# Patient Record
Sex: FEMALE | Race: BLACK OR AFRICAN AMERICAN | NOT HISPANIC OR LATINO | Employment: OTHER | URBAN - METROPOLITAN AREA
[De-identification: names, ages, dates, MRNs, and addresses within clinical notes are randomized per-mention and may not be internally consistent; named-entity substitution may affect disease eponyms.]

---

## 2017-03-24 ENCOUNTER — OFFICE VISIT (OUTPATIENT)
Dept: FAMILY MEDICINE | Facility: CLINIC | Age: 82
End: 2017-03-24
Payer: MEDICARE

## 2017-03-24 VITALS
HEIGHT: 64 IN | RESPIRATION RATE: 18 BRPM | BODY MASS INDEX: 23.82 KG/M2 | WEIGHT: 139.56 LBS | SYSTOLIC BLOOD PRESSURE: 130 MMHG | HEART RATE: 81 BPM | DIASTOLIC BLOOD PRESSURE: 72 MMHG | TEMPERATURE: 98 F | OXYGEN SATURATION: 99 %

## 2017-03-24 DIAGNOSIS — L23.9 ALLERGIC DERMATITIS: Primary | ICD-10-CM

## 2017-03-24 PROCEDURE — 99999 PR PBB SHADOW E&M-EST. PATIENT-LVL III: CPT | Mod: PBBFAC,,, | Performed by: FAMILY MEDICINE

## 2017-03-24 PROCEDURE — 99213 OFFICE O/P EST LOW 20 MIN: CPT | Mod: PBBFAC,PN | Performed by: FAMILY MEDICINE

## 2017-03-24 PROCEDURE — 99214 OFFICE O/P EST MOD 30 MIN: CPT | Mod: S$PBB,25,, | Performed by: FAMILY MEDICINE

## 2017-03-24 PROCEDURE — 96372 THER/PROPH/DIAG INJ SC/IM: CPT | Mod: PBBFAC,PN

## 2017-03-24 RX ORDER — TRIAMCINOLONE ACETONIDE 40 MG/ML
40 INJECTION, SUSPENSION INTRA-ARTICULAR; INTRAMUSCULAR
Status: COMPLETED | OUTPATIENT
Start: 2017-03-24 | End: 2017-03-24

## 2017-03-24 RX ORDER — KETOCONAZOLE 20 MG/G
CREAM TOPICAL
COMMUNITY
Start: 2016-12-24 | End: 2018-02-01 | Stop reason: SDUPTHER

## 2017-03-24 RX ORDER — HYDROXYZINE PAMOATE 25 MG/1
25 CAPSULE ORAL EVERY 8 HOURS PRN
Qty: 30 CAPSULE | Refills: 0 | Status: SHIPPED | OUTPATIENT
Start: 2017-03-24 | End: 2018-01-30

## 2017-03-24 RX ADMIN — TRIAMCINOLONE ACETONIDE 40 MG: 40 INJECTION, SUSPENSION INTRA-ARTICULAR; INTRAMUSCULAR at 11:03

## 2017-03-24 NOTE — MR AVS SNAPSHOT
[facility]  [address]  [place] [address]  Phone: [phone]                  Daiana Mcduffie   3/24/2017 10:15 AM   Office Visit    Description:  Female : 1929   Provider:  Nino Treviño MD   Department:  [facility]           Reason for Visit     Rash     Headache           Diagnoses this Visit        Comments    Allergic dermatitis    -  Primary            To Do List           Goals (5 Years of Data)     None      Follow-Up and Disposition     Return if symptoms worsen or fail to improve.       These Medications        Disp Refills Start End    hydrOXYzine pamoate (VISTARIL) 25 MG Cap 30 capsule 0 3/24/2017     Take 1 capsule (25 mg total) by mouth every 8 (eight) hours as needed. - Oral    Pharmacy: [id number] - [place], [place] - [address] Ph #: [phone]         [facility]     [facility] Care [facility] -  Assistance  Registered nurses in the [facility] provide clinical advisement, health education, appointment booking, and other advisory services.  Call for this free service at [phone].             Medications           Message regarding Medications     Verify the changes and/or additions to your medication regime listed below are the same as discussed with your clinician today.  If any of these changes or additions are incorrect, please notify your healthcare provider.        START taking these NEW medications        Refills    hydrOXYzine pamoate (VISTARIL) 25 MG Cap 0    Sig: Take 1 capsule (25 mg total) by mouth every 8 (eight) hours as needed.    Class: Normal    Route: Oral      These medications were administered today        Dose Freq    triamcinolone acetonide injection 40 mg 40 mg Clinic/HOD 1 time    Sig: Inject 1 mL (40 mg total) into the muscle one time.    Class: Normal    Route: Intramuscular           Verify that the below list of medications is an accurate representation of  "the medications you are currently taking.  If none reported, the list may be blank. If incorrect, please contact your healthcare provider. Carry this list with you in case of emergency.           Current Medications     amlodipine (NORVASC) 2.5 MG tablet Take 1 tablet (2.5 mg total) by mouth once daily.    atorvastatin (LIPITOR) 20 MG tablet TAKE ONE TABLET BY MOUTH ONCE DAILY    cetirizine (ZYRTEC) 10 MG tablet Take 1 tablet (10 mg total) by mouth every evening.    clopidogrel (PLAVIX) 75 mg tablet Take 1 tablet (75 mg total) by mouth once daily.    fluticasone (FLONASE) 50 mcg/actuation nasal spray 1 spray by Each Nare route once daily.    ketoconazole (NIZORAL) 2 % cream     lisinopril (PRINIVIL,ZESTRIL) 40 MG tablet Take 1 tablet (40 mg total) by mouth once daily.    meclizine (ANTIVERT) 25 mg tablet TAKE ONE TABLET BY MOUTH THREE TIMES DAILY AS NEEDED FOR  DIZZINESS    triamcinolone acetonide 0.1% (KENALOG) 0.1 % cream Apply topically 2 (two) times daily.    hydrOXYzine pamoate (VISTARIL) 25 MG Cap Take 1 capsule (25 mg total) by mouth every 8 (eight) hours as needed.           Clinical Reference Information           Your Vitals Were     BP Pulse Temp Resp Height Weight    130/72 (BP Location: Right arm, Patient Position: Sitting, BP Method: Manual) 81 97.6 °F (36.4 °C) (Oral) 18 5' 4" (1.626 m) 63.3 kg (139 lb 8.8 oz)    SpO2 BMI             99% 23.95 kg/m2         Blood Pressure          Most Recent Value    BP  130/72      Allergies as of 3/24/2017     Ibuprofen    Penicillins      Immunizations Administered on Date of Encounter - 3/24/2017     None      [facility] Sign-Up     Activating your [facility] account is as easy as 1-2-3!     1) Visit [email or web address], select Sign Up Now, enter this activation code and your date of birth, then select Next.  [email or web address]  Expires: 5/8/2017 10:50 AM      2) Create a username and password to use when you visit [facility] in the future and select a security question " in case you lose your password and select Next.    3) Enter your e-mail address and click Sign Up!    Additional Information  If you have questions, please e-mail [email or web address] or call [phone] to talk to our [facility] staff. Remember, [facility] is NOT to be used for urgent needs. For medical emergencies, dial 911.         Language Assistance Services     ATTENTION: Language assistance services are available, free of charge. Please call [phone].      ATENCIÓN: Si habla español, tiene a gordon disposición servicios gratuitos de asistencia lingüística. Llame al [phone].     CHÚ Ý: N?u b?n nói Ti?ng Vi?t, có các d?ch v? h? tr? ngôn ng? mi?n phí dành cho b?n. G?i s? [phone].         [facility] complies with applicable Federal civil rights laws and does not discriminate on the basis of race, color, national origin, age, disability, or sex.

## 2017-03-24 NOTE — PROGRESS NOTES
Routine Office Visit    Patient Name: Daiana Mcduffie    : 1929  MRN: [id number]    Subjective:  Daiana is a 87 y.o. female who presents today for:    1. Itching  Patient states that she has been getting bitten by something that causes her to form red bumps and patches of redness all over.  She states that she is constantly itching.  She thought it may be bed bugs, but had her home inspected and told she didn't have bed bugs.  She doesn't know of anything that she is allergic to other than a couple of medications.  She has no n/v/d, wheezing, or shortness of breath.  She has not taken any OTC antihistamine.     Past Medical History  Past Medical History:   Diagnosis Date    GERD (gastroesophageal reflux disease)     Hyperlipidemia     Hypertension        Past Surgical History  History reviewed. No pertinent surgical history.    Family History  History reviewed. No pertinent family history.    Social History  Social History     Social History    Marital status:      Spouse name: N/A    Number of children: N/A    Years of education: N/A     Occupational History    Not on file.     Social History Main Topics    Smoking status: Never Smoker    Smokeless tobacco: Not on file    Alcohol use No    Drug use: No    Sexual activity: No     Other Topics Concern    Not on file     Social History Narrative       Current Medications  Current Outpatient Prescriptions on File Prior to Visit   Medication Sig Dispense Refill    amlodipine (NORVASC) 2.5 MG tablet Take 1 tablet (2.5 mg total) by mouth once daily. 30 tablet 0    atorvastatin (LIPITOR) 20 MG tablet TAKE ONE TABLET BY MOUTH ONCE DAILY 30 tablet 2    cetirizine (ZYRTEC) 10 MG tablet Take 1 tablet (10 mg total) by mouth every evening.  0    clopidogrel (PLAVIX) 75 mg tablet Take 1 tablet (75 mg total) by mouth once daily. 30 tablet 11    fluticasone (FLONASE) 50 mcg/actuation nasal spray 1 spray by Each Nare route once daily.      lisinopril  "(PRINIVIL,ZESTRIL) 40 MG tablet Take 1 tablet (40 mg total) by mouth once daily. 30 tablet 0    meclizine (ANTIVERT) 25 mg tablet TAKE ONE TABLET BY MOUTH THREE TIMES DAILY AS NEEDED FOR  DIZZINESS 30 tablet 0    triamcinolone acetonide 0.1% (KENALOG) 0.1 % cream Apply topically 2 (two) times daily. 28.4 g 0     No current facility-administered medications on file prior to visit.        Allergies   Review of patient's allergies indicates:   Allergen Reactions    Ibuprofen     Penicillins        Review of Systems (Pertinent positives)  Review of Systems   Constitutional: Negative.    HENT: Negative.    Eyes: Negative.    Respiratory: Negative.    Cardiovascular: Negative.    Gastrointestinal: Negative.    Skin: Positive for itching and rash.         /72 (BP Location: Right arm, Patient Position: Sitting, BP Method: Manual)  Pulse 81  Temp 97.6 °F (36.4 °C) (Oral)   Resp 18  Ht 5' 4" (1.626 m)  Wt 63.3 kg (139 lb 8.8 oz)  SpO2 99%  BMI 23.95 kg/m2    GENERAL APPEARANCE: in no apparent distress and well developed and well nourished  HEENT: PERRL, EOMI, Sclera clear, anicteric, Oropharynx clear, no lesions, Neck supple with midline trachea  NECK: normal, supple, no adenopathy, thyroid normal in size  RESPIRATORY: appears well, vitals normal, no respiratory distress, acyanotic, normal RR, chest clear, no wheezing, crepitations, rhonchi, normal symmetric air entry  HEART: regular rate and rhythm, S1, S2 normal, no murmur, click, rub or gallop.    ABDOMEN: abdomen is soft without tenderness, no masses, no hernias, no organomegaly, no rebound, no guarding. Suprapubic tenderness absent. No CVA tenderness.  SKIN: small, erythematous bump noted to right bicep, no other rash seen  PSYCH: Alert, oriented x 3, thought content appropriate, speech normal, pleasant and cooperative, good eye contact, well groomed    Assessment/Plan:  Daiana Mcduffie is a 87 y.o. female who presents today for :    [place] was seen " today for rash and headache.    Diagnoses and all orders for this visit:    Allergic dermatitis  -     hydrOXYzine pamoate (VISTARIL) 25 MG Cap; Take 1 capsule (25 mg total) by mouth every 8 (eight) hours as needed.  -     triamcinolone acetonide injection 40 mg; Inject 1 mL (40 mg total) into the muscle one time.      1.  Steroid for relief of itching  2.  Vistaril for recurring itching  3.  Follow up as needed  4.  She is to call if symptoms persist or worsen    Nino Treviño MD

## 2017-05-09 ENCOUNTER — OFFICE VISIT (OUTPATIENT)
Dept: FAMILY MEDICINE | Facility: CLINIC | Age: 82
End: 2017-05-09
Payer: MEDICARE

## 2017-05-09 VITALS
TEMPERATURE: 98 F | RESPIRATION RATE: 17 BRPM | HEART RATE: 85 BPM | OXYGEN SATURATION: 97 % | DIASTOLIC BLOOD PRESSURE: 86 MMHG | BODY MASS INDEX: 23.37 KG/M2 | WEIGHT: 136.88 LBS | SYSTOLIC BLOOD PRESSURE: 136 MMHG | HEIGHT: 64 IN

## 2017-05-09 DIAGNOSIS — R07.9 CHEST PAIN, UNSPECIFIED TYPE: Primary | ICD-10-CM

## 2017-05-09 DIAGNOSIS — I10 ESSENTIAL HYPERTENSION: ICD-10-CM

## 2017-05-09 PROCEDURE — 99213 OFFICE O/P EST LOW 20 MIN: CPT | Mod: PBBFAC,PN,25 | Performed by: INTERNAL MEDICINE

## 2017-05-09 PROCEDURE — 99214 OFFICE O/P EST MOD 30 MIN: CPT | Mod: S$PBB,,, | Performed by: INTERNAL MEDICINE

## 2017-05-09 PROCEDURE — 99999 PR PBB SHADOW E&M-EST. PATIENT-LVL III: CPT | Mod: PBBFAC,,, | Performed by: INTERNAL MEDICINE

## 2017-05-09 PROCEDURE — 93005 ELECTROCARDIOGRAM TRACING: CPT | Mod: PBBFAC,PN | Performed by: INTERNAL MEDICINE

## 2017-05-09 PROCEDURE — 93010 ELECTROCARDIOGRAM REPORT: CPT | Mod: ,,, | Performed by: INTERNAL MEDICINE

## 2017-05-09 NOTE — PROGRESS NOTES
Subjective:       Patient ID: Daiana Mcduffie is a 88 y.o. female.    Chief Complaint: Hypertension; Follow-up (3 month ); Chest Pain (x's one day ); and Sinusitis    HPI Comments: Today she complains of a 2 day history of intermittent chest pain associated with shortness of breath.  She describes her pain is 4 out of 10.  She does have pain currently though it is mild.  She denies any other associated symptoms.  She reports that the pain moves and she sometimes feels like she has something stuck in her throat.  She denies heartburn does she does belch from time to time.  This seems to help also.    Review of Systems   Constitutional: Negative for diaphoresis.   Respiratory: Positive for shortness of breath.    Cardiovascular: Positive for chest pain. Negative for leg swelling.   Gastrointestinal: Negative for abdominal pain and nausea.   Neurological: Negative for light-headedness.       Objective:      Physical Exam   Constitutional: She is oriented to person, place, and time. She appears well-developed and well-nourished. No distress.   HENT:   Head: Normocephalic and atraumatic.   Right Ear: External ear normal.   Left Ear: External ear normal.   Eyes: Conjunctivae are normal. No scleral icterus.   Cardiovascular: Normal rate, regular rhythm and normal heart sounds.  Exam reveals no gallop and no friction rub.    No murmur heard.  Pulmonary/Chest: Effort normal and breath sounds normal. No respiratory distress. She has no wheezes. She has no rales.   Abdominal: Soft. Bowel sounds are normal. She exhibits no distension and no mass. There is no tenderness. There is no rebound and no guarding.   Musculoskeletal: She exhibits no edema or tenderness.   Neurological: She is alert and oriented to person, place, and time. No cranial nerve deficit.   Skin: Skin is warm and dry.   Psychiatric: She has a normal mood and affect.   Vitals reviewed.      Assessment:       1. Chest pain, unspecified type    2. Essential  hypertension        Plan:       Daiana was seen today for hypertension, follow-up, chest pain and sinusitis.    Diagnoses and all orders for this visit:    Chest pain, unspecified type - I suspect her symptoms are GI in nature as she has improvement with belching and reports a feeling of fullness in her throat.  Trial of Zantac.  No significant changes on EKG.  She will follow-up if persistent symptoms  -     EKG 12-lead    Essential hypertension - BP controlled.       follow-up in 3 months

## 2017-06-26 DIAGNOSIS — E78.5 HYPERLIPIDEMIA, UNSPECIFIED HYPERLIPIDEMIA TYPE: ICD-10-CM

## 2017-06-26 RX ORDER — LISINOPRIL 40 MG/1
40 TABLET ORAL DAILY
Qty: 30 TABLET | Refills: 2 | Status: SHIPPED | OUTPATIENT
Start: 2017-06-26 | End: 2017-07-21 | Stop reason: SDUPTHER

## 2017-06-26 RX ORDER — ATORVASTATIN CALCIUM 20 MG/1
20 TABLET, FILM COATED ORAL DAILY
Qty: 30 TABLET | Refills: 2 | Status: SHIPPED | OUTPATIENT
Start: 2017-06-26 | End: 2017-10-27 | Stop reason: SDUPTHER

## 2017-06-26 NOTE — TELEPHONE ENCOUNTER
----- Message from Carly Rachel sent at 6/26/2017  1:38 PM CDT -----  Contact: SELF  REFILL: lisinopril (PRINIVIL,ZESTRIL) 40 MG tablet                 atorvastatin (LIPITOR) 20 MG tablet    PLEASE SEND TO [facility]

## 2017-07-20 ENCOUNTER — TELEPHONE (OUTPATIENT)
Dept: FAMILY MEDICINE | Facility: CLINIC | Age: 82
End: 2017-07-20

## 2017-07-20 ENCOUNTER — HOSPITAL ENCOUNTER (OUTPATIENT)
Dept: RADIOLOGY | Facility: HOSPITAL | Age: 82
Discharge: HOME | End: 2017-07-20
Attending: NURSE PRACTITIONER
Payer: MEDICARE

## 2017-07-20 ENCOUNTER — OFFICE VISIT (OUTPATIENT)
Dept: FAMILY MEDICINE | Facility: CLINIC | Age: 82
End: 2017-07-20
Payer: MEDICARE

## 2017-07-20 VITALS
TEMPERATURE: 98 F | HEIGHT: 64 IN | SYSTOLIC BLOOD PRESSURE: 130 MMHG | HEART RATE: 82 BPM | WEIGHT: 138 LBS | BODY MASS INDEX: 23.56 KG/M2 | DIASTOLIC BLOOD PRESSURE: 78 MMHG | OXYGEN SATURATION: 97 % | RESPIRATION RATE: 17 BRPM

## 2017-07-20 DIAGNOSIS — R68.83 CHILLS: ICD-10-CM

## 2017-07-20 DIAGNOSIS — R68.83 CHILLS: Primary | ICD-10-CM

## 2017-07-20 DIAGNOSIS — J30.9 ALLERGIC RHINITIS, UNSPECIFIED CHRONICITY, UNSPECIFIED SEASONALITY, UNSPECIFIED TRIGGER: ICD-10-CM

## 2017-07-20 PROCEDURE — 99999 PR PBB SHADOW E&M-EST. PATIENT-LVL IV: CPT | Mod: PBBFAC,,, | Performed by: NURSE PRACTITIONER

## 2017-07-20 PROCEDURE — 1126F AMNT PAIN NOTED NONE PRSNT: CPT | Mod: ,,, | Performed by: NURSE PRACTITIONER

## 2017-07-20 PROCEDURE — 1159F MED LIST DOCD IN RCRD: CPT | Mod: ,,, | Performed by: NURSE PRACTITIONER

## 2017-07-20 PROCEDURE — 99214 OFFICE O/P EST MOD 30 MIN: CPT | Mod: S$PBB,,, | Performed by: NURSE PRACTITIONER

## 2017-07-20 PROCEDURE — 99214 OFFICE O/P EST MOD 30 MIN: CPT | Mod: PBBFAC,25,PN | Performed by: NURSE PRACTITIONER

## 2017-07-20 RX ORDER — FLUTICASONE PROPIONATE 50 MCG
1 SPRAY, SUSPENSION (ML) NASAL DAILY
Qty: 1 BOTTLE | Refills: 1 | Status: SHIPPED | OUTPATIENT
Start: 2017-07-20 | End: 2018-10-01 | Stop reason: SDUPTHER

## 2017-07-20 NOTE — PROGRESS NOTES
Subjective:       Patient ID: Daiana Mcduffie is a 88 y.o. female.    Chief Complaint: Fatigue and Chills    HPI Ms Mcduffie is here for some vague complaints.  She reports some chills and fatigue.  She is requesting vitamins.  She denies any CP, SOB, rhinorrhea, cough, bloody or black stool.  Her appetite is good per her daughter.  Review of Systems   Constitutional: Positive for chills and fatigue. Negative for fever.   Respiratory: Negative.    Cardiovascular: Negative.        Objective:      Physical Exam   Constitutional: She is oriented to person, place, and time. She appears well-developed and well-nourished. She does not appear ill. No distress.   HENT:   Head: Normocephalic and atraumatic.   Mildly full TMs  Mildly enlarged turbinates.   Cardiovascular: Normal rate, regular rhythm and normal heart sounds.  Exam reveals no friction rub.    No murmur heard.  Pulses:       Dorsalis pedis pulses are 2+ on the right side, and 2+ on the left side.        Posterior tibial pulses are 2+ on the right side, and 2+ on the left side.   Pulmonary/Chest: Effort normal and breath sounds normal. No respiratory distress. She has no decreased breath sounds. She has no wheezes. She has no rhonchi. She has no rales.   Musculoskeletal: Normal range of motion. She exhibits no edema.   Neurological: She is alert and oriented to person, place, and time.   Skin: Skin is warm and dry.   Psychiatric: She has a normal mood and affect.   Vitals reviewed.      Assessment:       1. Chills    2. Allergic rhinitis, unspecified chronicity, unspecified seasonality, unspecified trigger        Plan:       Chills  -     POCT urinalysis, dipstick or tablet reag  -     Cancel: X-Ray Chest PA And Lateral; Future; Expected date: 07/20/2017    Allergic rhinitis, unspecified chronicity, unspecified seasonality, unspecified trigger  -     fluticasone (FLONASE) 50 mcg/actuation nasal spray; 1 spray by Each Nare route once daily.  Dispense: 1 Bottle;  Refill: 1    Her exam is WNL,   Check for UTI, pneumonia?  encouraged her to use children's gummy vitamins since she states she is unable to swallow pills whole.    She is unable to make urine, will take cup home and return.    F/u if not improved  Go to ER for new worse or concerning symptoms      Verbalized understanding

## 2017-07-20 NOTE — PATIENT INSTRUCTIONS
Follow up if not improved  Go to ER for new worse or concerning symptoms    Allergic Rhinitis  Allergic rhinitis is an allergic reaction that affects the nose, and often the eyes. Its often known as nasal allergies. Nasal allergies are often due to things in the environment that are breathed in. Depending what you are sensitive to, nasal allergies may occur only during certain seasons. Or they may occur year round. Common indoor allergens include house dust mites, mold, cockroaches, and pet dander. Outdoor allergens include pollen from trees, grasses, and weeds.   Symptoms include a drippy, stuffy, and itchy nose. They also include sneezing and red and itchy eyes. You may feel tired more often. Severe allergies may also affect your breathing and trigger a condition called asthma.   Tests can be done to see what allergens are affecting you. You may be referred to an allergy specialist for testing and further evaluation.  Home care  The healthcare provider may prescribe medicines to help relieve allergy symptoms.   Ask the provider for advice on how to avoid substances that you are allergic to. Below are a few tips for each type of allergen.  Pet dander:  · Do not have pets with fur and feathers.  · If you cannot avoid having a pet, keep it out of your bedroom and off upholstered furniture.  Pollen:  · When pollen counts are high, keep windows of your car and home closed. If possible, use an air conditioner instead.  · Wear a filter mask when mowing or doing yard work.  House dust mites:  · Wash bedding every week in warm water and detergent and dry on a hot setting.  · Cover the mattress, box spring, and pillows with allergy covers.   · If possible, sleep in a room with no carpet, curtains, or upholstered furniture.  Cockroaches:  · Store food in sealed containers.  · Remove garbage from the home promptly.  · Fix water leaks  Mold:  · Keep humidity low by using a dehumidifier or air conditioner. Keep the  dehumidifier and air conditioner clean and free of mold.  · Clean moldy areas with bleach and water.  In general:  · Vacuum once or twice a week. If possible, use a vacuum with a high-efficiency particulate air (HEPA) filter.  · Do not smoke. Avoid cigarette smoke. Cigarette smoke is an irritant that can make symptoms worse.  Follow-up care  Follow up as advised by the health care provider or our staff. If you were referred to an allergy specialist, make this appointment promptly.  When to seek medical advice  Call your healthcare provider right away if the following occur:  · Coughing or wheezing  · Fever greater than 100.4°F (38°C)  · Continuing symptoms, new symptoms, or worsening symptoms  Call 911 right away if you have:  · Trouble breathing  · Hives (raised red bumps)  · Severe swelling of the face or severe itching of the eyes or mouth  Date Last Reviewed: 4/26/2015  © 3589-1568 [employer]. [address] [place] [address], [place] [address]. All rights reserved. This information is not intended as a substitute for professional medical care. Always follow your healthcare professional's instructions.

## 2017-07-21 RX ORDER — LISINOPRIL 40 MG/1
40 TABLET ORAL DAILY
Qty: 30 TABLET | Refills: 2 | Status: SHIPPED | OUTPATIENT
Start: 2017-07-21 | End: 2017-07-21 | Stop reason: SDUPTHER

## 2017-07-21 RX ORDER — LISINOPRIL 40 MG/1
40 TABLET ORAL DAILY
Qty: 30 TABLET | Refills: 2 | Status: SHIPPED | OUTPATIENT
Start: 2017-07-21 | End: 2017-10-27 | Stop reason: SDUPTHER

## 2017-07-21 RX ORDER — AMLODIPINE BESYLATE 2.5 MG/1
2.5 TABLET ORAL DAILY
Qty: 30 TABLET | Refills: 0 | Status: CANCELLED | OUTPATIENT
Start: 2017-07-21 | End: 2018-07-21

## 2017-07-21 NOTE — TELEPHONE ENCOUNTER
----- Message from Stacy Lobo sent at 7/20/2017  4:04 PM CDT -----  Contact: Self   Patient returned your call. Please call again at [phone]

## 2017-08-04 ENCOUNTER — HOSPITAL ENCOUNTER (EMERGENCY)
Facility: HOSPITAL | Age: 82
Discharge: HOME | End: 2017-08-05
Attending: EMERGENCY MEDICINE
Payer: MEDICARE

## 2017-08-04 DIAGNOSIS — I10 ESSENTIAL HYPERTENSION: ICD-10-CM

## 2017-08-04 DIAGNOSIS — Z77.120 MOLD EXPOSURE: Primary | ICD-10-CM

## 2017-08-04 DIAGNOSIS — R05.9 COUGH: ICD-10-CM

## 2017-08-04 PROCEDURE — 99284 EMERGENCY DEPT VISIT MOD MDM: CPT

## 2017-08-05 VITALS
DIASTOLIC BLOOD PRESSURE: 91 MMHG | HEART RATE: 79 BPM | WEIGHT: 138 LBS | RESPIRATION RATE: 16 BRPM | BODY MASS INDEX: 23.56 KG/M2 | OXYGEN SATURATION: 96 % | HEIGHT: 64 IN | SYSTOLIC BLOOD PRESSURE: 177 MMHG | TEMPERATURE: 97 F

## 2017-08-05 LAB
ALBUMIN SERPL BCP-MCNC: 3.8 G/DL
ALP SERPL-CCNC: 73 U/L
ALT SERPL W/O P-5'-P-CCNC: 10 U/L
ANION GAP SERPL CALC-SCNC: 12 MMOL/L
AST SERPL-CCNC: 21 U/L
BASOPHILS # BLD AUTO: 0.03 K/UL
BASOPHILS NFR BLD: 0.5 %
BILIRUB SERPL-MCNC: 0.3 MG/DL
BNP SERPL-MCNC: 40 PG/ML
BUN SERPL-MCNC: 22 MG/DL
CALCIUM SERPL-MCNC: 9.9 MG/DL
CHLORIDE SERPL-SCNC: 106 MMOL/L
CO2 SERPL-SCNC: 24 MMOL/L
CREAT SERPL-MCNC: 1 MG/DL
DIFFERENTIAL METHOD: ABNORMAL
EOSINOPHIL # BLD AUTO: 0 K/UL
EOSINOPHIL NFR BLD: 0.5 %
ERYTHROCYTE [DISTWIDTH] IN BLOOD BY AUTOMATED COUNT: 12.9 %
EST. GFR  (AFRICAN AMERICAN): 58 ML/MIN/1.73 M^2
EST. GFR  (NON AFRICAN AMERICAN): 50 ML/MIN/1.73 M^2
GLUCOSE SERPL-MCNC: 114 MG/DL
HCT VFR BLD AUTO: 42.6 %
HGB BLD-MCNC: 13.4 G/DL
LYMPHOCYTES # BLD AUTO: 1.8 K/UL
LYMPHOCYTES NFR BLD: 31.1 %
MCH RBC QN AUTO: 29.3 PG
MCHC RBC AUTO-ENTMCNC: 31.5 G/DL
MCV RBC AUTO: 93 FL
MONOCYTES # BLD AUTO: 0.5 K/UL
MONOCYTES NFR BLD: 8.5 %
NEUTROPHILS # BLD AUTO: 3.5 K/UL
NEUTROPHILS NFR BLD: 59.4 %
PLATELET # BLD AUTO: 146 K/UL
PMV BLD AUTO: 10.4 FL
POTASSIUM SERPL-SCNC: 4 MMOL/L
PROT SERPL-MCNC: 7.9 G/DL
RBC # BLD AUTO: 4.58 M/UL
SODIUM SERPL-SCNC: 142 MMOL/L
TROPONIN I SERPL DL<=0.01 NG/ML-MCNC: <0.006 NG/ML
WBC # BLD AUTO: 5.88 K/UL

## 2017-08-05 PROCEDURE — 93005 ELECTROCARDIOGRAM TRACING: CPT

## 2017-08-05 PROCEDURE — 84484 ASSAY OF TROPONIN QUANT: CPT

## 2017-08-05 PROCEDURE — 85025 COMPLETE CBC W/AUTO DIFF WBC: CPT

## 2017-08-05 PROCEDURE — 83880 ASSAY OF NATRIURETIC PEPTIDE: CPT

## 2017-08-05 PROCEDURE — 80053 COMPREHEN METABOLIC PANEL: CPT

## 2017-08-05 RX ORDER — HYDRALAZINE HYDROCHLORIDE 20 MG/ML
5 INJECTION INTRAMUSCULAR; INTRAVENOUS
Status: DISCONTINUED | OUTPATIENT
Start: 2017-08-05 | End: 2017-08-05

## 2017-08-05 NOTE — ED PROVIDER NOTES
Encounter Date: 8/4/2017    SCRIBE #1 NOTE: I, Shabnam Jarod, am scribing for, and in the presence of, Eric Cook III, MD. Other sections scribed: HPI, ROS.       History     Chief Complaint   Patient presents with    mold intoxication     Stated has mold in her apartment and it's making her sick,complaining of slight headache,watery eyes.    Hypertension     Denies,blurred vision,nausea or vomiting.209/102     CC: Mold Intoxication and Hypertension    HPI: This 88 y.o. female with a past medical history of GERD, hyperlipidemia, and HTN presents to the ED c/o generalized weakness with occasional coughing, and a hoarse voice x2-3 days from mold in her house. Pt denies fever, blurred vision, and associated sx. Pt reports the mold is in her bed and sofa, and her bed is damp. Pt reports that she is always very cold, and mold is all over her house. No prior tx.             The history is provided by the patient. No  was used.     Review of patient's allergies indicates:   Allergen Reactions    Ibuprofen     Penicillins      Past Medical History:   Diagnosis Date    GERD (gastroesophageal reflux disease)     Hyperlipidemia     Hypertension      No past surgical history on file.  No family history on file.  Social History   Substance Use Topics    Smoking status: Never Smoker    Smokeless tobacco: Not on file    Alcohol use No     Review of Systems   Constitutional: Negative for fever.   HENT: Positive for voice change (hoarse). Negative for sore throat.    Respiratory: Positive for cough (occasionally). Negative for shortness of breath.    Cardiovascular: Negative for chest pain.   Gastrointestinal: Negative for nausea.   Genitourinary: Negative for dysuria.   Musculoskeletal: Negative for back pain.   Skin: Negative for rash.   Neurological: Positive for weakness (generalized).   Hematological: Does not bruise/bleed easily.       Physical Exam     Initial Vitals [08/04/17 2131]   BP  Pulse Resp Temp SpO2   (!) 209/102 101 12 98 °F (36.7 °C) 98 %      MAP       137.67         Physical Exam    Nursing note and vitals reviewed.  Constitutional: She appears well-developed and well-nourished. She is not diaphoretic. No distress.   HENT:   Head: Normocephalic and atraumatic.   Nose: Nose normal.   Mouth/Throat: Oropharynx is clear and moist. No oropharyngeal exudate.   Eyes: Conjunctivae and EOM are normal. Pupils are equal, round, and reactive to light. No scleral icterus.   Neck: Normal range of motion. Neck supple. No thyromegaly present. No tracheal deviation present.   Cardiovascular: Normal rate, regular rhythm and normal heart sounds. Exam reveals no gallop and no friction rub.    No murmur heard.  Pulmonary/Chest: Breath sounds normal. No respiratory distress. She has no wheezes. She has no rhonchi. She has no rales.   Abdominal: Soft. Bowel sounds are normal. She exhibits no distension and no mass. There is no tenderness. There is no rebound and no guarding.   Musculoskeletal: Normal range of motion. She exhibits no edema or tenderness.   Lymphadenopathy:     She has no cervical adenopathy.   Neurological: She is alert and oriented to person, place, and time. She has normal strength. No cranial nerve deficit or sensory deficit.   Skin: Skin is warm and dry. No rash noted. No erythema. No pallor.   Psychiatric: She has a normal mood and affect. Her behavior is normal. Thought content normal.         ED Course   Procedures  Labs Reviewed   COMPREHENSIVE METABOLIC PANEL   CBC W/ AUTO DIFFERENTIAL   B-TYPE NATRIURETIC PEPTIDE   TROPONIN I             Medical Decision Making:   Initial Assessment:   88-year-old female presents with multiple complaints as detailed above, stating that she thinks mold in her apartment is responsible.  Physical examination reveals severely elevated blood pressure but no other concerning abnormalities.  Differential Diagnosis:   Given patient's age and comorbidities  will obtain screening evaluation  Independently Interpreted Test(s):   I have ordered and independently interpreted X-rays - see summary below.       <> Summary of X-Ray Reading(s): Chest x-ray: No acute abnormality  I have ordered and independently interpreted EKG Reading(s) - see summary below       <> Summary of EKG Reading(s): Sinus rhythm at 102 bpm, normal axis, normal intervals, no acute ischemic change  ED Management:  Laboratory evaluation is unremarkable.  Small dose of antihypertensive medication was ordered but patient's blood pressure began to decrease spontaneously so this was not given.  Patient continues to be well-appearing, with normal cardiopulmonary exam, stable vital signs, normal mental status. Patient counseled regarding test results, recommendations for supportive care, and need for follow-up.  Return precautions given.              Scribe Attestation:   Scribe #1: I performed the above scribed service and the documentation accurately describes the services I performed. I attest to the accuracy of the note.    Attending Attestation:           Physician Attestation for Scribe:  Physician Attestation Statement for Scribe #1: I, Eric Cook III, MD, reviewed documentation, as scribed by Shabnam Olivera in my presence, and it is both accurate and complete.                 ED Course     Clinical Impression:   The primary encounter diagnosis was Mold exposure. Diagnoses of Cough and Essential hypertension were also pertinent to this visit.                           Eric Cook III, MD  08/05/17 9977

## 2017-08-05 NOTE — DISCHARGE INSTRUCTIONS
Please return to the emergency department if you develop difficulty breathing, worsening cough, fever higher than 100.4°, chest pain, severe headache, persistent vomiting, or for any new or worsening medical concerns.

## 2017-08-05 NOTE — ED TRIAGE NOTES
"Pt presents to ED c/o mold in her apartment, "it's making me feel sick." Pt reports intermittent chills, abdominal pain and nausea, and weakness x 1 week.   "

## 2017-08-31 ENCOUNTER — OFFICE VISIT (OUTPATIENT)
Dept: FAMILY MEDICINE | Facility: CLINIC | Age: 82
End: 2017-08-31
Payer: MEDICARE

## 2017-08-31 VITALS
HEIGHT: 64 IN | SYSTOLIC BLOOD PRESSURE: 126 MMHG | WEIGHT: 136.44 LBS | RESPIRATION RATE: 16 BRPM | BODY MASS INDEX: 23.29 KG/M2 | HEART RATE: 88 BPM | TEMPERATURE: 98 F | DIASTOLIC BLOOD PRESSURE: 82 MMHG | OXYGEN SATURATION: 95 %

## 2017-08-31 DIAGNOSIS — Z77.120 MOLD EXPOSURE: Primary | ICD-10-CM

## 2017-08-31 DIAGNOSIS — R06.02 SOB (SHORTNESS OF BREATH): ICD-10-CM

## 2017-08-31 PROCEDURE — 99214 OFFICE O/P EST MOD 30 MIN: CPT | Mod: PBBFAC,PN | Performed by: INTERNAL MEDICINE

## 2017-08-31 PROCEDURE — 1126F AMNT PAIN NOTED NONE PRSNT: CPT | Mod: ,,, | Performed by: INTERNAL MEDICINE

## 2017-08-31 PROCEDURE — 99214 OFFICE O/P EST MOD 30 MIN: CPT | Mod: S$PBB,,, | Performed by: INTERNAL MEDICINE

## 2017-08-31 PROCEDURE — 1159F MED LIST DOCD IN RCRD: CPT | Mod: ,,, | Performed by: INTERNAL MEDICINE

## 2017-08-31 PROCEDURE — 99999 PR PBB SHADOW E&M-EST. PATIENT-LVL IV: CPT | Mod: PBBFAC,,, | Performed by: INTERNAL MEDICINE

## 2017-08-31 RX ORDER — ALBUTEROL SULFATE 90 UG/1
2 AEROSOL, METERED RESPIRATORY (INHALATION) EVERY 4 HOURS PRN
Qty: 1 INHALER | Refills: 2 | Status: SHIPPED | OUTPATIENT
Start: 2017-08-31 | End: 2018-01-30

## 2017-08-31 NOTE — PROGRESS NOTES
Subjective:       Patient ID: Daiana Mcduffie is a 88 y.o. female.    Chief Complaint: Hoarse (due to mold) and Fatigue (due to mold)    She presents today with complaints of continued symptoms relation to mold in her apartment.  She reports that the mole was supposed to be cleaned but she continues to have symptoms.  She is currently on a waiting list for new housing and has been staying with friends intermittently.  She complains of intermittent headache as well as hoarseness and continued shortness of breath.  Flonase does help with her rhinitis.      Review of Systems   Constitutional: Positive for fatigue.   HENT: Positive for congestion, rhinorrhea and voice change.    Respiratory: Positive for shortness of breath.    Neurological: Positive for headaches.       Objective:      Physical Exam   Constitutional: She is oriented to person, place, and time. She appears well-developed and well-nourished. No distress.   HENT:   Head: Normocephalic and atraumatic.   Right Ear: Tympanic membrane, external ear and ear canal normal.   Left Ear: Tympanic membrane, external ear and ear canal normal.   Nose: Nose normal. No mucosal edema.   Mouth/Throat: Oropharynx is clear and moist. No oropharyngeal exudate.   Eyes: Conjunctivae and EOM are normal. Pupils are equal, round, and reactive to light. No scleral icterus.   Cardiovascular: Normal rate, regular rhythm and normal heart sounds.  Exam reveals no gallop and no friction rub.    No murmur heard.  Pulmonary/Chest: Effort normal and breath sounds normal. No respiratory distress. She has no wheezes. She has no rales.   Lymphadenopathy:     She has no cervical adenopathy.   Neurological: She is alert and oriented to person, place, and time. No cranial nerve deficit.   Skin: Skin is warm and dry. No rash noted.   Psychiatric: She has a normal mood and affect.   Vitals reviewed.      Assessment:       1. Mold exposure    2. SOB (shortness of breath)        Plan:       Daiana  was seen today for hoarse and fatigue.    Diagnoses and all orders for this visit:    Mold exposure - c/o continued symptoms including headache, fatigue, sob and rhinitis.  She has moved out of her apartment and is on the waiting list for a new housing.  Will refer to outpatient case management for assistance with any available community resources.  -     Ambulatory referral to Outpatient Case Management    SOB (shortness of breath) - complains of continued symptoms.  She has had 2 chest x-rays over the past 6 weeks which have been normal.  She is clear on exam.  PFTs as below.  Albuterol inhaler when necessary in the meantime.  -     albuterol 90 mcg/actuation inhaler; Inhale 2 puffs into the lungs every 4 (four) hours as needed for Wheezing or Shortness of Breath. Rescue  -     Complete PFT w/o bronchodilator; Future       F/u after pfts

## 2017-08-31 NOTE — PATIENT INSTRUCTIONS
Inhaler Use  The inhaler that you were prescribed contains a potent medicine. It should only be used as directed. The medicine in your inhaler must be breathed deeply into your lungs for it to work. It will not work at all if it only reaches your mouth and throat. Follow the instructions below for best results. And remember to follow your asthma action plan as given to you by your doctor.  1. Keep your inhaler at room temperature.  2. Hold the inhaler so that the part that goes into your mouth is at the bottom.  3. Shake the inhaler well and remove the cap.  4. Breathe out through your mouth to fully empty your lungs.  5. Place the inhaler in your mouth and close your lips tightly around it. (Or hold the inhaler 1 to 2 inches from your open mouth if told to do so by your healthcare provider.)  6. Squeeze the inhaler as you breathe in slowly through your mouth until your lungs are full of air, drawing the medicine deep into your lungs.  7. Hold your breath for 10 seconds, or as long as you can comfortably hold your breath. Then breathe out slowly.  8. If you have been advised to take 2 puffs, wait 5 minutes, then repeat steps 3-7 above. Waiting 5 minutes between puffs will alow the medicine to open up your lungs so the second puff can get deeper into the lungs. Replace the cap when done.  9. If you were prescribed both a steroid inhaler and a bronchodilator inhaler, use the bronchodilator first to open the air passages. Wait 5 minutes, then use the steroid inhaler.  10. Rinse your mouth with water and spit it out (especially after using a steroid inhaler). This is very important if you are using a steroid inhaler to prevent thrush, a mild yeast infection of the mouth and back of the throat.  11. A special chamber (spacer) may be prescribed that attaches to your inhaler. This increases the amount of medicine that goes to your lungs. It also improves how well each treatment works. Ask your doctor about this if you  did not receive one.    Keep it clean  Remove the metal canister and do not immerse it in water. Then clean the plastic mouthpiece, cap, and spacer if you have one, by rinsing them well in warm running water for 30 to 60 seconds. Shake off excess water and allow the mouthpiece to dry completely (overnight is recommended). This should be done once a week. If you need the inhaler before the mouthpiece is dry, shake off excess water, replace canister, and test spray 2 times (away from the face).  Warning  A steroid inhaler is used to prevent an asthma attack. Do not use this to treat an acute wheezing episode. Use only bronchodilator inhalers (quick relief) to treat an acute asthma attack.  If you find that your medicine is not working and you need to use it more often than prescribed, this could be a sign that your asthma is getting worse. Go to the emergency room or urgent care right away. An asthma attack is easiest to treat in the early stages before it becomes severe.  When to seek medical advice  Get prompt medical attention if any of the following occur:  · Increased wheezing or shortness of breath  · Need to use your inhalers more often than usual without relief  · Fever of 100.4°F (38°C) or higher, or as directed by your healthcare provider  · Coughing up lots of dark-colored or bloody sputum (mucus)  · Chest pain with each breath  · Blue lips or fingernails  · Peak flow reading less than 50% of your normal best  Date Last Reviewed: 12/2/2015  © 9108-1126 [employer]. [address] [place] [address], [place] [address]. All rights reserved. This information is not intended as a substitute for professional medical care. Always follow your healthcare professional's instructions.

## 2017-09-01 ENCOUNTER — TELEPHONE (OUTPATIENT)
Dept: FAMILY MEDICINE | Facility: CLINIC | Age: 82
End: 2017-09-01

## 2017-09-01 ENCOUNTER — OUTPATIENT CASE MANAGEMENT (OUTPATIENT)
Dept: ADMINISTRATIVE | Facility: OTHER | Age: 82
End: 2017-09-01

## 2017-09-01 NOTE — TELEPHONE ENCOUNTER
----- Message from Jolie Avelar sent at 9/1/2017  2:15 PM CDT -----  Contact: [phone]  Pt is requesting a call back to follow up on the  helping her find a place to stay because she cant stay at her house because of mold Please call pt at your earliest convenience.  Thanks !

## 2017-09-01 NOTE — PROGRESS NOTES
Thank you for the referral.  Patient has been assigned to Marilee FRAGOSO  for low risk screening for Outpatient Case Management.     Reason for referral: Low risk    Mold exposure    Thank you,    Viviana Villar, SSC

## 2017-09-01 NOTE — TELEPHONE ENCOUNTER
The pt. States that she can not stay at her home due to mold. She is requesting to move to [facility].    The pt. Needs the provider to call Gin at [phone], to report from a doctors stand point the urgency of her moving homes.

## 2017-09-05 ENCOUNTER — TELEPHONE (OUTPATIENT)
Dept: FAMILY MEDICINE | Facility: CLINIC | Age: 82
End: 2017-09-05

## 2017-09-05 ENCOUNTER — OUTPATIENT CASE MANAGEMENT (OUTPATIENT)
Dept: ADMINISTRATIVE | Facility: OTHER | Age: 82
End: 2017-09-05

## 2017-09-05 NOTE — TELEPHONE ENCOUNTER
----- Message from Davida Lobo sent at 9/5/2017  9:43 AM CDT -----  Patient states she was told to contact us. She needs us to call her back at [phone]. I'm not sure exactly what she's calling about. I was very confused. Thank you!

## 2017-09-05 NOTE — PROGRESS NOTES
Attempt #:  1  This CSW attempted to reach patient/caregiver to provide resource and left a message requesting a return call.      CSW contacted patient to provided housing resources. Patient reports she is on waiting list for [facility]. CSW ask patient if [facility] has been notified of mold issues. Patient is having a hard understanding the question. Patient placed her friend on the phone Mrs Chambers. CSW ask Mrs Chambers if issues has been reported to [facility]. Mrs Chambers didn't know. CSW encouraged patient and friend to seek legal advice. Patient reports she is on the wait list for another appt not sure of the name. Patient reports she will contact me back to give me the name.  Patient denied emergency shelter information . CSW will follow up with patient.

## 2017-09-07 ENCOUNTER — OUTPATIENT CASE MANAGEMENT (OUTPATIENT)
Dept: ADMINISTRATIVE | Facility: OTHER | Age: 82
End: 2017-09-07

## 2017-09-07 NOTE — LETTER
September 7, 2017    Daiana Mcduffie  [address]  [place] [address]             [facility]  [address]  [facility] [address] Dear:Ms. Mcduffie    I am writing from the Outpatient Complex Care Management Department at [facility].  I received a referral from Dr. Mcclain to contact you or your caregiver regarding any needs you may have. I have attempted to contact you or your cargeiver by phone two times unsuccessfully.  Please contact the Outpatient Complex Care Management Department at [phone] if you would like to discuss your needs.      Sincerely,         Marilee Haque MARYSOL

## 2017-09-07 NOTE — PROGRESS NOTES
Follow Up  Reason for referral: Housing Advice    CSW attempted to follow up with patient no answer. Phone just rung. No voicemail.   Letter with contact information was sent via [employer]  to patient/caregiver.  Referral source notified.

## 2017-09-08 ENCOUNTER — TELEPHONE (OUTPATIENT)
Dept: FAMILY MEDICINE | Facility: CLINIC | Age: 82
End: 2017-09-08

## 2017-09-08 NOTE — TELEPHONE ENCOUNTER
----- Message from Jolie Avelar sent at 9/8/2017 10:33 AM CDT -----  Contact: [phone]  Pt is requesting a call back in regards to her living issues Please call pt at your earliest convenience.  Thanks !

## 2017-09-09 ENCOUNTER — HOSPITAL ENCOUNTER (INPATIENT)
Facility: HOSPITAL | Age: 82
LOS: 3 days | Discharge: HOME HEALTH | DRG: 175 | End: 2017-09-12
Attending: EMERGENCY MEDICINE | Admitting: EMERGENCY MEDICINE
Payer: MEDICARE

## 2017-09-09 DIAGNOSIS — I50.30 (HFPEF) HEART FAILURE WITH PRESERVED EJECTION FRACTION: ICD-10-CM

## 2017-09-09 DIAGNOSIS — R79.89 ELEVATED TROPONIN: ICD-10-CM

## 2017-09-09 DIAGNOSIS — I26.99 OTHER ACUTE PULMONARY EMBOLISM: Primary | ICD-10-CM

## 2017-09-09 PROBLEM — E78.5 HYPERLIPIDEMIA: Status: ACTIVE | Noted: 2017-09-09

## 2017-09-09 LAB
ALBUMIN SERPL BCP-MCNC: 3.4 G/DL
ALP SERPL-CCNC: 80 U/L
ALT SERPL W/O P-5'-P-CCNC: 10 U/L
ANION GAP SERPL CALC-SCNC: 13 MMOL/L
AST SERPL-CCNC: 18 U/L
BASOPHILS # BLD AUTO: 0.02 K/UL
BASOPHILS NFR BLD: 0.3 %
BILIRUB SERPL-MCNC: 0.4 MG/DL
BNP SERPL-MCNC: 61 PG/ML
BUN SERPL-MCNC: 14 MG/DL
CALCIUM SERPL-MCNC: 9.6 MG/DL
CHLORIDE SERPL-SCNC: 109 MMOL/L
CO2 SERPL-SCNC: 23 MMOL/L
CREAT SERPL-MCNC: 0.9 MG/DL
DIFFERENTIAL METHOD: ABNORMAL
EOSINOPHIL # BLD AUTO: 0.1 K/UL
EOSINOPHIL NFR BLD: 1.2 %
ERYTHROCYTE [DISTWIDTH] IN BLOOD BY AUTOMATED COUNT: 13.1 %
EST. GFR  (AFRICAN AMERICAN): >60 ML/MIN/1.73 M^2
EST. GFR  (NON AFRICAN AMERICAN): 57 ML/MIN/1.73 M^2
GLUCOSE SERPL-MCNC: 126 MG/DL
HCT VFR BLD AUTO: 42.6 %
HGB BLD-MCNC: 13.5 G/DL
LYMPHOCYTES # BLD AUTO: 2.3 K/UL
LYMPHOCYTES NFR BLD: 37.9 %
MCH RBC QN AUTO: 29.1 PG
MCHC RBC AUTO-ENTMCNC: 31.7 G/DL
MCV RBC AUTO: 92 FL
MONOCYTES # BLD AUTO: 0.3 K/UL
MONOCYTES NFR BLD: 5.4 %
NEUTROPHILS # BLD AUTO: 3.3 K/UL
NEUTROPHILS NFR BLD: 55.2 %
PLATELET # BLD AUTO: 122 K/UL
PMV BLD AUTO: 9.6 FL
POTASSIUM SERPL-SCNC: 3.7 MMOL/L
PROT SERPL-MCNC: 7 G/DL
RBC # BLD AUTO: 4.64 M/UL
SODIUM SERPL-SCNC: 145 MMOL/L
TROPONIN I SERPL DL<=0.01 NG/ML-MCNC: 0.01 NG/ML
TROPONIN I SERPL DL<=0.01 NG/ML-MCNC: 0.29 NG/ML
WBC # BLD AUTO: 5.93 K/UL

## 2017-09-09 PROCEDURE — 96360 HYDRATION IV INFUSION INIT: CPT

## 2017-09-09 PROCEDURE — 83880 ASSAY OF NATRIURETIC PEPTIDE: CPT

## 2017-09-09 PROCEDURE — 96372 THER/PROPH/DIAG INJ SC/IM: CPT

## 2017-09-09 PROCEDURE — 25000003 PHARM REV CODE 250: Performed by: EMERGENCY MEDICINE

## 2017-09-09 PROCEDURE — 21400001 HC TELEMETRY ROOM

## 2017-09-09 PROCEDURE — 93005 ELECTROCARDIOGRAM TRACING: CPT

## 2017-09-09 PROCEDURE — 99285 EMERGENCY DEPT VISIT HI MDM: CPT | Mod: 25

## 2017-09-09 PROCEDURE — 25500020 PHARM REV CODE 255: Performed by: EMERGENCY MEDICINE

## 2017-09-09 PROCEDURE — 27000221 HC OXYGEN, UP TO 24 HOURS

## 2017-09-09 PROCEDURE — 80053 COMPREHEN METABOLIC PANEL: CPT

## 2017-09-09 PROCEDURE — 94761 N-INVAS EAR/PLS OXIMETRY MLT: CPT

## 2017-09-09 PROCEDURE — 25000003 PHARM REV CODE 250: Performed by: HOSPITALIST

## 2017-09-09 PROCEDURE — 85025 COMPLETE CBC W/AUTO DIFF WBC: CPT

## 2017-09-09 PROCEDURE — 63600175 PHARM REV CODE 636 W HCPCS: Performed by: EMERGENCY MEDICINE

## 2017-09-09 PROCEDURE — 96361 HYDRATE IV INFUSION ADD-ON: CPT

## 2017-09-09 PROCEDURE — 84484 ASSAY OF TROPONIN QUANT: CPT | Mod: 91

## 2017-09-09 PROCEDURE — 99900035 HC TECH TIME PER 15 MIN (STAT)

## 2017-09-09 RX ORDER — FAMOTIDINE 20 MG/1
20 TABLET, FILM COATED ORAL 2 TIMES DAILY
Status: DISCONTINUED | OUTPATIENT
Start: 2017-09-09 | End: 2017-09-09

## 2017-09-09 RX ORDER — FAMOTIDINE 20 MG/1
20 TABLET, FILM COATED ORAL DAILY
Status: DISCONTINUED | OUTPATIENT
Start: 2017-09-09 | End: 2017-09-12 | Stop reason: HOSPADM

## 2017-09-09 RX ORDER — ALBUTEROL SULFATE 90 UG/1
2 AEROSOL, METERED RESPIRATORY (INHALATION) EVERY 4 HOURS PRN
Status: DISCONTINUED | OUTPATIENT
Start: 2017-09-09 | End: 2017-09-12 | Stop reason: HOSPADM

## 2017-09-09 RX ORDER — SODIUM CHLORIDE 9 MG/ML
1000 INJECTION, SOLUTION INTRAVENOUS
Status: COMPLETED | OUTPATIENT
Start: 2017-09-09 | End: 2017-09-09

## 2017-09-09 RX ORDER — ATORVASTATIN CALCIUM 10 MG/1
20 TABLET, FILM COATED ORAL DAILY
Status: DISCONTINUED | OUTPATIENT
Start: 2017-09-09 | End: 2017-09-12 | Stop reason: HOSPADM

## 2017-09-09 RX ORDER — ENOXAPARIN SODIUM 100 MG/ML
1 INJECTION SUBCUTANEOUS
Status: DISCONTINUED | OUTPATIENT
Start: 2017-09-10 | End: 2017-09-09

## 2017-09-09 RX ORDER — NITROGLYCERIN 0.4 MG/1
0.4 TABLET SUBLINGUAL EVERY 5 MIN PRN
Status: DISCONTINUED | OUTPATIENT
Start: 2017-09-09 | End: 2017-09-12 | Stop reason: HOSPADM

## 2017-09-09 RX ORDER — LISINOPRIL 20 MG/1
40 TABLET ORAL DAILY
Status: DISCONTINUED | OUTPATIENT
Start: 2017-09-09 | End: 2017-09-11

## 2017-09-09 RX ORDER — ENOXAPARIN SODIUM 100 MG/ML
1 INJECTION SUBCUTANEOUS
Status: DISCONTINUED | OUTPATIENT
Start: 2017-09-10 | End: 2017-09-11

## 2017-09-09 RX ORDER — MECLIZINE HCL 12.5 MG 12.5 MG/1
12.5 TABLET ORAL 3 TIMES DAILY PRN
Status: DISCONTINUED | OUTPATIENT
Start: 2017-09-09 | End: 2017-09-12 | Stop reason: HOSPADM

## 2017-09-09 RX ORDER — ENOXAPARIN SODIUM 100 MG/ML
92 INJECTION SUBCUTANEOUS
Status: DISCONTINUED | OUTPATIENT
Start: 2017-09-09 | End: 2017-09-09

## 2017-09-09 RX ORDER — FLUTICASONE PROPIONATE 50 MCG
1 SPRAY, SUSPENSION (ML) NASAL DAILY
Status: DISCONTINUED | OUTPATIENT
Start: 2017-09-09 | End: 2017-09-12 | Stop reason: HOSPADM

## 2017-09-09 RX ADMIN — SODIUM CHLORIDE 1000 ML: 0.9 INJECTION, SOLUTION INTRAVENOUS at 12:09

## 2017-09-09 RX ADMIN — NITROGLYCERIN 0.4 MG: 0.4 TABLET SUBLINGUAL at 10:09

## 2017-09-09 RX ADMIN — IOHEXOL 70 ML: 350 INJECTION, SOLUTION INTRAVENOUS at 12:09

## 2017-09-09 RX ADMIN — FAMOTIDINE 20 MG: 20 TABLET, FILM COATED ORAL at 04:09

## 2017-09-09 RX ADMIN — ATORVASTATIN CALCIUM 20 MG: 10 TABLET, FILM COATED ORAL at 04:09

## 2017-09-09 RX ADMIN — ENOXAPARIN SODIUM 90 MG: 100 INJECTION SUBCUTANEOUS at 02:09

## 2017-09-09 RX ADMIN — LISINOPRIL 40 MG: 20 TABLET ORAL at 04:09

## 2017-09-09 NOTE — H&P
[facility] Hospital Medicine  History & Physical    Patient Name: Daiana Mcduffie  MRN: [id number]  Admission Date: 2017  Attending Physician: Sreedhar Baird MD   Primary Care Provider: Pawan Mcclain MD         Patient information was obtained from patient and ER records.     Subjective:     Principal Problem:Acute pulmonary embolism    Chief Complaint:   Chief Complaint   Patient presents with    Chest Pain     constant midsternal chest pain since this morning and sob        HPI: This 88 y.o. female with HTN, HLD, GERD presents to the ED c/o SOB and chest pain that developed just a few minutes prior to arrival to the ED while she and her daughter were getting ready to attend a . Her pain is severe (8/10) and acute. Pt states that she is feeling SOB this morning.she was slightly hypoxic 88% Spo2 on RA improved with supplemental oxygen,, Pt denies any sore throat, N/V, fever;cough.CTA chest show bilateral PE,patient as been stared on lovenox,no reported recent surgery,long travel,fracture,history of blood clot or malignancy.    Past Medical History:   Diagnosis Date    GERD (gastroesophageal reflux disease)     Hyperlipidemia     Hypertension        History reviewed. No pertinent surgical history.    Review of patient's allergies indicates:   Allergen Reactions    Ibuprofen     Penicillins        No current facility-administered medications on file prior to encounter.      Current Outpatient Prescriptions on File Prior to Encounter   Medication Sig    atorvastatin (LIPITOR) 20 MG tablet Take 1 tablet (20 mg total) by mouth once daily.    lisinopril (PRINIVIL,ZESTRIL) 40 MG tablet Take 1 tablet (40 mg total) by mouth once daily.    albuterol 90 mcg/actuation inhaler Inhale 2 puffs into the lungs every 4 (four) hours as needed for Wheezing or Shortness of Breath. Rescue    amlodipine (NORVASC) 2.5 MG tablet Take 1 tablet (2.5 mg total) by mouth once daily.    cetirizine  (ZYRTEC) 10 MG tablet Take 1 tablet (10 mg total) by mouth every evening.    clopidogrel (PLAVIX) 75 mg tablet Take 1 tablet (75 mg total) by mouth once daily.    fluticasone (FLONASE) 50 mcg/actuation nasal spray 1 spray by Each Nare route once daily.    hydrOXYzine pamoate (VISTARIL) 25 MG Cap Take 1 capsule (25 mg total) by mouth every 8 (eight) hours as needed.    ketoconazole (NIZORAL) 2 % cream     meclizine (ANTIVERT) 25 mg tablet TAKE ONE TABLET BY MOUTH THREE TIMES DAILY AS NEEDED FOR  DIZZINESS    ranitidine (ZANTAC) 150 MG tablet Take 1 tablet (150 mg total) by mouth 2 (two) times daily.    triamcinolone acetonide 0.1% (KENALOG) 0.1 % cream Apply topically 2 (two) times daily.     Family History     None        Social History Main Topics    Smoking status: Never Smoker    Smokeless tobacco: Never Used    Alcohol use No    Drug use: No    Sexual activity: No     Review of Systems   Constitutional: Negative for activity change and appetite change.   HENT: Negative for congestion and dental problem.    Eyes: Negative for discharge and itching.   Respiratory: Positive for shortness of breath. Negative for cough.    Cardiovascular: Positive for chest pain. Negative for leg swelling.   Gastrointestinal: Negative for abdominal distention and abdominal pain.   Endocrine: Negative for cold intolerance and heat intolerance.   Genitourinary: Negative for difficulty urinating and dyspareunia.   Musculoskeletal: Negative for arthralgias and back pain.   Skin: Negative for color change.   Allergic/Immunologic: Negative for environmental allergies and food allergies.   Neurological: Negative for dizziness and facial asymmetry.   Hematological: Negative for adenopathy. Does not bruise/bleed easily.   Psychiatric/Behavioral: Negative for agitation and behavioral problems.     Objective:     Vital Signs (Most Recent):  Temp: 96.2 °F (35.7 °C) (pt just drank water) (09/09/17 1025)  Pulse: 96 (09/09/17  1325)  Resp: 18 (09/09/17 1043)  BP: 138/77 (09/09/17 1211)  SpO2: 99 % (09/09/17 1325) Vital Signs (24h Range):  Temp:  [96.2 °F (35.7 °C)] 96.2 °F (35.7 °C)  Pulse:  [] 96  Resp:  [18] 18  SpO2:  [81 %-99 %] 99 %  BP: (114-156)/(66-83) 138/77     Weight: 61.7 kg (136 lb)  Body mass index is 23.34 kg/m².    Physical Exam   Constitutional: She is oriented to person, place, and time. No distress.   HENT:   Head: Normocephalic.   Eyes: EOM are normal. Pupils are equal, round, and reactive to light.   Neck: Normal range of motion. Neck supple.   Cardiovascular: Normal rate and regular rhythm.    Pulmonary/Chest: Effort normal and breath sounds normal.   Abdominal: Soft.   Musculoskeletal: Normal range of motion. She exhibits no edema or deformity.   Neurological: She is oriented to person, place, and time. No cranial nerve deficit. Coordination normal.   Skin: Skin is warm and dry.   Psychiatric: She has a normal mood and affect.        Significant Labs:   BMP:   Recent Labs  Lab 09/09/17  1100   *      K 3.7      CO2 23   BUN 14   CREATININE 0.9   CALCIUM 9.6     CBC:   Recent Labs  Lab 09/09/17  1100   WBC 5.93   HGB 13.5   HCT 42.6   *       Significant Imaging: reviewed    Assessment/Plan:     * Acute pulmonary embolism    Possible duo to sedentary life style,will check lower leg DVT and will continue with Lovenox.          Essential hypertension    Continue with home medication.          Hyperlipidemia    Continue with stain.            VTE Risk Mitigation     None             Leonidas Bartlett MD  Department of Hospital Medicine   [facility]

## 2017-09-09 NOTE — ED PROVIDER NOTES
Encounter Date: 2017    SCRIBE #1 NOTE: I, Fidel Tone, am scribing for, and in the presence of, Wilmer Baird MD. Other sections scribed: HPI, ROS.       History     Chief Complaint   Patient presents with    Chest Pain     constant midsternal chest pain since this morning and sob     CC: Chest Pain  HPI: This 88 y.o. female with HTN, HLD, GERD presents to the ED c/o chest pain that developed just a few minutes prior to arrival to the ED while she and her daughter were getting ready to attend a . Her pain is severe (8/10) and acute. Pt states that she is feeling SOB this morning. Pt denies any sore throat, N/V, fever; daughter denies any coughing. Pt denies any Hx of A-fib or cardiac problems.        The history is provided by the patient and a relative.     Review of patient's allergies indicates:   Allergen Reactions    Ibuprofen     Penicillins      Past Medical History:   Diagnosis Date    GERD (gastroesophageal reflux disease)     Hyperlipidemia     Hypertension      History reviewed. No pertinent surgical history.  History reviewed. No pertinent family history.  Social History   Substance Use Topics    Smoking status: Never Smoker    Smokeless tobacco: Never Used    Alcohol use No     Review of Systems   Constitutional: Negative for chills and fever.   HENT: Negative for ear pain and sore throat.    Eyes: Negative for pain.   Respiratory: Positive for shortness of breath.    Cardiovascular: Positive for chest pain.   Gastrointestinal: Negative for abdominal pain, nausea and vomiting.   Genitourinary: Negative for dysuria.   Musculoskeletal: Negative for myalgias.   Skin: Negative for rash and wound.   Neurological: Negative for headaches.   All other systems reviewed and are negative.      Physical Exam     Initial Vitals [17 1025]   BP Pulse Resp Temp SpO2   114/66 92 18 96.2 °F (35.7 °C) (!) 90 %      MAP       82         Physical Exam    Nursing note and vitals  reviewed.    PHYSICAL EXAM:  saO2 on room air during interview  GEN:   NAD, A & Ox3, atraumatic, well appearing non toxic  HEENT:  PERRLA, EOMI, moist membranes, nl conjunctiva, no scleral icterus, no nystagmus, no nodes/nodules, soft, supple, FROM, no trachial deviation, nexus negative  CV:   RRR no m/r/g, 2+ radial pulses, <2sec cap refill  RESP:  no w/r/r, equal and bilateral chest rise, mild respiratory distress with tachypnea. Decreased breath sounds in bilateral bases.  ABD:   soft, Nontender, Nondistended, +BS  BACK:  FROM, no midline tenderness, no paraspinal tenderness  :   Deferred  EXT:   FROM, POWERS x 4, no edema, no calf tenderness, no swelling  LYMPH:  no gross adenopathy  NEURO:  CN II-XII grossly intact, no obvious motor/sensory deficit  PSYCH:   no SI/HI, no anxiety, nl mood, nl judgement/thought process. Flat affect.  SKIN:  Warm, dry, intact, no rashes/lesions or masses, nl color, no pallor      ED Course   Procedures  Labs Reviewed   CBC W/ AUTO DIFFERENTIAL - Abnormal; Notable for the following:        Result Value    MCHC 31.7 (*)     Platelets 122 (*)     All other components within normal limits   COMPREHENSIVE METABOLIC PANEL - Abnormal; Notable for the following:     Glucose 126 (*)     Albumin 3.4 (*)     eGFR if non  57 (*)     All other components within normal limits   TROPONIN I   B-TYPE NATRIURETIC PEPTIDE     EKG Readings: (Independently Interpreted)   Initial Reading: No STEMI. Previous EKG: Compared with most recent EKG Ectopy: No Ectopy. Conduction: Normal. ST Segments: Non-Specific ST Segment Depression. Axis: Normal.   10:40 NSR HR 90       X-Rays:   Other Radiology Reports:   Discussed with Radiology : CT chest bilateral PE     Medical Decision Making:   Differential Diagnosis:   Chest pain differential includes but not exclusive to myocardial infarction, pulmonary embolism, aortic dissection, costochondritis, GERD, pneumonia, malingering      Independently  Interpreted Test(s):   I have ordered and independently interpreted X-rays - see prior notes.  I have ordered and independently interpreted EKG Reading(s) - see prior notes  Clinical Tests:   Lab Tests: Ordered and Reviewed  The following lab test(s) were unremarkable: BMP       <> Summary of Lab: Second troponin elevated  Radiological Study: Ordered and Reviewed  Medical Tests: Ordered and Reviewed  ED Management:  Treatment plan includes physical exam, cardiac monitoring, labs, imaging studies, oxygne IVF and supportive care.    Patient noted to need more oxygen even on Nasal cannula.  Switched to face mask    12:48 Patient improved after treatment and tolerating Po.  saO2 100% titrate down face mask to nasal cannula.  Awaiting results CT chest      Patient updated on care   Second troponin elevation possible secondary to Pe.  ASA held as allergy to NSAID. lovenox order.  Currently CP free  Further plan per inpatient team              Scribe Attestation:   Scribe #1: I performed the above scribed service and the documentation accurately describes the services I performed. I attest to the accuracy of the note.    Attending Attestation:           Physician Attestation for Scribe:  Physician Attestation Statement for Scribe #1: I, Sreedhar Baird MD, reviewed documentation, as scribed by Fidel Wayne in my presence, and it is both accurate and complete.                 ED Course      Clinical Impression:   The primary encounter diagnosis was Other acute pulmonary embolism. A diagnosis of Elevated troponin was also pertinent to this visit. hypoxia    Disposition:   Disposition: Admitted  Condition: Ailyn Baird MD  09/09/17 8660

## 2017-09-09 NOTE — HPI
This 88 y.o. female with HTN, HLD, GERD presents to the ED c/o SOB and chest pain that developed just a few minutes prior to arrival to the ED while she and her daughter were getting ready to attend a . Her pain is severe (8/10) and acute. Pt states that she is feeling SOB this morning.she was slightly hypoxic 88% Spo2 on RA improved with supplemental oxygen,, Pt denies any sore throat, N/V, fever;cough.CTA chest show bilateral PE,patient as been stared on lovenox,no reported recent surgery,long travel,fracture,history of blood clot or malignancy.

## 2017-09-09 NOTE — NURSING
Received patient from ER to room via stretcher with 3L O2 nc. Patient accompanied by transport and family friend Mable Larsen. Transferred patient to bed. Evaluated general patient appearance and condition. Admit assessment initiated. Tele monitoring initiated. Saline lock at RFa 20G and 18 G Intact. ED IVF discontinued.Family friend Mable states she is temporarily living with patient. Patient states she feels tightness in her chest with increase on inspiration. No apparent distress noted at this time.

## 2017-09-09 NOTE — SUBJECTIVE & OBJECTIVE
Past Medical History:   Diagnosis Date    GERD (gastroesophageal reflux disease)     Hyperlipidemia     Hypertension        History reviewed. No pertinent surgical history.    Review of patient's allergies indicates:   Allergen Reactions    Ibuprofen     Penicillins        No current facility-administered medications on file prior to encounter.      Current Outpatient Prescriptions on File Prior to Encounter   Medication Sig    atorvastatin (LIPITOR) 20 MG tablet Take 1 tablet (20 mg total) by mouth once daily.    lisinopril (PRINIVIL,ZESTRIL) 40 MG tablet Take 1 tablet (40 mg total) by mouth once daily.    albuterol 90 mcg/actuation inhaler Inhale 2 puffs into the lungs every 4 (four) hours as needed for Wheezing or Shortness of Breath. Rescue    amlodipine (NORVASC) 2.5 MG tablet Take 1 tablet (2.5 mg total) by mouth once daily.    cetirizine (ZYRTEC) 10 MG tablet Take 1 tablet (10 mg total) by mouth every evening.    clopidogrel (PLAVIX) 75 mg tablet Take 1 tablet (75 mg total) by mouth once daily.    fluticasone (FLONASE) 50 mcg/actuation nasal spray 1 spray by Each Nare route once daily.    hydrOXYzine pamoate (VISTARIL) 25 MG Cap Take 1 capsule (25 mg total) by mouth every 8 (eight) hours as needed.    ketoconazole (NIZORAL) 2 % cream     meclizine (ANTIVERT) 25 mg tablet TAKE ONE TABLET BY MOUTH THREE TIMES DAILY AS NEEDED FOR  DIZZINESS    ranitidine (ZANTAC) 150 MG tablet Take 1 tablet (150 mg total) by mouth 2 (two) times daily.    triamcinolone acetonide 0.1% (KENALOG) 0.1 % cream Apply topically 2 (two) times daily.     Family History     None        Social History Main Topics    Smoking status: Never Smoker    Smokeless tobacco: Never Used    Alcohol use No    Drug use: No    Sexual activity: No     Review of Systems   Constitutional: Negative for activity change and appetite change.   HENT: Negative for congestion and dental problem.    Eyes: Negative for discharge and itching.    Respiratory: Positive for shortness of breath. Negative for cough.    Cardiovascular: Positive for chest pain. Negative for leg swelling.   Gastrointestinal: Negative for abdominal distention and abdominal pain.   Endocrine: Negative for cold intolerance and heat intolerance.   Genitourinary: Negative for difficulty urinating and dyspareunia.   Musculoskeletal: Negative for arthralgias and back pain.   Skin: Negative for color change.   Allergic/Immunologic: Negative for environmental allergies and food allergies.   Neurological: Negative for dizziness and facial asymmetry.   Hematological: Negative for adenopathy. Does not bruise/bleed easily.   Psychiatric/Behavioral: Negative for agitation and behavioral problems.     Objective:     Vital Signs (Most Recent):  Temp: 96.2 °F (35.7 °C) (pt just drank water) (09/09/17 1025)  Pulse: 96 (09/09/17 1325)  Resp: 18 (09/09/17 1043)  BP: 138/77 (09/09/17 1211)  SpO2: 99 % (09/09/17 1325) Vital Signs (24h Range):  Temp:  [96.2 °F (35.7 °C)] 96.2 °F (35.7 °C)  Pulse:  [] 96  Resp:  [18] 18  SpO2:  [81 %-99 %] 99 %  BP: (114-156)/(66-83) 138/77     Weight: 61.7 kg (136 lb)  Body mass index is 23.34 kg/m².    Physical Exam   Constitutional: She is oriented to person, place, and time. No distress.   HENT:   Head: Normocephalic.   Eyes: EOM are normal. Pupils are equal, round, and reactive to light.   Neck: Normal range of motion. Neck supple.   Cardiovascular: Normal rate and regular rhythm.    Pulmonary/Chest: Effort normal and breath sounds normal.   Abdominal: Soft.   Musculoskeletal: Normal range of motion. She exhibits no edema or deformity.   Neurological: She is oriented to person, place, and time. No cranial nerve deficit. Coordination normal.   Skin: Skin is warm and dry.   Psychiatric: She has a normal mood and affect.        Significant Labs:   BMP:   Recent Labs  Lab 09/09/17  1100   *      K 3.7      CO2 23   BUN 14   CREATININE 0.9    CALCIUM 9.6     CBC:   Recent Labs  Lab 09/09/17  1100   WBC 5.93   HGB 13.5   HCT 42.6   *       Significant Imaging: reviewed

## 2017-09-09 NOTE — ED PROVIDER NOTES
Encounter Date: 9/9/2017    SCRIBE #1 NOTE: I, Fidel Wayne, am scribing for, and in the presence of, Sreedhar Baird MD. Other sections scribed: HPI, ROS.       History     Chief Complaint   Patient presents with    Chest Pain     constant midsternal chest pain since this morning and sob     The history is provided by the patient and a relative.     Review of patient's allergies indicates:   Allergen Reactions    Ibuprofen     Penicillins      Past Medical History:   Diagnosis Date    GERD (gastroesophageal reflux disease)     Hyperlipidemia     Hypertension      No past surgical history on file.  No family history on file.  Social History   Substance Use Topics    Smoking status: Never Smoker    Smokeless tobacco: Never Used    Alcohol use No     Review of Systems   Constitutional: Negative for chills and fever.   HENT: Negative for sore throat.    Eyes: Negative for pain.   Respiratory: Positive for shortness of breath. Negative for cough.    Cardiovascular: Positive for chest pain.   Gastrointestinal: Negative for abdominal pain, nausea and vomiting.   Genitourinary: Negative for dysuria.   Musculoskeletal: Negative for myalgias.   Skin: Negative for rash and wound.   Neurological: Negative for syncope.       Physical Exam     Initial Vitals [09/09/17 1025]   BP Pulse Resp Temp SpO2   114/66 92 18 96.2 °F (35.7 °C) (!) 90 %      MAP       82         Physical Exam    Pulmonary/Chest:   Decreased breath sounds in bilateral bases   Psychiatric:   Flat affect.         ED Course   Procedures  Labs Reviewed   CBC W/ AUTO DIFFERENTIAL   COMPREHENSIVE METABOLIC PANEL   TROPONIN I   TROPONIN I   B-TYPE NATRIURETIC PEPTIDE                        Scribe Attestation:   Scribe #1: I performed the above scribed service and the documentation accurately describes the services I performed. I attest to the accuracy of the note.    Attending Attestation:           Physician Attestation for Scribe:  Physician  Attestation Statement for Scribe #1: I, Sreedhar Baird MD, reviewed documentation, as scribed by Fidel Wayne in my presence, and it is both accurate and complete.                 ED Course      Clinical Impression:   There were no encounter diagnoses.

## 2017-09-10 PROBLEM — R79.89 ELEVATED TROPONIN: Status: ACTIVE | Noted: 2017-09-10

## 2017-09-10 PROBLEM — I82.409 ACUTE DVT (DEEP VENOUS THROMBOSIS): Status: ACTIVE | Noted: 2017-09-10

## 2017-09-10 LAB
DIASTOLIC DYSFUNCTION: YES
ESTIMATED PA SYSTOLIC PRESSURE: 43.94
GLOBAL PERICARDIAL EFFUSION: ABNORMAL
MITRAL VALVE MOBILITY: NORMAL
RETIRED EF AND QEF - SEE NOTES: 45 (ref 55–65)
TRICUSPID VALVE REGURGITATION: ABNORMAL
TROPONIN I SERPL DL<=0.01 NG/ML-MCNC: 0.44 NG/ML

## 2017-09-10 PROCEDURE — 25000003 PHARM REV CODE 250: Performed by: HOSPITALIST

## 2017-09-10 PROCEDURE — 63600175 PHARM REV CODE 636 W HCPCS: Performed by: HOSPITALIST

## 2017-09-10 PROCEDURE — 27000221 HC OXYGEN, UP TO 24 HOURS

## 2017-09-10 PROCEDURE — 25000003 PHARM REV CODE 250: Performed by: EMERGENCY MEDICINE

## 2017-09-10 PROCEDURE — 94761 N-INVAS EAR/PLS OXIMETRY MLT: CPT

## 2017-09-10 PROCEDURE — G8978 MOBILITY CURRENT STATUS: HCPCS | Mod: CJ

## 2017-09-10 PROCEDURE — 99900035 HC TECH TIME PER 15 MIN (STAT)

## 2017-09-10 PROCEDURE — 84484 ASSAY OF TROPONIN QUANT: CPT

## 2017-09-10 PROCEDURE — 97161 PT EVAL LOW COMPLEX 20 MIN: CPT

## 2017-09-10 PROCEDURE — 36415 COLL VENOUS BLD VENIPUNCTURE: CPT

## 2017-09-10 PROCEDURE — 21400001 HC TELEMETRY ROOM

## 2017-09-10 PROCEDURE — G8979 MOBILITY GOAL STATUS: HCPCS | Mod: CI

## 2017-09-10 PROCEDURE — 93306 TTE W/DOPPLER COMPLETE: CPT

## 2017-09-10 PROCEDURE — 93306 TTE W/DOPPLER COMPLETE: CPT | Mod: 26,,, | Performed by: INTERNAL MEDICINE

## 2017-09-10 RX ADMIN — LISINOPRIL 40 MG: 20 TABLET ORAL at 10:09

## 2017-09-10 RX ADMIN — ATORVASTATIN CALCIUM 20 MG: 10 TABLET, FILM COATED ORAL at 10:09

## 2017-09-10 RX ADMIN — ENOXAPARIN SODIUM 60 MG: 100 INJECTION SUBCUTANEOUS at 02:09

## 2017-09-10 RX ADMIN — FAMOTIDINE 20 MG: 20 TABLET, FILM COATED ORAL at 10:09

## 2017-09-10 NOTE — PROGRESS NOTES
Received report from Angela SORIANO to assume care. AAOX 4 sitting up in bed talking to daughter and grand-daughter at bedside. No c/o pain. Oxygen level reads 98% on 3 lpm. Denies being SOB. Breath sounds very diminished. +2 radial and pedal pulses. BS normoactive X 4 quads. Able to reposition self in bed. No edema noted. Call light at side and bed alarm active. Cont pulse ox in place. Instructed to call for assistance if needed.

## 2017-09-10 NOTE — NURSING
Dr. Macias notified of patient's troponin of 0.287. No new orders given. Will continue to monitor.

## 2017-09-10 NOTE — PLAN OF CARE
Problem: Fall Risk (Adult)  Intervention: Reduce Risk/Promote Restraint Free Environment   17   Safety Interventions   Safety Precautions emergency equipment at bedside   Safety Interventions   Environmental Safety Modification assistive device/personal items within reach;lighting adjusted;room near unit station   Prevent  Drop/Fall   Safety/Security Measures bed alarm set     Intervention: Review Medications/Identify Contributors to Fall Risk   17   Safety Interventions   Medication Review/Management medications reviewed;high risk medications identified     Intervention: Patient Rounds   17   Safety Interventions   Patient Rounds bed in low position;bed wheels locked;call light in reach;clutter free environment maintained;ID band on;placement of personal items at bedside;toileting offered;visualized patient     Intervention: Safety Promotion/Fall Prevention   17 1730   Safety Interventions   Safety Promotion/Fall Prevention bed alarm set;Fall Risk reviewed with patient/family;Fall Risk signage in place;lighting adjusted;medications reviewed;muscle strengthening facilitated;nonskid shoes/socks when out of bed;room near unit station;side rails raised x 2     Intervention: Safety Precautions   17   Safety Interventions   Safety Precautions emergency equipment at bedside       Goal: Identify Related Risk Factors and Signs and Symptoms  Related risk factors and signs and symptoms are identified upon initiation of Human Response Clinical Practice Guideline (CPG)   Outcome: Ongoing (interventions implemented as appropriate)   17   Fall Risk   Related Risk Factors (Fall Risk) age-related changes;environment unfamiliar   Signs and Symptoms (Fall Risk) presence of risk factors     Goal: Absence of Falls  Patient will demonstrate the desired outcomes by discharge/transition of care.   Outcome: Ongoing (interventions implemented as appropriate)   17    Fall Risk (Adult)   Absence of Falls making progress toward outcome       Problem: VTE, DVT and PE (Adult)  Intervention: Prevent/Manage Thrombi/Emboli   09/09/17 2014   OTHER   VTE Required Core Measure Pharmacological prophylaxis initiated/maintained   Minimize Embolism Risk   VTE Prevention/Management bleeding precautions maintained;bleeding risk assessed;ROM (active) performed   Safety Interventions   Bleeding Precautions blood pressure closely monitored     Intervention: Monitor/Manage Pain   09/09/17 2014   Safety Interventions   Medication Review/Management medications reviewed;high risk medications identified   Pain/Comfort/Sleep Interventions   Pain Management Interventions care clustered;relaxation promoted       Goal: Signs and Symptoms of Listed Potential Problems Will be Absent, Minimized or Managed (VTE, DVT and PE)  Signs and symptoms of listed potential problems will be absent, minimized or managed by discharge/transition of care (reference VTE, DVT and PE (Adult) CPG).  Outcome: Ongoing (interventions implemented as appropriate)   09/09/17 2014   VTE, DVT and PE   Problems Assessed (VTE, DVT, PE) all   Problems Present (VTE, DVT, PE) pulmonary embolism

## 2017-09-10 NOTE — PLAN OF CARE
Problem: Fall Risk (Adult)  Intervention: Safety Promotion/Fall Prevention   09/10/17 0733   Safety Interventions   Safety Promotion/Fall Prevention side rails raised x 2;bed alarm set       Goal: Absence of Falls  Patient will demonstrate the desired outcomes by discharge/transition of care.    09/10/17 0739   Fall Risk (Adult)   Absence of Falls making progress toward outcome       Problem: VTE, DVT and PE (Adult)  Intervention: Monitor/Manage Pain   09/09/17 2014   Safety Interventions   Medication Review/Management medications reviewed;high risk medications identified   Pain/Comfort/Sleep Interventions   Pain Management Interventions care clustered;relaxation promoted         Comments: Educated on Lovenox. Daughter and grand-daughter along with pt receptive to education. Report still feeling a little tightness when breathing but has subsided from what the pain was. Stable getting to bed side commode. NC remains in place at 3 lpm along with cont pulse ox. Non-skid socks in place and bed alarm active.

## 2017-09-10 NOTE — PLAN OF CARE
09/10/17 1505   Discharge Assessment   Assessment Type Discharge Planning Assessment   Confirmed/corrected address and phone number on facesheet? Yes  (Does plan on moving into an appartment)   Assessment information obtained from? Patient   Communicated expected length of stay with patient/caregiver yes   Prior to hospitilization cognitive status: Alert/Oriented   Prior to hospitalization functional status: Independent   Current cognitive status: Alert/Oriented   Current Functional Status: Independent;Needs Assistance  (Showing a need for assistance; considering moving into an apartment and reveiving HH)   Facility Arrived From: Home-friend's home   Lives With friend(s)   Able to Return to Prior Arrangements no  (Apartment; indpendent living; assist.living)   Is patient able to care for self after discharge? Unable to determine at this time (comments)   Who are your caregiver(s) and their phone number(s)? Relative-[place]: [phone]   Patient's perception of discharge disposition home health  (Apartment vs. Independent living vs. assisted living in consideration)   Readmission Within The Last 30 Days no previous admission in last 30 days   Patient currently being followed by outpatient case management? No   Patient currently receives any other outside agency services? No   Equipment Currently Used at Home none   Do you have any problems affording any of your prescribed medications? No   Is the patient taking medications as prescribed? yes   Does the patient have transportation home? Yes   Transportation Available family or friend will provide   Does the patient receive services at [facility]? No   Discharge Plan A Other  (TBD: considering assist living; independ living; apartment; believes she needs HH)   Discharge Plan B Other  (TBD)   Patient/Family In Agreement With Plan yes   Readmission Questionnaire   Living Arrangements house  (Was living with friend but will not be returning)     Kathy Cisneros  independent at home with friend; family provides transport; will not be returning to friend's home: apart v. indep or assist living    Warning signs/symptoms information reviewed with and provided to patient     Senior guide provided for patient to review living possibilities

## 2017-09-10 NOTE — PLAN OF CARE
Problem: Physical Therapy Goal  Goal: Physical Therapy Goal  Goals to be met by: 17     Patient will increase functional independence with mobility by performin. Sit to stand transfer with Supervision  2. Gait  x500 feet with Supervision   3. Lower extremity exercise program x30 reps per handout, with supervision       She is okay to ambulate with nursing staff supervision and oxygen.

## 2017-09-10 NOTE — PT/OT/SLP EVAL
Physical Therapy  Evaluation    Daiana Mcduffie   MRN: [id number]   Admitting Diagnosis: Acute pulmonary embolism and L LE DVT    PT Received On: 09/10/17  PT Start Time: 1340     PT Stop Time: 1356    PT Total Time (min): 16 min       Billable Minutes:  Evaluation  16     Diagnosis: Acute pulmonary embolism and L LE DVT    Past Medical History:   Diagnosis Date    GERD (gastroesophageal reflux disease)     Hyperlipidemia     Hypertension       History reviewed. No pertinent surgical history.    Referring physician: Tri  Date referred to PT: 9/10/17    General Precautions: Standard, fall, respiratory, hard of hearing  Orthopedic Precautions: N/A   Braces: N/A       Patient History:  Lives With: alone  Living Arrangements: apartment  Living Environment Comment: Her apartment has mold in it so she is living with her friend currently.   Equipment Currently Used at Home: none    Previous Level of Function:  Ambulation Skills: independent  Transfer Skills: independent    Subjective:  Communicated with nurse Serrano prior to session.  Patient agreeable to participate in PT evaluation.   Chief Complaint: SOB with ambulation.   Patient goals: To get back to PLOF.     Pain/Comfort  Pain Rating : 0/10    Objective:   Patient found with: telemetry, peripheral IV, oxygen, pulse ox (continuous)     Cognitive Exam:  Oriented to: Person and Place    Follows Commands/attention: Follows one-step commands  Communication: clear/fluent  Safety awareness/insight to disability: intact    Physical Exam:  Postural examination/scapula alignment: No postural abnormalities identified    Skin integrity: Visible skin intact  Edema: None noted     Sensation: Intact    Lower Extremity Range of Motion:  Right Lower Extremity: WFL  Left Lower Extremity: WFL    Lower Extremity Strength:  Right Lower Extremity: WFL  Left Lower Extremity: WFL     Gross motor coordination: WFL    Functional Mobility:  Bed Mobility:  Supine to Sit:  Supervision    Transfers:  Sit <> Stand Assistance: Supervision  Sit <> Stand Assistive Device: No Assistive Device    Gait:   Gait Distance: ~250ft with 1 standing rest break due to SOB despite being on 2L NC O2. Patient educated in pursed lip breathing.   Assistance 1: Contact Guard Assistance  Gait Assistive Device: No device  Gait Pattern: reciprocal  Gait Deviation(s): decreased dirk, decreased velocity of limb motion, decreased step length    Balance:   Static Sit: NORMAL: No deviations seen in posture held statically  Dynamic Sit: GOOD+: Maintains balance through MAXIMAL excursions of active trunk motion  Static Stand: GOOD-: Takes MODERATE challenges from all directions inconsistently  Dynamic stand: FAIR: Needs CONTACT GUARD during gait    AM-PAC 6 CLICK MOBILITY  How much help from another person does this patient currently need?   1 = Unable, Total/Dependent Assistance  2 = A lot, Maximum/Moderate Assistance  3 = A little, Minimum/Contact Guard/Supervision  4 = None, Modified [place]/Independent    Turning over in bed (including adjusting bedclothes, sheets and blankets)?: 4  Sitting down on and standing up from a chair with arms (e.g., wheelchair, bedside commode, etc.): 4  Moving from lying on back to sitting on the side of the bed?: 4  Moving to and from a bed to a chair (including a wheelchair)?: 3  Need to walk in hospital room?: 3  Climbing 3-5 steps with a railing?: 3  Total Score: 21     AM-PAC Raw Score CMS G-Code Modifier Level of Impairment Assistance   6 % Total / Unable   7 - 9 CM 80 - 100% Maximal Assist   10 - 14 CL 60 - 80% Moderate Assist   15 - 19 CK 40 - 60% Moderate Assist   20 - 22 CJ 20 - 40% Minimal Assist   23 CI 1-20% SBA / CGA   24 CH 0% Independent/ Mod I     Patient left seated at edge of bed with all lines intact, call button in reach, nurse Maggie notified, and family present.    Assessment:   Dainaa Mcduffie is a 88 y.o. female with a medical diagnosis of  Acute pulmonary embolism and presents with decreased endurance for functional mobility. She was limited in ambulation due to SOB. She is okay to ambulate with nursing staff supervision and oxygen. She would continue to benefit from PT while in the hospital in order to address deficits listed below and get patient back to PLOF.     Rehab identified problem list/impairments: Rehab identified problem list/impairments: impaired endurance, impaired balance, impaired cardiopulmonary response to activity, impaired functional mobilty    Rehab potential is good.    Activity tolerance: Fair    Discharge recommendations: Discharge Facility/Level Of Care Needs: home (with assistance from family as needed)     Barriers to discharge: Barriers to Discharge: None    Equipment recommendations: Equipment Needed After Discharge: none     GOALS:    Physical Therapy Goals        Problem: Physical Therapy Goal    Goal Priority Disciplines Outcome Goal Variances Interventions   Physical Therapy Goal     PT/OT, PT      Description:  Goals to be met by: 17     Patient will increase functional independence with mobility by performin. Sit to stand transfer with Supervision  2. Gait  x500 feet with Supervision   3. Lower extremity exercise program x30 reps per handout, with supervision                    PLAN:    Patient to be seen 5 x/week to address the above listed problems via gait training, therapeutic activities, therapeutic exercises  Plan of Care expires: 17  Plan of Care reviewed with: patient, family    Functional Assessment Tool Used: AM PAC   Score: 21  Functional Limitation: Mobility: Walking and moving around  Mobility: Walking and Moving Around Current Status (): RANULFO  Mobility: Walking and Moving Around Goal Status (): MANUEL Maddox, PT, MOT  09/10/2017

## 2017-09-10 NOTE — HOSPITAL COURSE
This 88 y.o. female with HTN, HLD, GERD presents to the ED c/o SOB and chest pain that developed just a few minutes prior to arrival to the ED while she and her daughter were getting ready to attend a . Her pain is severe (8/10) and acute. Pt states that she is feeling SOB .she was slightly hypoxic 88% Spo2 on RA improved with supplemental oxygen,, Pt denies any sore throat, N/V, fever;cough.CTA chest show bilateral PE,left lower leg DVT is positive,patient was  stared on lovenox,no reported recent surgery,long travel,fracture,history of blood clot or malignancy.switched to Eliquis,side with possible irreversible to patient and family explained,she has slight elevated Troponin with no sign of ischemic changes on EKG,she denies chest pain.    Pt was switched to eliquis. Her discharge home was held up due to oxygen requirements. Testing on day of discharge did not indicate she needed home oxygen. Follow up with PCP as scheduled.

## 2017-09-11 PROBLEM — J96.02 ACUTE RESPIRATORY FAILURE WITH HYPOXIA AND HYPERCARBIA: Status: ACTIVE | Noted: 2017-09-11

## 2017-09-11 PROBLEM — J96.01 ACUTE RESPIRATORY FAILURE WITH HYPOXIA AND HYPERCARBIA: Status: ACTIVE | Noted: 2017-09-11

## 2017-09-11 PROCEDURE — 97165 OT EVAL LOW COMPLEX 30 MIN: CPT

## 2017-09-11 PROCEDURE — 25000242 PHARM REV CODE 250 ALT 637 W/ HCPCS: Performed by: HOSPITALIST

## 2017-09-11 PROCEDURE — 21400001 HC TELEMETRY ROOM

## 2017-09-11 PROCEDURE — 94640 AIRWAY INHALATION TREATMENT: CPT

## 2017-09-11 PROCEDURE — 63600175 PHARM REV CODE 636 W HCPCS: Performed by: HOSPITALIST

## 2017-09-11 PROCEDURE — G8987 SELF CARE CURRENT STATUS: HCPCS | Mod: CJ

## 2017-09-11 PROCEDURE — 97110 THERAPEUTIC EXERCISES: CPT

## 2017-09-11 PROCEDURE — 94799 UNLISTED PULMONARY SVC/PX: CPT

## 2017-09-11 PROCEDURE — 25000003 PHARM REV CODE 250: Performed by: HOSPITALIST

## 2017-09-11 PROCEDURE — 97116 GAIT TRAINING THERAPY: CPT

## 2017-09-11 PROCEDURE — G8988 SELF CARE GOAL STATUS: HCPCS | Mod: CI

## 2017-09-11 PROCEDURE — 27000221 HC OXYGEN, UP TO 24 HOURS

## 2017-09-11 PROCEDURE — 94761 N-INVAS EAR/PLS OXIMETRY MLT: CPT

## 2017-09-11 RX ORDER — IPRATROPIUM BROMIDE AND ALBUTEROL SULFATE 2.5; .5 MG/3ML; MG/3ML
3 SOLUTION RESPIRATORY (INHALATION) EVERY 6 HOURS
Status: DISCONTINUED | OUTPATIENT
Start: 2017-09-11 | End: 2017-09-12 | Stop reason: HOSPADM

## 2017-09-11 RX ORDER — IPRATROPIUM BROMIDE AND ALBUTEROL SULFATE 2.5; .5 MG/3ML; MG/3ML
3 SOLUTION RESPIRATORY (INHALATION) EVERY 6 HOURS
Status: DISCONTINUED | OUTPATIENT
Start: 2017-09-11 | End: 2017-09-11

## 2017-09-11 RX ADMIN — ATORVASTATIN CALCIUM 20 MG: 10 TABLET, FILM COATED ORAL at 10:09

## 2017-09-11 RX ADMIN — IPRATROPIUM BROMIDE AND ALBUTEROL SULFATE 3 ML: .5; 3 SOLUTION RESPIRATORY (INHALATION) at 07:09

## 2017-09-11 RX ADMIN — ENOXAPARIN SODIUM 60 MG: 100 INJECTION SUBCUTANEOUS at 02:09

## 2017-09-11 RX ADMIN — FAMOTIDINE 20 MG: 20 TABLET, FILM COATED ORAL at 10:09

## 2017-09-11 RX ADMIN — APIXABAN 10 MG: 5 TABLET, FILM COATED ORAL at 03:09

## 2017-09-11 RX ADMIN — APIXABAN 10 MG: 5 TABLET, FILM COATED ORAL at 09:09

## 2017-09-11 NOTE — PLAN OF CARE
Problem: Fall Risk (Adult)  Goal: Identify Related Risk Factors and Signs and Symptoms  Related risk factors and signs and symptoms are identified upon initiation of Human Response Clinical Practice Guideline (CPG)   Outcome: Ongoing (interventions implemented as appropriate)   09/09/17 2014 09/10/17 2005   Fall Risk   Related Risk Factors (Fall Risk) age-related changes;environment unfamiliar --    Signs and Symptoms (Fall Risk) --  presence of risk factors     Goal: Absence of Falls  Patient will demonstrate the desired outcomes by discharge/transition of care.   Outcome: Ongoing (interventions implemented as appropriate)   09/10/17 2005   Fall Risk (Adult)   Absence of Falls making progress toward outcome       Problem: VTE, DVT and PE (Adult)  Goal: Signs and Symptoms of Listed Potential Problems Will be Absent, Minimized or Managed (VTE, DVT and PE)  Signs and symptoms of listed potential problems will be absent, minimized or managed by discharge/transition of care (reference VTE, DVT and PE (Adult) CPG).   Outcome: Ongoing (interventions implemented as appropriate)   09/09/17 2014 09/10/17 2005   VTE, DVT and PE   Problems Assessed (VTE, DVT, PE) --  all   Problems Present (VTE, DVT, PE) pulmonary embolism --        Comments: Pt remains free of falls this shift. 2D echo shows elevated PA - diastolic dysfunction, EF 45. Pt without complaints of pain today, O2 3L nc continuous with continuous O2sat monitoring. Lovenox for PE/DVT management. BM today.

## 2017-09-11 NOTE — NURSING
While at pt bedside, notified by Dr. Byrd pt will be staying one more night due to O2 and home arrangements. Bing Estes at bedside, Pt notified of one more night and that TN will speak to Mable about it. Pt received first dose abixiban.

## 2017-09-11 NOTE — PROGRESS NOTES
TN faxed to [facility] [phone] for Oxygent to be delivered to patient's room..Bing Estes RN, BSN, [facility], [place]  9/11/2017

## 2017-09-11 NOTE — PLAN OF CARE
Problem: Fall Risk (Adult)  Goal: Identify Related Risk Factors and Signs and Symptoms  Related risk factors and signs and symptoms are identified upon initiation of Human Response Clinical Practice Guideline (CPG)   Outcome: Ongoing (interventions implemented as appropriate)   09/09/17 2014   Fall Risk   Related Risk Factors (Fall Risk) age-related changes;environment unfamiliar   Signs and Symptoms (Fall Risk) presence of risk factors     Goal: Absence of Falls  Patient will demonstrate the desired outcomes by discharge/transition of care.   Outcome: Ongoing (interventions implemented as appropriate)   09/10/17 1918   Fall Risk (Adult)   Absence of Falls making progress toward outcome       Problem: VTE, DVT and PE (Adult)  Goal: Signs and Symptoms of Listed Potential Problems Will be Absent, Minimized or Managed (VTE, DVT and PE)  Signs and symptoms of listed potential problems will be absent, minimized or managed by discharge/transition of care (reference VTE, DVT and PE (Adult) CPG).   Outcome: Ongoing (interventions implemented as appropriate)   09/10/17 1918   VTE, DVT and PE   Problems Assessed (VTE, DVT, PE) all       Comments: Pt remains free of falls, calls for assistance. No new skin breakdown. lovenox forPE/DVT management.

## 2017-09-11 NOTE — PROGRESS NOTES
[facility] Hospital Medicine  Progress Note    Patient Name: Daiana Mcduffie  MRN: [id number]  Patient Class: IP- Inpatient   Admission Date: 2017  Length of Stay: 2 days  Attending Physician: Leonidas Bartlett MD  Primary Care Provider: Pawan Mcclain MD        Subjective:     Principal Problem:Acute pulmonary embolism    HPI:  This 88 y.o. female with HTN, HLD, GERD presents to the ED c/o SOB and chest pain that developed just a few minutes prior to arrival to the ED while she and her daughter were getting ready to attend a . Her pain is severe (8/10) and acute. Pt states that she is feeling SOB this morning.she was slightly hypoxic 88% Spo2 on RA improved with supplemental oxygen,, Pt denies any sore throat, N/V, fever;cough.CTA chest show bilateral PE,patient as been stared on lovenox,no reported recent surgery,long travel,fracture,history of blood clot or malignancy.    Hospital Course:  This 88 y.o. female with HTN, HLD, GERD presents to the ED c/o SOB and chest pain that developed just a few minutes prior to arrival to the ED while she and her daughter were getting ready to attend a . Her pain is severe (8/10) and acute. Pt states that she is feeling SOB .she was slightly hypoxic 88% Spo2 on RA improved with supplemental oxygen,, Pt denies any sore throat, N/V, fever;cough.CTA chest show bilateral PE,left lower leg DVT is positive,patient was  stared on lovenox,no reported recent surgery,long travel,fracture,history of blood clot or malignancy.switched to Eliquis,side with possible irreversible to patient and family explained,she has slight elevated Troponin with no sign of ischemic changes on EKG,she denies chest pain.    Past Medical History:   Diagnosis Date    GERD (gastroesophageal reflux disease)     Hyperlipidemia     Hypertension        History reviewed. No pertinent surgical history.    Review of patient's allergies indicates:   Allergen Reactions     Ibuprofen     Penicillins        No current facility-administered medications on file prior to encounter.      Current Outpatient Prescriptions on File Prior to Encounter   Medication Sig    albuterol 90 mcg/actuation inhaler Inhale 2 puffs into the lungs every 4 (four) hours as needed for Wheezing or Shortness of Breath. Rescue    atorvastatin (LIPITOR) 20 MG tablet Take 1 tablet (20 mg total) by mouth once daily.    fluticasone (FLONASE) 50 mcg/actuation nasal spray 1 spray by Each Nare route once daily.    cetirizine (ZYRTEC) 10 MG tablet Take 1 tablet (10 mg total) by mouth every evening.    clopidogrel (PLAVIX) 75 mg tablet Take 1 tablet (75 mg total) by mouth once daily.    hydrOXYzine pamoate (VISTARIL) 25 MG Cap Take 1 capsule (25 mg total) by mouth every 8 (eight) hours as needed.    ketoconazole (NIZORAL) 2 % cream     lisinopril (PRINIVIL,ZESTRIL) 40 MG tablet Take 1 tablet (40 mg total) by mouth once daily.    meclizine (ANTIVERT) 25 mg tablet TAKE ONE TABLET BY MOUTH THREE TIMES DAILY AS NEEDED FOR  DIZZINESS    ranitidine (ZANTAC) 150 MG tablet Take 1 tablet (150 mg total) by mouth 2 (two) times daily.    triamcinolone acetonide 0.1% (KENALOG) 0.1 % cream Apply topically 2 (two) times daily.    [DISCONTINUED] amlodipine (NORVASC) 2.5 MG tablet Take 1 tablet (2.5 mg total) by mouth once daily.     Family History     None        Social History Main Topics    Smoking status: Never Smoker    Smokeless tobacco: Never Used    Alcohol use No    Drug use: No    Sexual activity: No     Review of Systems   Constitutional: Negative for activity change and appetite change.   HENT: Negative for congestion and dental problem.    Eyes: Negative for discharge and itching.   Respiratory: Negative for cough and shortness of breath.    Cardiovascular: Negative for chest pain and leg swelling.   Gastrointestinal: Negative for abdominal distention and abdominal pain.   Endocrine: Negative for cold  intolerance and heat intolerance.   Genitourinary: Negative for difficulty urinating and dyspareunia.   Musculoskeletal: Negative for arthralgias and back pain.   Skin: Negative for color change.   Allergic/Immunologic: Negative for environmental allergies and food allergies.   Neurological: Negative for dizziness and facial asymmetry.   Hematological: Negative for adenopathy. Does not bruise/bleed easily.   Psychiatric/Behavioral: Negative for agitation and behavioral problems.     Objective:     Vital Signs (Most Recent):  Temp: 97.5 °F (36.4 °C) (09/11/17 1126)  Pulse: 69 (09/11/17 1126)  Resp: 18 (09/11/17 1126)  BP: (!) 91/54 (09/11/17 1126)  SpO2: 99 % (09/11/17 1126) Vital Signs (24h Range):  Temp:  [97 °F (36.1 °C)-99 °F (37.2 °C)] 97.5 °F (36.4 °C)  Pulse:  [64-79] 69  Resp:  [18] 18  SpO2:  [94 %-100 %] 99 %  BP: ()/(53-71) 91/54     Weight: 61 kg (134 lb 7.7 oz)  Body mass index is 23.08 kg/m².    Physical Exam   Constitutional: She is oriented to person, place, and time. No distress.   HENT:   Head: Normocephalic.   Eyes: EOM are normal. Pupils are equal, round, and reactive to light.   Neck: Normal range of motion. Neck supple.   Cardiovascular: Normal rate and regular rhythm.    Pulmonary/Chest: Effort normal and breath sounds normal.   Abdominal: Soft.   Musculoskeletal: Normal range of motion. She exhibits no edema or deformity.   Neurological: She is oriented to person, place, and time. No cranial nerve deficit. Coordination normal.   Skin: Skin is warm and dry.   Psychiatric: She has a normal mood and affect.        Significant Labs:   BMP:   No results for input(s): GLU, NA, K, CL, CO2, BUN, CREATININE, CALCIUM, MG in the last 48 hours.  CBC:   No results for input(s): WBC, HGB, HCT, PLT in the last 48 hours.    Significant Imaging: reviewed    Assessment/Plan:      * Acute pulmonary embolism     duo to sedentary life style, was on  Lovenox.switched to Eliquis,pending authorization.           Essential hypertension    Continue with home medication.          Hyperlipidemia    Continue with stain.          Acute respiratory failure with hypoxia and hypercarbia    Denies history of tobacco abuse,passive smoking,likley duo to PE,staretd on supportive care with nebulzer and  IS,consuted SW for home oxygen.          Acute DVT (deep venous thrombosis)    Left leg ,On Eliquis.          Elevated troponin    Denies chest pain,EKG is unremarkable.likely duo to PE,Echo show no sign of heart stain,no sig of fluid overload.            VTE Risk Mitigation         Ordered     apixaban tablet 10 mg  2 times daily     Route:  Oral        09/11/17 0732              Leonidas Bartlett MD  Department of Hospital Medicine   [facility]

## 2017-09-11 NOTE — PROGRESS NOTES
WRITTEN DISCHARGE INFORMATION:     Follow-up Information     Pawan Mcclain MD On 9/18/2017.    Specialty:  Internal Medicine  Why:  out patient services:  10:40 a.m. follow up from the hospital  Contact information:  [address] [facility]  [place] [id number]  [phone]             Mikael Queen MD On 9/20/2017.    Specialties:  INTERVENTIONAL CARDIOLOGY, Cardiology  Why:  out patient services:  10:00 a.m. follow up from the hospital  Contact information:  [address]  [address]  [place] [facility]  [phone]             [facility] On 9/13/2017.    Specialty:  Home Health Services  Why:  Home Health  Contact information:  [address]  [address]  [place] [address]  [phone]               Things that YOU are responsible for to Manage Your Care At Home:  1. Getting your prescriptions filled.  2. Taking you medications as directed. DO NOT MISS ANY DOSES!  3. Going to your follow-up doctor appointments. This is important because it allows the doctor to monitor your progress and to determine if any changes need to be made to your treatment plan.                                     Help at Home  After discharge for assistance [facility] Line 24/7 assistance  [phone]     Thank you for choosing [facility] for your care.  Please answer any calls you may receive from [facility] we want to continue to support you as you manage your healthcare needs.  Sincerely, Your [facility] Healthcare Manager is,  Bing Estes RN [facility] [phone]

## 2017-09-11 NOTE — PT/OT/SLP PROGRESS
Physical Therapy  Treatment    Daiana Mcduffie   MRN: [id number]   Admitting Diagnosis: Acute pulmonary embolism    PT Received On: 09/11/17  PT Start Time: 0947     PT Stop Time: 1010    PT Total Time (min): 23 min       Billable Minutes:  Gait Training 13 and Therapeutic Exercise 10     Treatment Type: Treatment  PT/PTA: PT     PTA Visit Number: 0       General Precautions: Standard, fall, respiratory  Orthopedic Precautions: N/A   Braces: N/A    Subjective:  Communicated with nurse Serrano prior to session.  Patient agreeable to participate in PT evaluation.     Pain/Comfort  Pain Rating : 0/10    Objective:   Patient found with: telemetry, peripheral IV, pulse ox (continuous), oxygen    Functional Mobility:  Bed Mobility:   Supine to Sit: Supervision    Transfers:  Sit <> Stand Assistance: Supervision  Sit <> Stand Assistive Device: No Assistive Device    Gait:   Gait Distance: ~250ft with 1 seated rest break due to SOB/fatigue. Patient educated in pursed lip breathing. Patient had 2 loss of balance while initially ambulating and needed min A to recover upright posture.   Assistance 1: Contact Guard Assistance, Minimum assistance  Gait Assistive Device: No device  Gait Pattern: reciprocal  Gait Deviation(s): decreased dirk, decreased velocity of limb motion, decreased step length    Balance:   Static Sit: GOOD+: Takes MAXIMAL challenges from all directions.    Dynamic Sit: GOOD: Maintains balance through MODERATE excursions of active trunk movement  Static Stand: FAIR+: Takes MINIMAL challenges from all directions  Dynamic stand: FAIR: Needs CONTACT GUARD during gait     Therapeutic Activities and Exercises:  Patient performed B LE seated therex 2x15 for A/P, LAQ, hip flex, and hip abd/add. Patient tolerated well.      AM-PAC 6 CLICK MOBILITY  How much help from another person does this patient currently need?   1 = Unable, Total/Dependent Assistance  2 = A lot, Maximum/Moderate Assistance  3 = A little,  Minimum/Contact Guard/Supervision  4 = None, Modified [place]/Independent    Turning over in bed (including adjusting bedclothes, sheets and blankets)?: 4  Sitting down on and standing up from a chair with arms (e.g., wheelchair, bedside commode, etc.): 4  Moving from lying on back to sitting on the side of the bed?: 4  Moving to and from a bed to a chair (including a wheelchair)?: 3  Need to walk in hospital room?: 3  Climbing 3-5 steps with a railing?: 3  Total Score: 21    AM-PAC Raw Score CMS G-Code Modifier Level of Impairment Assistance   6 % Total / Unable   7 - 9 CM 80 - 100% Maximal Assist   10 - 14 CL 60 - 80% Moderate Assist   15 - 19 CK 40 - 60% Moderate Assist   20 - 22 CJ 20 - 40% Minimal Assist   23 CI 1-20% SBA / CGA   24 CH 0% Independent/ Mod I     Patient left seated on edge of bed with OT present with all lines intact, call button in reach, and nurse Maggie notified.    Assessment:  Daiana Mcduffie is a 88 y.o. female with a medical diagnosis of Acute pulmonary embolism and presents with decreased endurance and impaired balance for functional mobility. She was limited in ambulation distance due to SOB/fatigue. She would continue to benefit from PT while in the hospital in order to get back to PLOF.     Rehab identified problem list/impairments: Rehab identified problem list/impairments: impaired endurance, impaired balance, impaired cardiopulmonary response to activity, impaired functional mobilty, decreased safety awareness    Rehab potential is good.    Activity tolerance: Fair due to decreased endurance    Discharge recommendations: Discharge Facility/Level Of Care Needs: home health PT (with assistance from family as needed)     Barriers to discharge: Barriers to Discharge: None    Equipment recommendations: Equipment Needed After Discharge: none     GOALS:    Physical Therapy Goals        Problem: Physical Therapy Goal    Goal Priority Disciplines Outcome Goal Variances  Interventions   Physical Therapy Goal     PT/OT, PT      Description:  Goals to be met by: 17     Patient will increase functional independence with mobility by performin. Sit to stand transfer with Supervision  2. Gait  x500 feet with Supervision   3. Lower extremity exercise program x30 reps per handout, with supervision                    PLAN:    Patient to be seen 5 x/week  to address the above listed problems via gait training, therapeutic activities, therapeutic exercises  Plan of Care expires: 17  Plan of Care reviewed with: patient    Yvonne Maddox, PT, MOT  2017

## 2017-09-11 NOTE — CONSULTS
12:54TN requested PA information from pharmacy for PA for Eliquis..Bing Estes RN, BSN, [facility]  2017         12:54p.m. TN received Prior authorization request number [id number] [facility] [phone].  TN called and provided [employer] with patient information ember ID: [id number], Name & , Dr. Byrd's NPI [id number], doctor and address and  fax #[phone].  TN also provided intake Alessia davies with TN's phone number.  Acute pulmonary embolism ICD10 I26.99. Rx Eliquis 5 mg tab take 2 tabs by mouth twice daily.  Quanity #28..  Alessia says she requested urgent  Processing which could take 4 - 24 hours for processing.  The PA reference number is #[id number]..Bing Estes RN, BSN, [facility]  2017

## 2017-09-11 NOTE — SUBJECTIVE & OBJECTIVE
Past Medical History:   Diagnosis Date    GERD (gastroesophageal reflux disease)     Hyperlipidemia     Hypertension        History reviewed. No pertinent surgical history.    Review of patient's allergies indicates:   Allergen Reactions    Ibuprofen     Penicillins        No current facility-administered medications on file prior to encounter.      Current Outpatient Prescriptions on File Prior to Encounter   Medication Sig    albuterol 90 mcg/actuation inhaler Inhale 2 puffs into the lungs every 4 (four) hours as needed for Wheezing or Shortness of Breath. Rescue    atorvastatin (LIPITOR) 20 MG tablet Take 1 tablet (20 mg total) by mouth once daily.    fluticasone (FLONASE) 50 mcg/actuation nasal spray 1 spray by Each Nare route once daily.    cetirizine (ZYRTEC) 10 MG tablet Take 1 tablet (10 mg total) by mouth every evening.    clopidogrel (PLAVIX) 75 mg tablet Take 1 tablet (75 mg total) by mouth once daily.    hydrOXYzine pamoate (VISTARIL) 25 MG Cap Take 1 capsule (25 mg total) by mouth every 8 (eight) hours as needed.    ketoconazole (NIZORAL) 2 % cream     lisinopril (PRINIVIL,ZESTRIL) 40 MG tablet Take 1 tablet (40 mg total) by mouth once daily.    meclizine (ANTIVERT) 25 mg tablet TAKE ONE TABLET BY MOUTH THREE TIMES DAILY AS NEEDED FOR  DIZZINESS    ranitidine (ZANTAC) 150 MG tablet Take 1 tablet (150 mg total) by mouth 2 (two) times daily.    triamcinolone acetonide 0.1% (KENALOG) 0.1 % cream Apply topically 2 (two) times daily.    [DISCONTINUED] amlodipine (NORVASC) 2.5 MG tablet Take 1 tablet (2.5 mg total) by mouth once daily.     Family History     None        Social History Main Topics    Smoking status: Never Smoker    Smokeless tobacco: Never Used    Alcohol use No    Drug use: No    Sexual activity: No     Review of Systems   Constitutional: Negative for activity change and appetite change.   HENT: Negative for congestion and dental problem.    Eyes: Negative for discharge  and itching.   Respiratory: Negative for cough and shortness of breath.    Cardiovascular: Negative for chest pain and leg swelling.   Gastrointestinal: Negative for abdominal distention and abdominal pain.   Endocrine: Negative for cold intolerance and heat intolerance.   Genitourinary: Negative for difficulty urinating and dyspareunia.   Musculoskeletal: Negative for arthralgias and back pain.   Skin: Negative for color change.   Allergic/Immunologic: Negative for environmental allergies and food allergies.   Neurological: Negative for dizziness and facial asymmetry.   Hematological: Negative for adenopathy. Does not bruise/bleed easily.   Psychiatric/Behavioral: Negative for agitation and behavioral problems.     Objective:     Vital Signs (Most Recent):  Temp: 97.5 °F (36.4 °C) (09/11/17 1126)  Pulse: 69 (09/11/17 1126)  Resp: 18 (09/11/17 1126)  BP: (!) 91/54 (09/11/17 1126)  SpO2: 99 % (09/11/17 1126) Vital Signs (24h Range):  Temp:  [97 °F (36.1 °C)-99 °F (37.2 °C)] 97.5 °F (36.4 °C)  Pulse:  [64-79] 69  Resp:  [18] 18  SpO2:  [94 %-100 %] 99 %  BP: ()/(53-71) 91/54     Weight: 61 kg (134 lb 7.7 oz)  Body mass index is 23.08 kg/m².    Physical Exam   Constitutional: She is oriented to person, place, and time. No distress.   HENT:   Head: Normocephalic.   Eyes: EOM are normal. Pupils are equal, round, and reactive to light.   Neck: Normal range of motion. Neck supple.   Cardiovascular: Normal rate and regular rhythm.    Pulmonary/Chest: Effort normal and breath sounds normal.   Abdominal: Soft.   Musculoskeletal: Normal range of motion. She exhibits no edema or deformity.   Neurological: She is oriented to person, place, and time. No cranial nerve deficit. Coordination normal.   Skin: Skin is warm and dry.   Psychiatric: She has a normal mood and affect.        Significant Labs:   BMP:   No results for input(s): GLU, NA, K, CL, CO2, BUN, CREATININE, CALCIUM, MG in the last 48 hours.  CBC:   No results  for input(s): WBC, HGB, HCT, PLT in the last 48 hours.    Significant Imaging: reviewed

## 2017-09-11 NOTE — PLAN OF CARE
Problem: Occupational Therapy Goal  Goal: Occupational Therapy Goal  Goals to be met by: 9/18/17    Patient will increase functional independence with ADLs by performing:    UE Dressing with Modified [place] and Supervision.  LE Dressing with Stand-by Assistance.  Grooming while standing at sink with Modified [place].  Toileting from toilet with Modified [place] for hygiene and clothing management.   Stand pivot transfers with Modified [place] and Supervision.  Toilet transfer to toilet with Supervision.  Upper extremity exercise program x10 reps per handout, with assistance as needed.    Outcome: Ongoing (interventions implemented as appropriate)  Patient will benefit from OT to address functional deficits.

## 2017-09-11 NOTE — PROGRESS NOTES
Phone: [phone] Call to [facility] pharmacy, says Eliquis need PA sent form to hospitalist fax on  Yesterday.  TN asked that form be faxed to [phone],  fax.  TN will check for coupon  Availability..Bing Estes RN, BSN, [facility]  9/11/2017

## 2017-09-11 NOTE — PHYSICIAN QUERY
99PT Name: Daiana Mcduffie  MR #: [id number]    Physician Query Form - Respiratory Condition Clarification      CDS/: Michelle Cee RN CDS               Contact information: ext. [phone]------ 9/12 Query answered in Hospital Med. Progress Note 9/11 @ 2:28pm by LAYNE Bartlett MD.    This form is a permanent document in the medical record.    Query Date: September 11, 2017  /  By submitting this query, we are merely seeking further clarification of documentation. Please utilize your independent clinical judgment when addressing the question(s) below.    The Medical record contains the following   Indicators   Supporting Clinical Findings Location in Medical Record   x   SOB, NAVARRETE, Wheezing, Productive Cough, Use of Accessory Muscles, etc. to the ED c/o SOB and chest pain that developed just a few minutes prior  to arrival to the ED.    constant midsternal chest pain since this morning and sob         H & P 9/9   x   Acute/Chronic Illness with HTN, HLD, GERD presents to the ED c/o SOB and chest pain; Acute Pulm. Embolism H & P 9/9   x   Radiology Findings Diffuse bilateral pulmonary thromboembolism    CTA Chest 9/9   x   Respiratory Distress or Failure Mild respiratory distress with tachypnea.   ED MD Prov. Note 9/9   x   Hypoxia or Hypercapnia A diagnosis of Elevated troponin was also pertinent to this visit. Hypoxia    Pt states that she is feeling SOB this morning.she was slightly hypoxic 88%     ED MD Prov. Note 9/9        H & P 9/9   x   RR         ABGs         O2 sat RR = 18    Sats = 81 - 99% Vital Signs 9/9 - 9/11      BiPAP/Intubation     x   Supplemental O2 Pt states that she is feeling SOB this morning.she was slightly hypoxic 88% Spo2 on RA improved with supplemental oxygen.      Patient noted to need more oxygen even on Nasal cannula.    saO2 100% titrate down face mask to nasal cannula        Pt without complaints of pain today, O2 3L nc continuous with continuous O2sat monitoring.   H & P  9/9              ED MD Prov. Note 9/9                  Nurse Care Plan Note 9/10      Home O2, Oxygen Dependence        Treatment        Other       Provider, please specify diagnosis or diagnoses associated with above clinical findings.    [  ] Acute Respiratory Failure with Hypoxia  [  ] Acute Respiratory Failure with Hypercapnia  [  ] Acute Respiratory Failure with Hypoxia and Hypercapnia  [  ] Other Respiratory Diagnosis (please specify): _______________________________  [  ] Clinically Undetermined    Please document in your progress notes daily for the duration of treatment until resolved and include in your discharge summary.

## 2017-09-11 NOTE — PROGRESS NOTES
TN received call from [facility], [facility] says acute pulmonary embolism is not covered by medicare.  Patient needs a chronic respiratory diagnosis..Bing Estes RN, BSN, [facility]  9/11/2017  TN informed Dr. Byrd of the above..Bing Estes RN, BSN, [facility]  9/11/2017  '

## 2017-09-11 NOTE — PLAN OF CARE
[facility]    HOME HEALTH ORDERS  FACE TO FACE ENCOUNTER    Patient Name: Daiana Mcduffie  YOB: 1929    PCP: Pawan Mcclain MD   PCP Address: [facility] / [place] [address] [address]  PCP Phone Number: [phone]  PCP Fax: [phone]    Encounter Date: 09/11/2017    Admit to Home Health    Diagnoses:  Active Hospital Problems    Diagnosis  POA    *Acute pulmonary embolism [I26.99]  Yes     Priority: 1 - High    Essential hypertension [I10]  Yes     Priority: 2     Hyperlipidemia [E78.5]  Yes     Priority: 3     Acute respiratory failure with hypoxia and hypercarbia [J96.01, J96.02]  Yes    Elevated troponin [R74.8]  Yes    Acute DVT (deep venous thrombosis) [I82.409]  Yes      Resolved Hospital Problems    Diagnosis Date Resolved POA   No resolved problems to display.       No future appointments.  Follow-up Information     Pawan Mcclain MD In 1 week.    Specialty:  Internal Medicine  Contact information:  [address] [place], [place] [id number] [phone]             Mikael Queen MD In 1 week.    Specialties:  INTERVENTIONAL CARDIOLOGY, Cardiology  Contact information:  [address] [facility]  [address]  [place] [id number] [phone]                     I have seen and examined this patient face to face today. My clinical findings that support the need for the home health skilled services and home bound status are the following:  Weakness/numbness causing balance and gait disturbance due to pulmonary emboly  making it taxing to leave home.  Requiring assistive device to leave home due to unsteady gait caused by  hypoxia.  Medical restrictions requiring assistance of another human to leave home due to  Dyspnea on exertion (SOB) and Home oxygen requirement.    Allergies:  Review of patient's allergies indicates:   Allergen Reactions    Ibuprofen     Penicillins        Diet: cardiac diet    Activities: activity as tolerated    Nursing:   SN to complete comprehensive assessment  including routine vital signs. Instruct on disease process and s/s of complications to report to MD. Review/verify medication list sent home with the patient at time of discharge  and instruct patient/caregiver as needed. Frequency may be adjusted depending on start of care date.    Notify MD if SBP > 160 or < 90; DBP > 90 or < 50; HR > 120 or < 50; Temp > 101; Other:         CONSULTS:    Physical Therapy to evaluate and treat. Evaluate for home safety and equipment needs; Establish/upgrade home exercise program. Perform / instruct on therapeutic exercises, gait training, transfer training, and Range of Motion.  Occupational Therapy to evaluate and treat. Evaluate home environment for safety and equipment needs. Perform/Instruct on transfers, ADL training, ROM, and therapeutic exercises.   to evaluate for community resources/long-range planning.    MISCELLANEOUS CARE:  Home Oxygen:  Oxygen at 3 L/min nasal canula to be used:  Continuously.    WOUND CARE ORDERS  n/a      Medications: Review discharge medications with patient and family and provide education.      Current Discharge Medication List      START taking these medications    Details   !! apixaban 5 mg Tab Take 2 tablets (10 mg total) by mouth 2 (two) times daily.  Qty: 28 tablet, Refills: 0      !! apixaban 5 mg Tab Take 1 tablet (5 mg total) by mouth 2 (two) times daily.  Qty: 60 tablet, Refills: 5       !! - Potential duplicate medications found. Please discuss with provider.      CONTINUE these medications which have NOT CHANGED    Details   albuterol 90 mcg/actuation inhaler Inhale 2 puffs into the lungs every 4 (four) hours as needed for Wheezing or Shortness of Breath. Rescue  Qty: 1 Inhaler, Refills: 2    Associated Diagnoses: SOB (shortness of breath)      atorvastatin (LIPITOR) 20 MG tablet Take 1 tablet (20 mg total) by mouth once daily.  Qty: 30 tablet, Refills: 2    Associated Diagnoses: Hyperlipidemia, unspecified hyperlipidemia  type      fluticasone (FLONASE) 50 mcg/actuation nasal spray 1 spray by Each Nare route once daily.  Qty: 1 Bottle, Refills: 1    Associated Diagnoses: Allergic rhinitis, unspecified chronicity, unspecified seasonality, unspecified trigger      cetirizine (ZYRTEC) 10 MG tablet Take 1 tablet (10 mg total) by mouth every evening.  Refills: 0      clopidogrel (PLAVIX) 75 mg tablet Take 1 tablet (75 mg total) by mouth once daily.  Qty: 30 tablet, Refills: 11      hydrOXYzine pamoate (VISTARIL) 25 MG Cap Take 1 capsule (25 mg total) by mouth every 8 (eight) hours as needed.  Qty: 30 capsule, Refills: 0    Associated Diagnoses: Allergic dermatitis      ketoconazole (NIZORAL) 2 % cream       lisinopril (PRINIVIL,ZESTRIL) 40 MG tablet Take 1 tablet (40 mg total) by mouth once daily.  Qty: 30 tablet, Refills: 2      meclizine (ANTIVERT) 25 mg tablet TAKE ONE TABLET BY MOUTH THREE TIMES DAILY AS NEEDED FOR  DIZZINESS  Qty: 30 tablet, Refills: 0    Associated Diagnoses: Dizziness      ranitidine (ZANTAC) 150 MG tablet Take 1 tablet (150 mg total) by mouth 2 (two) times daily.  Qty: 60 tablet, Refills: 1    Associated Diagnoses: Chest pain, unspecified type      triamcinolone acetonide 0.1% (KENALOG) 0.1 % cream Apply topically 2 (two) times daily.  Qty: 28.4 g, Refills: 0    Associated Diagnoses: Contact dermatitis, unspecified contact dermatitis type, unspecified trigger         STOP taking these medications       amlodipine (NORVASC) 2.5 MG tablet Comments:   Reason for Stopping:               I certify that this patient is confined to her home and needs intermittent skilled nursing care, physical therapy and occupational therapy.

## 2017-09-11 NOTE — PROGRESS NOTES
TN informed Dr. Byrd that Eliquis processing could take 4 - 24 hours for processing.  The PA reference number is #[id number].  Phone number [phone]..Bing Estes RN, BSN, [facility]  9/11/2017

## 2017-09-11 NOTE — PROGRESS NOTES
TN asked Marianna at [id number] what is the cost to the pt is decides to purchase oxygen since pt does not qualify through her medicare with her diagnosis of acute pulmonary embolism.,   1cart, 1 oxygen tank and 1 regulator = $125 plus tax. Patient has IS and nebulizer at home.  .Bing Estes RN, BSN, [facility]  9/11/2017  '

## 2017-09-11 NOTE — PROGRESS NOTES
Pt is living with Mable Larsen (friend) temporarily due to mold in pt house: [phone]. TN spoke to Ms. Akins who will come to see pt this p.m. She is aware that pt is not discharging today..  ..Bing Estes, RN, BSN, [facility]  9/11/2017

## 2017-09-11 NOTE — NURSING
O2 Sats on RA at rest = 95 %    O2 sats on RA with exercise = 85%  O2 sats on 3L nc with exercise = 94%

## 2017-09-11 NOTE — ASSESSMENT & PLAN NOTE
Denies history of tobacco abuse,passive smoking,likley duo to PE,staretd on supportive care with nebulzer and  IS,consuted SW for home oxygen.

## 2017-09-11 NOTE — PT/OT/SLP EVAL
Occupational Therapy  Evaluation    Daiana Mcduffie   MRN: [id number]   Admitting Diagnosis: Acute pulmonary embolism    OT Date of Treatment: 09/11/17   OT Start Time: 1010  OT Stop Time: 1026  OT Total Time (min): 16 min    Billable Minutes:  Evaluation 16    Diagnosis: Acute pulmonary embolism       Past Medical History:   Diagnosis Date    GERD (gastroesophageal reflux disease)     Hyperlipidemia     Hypertension       History reviewed. No pertinent surgical history.    Referring physician: Tri  Date referred to OT: 10/10/17    General Precautions: Standard, fall, respiratory  Orthopedic Precautions: N/A  Braces: N/A    Do you have any cultural, spiritual, Spiritism conflicts, given your current situation?: none     Patient History:  Living Environment  Lives With: friend(s)  Living Arrangements: house  Equipment Currently Used at Home: none    Prior level of function:   Bed Mobility/Transfers: independent  Grooming: independent  Bathing: independent  Upper Body Dressing: independent  Lower Body Dressing: independent  Toileting: independent  IADL Comments: Patient states she recently left her apt and is staying with a friend     Dominant hand: right    Subjective:  Communicated with patient prior to session.    Chief Complaint: gets tired after walking  Patient/Family stated goals: ready to go home    Pain/Comfort  Pain Rating 1: 0/10    Objective:  Patient found with: telemetry, pulse ox (continuous), oxygen    Cognitive Exam:  Oriented to: Person, Place and Situation  Follows Commands/attention: Follows two-step commands  Communication: clear/fluent  Memory:  No Deficits noted  Safety awareness/insight to disability: intact  Coping skills/emotional control: Appropriate to situation    Visual/perceptual:  Intact    Physical Exam:  Postural examination/scapula alignment: No postural abnormalities identified  Skin integrity: Visible skin intact  Edema: None noted     Sensation:   Intact    Upper  Extremity Range of Motion:  Right Upper Extremity: WFL  Left Upper Extremity: WFL    Upper Extremity Strength:  Right Upper Extremity: WFL  Left Upper Extremity: WFL   Strength: WFL    Fine motor coordination:   Intact    Gross motor coordination: WFL    Functional Mobility:  Bed Mobility:  Sit to Supine:  (The patient was found sitting on the EOB with PT)    Transfers:  Sit <> Stand Assistance: Stand By Assistance  Sit <> Stand Assistive Device: No Assistive Device  Toilet Transfer Assistance: Contact Guard Assistance, Stand By Assistance  Toilet Transfer Assistive Device: No Assistive Device    Functional Ambulation: The patient amb to the toilet and sink with SBS and assist to manage the O2 lines    Activities of Daily Living:  Feeding Level of Assistance: Activity did not occur  Grooming Position: Standing at sink  Grooming Level of Assistance: Stand by assistance (the patient washed her hands and brushed her teeth)  Toileting Where Assessed: Toilet  Toileting Level of Assistance: Modified independent       Balance:   Static Sit: FAIR+: Able to take MINIMAL challenges from all directions  Dynamic Sit: FAIR+: Maintains balance through MINIMAL excursions of active trunk motion  Static Stand: FAIR+: Takes MINIMAL challenges from all directions  Dynamic stand: FAIR+: Needs CLOSE SUPERVISION during gait and is able to right self with minor LOB    Therapeutic Activities and Exercises:  The patient was educated re: OT role and recommendations.    AM-PAC 6 CLICK ADL  How much help from another person does this patient currently need?  1 = Unable, Total/Dependent Assistance  2 = A lot, Maximum/Moderate Assistance  3 = A little, Minimum/Contact Guard/Supervision  4 = None, Modified [place]/Independent    Putting on and taking off regular lower body clothing? : 3  Bathing (including washing, rinsing, drying)?: 3  Toileting, which includes using toilet, bedpan, or urinal? : 4  Putting on and taking off regular  "upper body clothing?: 4  Taking care of personal grooming such as brushing teeth?: 4  Eating meals?: 4  Total Score: 22    AM-PAC Raw Score CMS "G-Code Modifier Level of Impairment Assistance   6 % Total / Unable   7 - 9 CM 80 - 100% Maximal Assist   10-14 CL 60 - 80% Moderate Assist   15 - 19 CK 40 - 60% Moderate Assist   20 - 22 CJ 20 - 40% Minimal Assist   23 CI 1-20% SBA / CGA   24 CH 0% Independent/ Mod I       Patient left up in chair, reclined with all lines intact, call button in reach and nurseMaggie notified    Assessment:  Daiana Mcduffie is a 88 y.o. female with a medical diagnosis of Acute pulmonary embolism. The patient is able to perform self care and functional mobility with SBA. The patient will benefit from  OT to assess home safety.    Rehab identified problem list/impairments: Rehab identified problem list/impairments: impaired endurance, impaired self care skills, impaired functional mobilty, impaired cardiopulmonary response to activity, decreased safety awareness    Rehab potential is good.    Activity tolerance: Good    Discharge recommendations: Discharge Facility/Level Of Care Needs: home health OT (with assist from family)     Barriers to discharge: Barriers to Discharge: None    Equipment recommendations: none     GOALS:    Occupational Therapy Goals        Problem: Occupational Therapy Goal    Goal Priority Disciplines Outcome Interventions   Occupational Therapy Goal     OT, PT/OT Ongoing (interventions implemented as appropriate)    Description:  Goals to be met by: 9/18/17    Patient will increase functional independence with ADLs by performing:    UE Dressing with Modified [place] and Supervision.  LE Dressing with Stand-by Assistance.  Grooming while standing at sink with Modified [place].  Toileting from toilet with Modified [place] for hygiene and clothing management.   Stand pivot transfers with Modified [place] and Supervision.  Toilet transfer " to toilet with Supervision.  Upper extremity exercise program x10 reps per handout, with assistance as needed.                      PLAN:  Patient to be seen 3 x/week to address the above listed problems via self-care/home management, therapeutic activities, therapeutic exercises  Plan of Care expires: 09/18/17  Plan of Care reviewed with: patient    OT G-codes  Functional Assessment Tool Used: AM PAC  Score: 22  Functional Limitation: Self care  Self Care Current Status (): CJ  Self Care Goal Status (): MANUEL Valadez OT  09/11/2017

## 2017-09-11 NOTE — PROGRESS NOTES
Chiara with [facility], says pt accepted but waiting to find out friend's address where pt is going at d/c since pt has mold at her home..Bing Estse RN, BSN, [facility]  9/11/2017    '

## 2017-09-11 NOTE — PLAN OF CARE
Problem: Fall Risk (Adult)  Goal: Identify Related Risk Factors and Signs and Symptoms  Related risk factors and signs and symptoms are identified upon initiation of Human Response Clinical Practice Guideline (CPG)   Outcome: Ongoing (interventions implemented as appropriate)   09/11/17 0046   Fall Risk   Related Risk Factors (Fall Risk) age-related changes;environment unfamiliar     Goal: Absence of Falls  Patient will demonstrate the desired outcomes by discharge/transition of care.   Outcome: Ongoing (interventions implemented as appropriate)   09/11/17 1806   Fall Risk (Adult)   Absence of Falls making progress toward outcome       Problem: VTE, DVT and PE (Adult)  Goal: Signs and Symptoms of Listed Potential Problems Will be Absent, Minimized or Managed (VTE, DVT and PE)  Signs and symptoms of listed potential problems will be absent, minimized or managed by discharge/transition of care (reference VTE, DVT and PE (Adult) CPG).   Outcome: Ongoing (interventions implemented as appropriate)   09/11/17 0046 09/11/17 1806   VTE, DVT and PE   Problems Assessed (VTE, DVT, PE) --  all   Problems Present (VTE, DVT, PE) deep vein thrombosis;pulmonary embolism --        Comments: Pt remains free of falls this shift, PE management - lovenox Dc'd and first dose Apixiban today. Pt remains painfree today. Able to make needs known. Plans for DC tomorrow.

## 2017-09-11 NOTE — ASSESSMENT & PLAN NOTE
Denies chest pain,EKG is unremarkable.likely duo to PE,Echo show no sign of heart stain,no sig of fluid overload.

## 2017-09-11 NOTE — PROGRESS NOTES
TN called [phone], talked to Bryanna says eliquis has been approved.  TN called [facility] [phone], spoke to Supriya,pharm tech said eliquis script went through..Bing Estes RN, BSN, [facility]  9/11/2017

## 2017-09-11 NOTE — PLAN OF CARE
Problem: Fall Risk (Adult)  Goal: Identify Related Risk Factors and Signs and Symptoms  Related risk factors and signs and symptoms are identified upon initiation of Human Response Clinical Practice Guideline (CPG)   Outcome: Ongoing (interventions implemented as appropriate)   09/11/17 0046   Fall Risk   Related Risk Factors (Fall Risk) age-related changes;environment unfamiliar   Signs and Symptoms (Fall Risk) presence of risk factors     Plan of care reviewed with patient. Continue Lovenox as ordered. Patient does ROM with arms and legs as requested. Up to BSC with steady gait. Experienced dyspnea with activity.     Problem: Patient Care Overview  Goal: Plan of Care Review  Outcome: Ongoing (interventions implemented as appropriate)   09/11/17 0046   Coping/Psychosocial   Plan Of Care Reviewed With patient       Problem: VTE, DVT and PE (Adult)  Intervention: Prevent/Manage Thrombi/Emboli   09/11/17 0046   OTHER   VTE Required Core Measure Pharmacological prophylaxis initiated/maintained   Minimize Embolism Risk   VTE Prevention/Management bleeding precautions maintained;bleeding risk assessed;dorsiflexion/plantar flexion performed;ROM (active) performed   Safety Interventions   Bleeding Precautions blood pressure closely monitored;coagulation study results reviewed       Goal: Signs and Symptoms of Listed Potential Problems Will be Absent, Minimized or Managed (VTE, DVT and PE)  Signs and symptoms of listed potential problems will be absent, minimized or managed by discharge/transition of care (reference VTE, DVT and PE (Adult) CPG).   Outcome: Ongoing (interventions implemented as appropriate)   09/11/17 0046   VTE, DVT and PE   Problems Assessed (VTE, DVT, PE) all   Problems Present (VTE, DVT, PE) deep vein thrombosis;pulmonary embolism

## 2017-09-12 VITALS
OXYGEN SATURATION: 95 % | BODY MASS INDEX: 23.14 KG/M2 | DIASTOLIC BLOOD PRESSURE: 68 MMHG | HEIGHT: 64 IN | SYSTOLIC BLOOD PRESSURE: 118 MMHG | WEIGHT: 135.56 LBS | HEART RATE: 72 BPM | RESPIRATION RATE: 18 BRPM | TEMPERATURE: 97 F

## 2017-09-12 PROCEDURE — 25000242 PHARM REV CODE 250 ALT 637 W/ HCPCS: Performed by: EMERGENCY MEDICINE

## 2017-09-12 PROCEDURE — 25000242 PHARM REV CODE 250 ALT 637 W/ HCPCS: Performed by: HOSPITALIST

## 2017-09-12 PROCEDURE — 97116 GAIT TRAINING THERAPY: CPT

## 2017-09-12 PROCEDURE — G8978 MOBILITY CURRENT STATUS: HCPCS | Mod: CJ

## 2017-09-12 PROCEDURE — 97110 THERAPEUTIC EXERCISES: CPT

## 2017-09-12 PROCEDURE — 94799 UNLISTED PULMONARY SVC/PX: CPT

## 2017-09-12 PROCEDURE — G8988 SELF CARE GOAL STATUS: HCPCS | Mod: CI

## 2017-09-12 PROCEDURE — G8980 MOBILITY D/C STATUS: HCPCS | Mod: CJ

## 2017-09-12 PROCEDURE — 94640 AIRWAY INHALATION TREATMENT: CPT

## 2017-09-12 PROCEDURE — 94761 N-INVAS EAR/PLS OXIMETRY MLT: CPT

## 2017-09-12 PROCEDURE — G8987 SELF CARE CURRENT STATUS: HCPCS | Mod: CJ

## 2017-09-12 PROCEDURE — G8989 SELF CARE D/C STATUS: HCPCS | Mod: CJ

## 2017-09-12 PROCEDURE — 27000221 HC OXYGEN, UP TO 24 HOURS

## 2017-09-12 PROCEDURE — 25000003 PHARM REV CODE 250: Performed by: HOSPITALIST

## 2017-09-12 PROCEDURE — G8979 MOBILITY GOAL STATUS: HCPCS | Mod: CI

## 2017-09-12 RX ADMIN — FAMOTIDINE 20 MG: 20 TABLET, FILM COATED ORAL at 08:09

## 2017-09-12 RX ADMIN — FLUTICASONE PROPIONATE 1 SPRAY: 50 SPRAY, METERED NASAL at 08:09

## 2017-09-12 RX ADMIN — APIXABAN 10 MG: 5 TABLET, FILM COATED ORAL at 08:09

## 2017-09-12 RX ADMIN — ATORVASTATIN CALCIUM 20 MG: 10 TABLET, FILM COATED ORAL at 08:09

## 2017-09-12 RX ADMIN — IPRATROPIUM BROMIDE AND ALBUTEROL SULFATE 3 ML: .5; 3 SOLUTION RESPIRATORY (INHALATION) at 07:09

## 2017-09-12 RX ADMIN — IPRATROPIUM BROMIDE AND ALBUTEROL SULFATE 3 ML: .5; 3 SOLUTION RESPIRATORY (INHALATION) at 12:09

## 2017-09-12 RX ADMIN — IPRATROPIUM BROMIDE AND ALBUTEROL SULFATE 3 ML: .5; 3 SOLUTION RESPIRATORY (INHALATION) at 01:09

## 2017-09-12 NOTE — PLAN OF CARE
Problem: Fall Risk (Adult)  Intervention: Reduce Risk/Promote Restraint Free Environment   09/12/17 0456   Safety Interventions   Environmental Safety Modification assistive device/personal items within reach;room near unit station     Intervention: Review Medications/Identify Contributors to Fall Risk   09/12/17 0456   Safety Interventions   Medication Review/Management medications reviewed     Intervention: Patient Rounds   09/12/17 0300   Safety Interventions   Patient Rounds bed in low position;bed wheels locked;call light in reach;clutter free environment maintained;ID band on;placement of personal items at bedside;toileting offered;visualized patient     Intervention: Safety Promotion/Fall Prevention   09/12/17 0300   Safety Interventions   Safety Promotion/Fall Prevention assistive device/personal item within reach;nonskid shoes/socks when out of bed;room near unit station;lighting adjusted;medications reviewed;side rails raised x 2       Goal: Identify Related Risk Factors and Signs and Symptoms  Related risk factors and signs and symptoms are identified upon initiation of Human Response Clinical Practice Guideline (CPG)   Outcome: Ongoing (interventions implemented as appropriate)   09/12/17 0456   Fall Risk   Related Risk Factors (Fall Risk) age-related changes;impaired vision;environment unfamiliar   Signs and Symptoms (Fall Risk) presence of risk factors     Goal: Absence of Falls  Patient will demonstrate the desired outcomes by discharge/transition of care.   Outcome: Ongoing (interventions implemented as appropriate)   09/12/17 0456   Fall Risk (Adult)   Absence of Falls making progress toward outcome       Problem: Patient Care Overview  Goal: Plan of Care Review  Outcome: Ongoing (interventions implemented as appropriate)   09/12/17 0456   Coping/Psychosocial   Plan Of Care Reviewed With patient       Rested quietly throughout this shift.  No complaints of pain noted.  Patient noted to remain free  from falls and trauma this shift.  Purposeful hourly rounding in progress.  Call bell within reach.  Will continue to monitor.

## 2017-09-12 NOTE — PLAN OF CARE
Problem: Patient Care Overview  Goal: Plan of Care Review  Outcome: Ongoing (interventions implemented as appropriate)  Patient received on nasal cannula 2 lpm. Aerosol treatment given as ordered. Patient up in chair at this time.

## 2017-09-12 NOTE — PROGRESS NOTES
Pt is living with Mable Larsen (friend) temporarily due to mold in pt house: [phone]  Daughter Alicia Diez [phone], TN spoke to patient and called Alicia scott regarding, help at home, getting prescriptions filled, taking all meds and going to appts..  S&S of Respiratory SOB...Bing Estes RN, BSN, [facility]  9/12/2017    TN left message for Mable Larsen, friend that pt lives with at this time..Bing Estes RN, BSN, [facility]  9/12/2017    Telemetry nurse informed that pt is ready for d/c from [facility] viewpoint..Bing Estes RN, BSN, [facility]  9/12/2017

## 2017-09-12 NOTE — PT/OT/SLP PROGRESS
Physical Therapy  Treatment    Daiana Mcduffie   MRN: [id number]   Admitting Diagnosis: Acute pulmonary embolism    PT Received On: 09/12/17  PT Start Time: 1358     PT Stop Time: 1410    PT Total Time (min): 12 min       Billable Minutes:  Gait [id number]    Treatment Type: Treatment  PT/PTA: PTA     PTA Visit Number: 1       General Precautions: Standard, fall  Orthopedic Precautions: N/A   Braces: N/A         Subjective:  Communicated with nurse Collazo (  , per Ms. Bing pt will be stay home by herself most of the time, therapy will benefit her )  prior to session.  Pt agreeable to gait training.     Pain/Comfort  Pain Rating 1: 0/10  Pain Rating Post-Intervention 1: 0/10    Objective:   Patient found with: telemetry    Functional Mobility:  Bed Mobility:    pt found seated in bedside chair.     Transfers:  Sit <> Stand Assistance: Stand By Assistance, Supervision  Sit <> Stand Assistive Device: No Assistive Device    Gait:   Gait Distance: 260 ft. Pt  with min unsteadiness during gait training however no LOB. Pt reaching for hallway handrail occationally. Pt required 1 standing rest break during gait training 2* fatigue  . VC's for safety awareness and porper breathing technique during gait training.   Assistance 1: Contact Guard Assistance, Stand by Assistance  Gait Assistive Device: No device  Gait Pattern: reciprocal  Gait Deviation(s): decreased dirk, increased time in double stance, decreased velocity of limb motion, decreased swing-to-stance ratio      Balance:   Static Sit: GOOD-: Takes MODERATE challenges from all directions but inconsistently  Dynamic Sit: GOOD-: Maintains balance through MODERATE excursions of active trunk movement,     Static Stand: FAIR+: Takes MINIMAL challenges from all directions  Dynamic stand: FAIR+: Needs CLOSE SUPERVISION during gait and is able to right self with minor LOB     Therapeutic Activities and Exercises:  Pt performed transfer and gait training  as above.   Educated and demonstrated proper technique with BLE ex's : AP, LAQ, HS, hip flexion and pillow squeezes.   Encouraged pt to perform BLE ex's while in chair during the day.      AM-PAC 6 CLICK MOBILITY  How much help from another person does this patient currently need?   1 = Unable, Total/Dependent Assistance  2 = A lot, Maximum/Moderate Assistance  3 = A little, Minimum/Contact Guard/Supervision  4 = None, Modified [place]/Independent    Turning over in bed (including adjusting bedclothes, sheets and blankets)?: 4  Sitting down on and standing up from a chair with arms (e.g., wheelchair, bedside commode, etc.): 4  Moving from lying on back to sitting on the side of the bed?: 4  Moving to and from a bed to a chair (including a wheelchair)?: 4  Need to walk in hospital room?: 3  Climbing 3-5 steps with a railing?: 3  Total Score: 22    AM-PAC Raw Score CMS G-Code Modifier Level of Impairment Assistance   6 % Total / Unable   7 - 9 CM 80 - 100% Maximal Assist   10 - 14 CL 60 - 80% Moderate Assist   15 - 19 CK 40 - 60% Moderate Assist   20 - 22 CJ 20 - 40% Minimal Assist   23 CI 1-20% SBA / CGA   24 CH 0% Independent/ Mod I     Patient left up in reclined chair with all lines intact, call button in reach and nurse notified.    Assessment:  Daiana Mcduffie is a 88 y.o. female with a medical diagnosis of Acute pulmonary embolism . Pt progressing well toward treatment goals. Pt will benefit from family assistance upon discharge from the hospital  .    Rehab identified problem list/impairments: Rehab identified problem list/impairments: weakness, decreased lower extremity function, decreased upper extremity function, decreased coordination, decreased safety awareness, gait instability    Rehab potential is good.    Activity tolerance: Good    Discharge recommendations:   Discharge Facility/Level Of Care Needs: home health PT (with assistance from family as needed)     Barriers to discharge:  None      Equipment recommendations:   none     GOALS:    Physical Therapy Goals        Problem: Physical Therapy Goal    Goal Priority Disciplines Outcome Goal Variances Interventions   Physical Therapy Goal     PT/OT, PT      Description:  Goals to be met by: 17     Patient will increase functional independence with mobility by performin. Sit to stand transfer with Supervision  2. Gait  x500 feet with Supervision   3. Lower extremity exercise program x30 reps per handout, with supervision                      PLAN:    Patient to be seen 5 x/week  to address the above listed problems via gait training, therapeutic activities, therapeutic exercises  Plan of Care expires: 17  Plan of Care reviewed with: patient         Estella Garibay, PTA  2017

## 2017-09-12 NOTE — PLAN OF CARE
Problem: Physical Therapy Goal  Goal: Physical Therapy Goal  Goals to be met by: 17     Patient will increase functional independence with mobility by performin. Sit to stand transfer with Supervision  2. Gait  x500 feet with Supervision   3. Lower extremity exercise program x30 reps per handout, with supervision     Outcome: Ongoing (interventions implemented as appropriate)  Pt progressing well toward treatment goals. Pt will benefit from family assistance upon discharge from the hospital .

## 2017-09-12 NOTE — PLAN OF CARE
Problem: Occupational Therapy Goal  Goal: Occupational Therapy Goal  Goals to be met by: 9/18/17    Patient will increase functional independence with ADLs by performing:    UE Dressing with Modified [place] and Supervision. Met 9/12/17  LE Dressing with Stand-by Assistance. Met 9/12/17  Grooming while standing at sink with Modified [place]. Met 9/12/17  Toileting from toilet with Modified [place] for hygiene and clothing management.   Stand pivot transfers with Modified [place] and Supervision.  Toilet transfer to toilet with Supervision.  Upper extremity exercise program x10 reps per handout, with assistance as needed.     Outcome: Ongoing (interventions implemented as appropriate)  Patient able to complete LE/UE dressing and grooming at sink with mod I and supervision. Patient progressing toward goals.

## 2017-09-12 NOTE — PT/OT/SLP PROGRESS
Occupational Therapy  Treatment    Daiana Mcduffie   MRN: [id number]   Admitting Diagnosis: Acute pulmonary embolism    OT Date of Treatment: 09/12/17   OT Start Time: 0920  OT Stop Time: 0935  OT Total Time (min): 15 min    Billable Minutes:  Therapeutic Exercise 15 and Total Time 15    General Precautions: Standard, fall, respiratory  Orthopedic Precautions: N/A  Braces: N/A    Do you have any cultural, spiritual, Orthodoxy conflicts, given your current situation?: none    Subjective:  Communicated with  Nurse Jenn prior to session. Patient without O2 since early this AM.    Pain/Comfort  Pain Rating 1: 0/10    Objective:  Patient found with:  Up in chair without O2     Functional Mobility:  Transfers:   Sit <> Stand Assistance: Stand By Assistance  Sit <> Stand Assistive Device: No Assistive Device    Functional Ambulation: Patient ambulated from bedside chair to sink for grooming activity with SBA.    Activities of Daily Living:     UE Dressing Level of Assistance: Supervision, Modified independent  LE Dressing Level of Assistance: Supervision, Modified independent  Grooming Position: Standing at sink  Grooming Level of Assistance: Modified independent, Supervision    Balance:   Static Sit: GOOD: Takes MODERATE challenges from all directions  Dynamic Sit: GOOD: Maintains balance through MODERATE excursions of active trunk movement  Static Stand: FAIR+: Takes MINIMAL challenges from all directions  Dynamic stand: FAIR+: Needs CLOSE SUPERVISION during gait and is able to right self with minor LOB    Therapeutic Activities and Exercises:  Patient performed BUE theraband exercises x 10 reps shoulder flex, elbow flex, shoulder horizontal abd, and forward press.     AM-PAC 6 CLICK ADL   How much help from another person does this patient currently need?   1 = Unable, Total/Dependent Assistance  2 = A lot, Maximum/Moderate Assistance  3 = A little, Minimum/Contact Guard/Supervision  4 = None, Modified  "[place]/Independent    Putting on and taking off regular lower body clothing? : 3  Bathing (including washing, rinsing, drying)?: 3  Toileting, which includes using toilet, bedpan, or urinal? : 4  Putting on and taking off regular upper body clothing?: 4  Taking care of personal grooming such as brushing teeth?: 4  Eating meals?: 4  Total Score: 22     AM-PAC Raw Score CMS "G-Code Modifier Level of Impairment Assistance   6 % Total / Unable   7 - 8 CM 80 - 100% Maximal Assist   9-13 CL 60 - 80% Moderate Assist   14 - 19 CK 40 - 60% Moderate Assist   20 - 22 CJ 20 - 40% Minimal Assist   23 CI 1-20% SBA / CGA   24 CH 0% Independent/ Mod I       Patient left up in chair with all lines intact, call button in reach and nurse Jenn notified    ASSESSMENT:  Daiana Mcduffie is a 88 y.o. female with a medical diagnosis of Acute pulmonary embolism.  Patient able to perform functional mobility with SBA for safety.  Patient able to perform self care with mod I and supervision. Patient without O2 this AM with no signs of SOB.  Patient will benefit from continued OT to assess home safety awareness.    Rehab identified problem list/impairments: Rehab identified problem list/impairments: impaired endurance, impaired self care skills, impaired functional mobilty, decreased safety awareness, impaired cardiopulmonary response to activity    Rehab potential is good.    Activity tolerance: Good    Discharge recommendations: Discharge Facility/Level Of Care Needs: home with home health ((with assist from family))     Barriers to discharge: Barriers to Discharge: None    Equipment recommendations: none     GOALS:    Occupational Therapy Goals        Problem: Occupational Therapy Goal    Goal Priority Disciplines Outcome Interventions   Occupational Therapy Goal     OT, PT/OT Ongoing (interventions implemented as appropriate)    Description:  Goals to be met by: 9/18/17    Patient will increase functional independence with ADLs " by performing:    UE Dressing with Modified [place] and Supervision. Met 9/12/17  LE Dressing with Stand-by Assistance. Met 9/12/17  Grooming while standing at sink with Modified [place]. Met 9/12/17  Toileting from toilet with Modified [place] for hygiene and clothing management.   Stand pivot transfers with Modified [place] and Supervision.  Toilet transfer to toilet with Supervision.  Upper extremity exercise program x10 reps per handout, with assistance as needed.                       Plan:  Patient to be seen 3 x/week to address the above listed problems via self-care/home management, therapeutic activities, therapeutic exercises  Plan of Care expires: 09/18/17  Plan of Care reviewed with: patient         SERA Heck  09/12/2017

## 2017-09-13 NOTE — PT/OT/SLP DISCHARGE
Physical Therapy Discharge Summary    Daiana Mcduffie  MRN: [id number]   Acute pulmonary embolism   Patient Discharged from acute Physical Therapy on 17.  Please refer to prior PT noted date on 17 for functional status.     Assessment:   Goals partially met. Patient appropriate for care in another setting.  GOALS:    Physical Therapy Goals        Problem: Physical Therapy Goal    Goal Priority Disciplines Outcome Goal Variances Interventions   Physical Therapy Goal     PT/OT, PT Ongoing (interventions implemented as appropriate)     Description:  Goals to be met by: 17     Patient will increase functional independence with mobility by performin. Sit to stand transfer with Supervision  2. Gait  x500 feet with Supervision   3. Lower extremity exercise program x30 reps per handout, with supervision                    Reasons for Discontinuation of Therapy Services  Transfer to alternate level of care.      Plan:  Patient Discharged to: Home with Home Health Service.

## 2017-09-13 NOTE — DISCHARGE SUMMARY
[facility] Hospital Medicine  Discharge Summary      Patient Name: Daiana Mcduffie  MRN: [id number]  Admission Date: 2017  Hospital Length of Stay: 3 days  Discharge Date and Time: 2017  Attending Physician: Meliza Lang   Discharging Provider: Meliza Lang MD  Primary Care Provider: Pawan Mcclain MD      HPI:   This 88 y.o. female with HTN, HLD, GERD presents to the ED c/o SOB and chest pain that developed just a few minutes prior to arrival to the ED while she and her daughter were getting ready to attend a . Her pain is severe (8/10) and acute. Pt states that she is feeling SOB this morning.she was slightly hypoxic 88% Spo2 on RA improved with supplemental oxygen,, Pt denies any sore throat, N/V, fever;cough.CTA chest show bilateral PE,patient as been stared on lovenox,no reported recent surgery,long travel,fracture,history of blood clot or malignancy.    * No surgery found *      Indwelling Lines/Drains at time of discharge:   Lines/Drains/Airways          No matching active lines, drains, or airways        Hospital Course:   This 88 y.o. female with HTN, HLD, GERD presents to the ED c/o SOB and chest pain that developed just a few minutes prior to arrival to the ED while she and her daughter were getting ready to attend a . Her pain is severe (8/10) and acute. Pt states that she is feeling SOB .she was slightly hypoxic 88% Spo2 on RA improved with supplemental oxygen,, Pt denies any sore throat, N/V, fever;cough.CTA chest show bilateral PE,left lower leg DVT is positive,patient was  stared on lovenox,no reported recent surgery,long travel,fracture,history of blood clot or malignancy.switched to Eliquis,side with possible irreversible to patient and family explained,she has slight elevated Troponin with no sign of ischemic changes on EKG,she denies chest pain.    Pt was switched to eliquis. Her discharge home was held up due to oxygen requirements. Testing on day  of discharge did not indicate she needed home oxygen. Follow up with PCP as scheduled.      Consults:   Consults         Status Ordering Provider     Inpatient consult to Social Work  Once     Provider:  (Not yet assigned)    Completed KATHERIN GAONA     Inpatient consult to Social Work  Once     Provider:  (Not yet assigned)    Completed KATHERIN GAONA          Significant Diagnostic Studies: Chest CTA:  .  Diffuse bilateral pulmonary thromboembolism as described above.    2.  Pulmonary nodule within the right lower lobe, one year followup advised.  Correlation with any previous exams would be helpful, this may reflect a chronic finding.    3.  Several additional findings above noting mild prominence of the thyroid gland, correlation with ultrasound if not previously performed.  Please see above.    LE ultrasound:   Partially occlusive thrombus involving the left femoral vein and popliteal vein.  No right deep venous thrombosis.    Pending Diagnostic Studies:     None        Final Active Diagnoses:    Diagnosis Date Noted POA    PRINCIPAL PROBLEM:  Acute pulmonary embolism [I26.99] 09/09/2017 Yes    Acute respiratory failure with hypoxia and hypercarbia [J96.01, J96.02] 09/11/2017 Yes    Elevated troponin [R74.8] 09/10/2017 Yes    Acute DVT (deep venous thrombosis) [I82.409] 09/10/2017 Yes    Hyperlipidemia [E78.5] 09/09/2017 Yes    Essential hypertension [I10] 12/17/2012 Yes      Problems Resolved During this Admission:    Diagnosis Date Noted Date Resolved POA      No new Assessment & Plan notes have been filed under this hospital service since the last note was generated.  Service: Hospital Medicine      Discharged Condition: good    Disposition: Home-Health Care [facility]    Follow Up:  Follow-up Information     Pawan Mcclain MD On 9/18/2017.    Specialty:  Internal Medicine  Why:  out patient services:  10:40 a.m. follow up from the hospital  Contact information:  [address] [place]  "[id number]  [phone]             Mikael Queen MD On 9/20/2017.    Specialties:  INTERVENTIONAL CARDIOLOGY, Cardiology  Why:  out patient services:  10:00 a.m. follow up from the hospital  Contact information:  [address]  [address]  [place] [address]  [phone]             [facility] [place] On 9/13/2017.    Specialty:  Home Health Services  Why:  Home Health  Contact information:  [address]  [address]  [place] [id number]  [phone]                 Patient Instructions:     OXYGEN FOR HOME USE   Order Specific Question Answer Comments   Liter Flow 3    Duration Continuous    Qualifying SpO2: 85 % on RA.    Testing done at: Exercise/Activity    Device home concentrator with portable unit    Length of need (in months): 99 mos    Patient condition with qualifying saturation  pulmonary emboly    Height: 5' 4" (1.626 m)    Weight: 61 kg (134 lb 7.7 oz)    Does patient have medical equipment at home? none    Alternative treatment measures have been tried or considered and deemed clinically ineffective. Yes      Diet general     Activity as tolerated       Medications:  Reconciled Home Medications:   Discharge Medication List as of 9/12/2017 12:48 PM      START taking these medications    Details   !! apixaban 5 mg Tab Take 2 tablets (10 mg total) by mouth 2 (two) times daily., Starting Sun 9/10/2017, Until Sun 9/17/2017, Normal      !! apixaban 5 mg Tab Take 1 tablet (5 mg total) by mouth 2 (two) times daily., Starting Sun 9/17/2017, Until Tue 10/17/2017, Normal       !! - Potential duplicate medications found. Please discuss with provider.      CONTINUE these medications which have NOT CHANGED    Details   albuterol 90 mcg/actuation inhaler Inhale 2 puffs into the lungs every 4 (four) hours as needed for Wheezing or Shortness of Breath. Rescue, Starting Thu 8/31/2017, Normal      atorvastatin (LIPITOR) 20 MG tablet Take 1 tablet (20 mg total) by mouth once daily., Starting Mon 6/26/2017, " Normal      cetirizine (ZYRTEC) 10 MG tablet Take 1 tablet (10 mg total) by mouth every evening., Starting 5/17/2016, Until Wed 5/17/17, OTC      clopidogrel (PLAVIX) 75 mg tablet Take 1 tablet (75 mg total) by mouth once daily., Starting 5/17/2016, Until Wed 5/17/17, Normal      fluticasone (FLONASE) 50 mcg/actuation nasal spray 1 spray by Each Nare route once daily., Starting Thu 7/20/2017, Normal      hydrOXYzine pamoate (VISTARIL) 25 MG Cap Take 1 capsule (25 mg total) by mouth every 8 (eight) hours as needed., Starting 3/24/2017, Until Discontinued, Normal      ketoconazole (NIZORAL) 2 % cream Starting 12/24/2016, Until Discontinued, Historical Med      lisinopril (PRINIVIL,ZESTRIL) 40 MG tablet Take 1 tablet (40 mg total) by mouth once daily., Starting Fri 7/21/2017, Until Sat 7/21/2018, Normal      meclizine (ANTIVERT) 25 mg tablet TAKE ONE TABLET BY MOUTH THREE TIMES DAILY AS NEEDED FOR  DIZZINESS, Normal      ranitidine (ZANTAC) 150 MG tablet Take 1 tablet (150 mg total) by mouth 2 (two) times daily., Starting Tue 5/9/2017, Until Wed 5/9/2018, Normal      triamcinolone acetonide 0.1% (KENALOG) 0.1 % cream Apply topically 2 (two) times daily., Starting 9/8/2016, Until Discontinued, Normal         STOP taking these medications       amlodipine (NORVASC) 2.5 MG tablet Comments:   Reason for Stopping:             Time spent on the discharge of patient: 35 minutes    HOS POC IP DISCHARGE SUMMARY    Meliza Lang MD  Department of Hospital Medicine  [facility]

## 2017-09-14 ENCOUNTER — TELEPHONE (OUTPATIENT)
Dept: FAMILY MEDICINE | Facility: CLINIC | Age: 82
End: 2017-09-14

## 2017-09-14 ENCOUNTER — PATIENT OUTREACH (OUTPATIENT)
Dept: ADMINISTRATIVE | Facility: CLINIC | Age: 82
End: 2017-09-14

## 2017-09-14 DIAGNOSIS — J96.90 RESPIRATORY FAILURE, UNSPECIFIED CHRONICITY, UNSPECIFIED WHETHER WITH HYPOXIA OR HYPERCAPNIA: ICD-10-CM

## 2017-09-14 DIAGNOSIS — I10 ESSENTIAL HYPERTENSION: Primary | ICD-10-CM

## 2017-09-14 DIAGNOSIS — I26.99 OTHER ACUTE PULMONARY EMBOLISM WITHOUT ACUTE COR PULMONALE: ICD-10-CM

## 2017-09-14 NOTE — PROGRESS NOTES
C3 nurse contacted several entities for housing; Called friend whom pt is staying with temporarily and gave her phone #'s for housing and food assistance. C3 nurse will call pt later.    Also, pt has been referred to [facility].

## 2017-09-14 NOTE — Clinical Note
Please forward this important TCC information to your provider in order to maximize the post discharge care delivery of this patient.  C3 nurse spoke with Daiana Mcduffie  for a TCC post hospital discharge follow up call. The patient has a scheduled HOSFU appointment with Pawan Mcclain MD on 9/18 @ 1040. Patient is staying with friend and is in need of housing; Reporting  Her apt has mold.  Respectfully, Krista Noel RN  [facility]   [email or web address]     Please do not reply to this message, as this inbox is not routinely monitored.

## 2017-09-14 NOTE — PATIENT INSTRUCTIONS
Discharge Instructions for Pulmonary Embolism  A pulmonary embolism occurs when an embolus (clot) in the bloodstream travels through the heart and into the lungs. If the embolus becomes lodged in a blood vessel in the lungs, blood flow can be blocked. Symptoms can quickly develop and cause life-threatening heart and lung problems.  Home Care  Take your medications exactly as directed. Dont skip doses.  Ask your doctor about daily aspirin therapy.  Drink 6-8 glasses of water a day, unless directed otherwise.  Learn to take your own pulse. Keep a record of your results. Ask your doctor which readings mean that you need medical attention.  Lifestyle Changes  Avoid sitting, standing, or lying down for long periods without moving your legs and feet.  When traveling by car, stop to get out and move around at least once every three hours. On long airplane, train, or bus rides, get up and move around when possible. If you cant get up, wiggle your toes and tighten your calves to keep your blood moving.  Maintain a healthy weight. Get help to lose any extra pounds.  If you are a smoker, break the smoking habit. Enroll in a stop-smoking program to improve your chances of success.  Be active. Begin an exercise program. Ask your doctor how to get started. You can benefit from simple activities such as walking or gardening.  Follow-Up  Make a follow-up appointment as directed by our staff.    Call the healthcare provider right away if you have any of the following:  Chest pain (call 911)  Trouble breathing (call 911)  Coughing up blood (call 911)  Fainting (call 911)  Skin turns blue (call 911)  Dizziness  Rapid, pounding, or unusual heartbeat  Sweating more than usual  Unusual swelling or pain in your leg   © 8123-5303 [facility], [address] [place] [address], [place] [address]. All rights reserved. This information is not intended as a substitute for professional medical care. Always follow your healthcare professional's  instructions.

## 2017-09-14 NOTE — TELEPHONE ENCOUNTER
----- Message from Stacy Lobo sent at 9/14/2017 11:46 AM CDT -----  Contact: John - [facility] John with [facility] says he need a Verbal order for patient to get a   . Please call at [phone]

## 2017-09-18 ENCOUNTER — OFFICE VISIT (OUTPATIENT)
Dept: FAMILY MEDICINE | Facility: CLINIC | Age: 82
End: 2017-09-18
Payer: MEDICARE

## 2017-09-18 ENCOUNTER — OUTPATIENT CASE MANAGEMENT (OUTPATIENT)
Dept: ADMINISTRATIVE | Facility: OTHER | Age: 82
End: 2017-09-18

## 2017-09-18 VITALS
RESPIRATION RATE: 17 BRPM | BODY MASS INDEX: 23.18 KG/M2 | WEIGHT: 135.81 LBS | SYSTOLIC BLOOD PRESSURE: 124 MMHG | TEMPERATURE: 98 F | OXYGEN SATURATION: 95 % | DIASTOLIC BLOOD PRESSURE: 74 MMHG | HEART RATE: 83 BPM | HEIGHT: 64 IN

## 2017-09-18 DIAGNOSIS — I26.99 OTHER ACUTE PULMONARY EMBOLISM WITHOUT ACUTE COR PULMONALE: Primary | ICD-10-CM

## 2017-09-18 DIAGNOSIS — R91.1 PULMONARY NODULE, RIGHT: ICD-10-CM

## 2017-09-18 PROCEDURE — 99213 OFFICE O/P EST LOW 20 MIN: CPT | Mod: PBBFAC,PN | Performed by: INTERNAL MEDICINE

## 2017-09-18 PROCEDURE — 99495 TRANSJ CARE MGMT MOD F2F 14D: CPT | Mod: PBBFAC,PN | Performed by: INTERNAL MEDICINE

## 2017-09-18 PROCEDURE — 99495 TRANSJ CARE MGMT MOD F2F 14D: CPT | Mod: S$PBB,,, | Performed by: INTERNAL MEDICINE

## 2017-09-18 PROCEDURE — 99999 PR PBB SHADOW E&M-EST. PATIENT-LVL III: CPT | Mod: PBBFAC,,, | Performed by: INTERNAL MEDICINE

## 2017-09-18 NOTE — PROGRESS NOTES
Transitional Care Note  Subjective:       Patient ID: Daiana Mcduffie is a 88 y.o. female.  Chief Complaint: Hospital Follow Up    Family and/or Caretaker present at visit?  No.  Diagnostic tests reviewed/disposition: No diagnosic tests pending after this hospitalization.  Disease/illness education:   Home health/community services discussion/referrals: Patient has home health established at .   Establishment or re-establishment of referral orders for community resources: No other necessary community resources.   Discussion with other health care providers: No discussion with other health care providers necessary.   HPI  Review of Systems   HENT: Positive for hearing loss.    Respiratory: Negative for shortness of breath.    Cardiovascular: Negative for chest pain and leg swelling.   Musculoskeletal: Negative for arthralgias.       Objective:      Physical Exam   Constitutional: She is oriented to person, place, and time. She appears well-developed and well-nourished. No distress.   HENT:   Head: Normocephalic and atraumatic.   Right Ear: External ear normal.   Left Ear: External ear normal.   Eyes: Conjunctivae are normal. No scleral icterus.   Cardiovascular: Normal rate, regular rhythm and normal heart sounds.  Exam reveals no gallop and no friction rub.    No murmur heard.  Pulmonary/Chest: Effort normal and breath sounds normal. No respiratory distress. She has no wheezes. She has no rales.   Musculoskeletal: She exhibits no edema or tenderness.   Neurological: She is alert and oriented to person, place, and time. No cranial nerve deficit.   Skin: Skin is warm and dry.   Psychiatric: She has a normal mood and affect.   Vitals reviewed.      Assessment:       1. Other acute pulmonary embolism without acute cor pulmonale    2. Pulmonary nodule, right        Plan:       Daiana was seen today for hospital follow up.    Diagnoses and all orders for this visit:    Other acute pulmonary embolism without acute cor  pulmonale - recent admission with DVT and multiple PEs.  She is taking apixaban as prescribed.  She denies sob and no longer needs O2.  She tells me she will have no where to live soon.  She has 5 children but none are able to take her in.  She has lived with random friends for weeks now.  She won't go back to her apartment due to mold.  I have suggested that she allow management to move her into another apartment until she is able to find additional housing.  She seems relucant.  I asked to call her daughter who is listed as her emergency contact.  She would rather I not.  Will try to get case management involved again to help with available resources.      Pulmonary nodule, right - No on CT during admission.  She is not a smoker.  Repeat CT in 1 year.        f/u 1 month

## 2017-09-18 NOTE — PROGRESS NOTES
Thank you for the referral.  Patient has been assigned to FOUZIA London  for low risk screening for Outpatient Case Management.     Reason for referral: Low risk    Essential hypertension  Respiratory failure, unspecified chronicity, unspecified whether with hypoxia or hypercapnia  Other acute pulmonary embolism without acute cor pulmonale  Pt has moved to friend's apt due to mold in pt's apt and is seeking aid for housing.    Thank you,    Viviana Villar, SSC

## 2017-09-20 ENCOUNTER — OUTPATIENT CASE MANAGEMENT (OUTPATIENT)
Dept: ADMINISTRATIVE | Facility: OTHER | Age: 82
End: 2017-09-20

## 2017-09-21 ENCOUNTER — OUTPATIENT CASE MANAGEMENT (OUTPATIENT)
Dept: ADMINISTRATIVE | Facility: OTHER | Age: 82
End: 2017-09-21

## 2017-09-21 NOTE — LETTER
September 21, 2017    Daiana Mcduffie  [address]  [place] [address]             [facility]  [address]  [facility] [id number] Dear:Ms.Lillie Mcduffie     I am writing from the Outpatient Complex Care Management Department at [facility].  I received a referral from Dr. Mcclain  to contact you or your caregiver regarding any needs you may have. I have attempted to contact you or your cargeiver by phone two times unsuccessfully.  Please contact the Outpatient Complex Care Management Department at [phone] you would like to discuss your needs.      Sincerely,         FOUZIA London

## 2017-09-21 NOTE — PROGRESS NOTES
This CSW attempted to reach patient/caregiver to provide resource and left msg requesting a return call.  Letter with contact information was sent via [employer] to patient/caregiver.    CSW sent an e-mail to PCP asking if there are any additional numbers to contact patient .

## 2017-09-25 ENCOUNTER — TELEPHONE (OUTPATIENT)
Dept: FAMILY MEDICINE | Facility: CLINIC | Age: 82
End: 2017-09-25

## 2017-09-25 NOTE — TELEPHONE ENCOUNTER
----- Message from FOUZIA Morales sent at 9/21/2017  2:05 PM CDT -----  This CSW received a referral on the above patient. I have attempted to contact the patient or caregiver by phone two times unsuccessfully and mailed a letter with our contact information.    Please advise if there is another number I may call to reach the patient     Thank you for the referral,    FOUZIA London

## 2017-10-15 ENCOUNTER — HOSPITAL ENCOUNTER (INPATIENT)
Facility: HOSPITAL | Age: 82
LOS: 2 days | Discharge: HOME HEALTH | DRG: 175 | End: 2017-10-18
Attending: EMERGENCY MEDICINE | Admitting: EMERGENCY MEDICINE
Payer: MEDICARE

## 2017-10-15 DIAGNOSIS — R09.02 HYPOXIA: ICD-10-CM

## 2017-10-15 DIAGNOSIS — R07.9 CHEST PAIN: ICD-10-CM

## 2017-10-15 DIAGNOSIS — J96.01 ACUTE HYPOXEMIC RESPIRATORY FAILURE: ICD-10-CM

## 2017-10-15 DIAGNOSIS — R10.13 EPIGASTRIC ABDOMINAL PAIN: ICD-10-CM

## 2017-10-15 DIAGNOSIS — R55 SYNCOPE AND COLLAPSE: ICD-10-CM

## 2017-10-15 DIAGNOSIS — I26.09 OTHER ACUTE PULMONARY EMBOLISM WITH ACUTE COR PULMONALE: Primary | ICD-10-CM

## 2017-10-15 LAB
ALBUMIN SERPL BCP-MCNC: 3.3 G/DL
ALP SERPL-CCNC: 87 U/L
ALT SERPL W/O P-5'-P-CCNC: 67 U/L
ANION GAP SERPL CALC-SCNC: 13 MMOL/L
AST SERPL-CCNC: 96 U/L
BACTERIA #/AREA URNS HPF: ABNORMAL /HPF
BASOPHILS # BLD AUTO: 0.01 K/UL
BASOPHILS NFR BLD: 0.1 %
BILIRUB SERPL-MCNC: 0.5 MG/DL
BILIRUB UR QL STRIP: NEGATIVE
BNP SERPL-MCNC: 26 PG/ML
BUN SERPL-MCNC: 30 MG/DL
CALCIUM SERPL-MCNC: 9.3 MG/DL
CHLORIDE SERPL-SCNC: 105 MMOL/L
CLARITY UR: CLEAR
CO2 SERPL-SCNC: 23 MMOL/L
COLOR UR: YELLOW
CREAT SERPL-MCNC: 1.3 MG/DL
DIFFERENTIAL METHOD: ABNORMAL
EOSINOPHIL # BLD AUTO: 0 K/UL
EOSINOPHIL NFR BLD: 0.4 %
ERYTHROCYTE [DISTWIDTH] IN BLOOD BY AUTOMATED COUNT: 13.3 %
EST. GFR  (AFRICAN AMERICAN): 42 ML/MIN/1.73 M^2
EST. GFR  (NON AFRICAN AMERICAN): 37 ML/MIN/1.73 M^2
GLUCOSE SERPL-MCNC: 172 MG/DL
GLUCOSE UR QL STRIP: ABNORMAL
HCT VFR BLD AUTO: 40.2 %
HGB BLD-MCNC: 12.9 G/DL
HGB UR QL STRIP: ABNORMAL
KETONES UR QL STRIP: NEGATIVE
LACTATE SERPL-SCNC: 2.5 MMOL/L
LEUKOCYTE ESTERASE UR QL STRIP: ABNORMAL
LYMPHOCYTES # BLD AUTO: 3.1 K/UL
LYMPHOCYTES NFR BLD: 38.4 %
MCH RBC QN AUTO: 28.9 PG
MCHC RBC AUTO-ENTMCNC: 32.1 G/DL
MCV RBC AUTO: 90 FL
MICROSCOPIC COMMENT: ABNORMAL
MONOCYTES # BLD AUTO: 0.6 K/UL
MONOCYTES NFR BLD: 7.2 %
NEUTROPHILS # BLD AUTO: 4.3 K/UL
NEUTROPHILS NFR BLD: 53.9 %
NITRITE UR QL STRIP: NEGATIVE
PH UR STRIP: 5 [PH] (ref 5–8)
PLATELET # BLD AUTO: 120 K/UL
PMV BLD AUTO: 10 FL
POCT GLUCOSE: 179 MG/DL (ref 70–110)
POTASSIUM SERPL-SCNC: 3.6 MMOL/L
PROT SERPL-MCNC: 6.8 G/DL
PROT UR QL STRIP: NEGATIVE
RBC # BLD AUTO: 4.47 M/UL
RBC #/AREA URNS HPF: 4 /HPF (ref 0–4)
SODIUM SERPL-SCNC: 141 MMOL/L
SP GR UR STRIP: 1.02 (ref 1–1.03)
SQUAMOUS #/AREA URNS HPF: 6 /HPF
TROPONIN I SERPL DL<=0.01 NG/ML-MCNC: 0.02 NG/ML
URN SPEC COLLECT METH UR: ABNORMAL
UROBILINOGEN UR STRIP-ACNC: NEGATIVE EU/DL
WBC # BLD AUTO: 8.02 K/UL
WBC #/AREA URNS HPF: 15 /HPF (ref 0–5)

## 2017-10-15 PROCEDURE — 93005 ELECTROCARDIOGRAM TRACING: CPT

## 2017-10-15 PROCEDURE — 81000 URINALYSIS NONAUTO W/SCOPE: CPT

## 2017-10-15 PROCEDURE — 83880 ASSAY OF NATRIURETIC PEPTIDE: CPT

## 2017-10-15 PROCEDURE — 87088 URINE BACTERIA CULTURE: CPT

## 2017-10-15 PROCEDURE — 96365 THER/PROPH/DIAG IV INF INIT: CPT

## 2017-10-15 PROCEDURE — 96372 THER/PROPH/DIAG INJ SC/IM: CPT

## 2017-10-15 PROCEDURE — 93010 ELECTROCARDIOGRAM REPORT: CPT | Mod: ,,, | Performed by: INTERNAL MEDICINE

## 2017-10-15 PROCEDURE — 87077 CULTURE AEROBIC IDENTIFY: CPT

## 2017-10-15 PROCEDURE — 83605 ASSAY OF LACTIC ACID: CPT

## 2017-10-15 PROCEDURE — 87086 URINE CULTURE/COLONY COUNT: CPT

## 2017-10-15 PROCEDURE — 84484 ASSAY OF TROPONIN QUANT: CPT

## 2017-10-15 PROCEDURE — 99285 EMERGENCY DEPT VISIT HI MDM: CPT | Mod: 25

## 2017-10-15 PROCEDURE — 85025 COMPLETE CBC W/AUTO DIFF WBC: CPT

## 2017-10-15 PROCEDURE — 80053 COMPREHEN METABOLIC PANEL: CPT

## 2017-10-15 PROCEDURE — 82962 GLUCOSE BLOOD TEST: CPT

## 2017-10-15 PROCEDURE — 87186 SC STD MICRODIL/AGAR DIL: CPT

## 2017-10-16 PROBLEM — R07.9 CHEST PAIN: Status: ACTIVE | Noted: 2017-10-16

## 2017-10-16 PROBLEM — I27.82 CHRONIC PULMONARY EMBOLISM: Status: ACTIVE | Noted: 2017-10-16

## 2017-10-16 PROBLEM — R93.5 ABNORMAL CT SCAN, PELVIS: Status: ACTIVE | Noted: 2017-10-16

## 2017-10-16 PROBLEM — N30.00 ACUTE CYSTITIS: Status: ACTIVE | Noted: 2017-10-16

## 2017-10-16 PROBLEM — I26.09 PULMONARY EMBOLISM WITH ACUTE COR PULMONALE: Status: ACTIVE | Noted: 2017-09-09

## 2017-10-16 PROBLEM — R06.03 ACUTE RESPIRATORY DISTRESS: Status: ACTIVE | Noted: 2017-10-16

## 2017-10-16 LAB
ALLENS TEST: ABNORMAL
DELSYS: ABNORMAL
FIO2: 21
HCO3 UR-SCNC: 23.3 MMOL/L (ref 24–28)
HCT VFR BLD CALC: 38 %PCV (ref 36–54)
MODE: ABNORMAL
PCO2 BLDA: 34.2 MMHG (ref 35–45)
PH SMN: 7.44 [PH] (ref 7.35–7.45)
PO2 BLDA: 51 MMHG (ref 80–100)
POC BE: 0 MMOL/L
POC IONIZED CALCIUM: 1.23 MMOL/L (ref 1.06–1.42)
POC SATURATED O2: 88 % (ref 95–100)
POC TCO2: 24 MMOL/L (ref 23–27)
POTASSIUM BLD-SCNC: 3.7 MMOL/L (ref 3.5–5.1)
SAMPLE: ABNORMAL
SITE: ABNORMAL
SODIUM BLD-SCNC: 137 MMOL/L (ref 136–145)
SP02: 92
TROPONIN I SERPL DL<=0.01 NG/ML-MCNC: 0.13 NG/ML
TROPONIN I SERPL DL<=0.01 NG/ML-MCNC: 0.13 NG/ML
TROPONIN I SERPL DL<=0.01 NG/ML-MCNC: 0.18 NG/ML
TROPONIN I SERPL DL<=0.01 NG/ML-MCNC: 0.18 NG/ML

## 2017-10-16 PROCEDURE — 82803 BLOOD GASES ANY COMBINATION: CPT

## 2017-10-16 PROCEDURE — 36600 WITHDRAWAL OF ARTERIAL BLOOD: CPT

## 2017-10-16 PROCEDURE — 63600175 PHARM REV CODE 636 W HCPCS: Performed by: EMERGENCY MEDICINE

## 2017-10-16 PROCEDURE — 21400001 HC TELEMETRY ROOM

## 2017-10-16 PROCEDURE — 84484 ASSAY OF TROPONIN QUANT: CPT

## 2017-10-16 PROCEDURE — 99223 1ST HOSP IP/OBS HIGH 75: CPT | Mod: ,,, | Performed by: INTERNAL MEDICINE

## 2017-10-16 PROCEDURE — 25000003 PHARM REV CODE 250: Performed by: HOSPITALIST

## 2017-10-16 PROCEDURE — 25000003 PHARM REV CODE 250: Performed by: EMERGENCY MEDICINE

## 2017-10-16 PROCEDURE — 99900035 HC TECH TIME PER 15 MIN (STAT)

## 2017-10-16 PROCEDURE — 36415 COLL VENOUS BLD VENIPUNCTURE: CPT

## 2017-10-16 RX ORDER — ENOXAPARIN SODIUM 100 MG/ML
1 INJECTION SUBCUTANEOUS
Status: DISCONTINUED | OUTPATIENT
Start: 2017-10-17 | End: 2017-10-18 | Stop reason: HOSPADM

## 2017-10-16 RX ORDER — LISINOPRIL 20 MG/1
40 TABLET ORAL DAILY
Status: DISCONTINUED | OUTPATIENT
Start: 2017-10-16 | End: 2017-10-18 | Stop reason: HOSPADM

## 2017-10-16 RX ORDER — CLOPIDOGREL BISULFATE 75 MG/1
75 TABLET ORAL DAILY
Status: DISCONTINUED | OUTPATIENT
Start: 2017-10-16 | End: 2017-10-16

## 2017-10-16 RX ORDER — ONDANSETRON 2 MG/ML
4 INJECTION INTRAMUSCULAR; INTRAVENOUS EVERY 8 HOURS PRN
Status: DISCONTINUED | OUTPATIENT
Start: 2017-10-16 | End: 2017-10-18 | Stop reason: HOSPADM

## 2017-10-16 RX ORDER — SODIUM CHLORIDE 0.9 % (FLUSH) 0.9 %
3 SYRINGE (ML) INJECTION EVERY 8 HOURS PRN
Status: DISCONTINUED | OUTPATIENT
Start: 2017-10-16 | End: 2017-10-18 | Stop reason: HOSPADM

## 2017-10-16 RX ORDER — ENOXAPARIN SODIUM 100 MG/ML
1 INJECTION SUBCUTANEOUS
Status: COMPLETED | OUTPATIENT
Start: 2017-10-16 | End: 2017-10-16

## 2017-10-16 RX ORDER — SODIUM CHLORIDE 9 MG/ML
INJECTION, SOLUTION INTRAVENOUS CONTINUOUS
Status: ACTIVE | OUTPATIENT
Start: 2017-10-16 | End: 2017-10-16

## 2017-10-16 RX ORDER — NAPROXEN SODIUM 220 MG/1
81 TABLET, FILM COATED ORAL DAILY
Status: DISCONTINUED | OUTPATIENT
Start: 2017-10-17 | End: 2017-10-18 | Stop reason: HOSPADM

## 2017-10-16 RX ORDER — ATORVASTATIN CALCIUM 10 MG/1
20 TABLET, FILM COATED ORAL DAILY
Status: DISCONTINUED | OUTPATIENT
Start: 2017-10-16 | End: 2017-10-18 | Stop reason: HOSPADM

## 2017-10-16 RX ADMIN — LISINOPRIL 40 MG: 20 TABLET ORAL at 09:10

## 2017-10-16 RX ADMIN — ATORVASTATIN CALCIUM 20 MG: 10 TABLET, FILM COATED ORAL at 09:10

## 2017-10-16 RX ADMIN — SODIUM CHLORIDE: 0.9 INJECTION, SOLUTION INTRAVENOUS at 06:10

## 2017-10-16 RX ADMIN — CEFTRIAXONE 1 G: 1 INJECTION, SOLUTION INTRAVENOUS at 02:10

## 2017-10-16 RX ADMIN — ENOXAPARIN SODIUM 50 MG: 100 INJECTION SUBCUTANEOUS at 05:10

## 2017-10-16 NOTE — H&P
[facility] Medicine  History & Physical    Patient Name: Daiana Mcduffie  MRN: [id number]  Admission Date: 10/16/2017  Attending Physician: Keith Méndez MD, MPH      PCP:     Pawan Mcclain MD    CC:     Chief Complaint   Patient presents with    Chest Pain     pt biba from [facility] living orignally found on the floor diaphoretic tachypneic (36 per minute) cool to touch. inverted t waves on ems monitor. c/o mild chest pressure upon arrival        HISTORY OF PRESENT ILLNESS:     Daiana Mcduffie is a 88 y.o. female that (in part)  has a past medical history of GERD (gastroesophageal reflux disease); Hyperlipidemia; and Hypertension. Presents to [facility] Emergency Department brought in by ems c/o chest pressure 2/10. Pt was found on the floor at [facility] diaphoretic, tachypneic at 36 breaths per minute with o2 sat 84 %.  Upon arrival in the ED she was still in distress on a non re breather. Pt given 325 aspirin and 2 sprays of nitro in route without relief of her symptoms.  She was having chest pain associated with dyspnea with exertion.   severe intensity.  Constant duration.  Acute onset.  Progressively worsening since onset.   Denies fever or chills.  No hemoptysis.   Located in the chest.  Diffuse radiation throughout chest.  No relieving factors.  She has been taking Avandia and Plavix at home.  She has a history of recently being diagnosed with DVT and pulmonary embolism.  States she is compliant with her home medication regimen.  No reported history of malignancy.      In the emergency department a VQ scan as well as a pelvic CT was obtained with renal stone protocol.  There is a high probability result for pulmonary embolism on VQ scan.  CT of pelvis showed evidence of adnexal lesion.      Hospital medicine has been asked to admit for further evaluation and treatment.       REVIEW OF SYSTEMS:     -- Constitutional: No fever or chills.  --  Eyes: No visual changes, diplopia, pain, tearing, blind spots, or discharge.   -- Ears, nose, mouth, throat, and face: No congestion, sore throat, epistaxis, d/c, bleeding gums, neck stiffness masses, or dental issues.  -- Respiratory: Acute respiratory distress.  Shortness of breath.  Denies hemoptysis.    -- Cardiovascular: Chest pain.  Associated with exertion.  -- Gastrointestinal: No vomiting, abdominal pain, hematemesis, melena, dyspepsia, or change in bowel habits.  -- Genitourinary: No hematuria, dysuria, frequency, urgency, nocturia, polyuria, stones, or incontinence.  -- Integument/breast: No rash, pruritis, pigmentation changes, dryness, or changes in hair  -- Hematologic/lymphatic: Recent DVT and pulmonary embolism.  Easy bruising.  Chronic anti-coagulation.    -- Musculoskeletal: No acute arthralgias, acute myalgias, joint swelling, acute limitations of ROM, or acute muscular weakness.  -- Neurological: No seizures, headaches, incoordination, paraesthesias, ataxia, vertigo, or tremors.  -- Behavioral/Psych: No auditory or visual hallucinations, depression, or suicidal/homicidal ideations.  -- Endocrine: No heat or cold intolerance, polydipsia, or unintentional weight gain / loss.  -- Allergy/Immunologic: No recurrent infections or adverse reaction to food, insects, or difficulty breathing.      PAST MEDICAL / SURGICAL HISTORY:     Past Medical History:   Diagnosis Date    GERD (gastroesophageal reflux disease)     Hyperlipidemia     Hypertension      History reviewed. No pertinent surgical history.      FAMILY HISTORY:     Family history of cardiovascular disease    SOCIAL HISTORY:     Social History   Substance Use Topics    Smoking status: Never Smoker    Smokeless tobacco: Never Used    Alcohol use No     Social History     Social History    Marital status:      Spouse name: N/A    Number of children: N/A    Years of education: N/A     Social History Main Topics    Smoking status:  Never Smoker    Smokeless tobacco: Never Used    Alcohol use No    Drug use: No    Sexual activity: No     Other Topics Concern    None     Social History Narrative    None         ALLERGIES:       Review of patient's allergies indicates:   Allergen Reactions    Ibuprofen     Penicillins          HEALTH SCREENING:     Influenza vaccine not up-to-date for this season.  Prevnar 13 pneumonia vaccine = no evidence of previous vaccination found in the medical record      HOME MEDICATIONS:     Prior to Admission medications    Medication Sig Start Date End Date Taking? Authorizing Provider   albuterol 90 mcg/actuation inhaler Inhale 2 puffs into the lungs every 4 (four) hours as needed for Wheezing or Shortness of Breath. Rescue 8/31/17   Pawan Mcclain MD   apixaban 5 mg Tab Take 1 tablet (5 mg total) by mouth 2 (two) times daily. 9/17/17 10/17/17  Leonidas Bartlett MD   atorvastatin (LIPITOR) 20 MG tablet Take 1 tablet (20 mg total) by mouth once daily. 6/26/17   OLVIN Fernández   cetirizine (ZYRTEC) 10 MG tablet Take 1 tablet (10 mg total) by mouth every evening. 5/17/16 9/14/17  SCARLETT Meneses   clopidogrel (PLAVIX) 75 mg tablet Take 1 tablet (75 mg total) by mouth once daily. 5/17/16 9/14/17  SCARLETT Meneses   fluticasone (FLONASE) 50 mcg/actuation nasal spray 1 spray by Each Nare route once daily. 7/20/17   OLVIN Fernández   hydrOXYzine pamoate (VISTARIL) 25 MG Cap Take 1 capsule (25 mg total) by mouth every 8 (eight) hours as needed. 3/24/17   Nino Treviño MD   ketoconazole (NIZORAL) 2 % cream  12/24/16   Historical Provider, MD   lisinopril (PRINIVIL,ZESTRIL) 40 MG tablet Take 1 tablet (40 mg total) by mouth once daily. 7/21/17 7/21/18  OLVIN Fernández   meclizine (ANTIVERT) 25 mg tablet TAKE ONE TABLET BY MOUTH THREE TIMES DAILY AS NEEDED FOR  DIZZINESS 12/6/16   Pawan Mcclain MD   ranitidine (ZANTAC) 150 MG tablet Take 1 tablet (150 mg total) by mouth 2 (two)  times daily. 5/9/17 5/9/18  Pawan Mcclain MD   triamcinolone acetonide 0.1% (KENALOG) 0.1 % cream Apply topically 2 (two) times daily. 9/8/16   Jhonny Guerrero MD          [facility] MEDICATIONS:     Scheduled Meds:    enoxaparin  1 mg/kg Subcutaneous ED 1 Time     Continuous Infusions:    PRN Meds:       PHYSICAL EXAM:     Wt Readings from Last 1 Encounters:   10/15/17 2137 54 kg (119 lb 0.8 oz)     Body mass index is 20.43 kg/m².  Vitals:    10/15/17 2144 10/16/17 0119 10/16/17 0200 10/16/17 0300   BP: (!) 140/73  (!) 141/89 116/72   BP Location:       Patient Position:       Pulse: 110 104 108 96   Resp:       Temp:       TempSrc:       SpO2: (!) 87% 95% 98% 98%   Weight:       Height:              -- General appearance: well developed. appears stated age   -- Head: normocephalic, atraumatic   -- Eyes: conjunctivae clear. Extraocular muscles intact  -- Nose: Nares normal. Septum midline.   -- Mouth/Throat: lips, mucosa, and tongue normal. no throat erythema.   -- Neck: supple, symmetrical, trachea midline, no JVD and thyroid not grossly enlarged, appears symmetric  -- Lungs: Increased respiratory rate.  clear to auscultation bilaterally. normal respiratory effort. No use of accessory muscles.   -- Chest wall: no tenderness. equal bilateral chest rise   -- Heart: Rapid rate and regular rhythm. S1, S2 normal.  no click, rub or gallop   -- Abdomen: soft, non-tender, non-distended, non-tympanic; bowel sounds normal; no masses  -- Extremities: no cyanosis, clubbing or edema.   -- Pulses: 2+ and symmetric   -- Skin: Scattered ecchymoses.  color normal, texture normal, turgor normal.   -- Neurologic: Normal strength and tone. No focal numbness or weakness. CNII-XII intact. Langlois coma scale: eyes open spontaneously-4, oriented & converses-5, obeys commands-6.      LABORATORY STUDIES:     Recent Results (from the past 36 hour(s))   CBC auto differential    Collection Time: 10/15/17 10:02 PM   Result Value Ref Range     WBC 8.02 3.90 - 12.70 K/uL    RBC 4.47 4.00 - 5.40 M/uL    Hemoglobin 12.9 12.0 - 16.0 g/dL    Hematocrit 40.2 37.0 - 48.5 %    MCV 90 82 - 98 fL    MCH 28.9 27.0 - 31.0 pg    MCHC 32.1 32.0 - 36.0 g/dL    RDW 13.3 11.5 - 14.5 %    Platelets 120 (L) 150 - 350 K/uL    MPV 10.0 9.2 - 12.9 fL    Gran # 4.3 1.8 - 7.7 K/uL    Lymph # 3.1 1.0 - 4.8 K/uL    Mono # 0.6 0.3 - 1.0 K/uL    Eos # 0.0 0.0 - 0.5 K/uL    Baso # 0.01 0.00 - 0.20 K/uL    Gran% 53.9 38.0 - 73.0 %    Lymph% 38.4 18.0 - 48.0 %    Mono% 7.2 4.0 - 15.0 %    Eosinophil% 0.4 0.0 - 8.0 %    Basophil% 0.1 0.0 - 1.9 %    Differential Method Automated    Comprehensive metabolic panel    Collection Time: 10/15/17 10:02 PM   Result Value Ref Range    Sodium 141 136 - 145 mmol/L    Potassium 3.6 3.5 - 5.1 mmol/L    Chloride 105 95 - 110 mmol/L    CO2 23 23 - 29 mmol/L    Glucose 172 (H) 70 - 110 mg/dL    BUN, Bld 30 (H) 8 - 23 mg/dL    Creatinine 1.3 0.5 - 1.4 mg/dL    Calcium 9.3 8.7 - 10.5 mg/dL    Total Protein 6.8 6.0 - 8.4 g/dL    Albumin 3.3 (L) 3.5 - 5.2 g/dL    Total Bilirubin 0.5 0.1 - 1.0 mg/dL    Alkaline Phosphatase 87 55 - 135 U/L    AST 96 (H) 10 - 40 U/L    ALT 67 (H) 10 - 44 U/L    Anion Gap 13 8 - 16 mmol/L    eGFR if African American 42 (A) >60 mL/min/1.73 m^2    eGFR if non African American 37 (A) >60 mL/min/1.73 m^2   Troponin I    Collection Time: 10/15/17 10:02 PM   Result Value Ref Range    Troponin I 0.016 0.000 - 0.026 ng/mL   B-Type natriuretic peptide (BNP)    Collection Time: 10/15/17 10:02 PM   Result Value Ref Range    BNP 26 0 - 99 pg/mL   Lactic acid, plasma    Collection Time: 10/15/17 10:02 PM   Result Value Ref Range    Lactate (Lactic Acid) 2.5 (H) 0.5 - 2.2 mmol/L   POCT glucose    Collection Time: 10/15/17 10:04 PM   Result Value Ref Range    POCT Glucose 179 (H) 70 - 110 mg/dL   Urinalysis Clean Catch    Collection Time: 10/15/17 10:30 PM   Result Value Ref Range    Specimen UA Urine, Clean Catch     Color, UA Yellow  Yellow, Straw, Angelina    Appearance, UA Clear Clear    pH, UA 5.0 5.0 - 8.0    Specific Gravity, UA 1.025 1.005 - 1.030    Protein, UA Negative Negative    Glucose, UA 1+ (A) Negative    Ketones, UA Negative Negative    Bilirubin (UA) Negative Negative    Occult Blood UA 1+ (A) Negative    Nitrite, UA Negative Negative    Urobilinogen, UA Negative <2.0 EU/dL    Leukocytes, UA 2+ (A) Negative   Urinalysis Microscopic    Collection Time: 10/15/17 10:30 PM   Result Value Ref Range    RBC, UA 4 0 - 4 /hpf    WBC, UA 15 (H) 0 - 5 /hpf    Bacteria, UA Moderate (A) None-Occ /hpf    Squam Epithel, UA 6 /hpf    Microscopic Comment SEE COMMENT    ISTAT PROCEDURE    Collection Time: 10/16/17  1:19 AM   Result Value Ref Range    POC PH 7.440 7.35 - 7.45    POC PCO2 34.2 (L) 35 - 45 mmHg    POC PO2 51 (LL) 80 - 100 mmHg    POC HCO3 23.3 (L) 24 - 28 mmol/L    POC BE 0 -2 to 2 mmol/L    POC SATURATED O2 88 (L) 95 - 100 %    POC Sodium 137 136 - 145 mmol/L    POC Potassium 3.7 3.5 - 5.1 mmol/L    POC TCO2 24 23 - 27 mmol/L    POC Ionized Calcium 1.23 1.06 - 1.42 mmol/L    POC Hematocrit 38 36 - 54 %PCV    Sample ARTERIAL     Site LR     Allens Test Pass     DelSys Room Air     Mode SPONT     FiO2 21     Sp02 92        Lab Results   Component Value Date    INR 1.1 12/02/2016    INR 1.1 05/16/2016    INR 1.0 02/25/2016     Lab Results   Component Value Date    HGBA1C 5.5 06/04/2014     Recent Labs      10/15/17   2204   POCTGLUCOSE  179*           MICROBIOLOGY DATA:     Microbiology x 7d:   Microbiology Results (last 7 days)     Procedure Component Value Units Date/Time    Urine culture [[id number]] Collected:  10/15/17 2230    Order Status:  Sent Specimen:  Urine from Urine, Clean Catch Updated:  10/15/17 2242            IMAGING:     Imaging Results          NM Lung Ventilation Perfusion Imaging (Final result)  Result time 10/16/17 03:57:43    Final result by Teodoro Saini MD (10/16/17 03:57:43)                 Impression:      High probability for pulmonary emboli.      Findings were discussed with PABLITO MANZO at 03:57:38 10/16/17       Electronically signed by: JOSE ALBERTO CRAIG MD  Date:     10/16/17  Time:    03:57              Narrative:    Clinical history:Syncope with associated hypoxia    Following inhalation of 19.6 mCi of xenon 133 gas, statis scans were performed from posterior aspect.  There is normal tracer localization in the lungs.  [place]ashout is symmetrical with minimal bibasal retention.     Following intravenous injection of 5.0 mCi of Tc 99m MAA, static scans were performed from multiple projections.  Abnormal tracer localization identified in the lungs.  There are multiple bilateral segmental and subsegmental mismatched perfusion defects.                             CT Renal Stone Study ABD Pelvis WO (Final result)  Result time 10/16/17 02:22:25    Final result by Marin Wellington MD (10/16/17 02:22:25)                 Impression:        No acute intra-abdominal/pelvic CT abnormalities to account for the reported history of epigastric pain. Note is made that the stomach is decompressed, limiting accurate evaluation.    Findings include:  - Extensive colonic diverticulosis without associated inflammatory changes.  - 3.2 cm low attenuation right adnexal lesion, incompletely characterized on this exam.  Recommend further characterization with nonemergent outpatient pelvic ultrasound.  - Extensive atherosclerotic calcification of the abdominal aorta.      Electronically signed by: MARIN WELLINGTON MD  Date:     10/16/17  Time:    02:22              Narrative:    Technique: 5-mm axial images were obtained through the abdomen and pelvis without the use of IV contrast.  Images were submitted for coronal and sagittal reformats.    Comparison: CT 09/09/2013.    Findings:    The visualized heart is at the upper limit of normal in size.  There is trace pericardial fluid.    Visualized lungs demonstrate minimal basilar dependent  atelectasis.  No pleural effusions.    Evaluation of the intra-abdominal/pelvis structures is limited due to the lack of IV and enteric contrast.    The liver is normal in size.  No focal hypoattenuating masses.  No intrahepatic bile duct dilatation.    Gallbladder, common bile duct, spleen, pancreas and adrenal glands are normal.    The stomach is decompressed, limiting evaluation.    Kidneys are normal in size and location.  No contour deforming renal masses.  Punctate calcification noted about the right renal cortex, possibly vascular in nature.  No hydronephrosis. Ureters are difficult to track but demonstrate no obvious abnormalities along their expected course.  The bladder is fluid-filled and unremarkable.    Uterus is normal.  There is a 3.2 cm low-attenuation right adnexal lesion, incompletely evaluated on this exam.  No pelvic free fluid.    The small bowel is normal in caliber.  The colon demonstrates numerous scattered diverticula and a moderate amount of retained fecal material.  The appendix is not definitely seen.  No obstruction or inflammatory changes.    Abdominal aorta demonstrates prominent atherosclerotic calcification.    No ascites or intra-abdominal free air.  No abdominal or pelvic lymph node enlargement.  No focal mesenteric masses.    Subcutaneous soft tissues are within normal limits.    There are degenerative changes of the axial and appendicular skeleton.  No acute fractures are also destruction.                             CT Head Without Contrast (Final result)  Result time 10/16/17 01:40:50    Final result by Teodoro Saini MD (10/16/17 01:40:50)                 Impression:        No acute intracranial abnormalities.    No significant change from prior.      Electronically signed by: TEODORO SAINI MD  Date:     10/16/17  Time:    01:40              Narrative:    History: altered mental status    Comparison: December 3, 2016    Technique:    Axial images of the brain were  obtained at 5-mm intervals from the skull base to the vertex without the administration of contrast.    Findings:    The brain parenchyma appears normal for age with good corticomedullary differentiation.  There is no evidence of acute infarct, hemorrhage, or mass.  There is ventricular and sulcal enlargement consistent with generalized atrophy.  No mass-effect or midline shift.  There are no abnormal extra-axial fluid collections.      Partially visualized mucous retention cyst in the maxillary antra.  The remaining paranasal sinuses and mastoid air cells are clear.      The calvarium appears intact.  .                             X-Ray Chest AP Portable (Final result)  Result time 10/15/17 23:03:32    Final result by Jd Flowers MD (10/15/17 23:03:32)                 Impression:        No acute radiographic findings in the chest on this single view.      Electronically signed by: JD FLOWERS MD  Date:     10/15/17  Time:    23:03              Narrative:    Technique: AP chest radiograph.    Comparison: CT 09/09/2017.    Findings:    Mediastinal structures are midline. Cardiac silhouette is normal in size. Lung volumes are normal and symmetric. No pulmonary consolidation.  No pneumothorax or pleural effusion. No free air beneath the diaphragm. Bones demonstrate no acute abnormalities.  Degenerative changes of the spine noted.                                CONSULTS:     IP CONSULT TO PULMONOLOGY       ASSESSMENT & PLAN:     Primary Diagnosis:  Acute respiratory distress    Active Hospital Problems    Diagnosis  POA    *Acute respiratory distress [R06.03]  Yes     Priority: 1 - High    Chest pain [R07.9]  Yes     Priority: 2     Chronic pulmonary embolism [I27.82]  Yes     Priority: 2     Abnormal CT scan, pelvis [R93.5]  Yes     Priority: 3     Acute cystitis [N30.00]  Yes     Priority: 4     Hyperlipidemia [E78.5]  Yes    Essential hypertension [I10]  Yes      Resolved Hospital Problems     Diagnosis Date Resolved POA   No resolved problems to display.         Chronic pulmonary embolism  VQ scan positive for perfusion abnormality is high probability of pulmonary embolism.  She currently on oral anticoagulation but I am concerned that this is not effective treatment.  We will give full dose Lovenox and reevaluate.  Also history of recent DVT.  We will reevaluate with bilateral lower extremity ultrasound.  Also of note she has a CT of the pelvis with a possible adnexal lesion  which will be investigated further.  If she has malignancy this may be a reason for a hypercoagulable state.      Chest pain  Chest pain secondary to acute on chronic pulmonary embolism as described below.      Acute respiratory distress  Acute respiratory distress secondary to acute on chronic pulmonary embolism as described below.  Full dose Lovenox given.      Essential hypertension  · Goal while inpatient is a systolic blood pressure less than 160mmHg  · BP in acceptable range at this time  · Continue current home regimen with hold parameters  · PRN antihypertensives available        Hyperlipidemia  · Lipid panel - as an outpatient  · Cardiac diet  · Continue statin        Abnormal CT scan, pelvis  3.2 cm low attenuation right adnexal lesion, incompletely characterized on this exam.  Recommend further characterization with nonemergent outpatient pelvic ultrasound.      Acute cystitis  · As evidenced by UA and history  · Urine culture and Gram stain obtained prior to antibiotics  · Given empiric antibiotics  · Will tailor antibiotic regimen according to culture & sensitivity results  · Maintain euvolemic status with IV fluids              VTE Risk Mitigation         Ordered     enoxaparin injection 50 mg  ED 1 Time     Route:  Subcutaneous        10/16/17 0238            Adult PRN medications available   DVT prophylaxis given       DISPOSITION:     Will admit to the Hospital Medicine service for further evaluation and  treatment.    Chart reviewed and updated where applicable.    High Risk Conditions:  Patient has a condition that poses threat to life and bodily function: Pulmonary Embolism and Severe Respiratory Distress      ===============================================================    Keith Méndez MD, MPH  Department of Hospital Medicine   [facility]  [phone] pg  (7pm - 6am)          This note is dictated using Dragon Medical 360 voice recognition software.  There are word recognition mistakes that are occasionally missed on review.

## 2017-10-16 NOTE — ASSESSMENT & PLAN NOTE
3.2 cm low attenuation right adnexal lesion, incompletely characterized on this exam.  Recommend further characterization with nonemergent outpatient pelvic ultrasound.

## 2017-10-16 NOTE — NURSING
1530- provided patient with snack off npo status . Tolerated food well. Denies pain when breathing . Tolerating oxygen at 4 liters by nasal canula . No changes on telemetry . Bed alarm is on . Call light in hand. Head of bed elevated.       1730- patient ate supper meal tolerated well. No acute distress . Denies pain when asked. Bed alarm is on. Side rails up x 2 call light in hand. Head of bed elevated. No change on telemetry .

## 2017-10-16 NOTE — CARE UPDATE
Reviewed H and P by my colleague and agree with A and P.  The patient presents with CP and SOB. Found on VQ scan to have high prob for PE. Started on full dose lovenox.  Patient was already on anti-coagulation. Consider IVC filter? Found to have adnexal mass, work up can be done as out patient.      Attempted to call daughter at number on chart to obtain more information. Non-working number.

## 2017-10-16 NOTE — ASSESSMENT & PLAN NOTE
VQ scan positive for perfusion abnormality is high probability of pulmonary embolism.  She currently on oral anticoagulation but I am concerned that this is not effective treatment.  We will give full dose Lovenox and reevaluate.  Also history of recent DVT.  We will reevaluate with bilateral lower extremity ultrasound.  Also of note she has a CT of the pelvis with a possible adnexal lesion  which will be investigated further.  If she has malignancy this may be a reason for a hypercoagulable state.

## 2017-10-16 NOTE — ASSESSMENT & PLAN NOTE
?Acute exacerbation in setting of med noncompliance.  Hemodynamically stable.  Elevated trop noted.  Suggest case management/social work consult for assistance with med mgmt (i.e. Assurance that pt is taking medications).  Do not plan IVC filter at this time.

## 2017-10-16 NOTE — ASSESSMENT & PLAN NOTE
Based on recent ECHO from the admission in September that showed right ventricular systolic function and pulmonary hypertension, combined with her elevated troponins, I would label this event submassive PE. Ultimately, it is unclear if this is treatment failure vs. non-compliance with the prescribed treatment.   When I was interviewing Ms. Mcduffie she did not seem to recall her recent hospital admission.  She lives in an assisted living facility but reports that she is responsible for administering her own medications.  She reported that she occasionally misses a dose here and there.      · Assistance from case management or social work to clarify the above would be useful.  · Ultimately, if she was taking apixiban reliably, this would be treatment failure and switching to a VKA for treatment of VTE would be the next course of action, along with age related cancer screenings.  · If she was non-compliant with apixiban, it would be reasonable to continue either a DOAC or VKA in a more supervised fashion to insure medication compliance  · Given the potential complexity of the situation it is not unreasonable to pursue IVC filter.  With her submassive PE and what appears to be chronic lower extremity clot burden, the next PE could be fatal and thus an IVC filter would serve as protection while issues surrounding anti-coagulation are resolved.   · Wean supplemental oxygen while maintaining an O2 Sat at or above 92%.

## 2017-10-16 NOTE — HPI
Daiana Mcduffie is a 88 y.o. female that (in part)  has a past medical history of GERD (gastroesophageal reflux disease); Hyperlipidemia; and Hypertension. Presents to [facility] Emergency Department brought in by ems c/o chest pressure 2/10. Pt was found on the floor at [facility] diaphoretic, tachypneic at 36 breaths per minute with o2 sat 84 %.  Upon arrival in the ED she was still in distress on a non re breather. Pt given 325 aspirin and 2 sprays of nitro in route without relief of her symptoms.  She was having chest pain associated with dyspnea with exertion.   severe intensity.  Constant duration.  Acute onset.  Progressively worsening since onset.   Denies fever or chills.  No hemoptysis.   Located in the chest.  Diffuse radiation throughout chest.  No relieving factors.  She has been taking Avandia and Plavix at home.  She has a history of recently being diagnosed with DVT and pulmonary embolism.  States she is compliant with her home medication regimen.  No reported history of malignancy.      In the emergency department a VQ scan as well as a pelvic CT was obtained with renal stone protocol.  There is a high probability result for pulmonary embolism on VQ scan.  CT of pelvis showed evidence of adnexal lesion.      Hospital medicine has been asked to admit for further evaluation and treatment.

## 2017-10-16 NOTE — NURSING
0650- bedside rounding report received from arianna howell rn on patients progress and updated hand off report sheet given. No acute distress oxygen at 4 liters by nasal canula. Head of bed elevated and side rails up x 2. Call light in patients hand bed alarm is on.     0750- assessment completed and plan of care discussed with the patient . Denies pain oxygen at 4 liters by nasal canula and bed alarm on and side rails up x 2. Denies pain or needs. Tolerating iv fluids. Iv site clean dry and intact.

## 2017-10-16 NOTE — NURSING
Patient just returned from ultrasound. No acute distress. Oxygen on at 4 liters by nasal canula. No acute distress. Turned off ac patient reports she feels cold.

## 2017-10-16 NOTE — NURSING
Patient remains npo off the floor to ultrasound for test at 0935 awaiting for patient to return. No acute distress. lovenox teaching done to prevent blood clots. Patient complaint with treatment plan. No telemetry change. Oxygen sats maintaining above 92% on 4 liters of oxygen by nasal canula.

## 2017-10-16 NOTE — ED TRIAGE NOTES
Pt brought in by ems c/o chest pressure 2/10. Pt was found on the floor at [facility] diaphoretic tachypneic 36 breaths per minute with o2 sat 84 % arriving in ed mild resp distress 15 l non re breather. Pt given 325 aspirin 2 sprays of nitro in route. Pt states it is hard for her to burp upon assessment.

## 2017-10-16 NOTE — ED NOTES
Pt is leaving floor via stretcher with transport. Titrated down to 3L/NC O2 sat 100%. AAOx4, no distress noted. Being transported with tele

## 2017-10-16 NOTE — ASSESSMENT & PLAN NOTE
Acute respiratory distress secondary to acute on chronic pulmonary embolism as described below.  Full dose Lovenox given.

## 2017-10-16 NOTE — CONSULTS
[facility]  Cardiology  Consult Note    Patient Name: Daiana Mcduffie  MRN: [id number]  Admission Date: 10/15/2017  Hospital Length of Stay: 0 days  Code Status: Full Code   Attending Provider: Zaid Flanagan MD   Consulting Provider: Keith Edgar MD  Primary Care Physician: Pawan Mcclain MD  Principal Problem:<principal problem not specified>    Patient information was obtained from patient and ER records.     Inpatient consult to Cardiology  Consult performed by: KEITH EDGAR  Consult ordered by: ZAID FLANAGAN  Reason for consult: PE        Subjective:     Chief Complaint:  PE     HPI:   88 y.o. female that (in part)  has a past medical history of GERD (gastroesophageal reflux disease); Hyperlipidemia; and Hypertension. Presents to [facility] Emergency Department brought in by ems c/o chest pressure 2/10. Pt was found on the floor at [facility] diaphoretic, tachypneic at 36 breaths per minute with o2 sat 84 %.  Upon arrival in the ED she was still in distress on a non re breather. Pt given 325 aspirin and 2 sprays of nitro in route without relief of her symptoms.  She was having chest pain associated with dyspnea with exertion.   severe intensity.  Constant duration.  Acute onset.  Progressively worsening since onset.   Denies fever or chills.  No hemoptysis.   Located in the chest.  Diffuse radiation throughout chest.  No relieving factors.  She has been taking Avandia and Plavix at home.  She has a history of recently being diagnosed with DVT and pulmonary embolism.  States she is compliant with her home medication regimen.  No reported history of malignancy.    In the emergency department a VQ scan as well as a pelvic CT was obtained with renal stone protocol.  There is a high probability result for pulmonary embolism on VQ scan.  CT of pelvis showed evidence of adnexal lesion.      Case d/w Dr. Del Rosario (Pulm).  Not clear that pt is a med  failure as it is not clear that she has been reliably taking her eliquis.  Pt is currently feeling well without CP/SOB.    Past Medical History:   Diagnosis Date    GERD (gastroesophageal reflux disease)     Hyperlipidemia     Hypertension        History reviewed. No pertinent surgical history.    Review of patient's allergies indicates:   Allergen Reactions    Ibuprofen     Penicillins        No current facility-administered medications on file prior to encounter.      Current Outpatient Prescriptions on File Prior to Encounter   Medication Sig    albuterol 90 mcg/actuation inhaler Inhale 2 puffs into the lungs every 4 (four) hours as needed for Wheezing or Shortness of Breath. Rescue    apixaban 5 mg Tab Take 1 tablet (5 mg total) by mouth 2 (two) times daily.    atorvastatin (LIPITOR) 20 MG tablet Take 1 tablet (20 mg total) by mouth once daily.    cetirizine (ZYRTEC) 10 MG tablet Take 1 tablet (10 mg total) by mouth every evening.    clopidogrel (PLAVIX) 75 mg tablet Take 1 tablet (75 mg total) by mouth once daily.    fluticasone (FLONASE) 50 mcg/actuation nasal spray 1 spray by Each Nare route once daily.    hydrOXYzine pamoate (VISTARIL) 25 MG Cap Take 1 capsule (25 mg total) by mouth every 8 (eight) hours as needed.    ketoconazole (NIZORAL) 2 % cream     lisinopril (PRINIVIL,ZESTRIL) 40 MG tablet Take 1 tablet (40 mg total) by mouth once daily.    meclizine (ANTIVERT) 25 mg tablet TAKE ONE TABLET BY MOUTH THREE TIMES DAILY AS NEEDED FOR  DIZZINESS    ranitidine (ZANTAC) 150 MG tablet Take 1 tablet (150 mg total) by mouth 2 (two) times daily.    triamcinolone acetonide 0.1% (KENALOG) 0.1 % cream Apply topically 2 (two) times daily.     Family History     None        Social History Main Topics    Smoking status: Never Smoker    Smokeless tobacco: Never Used    Alcohol use No    Drug use: No    Sexual activity: No     Review of Systems   Constitution: Negative for chills, diaphoresis,  fever, weakness and malaise/fatigue.   HENT: Negative for nosebleeds.    Eyes: Negative for blurred vision and double vision.   Cardiovascular: Negative for chest pain, claudication, cyanosis, dyspnea on exertion, leg swelling, orthopnea, palpitations, paroxysmal nocturnal dyspnea and syncope.   Respiratory: Negative for cough, shortness of breath and wheezing.    Skin: Negative for dry skin and poor wound healing.   Musculoskeletal: Negative for back pain, joint swelling and myalgias.   Gastrointestinal: Negative for abdominal pain, nausea and vomiting.   Genitourinary: Negative for hematuria.   Neurological: Negative for dizziness, headaches, numbness and seizures.   Psychiatric/Behavioral: Negative for altered mental status and depression.     Objective:     Vital Signs (Most Recent):  Temp: 98 °F (36.7 °C) (10/16/17 1653)  Pulse: 76 (10/16/17 1653)  Resp: 18 (10/16/17 1653)  BP: 139/78 (10/16/17 1653)  SpO2: 99 % (10/16/17 1653) Vital Signs (24h Range):  Temp:  [97 °F (36.1 °C)-98 °F (36.7 °C)] 98 °F (36.7 °C)  Pulse:  [] 76  Resp:  [16-18] 18  SpO2:  [87 %-100 %] 99 %  BP: (109-146)/(66-89) 139/78     Weight: 55.6 kg (122 lb 9.2 oz)  Body mass index is 21.04 kg/m².    SpO2: 99 %  O2 Device (Oxygen Therapy): nasal cannula      Intake/Output Summary (Last 24 hours) at 10/16/17 1656  Last data filed at 10/16/17 0525   Gross per 24 hour   Intake               50 ml   Output              450 ml   Net             -400 ml       Lines/Drains/Airways     Peripheral Intravenous Line                 Peripheral IV - Single Lumen 09/09/17 1212 Right Antecubital 37 days         Peripheral IV - Single Lumen 10/15/17 2138 Right Hand less than 1 day                Physical Exam   Constitutional: She is oriented to person, place, and time. She appears well-developed and well-nourished. No distress.   HENT:   Head: Normocephalic and atraumatic.   Mouth/Throat: No oropharyngeal exudate.   Eyes: Conjunctivae and EOM are  normal. Pupils are equal, round, and reactive to light. No scleral icterus.   Neck: Normal range of motion. Neck supple. No JVD present. No tracheal deviation present.   Cardiovascular: Normal rate, regular rhythm, S1 normal and S2 normal.  Exam reveals no gallop and no friction rub.    No murmur heard.  Pulmonary/Chest: Effort normal and breath sounds normal. No respiratory distress. She has no wheezes. She has no rales. She exhibits no tenderness.   Abdominal: Soft. Bowel sounds are normal. There is no tenderness.   Musculoskeletal: Normal range of motion. She exhibits no edema.   Neurological: She is alert and oriented to person, place, and time. No cranial nerve deficit.   Skin: Skin is warm and dry. She is not diaphoretic.   Psychiatric: She has a normal mood and affect. Her behavior is normal. Judgment normal.       Current Medications:   [START ON 10/17/2017] aspirin  81 mg Oral Daily    atorvastatin  20 mg Oral Daily    [START ON 10/17/2017] enoxaparin  1 mg/kg Subcutaneous Q24H    lisinopril  40 mg Oral Daily        ondansetron, promethazine (PHENERGAN) IVPB, sodium chloride 0.9%    Laboratory:  CBC:    Recent Labs  Lab 08/05/17  0020 09/09/17  1100 10/15/17  2202 10/16/17  0119   WHITE BLOOD CELL COUNT 5.88 5.93 8.02  --    HEMOGLOBIN 13.4 13.5 12.9  --    POC HEMATOCRIT  --   --   --  38   HEMATOCRIT 42.6 42.6 40.2  --    PLATELETS 146 L 122 L 120 L  --        CHEMISTRIES:    Recent Labs  Lab 02/26/16  0515  08/05/17  0020 09/09/17  1100 10/15/17  2202   GLUCOSE 90  < > 114 H 126 H 172 H   SODIUM 143  < > 142 145 141   POTASSIUM 3.9  < > 4.0 3.7 3.6   BUN BLD 14  < > 22 14 30 H   CREATININE 0.8  < > 1.0 0.9 1.3   EGFR IF  >60  < > 58 A >60 42 A   EGFR IF NON- >60  < > 50 A 57 A 37 A   CALCIUM 9.1  < > 9.9 9.6 9.3   MAGNESIUM 1.7  --   --   --   --    < > = values in this interval not displayed.    CARDIAC BIOMARKERS:    Recent Labs  Lab 09/10/17  1012 10/15/17  2201  10/16/17  1250   TROPONIN I 0.443 H 0.016 0.182 H  0.182 H       COAGS:    Recent Labs  Lab 02/25/16  1941 05/16/16  1654 12/02/16  2316   INR 1.0 1.1 1.1       LIPIDS/LFTS:    Recent Labs  Lab 04/15/15  1211 09/04/15  0934  05/17/16  1204  08/05/17  0020 09/09/17  1100 10/15/17  2202   CHOLESTEROL 286 H 192  --  206 H  --   --   --   --    TRIGLYCERIDES 99 47  --  51  --   --   --   --    HDL 73 66  --  76 H  --   --   --   --    LDL CHOLESTEROL 193.2 H 116.6  --  119.8  --   --   --   --    NON-HDL CHOLESTEROL 213 126  --  130  --   --   --   --    AST 22 21  < >  --   < > 21 18 96 H   ALT 14 11  < >  --   < > 10 10 67 H   < > = values in this interval not displayed.        Diagnostic Results:  ECG (personally reviewed tracings):   10/15/17 2143 , NSSTTW changes    Chest X-Ray (personally reviewed image(s)): 10/15/17 NAD    V/Q 10/16/17: High probability for pulmonary emboli.    LE venous US 10/16/17  Nonocclusive thrombus in the left femoral vein and popliteal vein, in a similar pattern to the 9/9/17 exam. No new thrombus identified.    CT A/P 10/16/17  No acute intra-abdominal/pelvic CT abnormalities to account for the reported history of epigastric pain. Note is made that the stomach is decompressed, limiting accurate evaluation.  Findings include:  - Extensive colonic diverticulosis without associated inflammatory changes.  - 3.2 cm low attenuation right adnexal lesion, incompletely characterized on this exam.  Recommend further characterization with nonemergent outpatient pelvic ultrasound.  - Extensive atherosclerotic calcification of the abdominal aorta.    CTA Chest 9/9/17  1.  Diffuse bilateral pulmonary thromboembolism as described above.  2.  Pulmonary nodule within the right lower lobe, one year followup advised.  Correlation with any previous exams would be helpful, this may reflect a chronic finding.  3.  Several additional findings above noting mild prominence of the thyroid gland,  correlation with ultrasound if not previously performed.  Please see above.    Echo: 9/10/17    1 - Mildly depressed left ventricular systolic function (EF 45-50%).     2 - Mild global hypokinesis.     3 - Concentric remodeling.     4 - Impaired LV relaxation, normal LAP (grade 1 diastolic dysfunction).     5 - Mildly to moderately depressed right ventricular systolic function.     6 - Mild tricuspid regurgitation.     7 - Pulmonary hypertension. The estimated PA systolic pressure is 44 mmHg.      Assessment and Plan:     Chronic pulmonary embolism    ?Acute exacerbation in setting of med noncompliance.  Hemodynamically stable.  Elevated trop noted.  Suggest case management/social work consult for assistance with med mgmt (i.e. Assurance that pt is taking medications).  Do not plan IVC filter at this time.            VTE Risk Mitigation         Ordered     enoxaparin injection 60 mg  Every 24 hours (non-standard times)     Route:  Subcutaneous        10/16/17 0910     Medium Risk of VTE  Once      10/16/17 0624     Place BAM hose  Until discontinued      10/16/17 0624          Thank you for your consult. I will follow-up with patient. Please contact us if you have any additional questions.    Keith Bolton MD  Cardiology   [facility] [place], [place]

## 2017-10-16 NOTE — HPI
Ms. Mcduffie is an 88 year old woman with a past medical history of DVT/PE, GERD and HTN who presented to [facility] ED with chest pressure.  In the ED he was noted to be diaphoretic, tachypneic, and hypoxic (O2 Sat=84%). She was treated with ASA and SL nitro without relief.  Per chart review her chest pain was associated with NAVARRETE, was acute in onset and had been progressively worsening.  No fever/chills, hemoptysis    V/Q scan was performed in the ED and was high probability for PE.

## 2017-10-16 NOTE — SUBJECTIVE & OBJECTIVE
Past Medical History:   Diagnosis Date    GERD (gastroesophageal reflux disease)     Hyperlipidemia     Hypertension        History reviewed. No pertinent surgical history.    Review of patient's allergies indicates:   Allergen Reactions    Ibuprofen     Penicillins        No current facility-administered medications on file prior to encounter.      Current Outpatient Prescriptions on File Prior to Encounter   Medication Sig    albuterol 90 mcg/actuation inhaler Inhale 2 puffs into the lungs every 4 (four) hours as needed for Wheezing or Shortness of Breath. Rescue    apixaban 5 mg Tab Take 1 tablet (5 mg total) by mouth 2 (two) times daily.    atorvastatin (LIPITOR) 20 MG tablet Take 1 tablet (20 mg total) by mouth once daily.    cetirizine (ZYRTEC) 10 MG tablet Take 1 tablet (10 mg total) by mouth every evening.    clopidogrel (PLAVIX) 75 mg tablet Take 1 tablet (75 mg total) by mouth once daily.    fluticasone (FLONASE) 50 mcg/actuation nasal spray 1 spray by Each Nare route once daily.    hydrOXYzine pamoate (VISTARIL) 25 MG Cap Take 1 capsule (25 mg total) by mouth every 8 (eight) hours as needed.    ketoconazole (NIZORAL) 2 % cream     lisinopril (PRINIVIL,ZESTRIL) 40 MG tablet Take 1 tablet (40 mg total) by mouth once daily.    meclizine (ANTIVERT) 25 mg tablet TAKE ONE TABLET BY MOUTH THREE TIMES DAILY AS NEEDED FOR  DIZZINESS    ranitidine (ZANTAC) 150 MG tablet Take 1 tablet (150 mg total) by mouth 2 (two) times daily.    triamcinolone acetonide 0.1% (KENALOG) 0.1 % cream Apply topically 2 (two) times daily.     Family History     None        Social History Main Topics    Smoking status: Never Smoker    Smokeless tobacco: Never Used    Alcohol use No    Drug use: No    Sexual activity: No     Review of Systems   Constitution: Negative for chills, diaphoresis, fever, weakness and malaise/fatigue.   HENT: Negative for nosebleeds.    Eyes: Negative for blurred vision and double vision.    Cardiovascular: Negative for chest pain, claudication, cyanosis, dyspnea on exertion, leg swelling, orthopnea, palpitations, paroxysmal nocturnal dyspnea and syncope.   Respiratory: Negative for cough, shortness of breath and wheezing.    Skin: Negative for dry skin and poor wound healing.   Musculoskeletal: Negative for back pain, joint swelling and myalgias.   Gastrointestinal: Negative for abdominal pain, nausea and vomiting.   Genitourinary: Negative for hematuria.   Neurological: Negative for dizziness, headaches, numbness and seizures.   Psychiatric/Behavioral: Negative for altered mental status and depression.     Objective:     Vital Signs (Most Recent):  Temp: 98 °F (36.7 °C) (10/16/17 1653)  Pulse: 76 (10/16/17 1653)  Resp: 18 (10/16/17 1653)  BP: 139/78 (10/16/17 1653)  SpO2: 99 % (10/16/17 1653) Vital Signs (24h Range):  Temp:  [97 °F (36.1 °C)-98 °F (36.7 °C)] 98 °F (36.7 °C)  Pulse:  [] 76  Resp:  [16-18] 18  SpO2:  [87 %-100 %] 99 %  BP: (109-146)/(66-89) 139/78     Weight: 55.6 kg (122 lb 9.2 oz)  Body mass index is 21.04 kg/m².    SpO2: 99 %  O2 Device (Oxygen Therapy): nasal cannula      Intake/Output Summary (Last 24 hours) at 10/16/17 1656  Last data filed at 10/16/17 0525   Gross per 24 hour   Intake               50 ml   Output              450 ml   Net             -400 ml       Lines/Drains/Airways     Peripheral Intravenous Line                 Peripheral IV - Single Lumen 09/09/17 1212 Right Antecubital 37 days         Peripheral IV - Single Lumen 10/15/17 2138 Right Hand less than 1 day                Physical Exam   Constitutional: She is oriented to person, place, and time. She appears well-developed and well-nourished. No distress.   HENT:   Head: Normocephalic and atraumatic.   Mouth/Throat: No oropharyngeal exudate.   Eyes: Conjunctivae and EOM are normal. Pupils are equal, round, and reactive to light. No scleral icterus.   Neck: Normal range of motion. Neck supple. No JVD  present. No tracheal deviation present.   Cardiovascular: Normal rate, regular rhythm, S1 normal and S2 normal.  Exam reveals no gallop and no friction rub.    No murmur heard.  Pulmonary/Chest: Effort normal and breath sounds normal. No respiratory distress. She has no wheezes. She has no rales. She exhibits no tenderness.   Abdominal: Soft. Bowel sounds are normal. There is no tenderness.   Musculoskeletal: Normal range of motion. She exhibits no edema.   Neurological: She is alert and oriented to person, place, and time. No cranial nerve deficit.   Skin: Skin is warm and dry. She is not diaphoretic.   Psychiatric: She has a normal mood and affect. Her behavior is normal. Judgment normal.       Current Medications:   [START ON 10/17/2017] aspirin  81 mg Oral Daily    atorvastatin  20 mg Oral Daily    [START ON 10/17/2017] enoxaparin  1 mg/kg Subcutaneous Q24H    lisinopril  40 mg Oral Daily        ondansetron, promethazine (PHENERGAN) IVPB, sodium chloride 0.9%    Laboratory:  CBC:    Recent Labs  Lab 08/05/17  0020 09/09/17  1100 10/15/17  2202 10/16/17  0119   WHITE BLOOD CELL COUNT 5.88 5.93 8.02  --    HEMOGLOBIN 13.4 13.5 12.9  --    POC HEMATOCRIT  --   --   --  38   HEMATOCRIT 42.6 42.6 40.2  --    PLATELETS 146 L 122 L 120 L  --        CHEMISTRIES:    Recent Labs  Lab 02/26/16  0515  08/05/17  0020 09/09/17  1100 10/15/17  2202   GLUCOSE 90  < > 114 H 126 H 172 H   SODIUM 143  < > 142 145 141   POTASSIUM 3.9  < > 4.0 3.7 3.6   BUN BLD 14  < > 22 14 30 H   CREATININE 0.8  < > 1.0 0.9 1.3   EGFR IF  >60  < > 58 A >60 42 A   EGFR IF NON- >60  < > 50 A 57 A 37 A   CALCIUM 9.1  < > 9.9 9.6 9.3   MAGNESIUM 1.7  --   --   --   --    < > = values in this interval not displayed.    CARDIAC BIOMARKERS:    Recent Labs  Lab 09/10/17  1012 10/15/17  2202 10/16/17  1250   TROPONIN I 0.443 H 0.016 0.182 H  0.182 H       COAGS:    Recent Labs  Lab 02/25/16 1941 05/16/16  5809  12/02/16  2316   INR 1.0 1.1 1.1       LIPIDS/LFTS:    Recent Labs  Lab 04/15/15  1211 09/04/15  0934  05/17/16  1204  08/05/17  0020 09/09/17  1100 10/15/17  2202   CHOLESTEROL 286 H 192  --  206 H  --   --   --   --    TRIGLYCERIDES 99 47  --  51  --   --   --   --    HDL 73 66  --  76 H  --   --   --   --    LDL CHOLESTEROL 193.2 H 116.6  --  119.8  --   --   --   --    NON-HDL CHOLESTEROL 213 126  --  130  --   --   --   --    AST 22 21  < >  --   < > 21 18 96 H   ALT 14 11  < >  --   < > 10 10 67 H   < > = values in this interval not displayed.        Diagnostic Results:  ECG (personally reviewed tracings):   10/15/17 2143 , NSSTTW changes    Chest X-Ray (personally reviewed image(s)): 10/15/17 NAD    V/Q 10/16/17: High probability for pulmonary emboli.    LE venous US 10/16/17  Nonocclusive thrombus in the left femoral vein and popliteal vein, in a similar pattern to the 9/9/17 exam. No new thrombus identified.    CT A/P 10/16/17  No acute intra-abdominal/pelvic CT abnormalities to account for the reported history of epigastric pain. Note is made that the stomach is decompressed, limiting accurate evaluation.  Findings include:  - Extensive colonic diverticulosis without associated inflammatory changes.  - 3.2 cm low attenuation right adnexal lesion, incompletely characterized on this exam.  Recommend further characterization with nonemergent outpatient pelvic ultrasound.  - Extensive atherosclerotic calcification of the abdominal aorta.    CTA Chest 9/9/17  1.  Diffuse bilateral pulmonary thromboembolism as described above.  2.  Pulmonary nodule within the right lower lobe, one year followup advised.  Correlation with any previous exams would be helpful, this may reflect a chronic finding.  3.  Several additional findings above noting mild prominence of the thyroid gland, correlation with ultrasound if not previously performed.  Please see above.    Echo: 9/10/17    1 - Mildly depressed left ventricular  systolic function (EF 45-50%).     2 - Mild global hypokinesis.     3 - Concentric remodeling.     4 - Impaired LV relaxation, normal LAP (grade 1 diastolic dysfunction).     5 - Mildly to moderately depressed right ventricular systolic function.     6 - Mild tricuspid regurgitation.     7 - Pulmonary hypertension. The estimated PA systolic pressure is 44 mmHg.

## 2017-10-16 NOTE — ED PROVIDER NOTES
Encounter Date: 10/15/2017    SCRIBE #1 NOTE: I, Gilberto Reese, am scribing for, and in the presence of,  Nixon Hill MD. I have scribed the following portions of the note - Other sections scribed: HPI, ROS.       History     Chief Complaint   Patient presents with    Chest Pain     pt [facility] from [facility] found on the floor diaphoretic tachypneic (36 per minute) cool to touch. inverted t waves on ems monitor. c/o mild chest pressure upon arrival      CC: Loss of Consciousness; Shortness of Breath    HPI: This 88 y.o. female with a medical history of GERD (gastroesophageal reflux disease); Hyperlipidemia; and Hypertension presents to the ED via EMS from [facility] for evaluation of loss of consciousness prior to arrival. Pt denies any head trauma. Pt is c/o epigastric abdominal pain upon arrival and shortness of breath. Pt states the pain is similar to ED visit on 9/9/17, but denies chest pain currently. Pt denies history of smoking. Pt denies any fevers, nausea, dysuria, or any other associated symptoms. Pt has no modifying factors. Pt has no prior treatment.       The history is provided by the patient. No  was used.     Review of patient's allergies indicates:   Allergen Reactions    Ibuprofen     Penicillins      Past Medical History:   Diagnosis Date    GERD (gastroesophageal reflux disease)     Hyperlipidemia     Hypertension      History reviewed. No pertinent surgical history.  History reviewed. No pertinent family history.  Social History   Substance Use Topics    Smoking status: Never Smoker    Smokeless tobacco: Never Used    Alcohol use No     Review of Systems   Constitutional: Negative for fever.        (+) Loss of Consciousness   HENT: Negative for sore throat.    Respiratory: Positive for shortness of breath.    Cardiovascular: Negative for chest pain.   Gastrointestinal: Positive for abdominal pain (epigastric pain).  Negative for nausea.   Genitourinary: Negative for dysuria.   Musculoskeletal: Negative for back pain.   Skin: Negative for rash.   Neurological: Negative for weakness.   Hematological: Does not bruise/bleed easily.       Physical Exam     Initial Vitals [10/15/17 2143]   BP Pulse Resp Temp SpO2   (!) 140/73 103 18 97 °F (36.1 °C) 96 %      MAP       95.33         Physical Exam  Nursing note and vitals reviewed.  Constitutional:  Frail chronically ill appearing elderly female in no obvious distress.  HENT:    Head: NC/AT    Eyes: Conjunctivae normal.   (-) scleral icterus.              Mouth/Throat: Dry mucosal membranes..      Neck: Neck supple, normal rom.    Cardiovascular: Tachycardic, regular rhythm  Pulmonary/Chest: Diminished breath sounds   Abdominal: Soft. ND/epigastric TTP w/o guarding or rebound  (-) CVA tenderness.  Musculoskeletal: FROM of all major joints. No extremity edema or tenderness.  Neurological: A&Ox3, Normal speech.  No acute focal motor deficits.     Skin: Skin is warm and dry.   Psychiatric: normal/baseline mood and affect.      ED Course   Critical Care  Date/Time: 10/15/2017 11:48 PM  Performed by: PABLITO MANZO.  Authorized by: PABLITO MANZO.   Direct patient critical care time: 20 minutes  Additional history critical care time: 10 minutes  Ordering / reviewing critical care time: 10 minutes  Documentation critical care time: 10 minutes  Consulting other physicians critical care time: 5 minutes  Total critical care time (exclusive of procedural time) : 55 minutes  Critical care time was exclusive of separately billable procedures and treating other patients and teaching time.  Critical care was necessary to treat or prevent imminent or life-threatening deterioration of the following conditions: respiratory failure.  Critical care was time spent personally by me on the following activities: development of treatment plan with patient or surrogate, evaluation of patient's response  to treatment, examination of patient, obtaining history from patient or surrogate, ordering and performing treatments and interventions, ordering and review of laboratory studies, ordering and review of radiographic studies, pulse oximetry, re-evaluation of patient's condition and review of old charts.        Labs Reviewed   CBC W/ AUTO DIFFERENTIAL - Abnormal; Notable for the following:        Result Value    Platelets 120 (*)     All other components within normal limits   COMPREHENSIVE METABOLIC PANEL - Abnormal; Notable for the following:     Glucose 172 (*)     BUN, Bld 30 (*)     Albumin 3.3 (*)     AST 96 (*)     ALT 67 (*)     eGFR if  42 (*)     eGFR if non  37 (*)     All other components within normal limits   LACTIC ACID, PLASMA - Abnormal; Notable for the following:     Lactate (Lactic Acid) 2.5 (*)     All other components within normal limits   URINALYSIS - Abnormal; Notable for the following:     Glucose, UA 1+ (*)     Occult Blood UA 1+ (*)     Leukocytes, UA 2+ (*)     All other components within normal limits   URINALYSIS MICROSCOPIC - Abnormal; Notable for the following:     WBC, UA 15 (*)     Bacteria, UA Moderate (*)     All other components within normal limits   POCT GLUCOSE - Abnormal; Notable for the following:     POCT Glucose 179 (*)     All other components within normal limits   ISTAT PROCEDURE - Abnormal; Notable for the following:     POC PCO2 34.2 (*)     POC PO2 51 (*)     POC HCO3 23.3 (*)     POC SATURATED O2 88 (*)     All other components within normal limits   TROPONIN I   B-TYPE NATRIURETIC PEPTIDE             Medical Decision Making:   History:   I obtained history from: someone other than patient and EMS provider.  Old Medical Records: I decided to obtain old medical records.  Old Records Summarized: records from clinic visits and other records.  Independently Interpreted Test(s):   I have ordered and independently interpreted X-rays - see  prior notes.  I have ordered and independently interpreted EKG Reading(s) - see prior notes  Clinical Tests:   Lab Tests: Ordered and Reviewed  Radiological Study: Ordered and Reviewed  Medical Tests: Ordered and Reviewed    EKG Readings: (Independently Interpreted)   Initial Reading: No STEMI.   Sinus tachycardia with occasional PVC, rate 103, normal intervals,  nonspecific ST/T-wave abnormalities.       Additional Medical Decision Makin-year-old female with history of hypertension, dyslipidemia and GERD presents the emergency department via EMS from [facility] for evaluation of a syncopal episode.  She reports epigastric abdominal pain along with shortness of breath and was recently seen in the emergency department on 17 for similar symptoms.  Initial vitals concerning for mild tachycardia and hypoxia.  VQ scan high probability for multiple bilateral PEs.  She has been given Lovenox and will be admitted to medicine for further evaluation and management.            Scribe Attestation:   Scribe #1: I performed the above scribed service and the documentation accurately describes the services I performed. I attest to the accuracy of the note.    Attending Attestation:           Physician Attestation for Scribe:  Physician Attestation Statement for Scribe #1: I, Nixon Hill MD, reviewed documentation, as scribed by Gilberto Reese in my presence, and it is both accurate and complete.                 ED Course      Clinical Impression:   The primary encounter diagnosis was Other acute pulmonary embolism with acute cor pulmonale. Diagnoses of Chest pain, Syncope and collapse, Hypoxia, Epigastric abdominal pain, and Acute hypoxemic respiratory failure were also pertinent to this visit.    Disposition:   Disposition: Admitted  Condition: Serious                        Nixon Hill MD  10/19/17 0749

## 2017-10-16 NOTE — HOSPITAL COURSE
10/16/17  · patient admitted to hospital medicine  · Ultrasound of pelvis showed an ovarian cyst.  · Ultrasound evaluation of the lower extremities showed venous thrombosis of the LLE similar to studies from September  · Patient started on lovenox.

## 2017-10-16 NOTE — CONSULTS
[facility]  Pulmonology  Consult Note    Patient Name: Daiana Mcduffie  MRN: [id number]  Admission Date: 10/15/2017  Hospital Length of Stay: 0 days  Code Status: Full Code  Attending Physician: Oswaldo Flanagan MD  Primary Care Provider: Pawan Mcclain MD   Principal Problem: <principal problem not specified>    Consults  Subjective:     HPI:  88 year old woman with history of GERD and HTN who presented with chest pressure and was found to be tachycardic, diaphoretic, and hypoxic (O2 Sat=84%) who notably had a  recent admission in September of 2017 with similar complaints and diagnosed with DVT and PE; started on apixiban at that time.    V/Q scan was performed in the ED and was read as high-probability for PE.  CT pelvis was also performed and was concerning for a 3.2cm adnexal mass.    Past Medical History:   Diagnosis Date    GERD (gastroesophageal reflux disease)     Hyperlipidemia     Hypertension        History reviewed. No pertinent surgical history.    Review of patient's allergies indicates:   Allergen Reactions    Ibuprofen     Penicillins        Family History     None        Social History Main Topics    Smoking status: Never Smoker    Smokeless tobacco: Never Used    Alcohol use No    Drug use: No    Sexual activity: No         Review of Systems   Constitutional: Negative.    HENT: Negative.    Eyes: Negative.    Respiratory: Positive for cough (occasional) and shortness of breath. Negative for chest tightness.    Cardiovascular: Negative.  Negative for chest pain.   Gastrointestinal: Negative.    Endocrine: Negative.    Genitourinary: Negative.    Musculoskeletal: Negative.    Allergic/Immunologic: Negative.    Neurological: Positive for syncope.   Hematological: Negative.    Psychiatric/Behavioral: Negative.    All other systems reviewed and are negative.    Objective:     Vital Signs (Most Recent):  Temp: 98 °F (36.7 °C) (10/16/17 1653)  Pulse: 76 (10/16/17  1653)  Resp: 18 (10/16/17 1653)  BP: 139/78 (10/16/17 1653)  SpO2: 99 % (10/16/17 1653) Vital Signs (24h Range):  Temp:  [97 °F (36.1 °C)-98 °F (36.7 °C)] 98 °F (36.7 °C)  Pulse:  [] 76  Resp:  [16-18] 18  SpO2:  [87 %-100 %] 99 %  BP: (109-146)/(66-89) 139/78     Weight: 55.6 kg (122 lb 9.2 oz)  Body mass index is 21.04 kg/m².      Intake/Output Summary (Last 24 hours) at 10/16/17 1722  Last data filed at 10/16/17 0525   Gross per 24 hour   Intake               50 ml   Output              450 ml   Net             -400 ml       Physical Exam   Constitutional: No distress.   Elderly, thin   HENT:   Head: Normocephalic and atraumatic.   Eyes: Conjunctivae are normal. Pupils are equal, round, and reactive to light.   Neck: Normal range of motion.   Cardiovascular: Normal rate, regular rhythm and normal heart sounds.    Pulmonary/Chest: Effort normal and breath sounds normal. No respiratory distress. She has no wheezes. She has no rales. She exhibits no tenderness.   Abdominal: Soft. Bowel sounds are normal. She exhibits no distension. There is no tenderness.   Musculoskeletal: She exhibits no edema.   Neurological: She is alert.   Skin: Skin is warm and dry. She is not diaphoretic.   Psychiatric: She has a normal mood and affect.       Vents:       Lines/Drains/Airways     Peripheral Intravenous Line                 Peripheral IV - Single Lumen 09/09/17 1212 Right Antecubital 37 days         Peripheral IV - Single Lumen 10/15/17 2138 Right Hand less than 1 day                Significant Labs:    CBC/Anemia Profile:    Recent Labs  Lab 10/15/17  2202 10/16/17  0119   WBC 8.02  --    HGB 12.9  --    HCT 40.2 38   *  --    MCV 90  --    RDW 13.3  --         Chemistries:    Recent Labs  Lab 10/15/17  2202      K 3.6      CO2 23   BUN 30*   CREATININE 1.3   CALCIUM 9.3   ALBUMIN 3.3*   PROT 6.8   BILITOT 0.5   ALKPHOS 87   ALT 67*   AST 96*       All pertinent labs within the past 24 hours have been  reviewed.    Significant Imaging:   I have reviewed and interpreted all pertinent imaging results/findings within the past 24 hours.    Assessment/Plan:     Chronic pulmonary embolism    Based on recent ECHO from the admission in September that showed right ventricular systolic function and pulmonary hypertension, combined with her elevated troponins, I would label this event submassive PE. Ultimately, it is unclear if this is treatment failure vs. non-compliance with the prescribed treatment.   When I was interviewing Ms. Mcduffie she did not seem to recall her recent hospital admission.  She lives in an assisted living facility but reports that she is responsible for administering her own medications.  She reported that she occasionally misses a dose here and there.      · Assistance from case management or social work to clarify the above would be useful.  · Ultimately, if she was taking apixiban reliably, this would be treatment failure and switching to a VKA for treatment of VTE would be the next course of action, along with age related cancer screenings.  · If she was non-compliant with apixiban, it would be reasonable to continue either a DOAC or VKA in a more supervised fashion to insure medication compliance  · Given the potential complexity of the situation it is not unreasonable to pursue IVC filter.  With her submassive PE and what appears to be chronic lower extremity clot burden, the next PE could be fatal and thus an IVC filter would serve as protection while issues surrounding anti-coagulation are resolved.   · Wean supplemental oxygen while maintaining an O2 Sat at or above 92%.              Thank you for your consult. I will follow-up with patient. Please contact us if you have any additional questions.     Peggy James MD  Pulmonology  [facility]  [phone] (c)

## 2017-10-16 NOTE — PROGRESS NOTES
Results reported to Dr Hill.   Results for CHRISTINA MARROQUIN (MRN [id number]) as of 10/16/2017 01:32   Ref. Range 10/16/2017 01:19   POC Sodium Latest Ref Range: 136 - 145 mmol/L 137   POC Potassium Latest Ref Range: 3.5 - 5.1 mmol/L 3.7   POC Ionized Calcium Latest Ref Range: 1.06 - 1.42 mmol/L 1.23   POC Hematocrit Latest Ref Range: 36 - 54 %PCV 38   POC PH Latest Ref Range: 7.35 - 7.45  7.440   POC PCO2 Latest Ref Range: 35 - 45 mmHg 34.2 (L)   POC PO2 Latest Ref Range: 80 - 100 mmHg 51 (LL)   POC BE Latest Ref Range: -2 to 2 mmol/L 0   POC HCO3 Latest Ref Range: 24 - 28 mmol/L 23.3 (L)   POC SATURATED O2 Latest Ref Range: 95 - 100 % 88 (L)   POC TCO2 Latest Ref Range: 23 - 27 mmol/L 24   FiO2 Unknown 21   Sample Unknown ARTERIAL   DelSys Unknown Room Air   Allens Test Unknown Pass   Site Unknown LR   Mode Unknown SPONT

## 2017-10-16 NOTE — SUBJECTIVE & OBJECTIVE
Past Medical History:   Diagnosis Date    GERD (gastroesophageal reflux disease)     Hyperlipidemia     Hypertension        History reviewed. No pertinent surgical history.    Review of patient's allergies indicates:   Allergen Reactions    Ibuprofen     Penicillins        Family History     None        Social History Main Topics    Smoking status: Never Smoker    Smokeless tobacco: Never Used    Alcohol use No    Drug use: No    Sexual activity: No         Review of Systems   Constitutional: Negative.    HENT: Negative.    Eyes: Negative.    Respiratory: Positive for cough (occasional) and shortness of breath. Negative for chest tightness.    Cardiovascular: Negative.  Negative for chest pain.   Gastrointestinal: Negative.    Endocrine: Negative.    Genitourinary: Negative.    Musculoskeletal: Negative.    Allergic/Immunologic: Negative.    Neurological: Positive for syncope.   Hematological: Negative.    Psychiatric/Behavioral: Negative.    All other systems reviewed and are negative.    Objective:     Vital Signs (Most Recent):  Temp: 98 °F (36.7 °C) (10/16/17 1653)  Pulse: 76 (10/16/17 1653)  Resp: 18 (10/16/17 1653)  BP: 139/78 (10/16/17 1653)  SpO2: 99 % (10/16/17 1653) Vital Signs (24h Range):  Temp:  [97 °F (36.1 °C)-98 °F (36.7 °C)] 98 °F (36.7 °C)  Pulse:  [] 76  Resp:  [16-18] 18  SpO2:  [87 %-100 %] 99 %  BP: (109-146)/(66-89) 139/78     Weight: 55.6 kg (122 lb 9.2 oz)  Body mass index is 21.04 kg/m².      Intake/Output Summary (Last 24 hours) at 10/16/17 1722  Last data filed at 10/16/17 0525   Gross per 24 hour   Intake               50 ml   Output              450 ml   Net             -400 ml       Physical Exam   Constitutional: No distress.   Elderly, thin   HENT:   Head: Normocephalic and atraumatic.   Eyes: Conjunctivae are normal. Pupils are equal, round, and reactive to light.   Neck: Normal range of motion.   Cardiovascular: Normal rate, regular rhythm and normal heart sounds.     Pulmonary/Chest: Effort normal and breath sounds normal. No respiratory distress. She has no wheezes. She has no rales. She exhibits no tenderness.   Abdominal: Soft. Bowel sounds are normal. She exhibits no distension. There is no tenderness.   Musculoskeletal: She exhibits no edema.   Neurological: She is alert.   Skin: Skin is warm and dry. She is not diaphoretic.   Psychiatric: She has a normal mood and affect.       Vents:       Lines/Drains/Airways     Peripheral Intravenous Line                 Peripheral IV - Single Lumen 09/09/17 1212 Right Antecubital 37 days         Peripheral IV - Single Lumen 10/15/17 2138 Right Hand less than 1 day                Significant Labs:    CBC/Anemia Profile:    Recent Labs  Lab 10/15/17  2202 10/16/17  0119   WBC 8.02  --    HGB 12.9  --    HCT 40.2 38   *  --    MCV 90  --    RDW 13.3  --         Chemistries:    Recent Labs  Lab 10/15/17  2202      K 3.6      CO2 23   BUN 30*   CREATININE 1.3   CALCIUM 9.3   ALBUMIN 3.3*   PROT 6.8   BILITOT 0.5   ALKPHOS 87   ALT 67*   AST 96*       All pertinent labs within the past 24 hours have been reviewed.    Significant Imaging:   I have reviewed and interpreted all pertinent imaging results/findings within the past 24 hours.

## 2017-10-16 NOTE — HPI
88 y.o. female that (in part)  has a past medical history of GERD (gastroesophageal reflux disease); Hyperlipidemia; and Hypertension. Presents to [facility] Emergency Department brought in by ems c/o chest pressure 2/10. Pt was found on the floor at [facility] diaphoretic, tachypneic at 36 breaths per minute with o2 sat 84 %.  Upon arrival in the ED she was still in distress on a non re breather. Pt given 325 aspirin and 2 sprays of nitro in route without relief of her symptoms.  She was having chest pain associated with dyspnea with exertion.   severe intensity.  Constant duration.  Acute onset.  Progressively worsening since onset.   Denies fever or chills.  No hemoptysis.   Located in the chest.  Diffuse radiation throughout chest.  No relieving factors.  She has been taking Avandia and Plavix at home.  She has a history of recently being diagnosed with DVT and pulmonary embolism.  States she is compliant with her home medication regimen.  No reported history of malignancy.    In the emergency department a VQ scan as well as a pelvic CT was obtained with renal stone protocol.  There is a high probability result for pulmonary embolism on VQ scan.  CT of pelvis showed evidence of adnexal lesion.      Case d/w Dr. Del Rosario (Pulm).  Not clear that pt is a med failure as it is not clear that she has been reliably taking her eliquis.  Pt is currently feeling well without CP/SOB.

## 2017-10-16 NOTE — HPI
88 year old woman with history of GERD and HTN who presented with chest pressure and was found to be tachycardic, diaphoretic, and hypoxic (O2 Sat=84%) who notably had a  recent admission in September of 2017 with similar complaints and diagnosed with DVT and PE; started on apixiban at that time.    V/Q scan was performed in the ED and was read as high-probability for PE.  CT pelvis was also performed and was concerning for a 3.2cm adnexal mass.

## 2017-10-16 NOTE — PLAN OF CARE
"TN to patient's room to discuss Helping the patient manage care at home.   TN/SW roll explained to pt.  Teach back method used.  TN's name and contact info placed on white board.  Pt/family encouraged to call for any problems/concerns with DC. "Discharge planning begins on Admission" pamphlet discussed and placed in "My Health Packet" and placed at bedside.        10/16/17 1736   Discharge Assessment   Assessment Type Discharge Planning Assessment   Confirmed/corrected address and phone number on facesheet? Yes   Assessment information obtained from? Patient   Expected Length of Stay (days) 3   Communicated expected length of stay with patient/caregiver yes   Prior to hospitilization cognitive status: Alert/Oriented   Prior to hospitalization functional status: Needs Assistance   Current cognitive status: Alert/Oriented   Current Functional Status: Needs Assistance   Lives With alone   Able to Return to Prior Arrangements unable to determine at this time (comments)   Is patient able to care for self after discharge? Unable to determine at this time (comments)   Who are your caregiver(s) and their phone number(s)? Consuelo and a friend, no family   Patient's perception of discharge disposition home or selfcare   Readmission Within The Last 30 Days no previous admission in last 30 days   Patient currently being followed by outpatient case management? No   Patient currently receives any other outside agency services? No   Equipment Currently Used at Home none   Do you have any problems affording any of your prescribed medications? No   Is the patient taking medications as prescribed? yes   Does the patient have transportation home? Yes   Transportation Available family or friend will provide   Does the patient receive services at the [facility]mad[facility] Clinic? No   Discharge Plan A Home Health   Discharge Plan B (tbd)   Patient/Family In Agreement With Plan yes     [facility] [address], [place] - [facility]  [place] [address]  Phone: [phone] Fax: [phone]    NOTE:  Patient was discharged 9/17 with [facility]

## 2017-10-16 NOTE — ASSESSMENT & PLAN NOTE
· As evidenced by UA and history  · Urine culture and Gram stain obtained prior to antibiotics  · Given empiric antibiotics  · Will tailor antibiotic regimen according to culture & sensitivity results  · Maintain euvolemic status with IV fluids

## 2017-10-17 LAB
ANION GAP SERPL CALC-SCNC: 9 MMOL/L
BACTERIA UR CULT: NORMAL
BUN SERPL-MCNC: 15 MG/DL
CALCIUM SERPL-MCNC: 9 MG/DL
CHLORIDE SERPL-SCNC: 108 MMOL/L
CO2 SERPL-SCNC: 25 MMOL/L
CREAT SERPL-MCNC: 0.8 MG/DL
EST. GFR  (AFRICAN AMERICAN): >60 ML/MIN/1.73 M^2
EST. GFR  (NON AFRICAN AMERICAN): >60 ML/MIN/1.73 M^2
GLUCOSE SERPL-MCNC: 93 MG/DL
INR PPP: 1.1
POTASSIUM SERPL-SCNC: 4 MMOL/L
PROTHROMBIN TIME: 11.2 SEC
SODIUM SERPL-SCNC: 142 MMOL/L

## 2017-10-17 PROCEDURE — 25000003 PHARM REV CODE 250: Performed by: HOSPITALIST

## 2017-10-17 PROCEDURE — 63600175 PHARM REV CODE 636 W HCPCS: Performed by: INTERNAL MEDICINE

## 2017-10-17 PROCEDURE — 99232 SBSQ HOSP IP/OBS MODERATE 35: CPT | Mod: ,,, | Performed by: INTERNAL MEDICINE

## 2017-10-17 PROCEDURE — 21400001 HC TELEMETRY ROOM

## 2017-10-17 PROCEDURE — 80048 BASIC METABOLIC PNL TOTAL CA: CPT

## 2017-10-17 PROCEDURE — 63600175 PHARM REV CODE 636 W HCPCS: Performed by: HOSPITALIST

## 2017-10-17 PROCEDURE — 36415 COLL VENOUS BLD VENIPUNCTURE: CPT

## 2017-10-17 PROCEDURE — 25000003 PHARM REV CODE 250: Performed by: EMERGENCY MEDICINE

## 2017-10-17 PROCEDURE — 85610 PROTHROMBIN TIME: CPT

## 2017-10-17 RX ORDER — ACETAMINOPHEN 325 MG/1
650 TABLET ORAL EVERY 6 HOURS PRN
Status: DISCONTINUED | OUTPATIENT
Start: 2017-10-17 | End: 2017-10-18 | Stop reason: HOSPADM

## 2017-10-17 RX ORDER — CIPROFLOXACIN 2 MG/ML
400 INJECTION, SOLUTION INTRAVENOUS
Status: DISCONTINUED | OUTPATIENT
Start: 2017-10-17 | End: 2017-10-18 | Stop reason: HOSPADM

## 2017-10-17 RX ADMIN — ACETAMINOPHEN 650 MG: 325 TABLET, FILM COATED ORAL at 02:10

## 2017-10-17 RX ADMIN — CIPROFLOXACIN 400 MG: 2 INJECTION, SOLUTION INTRAVENOUS at 02:10

## 2017-10-17 RX ADMIN — LISINOPRIL 40 MG: 20 TABLET ORAL at 09:10

## 2017-10-17 RX ADMIN — ATORVASTATIN CALCIUM 20 MG: 10 TABLET, FILM COATED ORAL at 09:10

## 2017-10-17 RX ADMIN — ENOXAPARIN SODIUM 60 MG: 100 INJECTION SUBCUTANEOUS at 05:10

## 2017-10-17 RX ADMIN — ASPIRIN 81 MG 81 MG: 81 TABLET ORAL at 09:10

## 2017-10-17 NOTE — NURSING
Patient awake and laying in bed.denies pain or needs . tv was off offered to turn on patient declined. No acute distress . Bed alarm on  And side rails up x 2. Head of bed elevated and call light in her lap. Continue to monitor.

## 2017-10-17 NOTE — PROGRESS NOTES
[facility] Hospital Medicine  Progress Note    Patient Name: Daiana Mcduffie  MRN: [id number]  Patient Class: IP- Inpatient   Admission Date: 10/15/2017  Length of Stay: 1 days  Attending Physician: Leonidas Bartlett MD  Primary Care Provider: Pawan Mcclain MD        Subjective:     Principal Problem:Acute hypoxemic respiratory failure    HPI:    Daiana Mcduffie is a 88 y.o. female that (in part)  has a past medical history of GERD (gastroesophageal reflux disease); Hyperlipidemia; and Hypertension. Presents to [facility] Emergency Department brought in by ems c/o chest pressure 2/10. Pt was found on the floor at [facility] diaphoretic, tachypneic at 36 breaths per minute with o2 sat 84 %.  Upon arrival in the ED she was still in distress on a non re breather. Pt given 325 aspirin and 2 sprays of nitro in route without relief of her symptoms.  She was having chest pain associated with dyspnea with exertion.   severe intensity.  Constant duration.  Acute onset.  Progressively worsening since onset.   Denies fever or chills.  No hemoptysis.   Located in the chest.  Diffuse radiation throughout chest.  No relieving factors.  She has been taking Avandia and Plavix at home.  She has a history of recently being diagnosed with DVT and pulmonary embolism.  States she is compliant with her home medication regimen.  No reported history of malignancy.      In the emergency department a VQ scan as well as a pelvic CT was obtained with renal stone protocol.  There is a high probability result for pulmonary embolism on VQ scan.  CT of pelvis showed evidence of adnexal lesion.      Hospital medicine has been asked to admit for further evaluation and treatment.       Hospital Course:  Daiana Mcduffie is a 88 y.o. female that (in part)  has a past medical history of GERD (gastroesophageal reflux disease); Hyperlipidemia; and Hypertension. Presents to [facility] Emergency Department brought in by ems c/o chest pressure 2/10. Pt was found on the floor at [facility] diaphoretic, tachypneic at 36 breaths per minute with o2 sat 84 %.  Upon arrival in the ED she was still in distress on a non re breather. Pt given 325 aspirin and 2 sprays of nitro in route without relief of her symptoms.  She was having chest pain associated with dyspnea with exertion.   severe intensity.  Constant duration.  Acute onset.  Progressively worsening since onset.   Denies fever or chills.  No hemoptysis.   Located in the chest.  Diffuse radiation throughout chest.  No relieving factors.  She has been taking Avandia and Plavix at home.  She has a history of recently being diagnosed with DVT and pulmonary embolism.  States she is compliant with her home medication regimen.  No reported history of malignancy.      In the emergency department a VQ scan as well as a pelvic CT was obtained with renal stone protocol.  There is a high probability result for pulmonary embolism on VQ scan.  CT of pelvis showed evidence of adnexal lesion. US lower leg showed DVT on LLL,she say she was non compliant with OAC,she say,she feel better at this time.    .     Past Medical History:   Diagnosis Date    GERD (gastroesophageal reflux disease)     Hyperlipidemia     Hypertension        History reviewed. No pertinent surgical history.    Review of patient's allergies indicates:   Allergen Reactions    Ibuprofen     Penicillins        Family History     None        Social History Main Topics    Smoking status: Never Smoker    Smokeless tobacco: Never Used    Alcohol use No    Drug use: No    Sexual activity: No         Review of Systems   Constitutional: Negative.    HENT: Negative.    Eyes: Negative.    Respiratory: Negative for cough (occasional), chest tightness and shortness of breath.    Cardiovascular: Negative.  Negative for chest pain.   Gastrointestinal: Negative.    Endocrine: Negative.     Genitourinary: Negative.    Musculoskeletal: Negative.    Allergic/Immunologic: Negative.    Neurological: Negative for syncope.   Hematological: Negative.    Psychiatric/Behavioral: Negative.    All other systems reviewed and are negative.    Objective:     Vital Signs (Most Recent):  Temp: 98.2 °F (36.8 °C) (10/17/17 1208)  Pulse: 71 (10/17/17 1208)  Resp: 18 (10/17/17 1208)  BP: (!) 103/58 (10/17/17 1208)  SpO2: 100 % (10/17/17 1208) Vital Signs (24h Range):  Temp:  [97.4 °F (36.3 °C)-98.6 °F (37 °C)] 98.2 °F (36.8 °C)  Pulse:  [71-91] 71  Resp:  [16-18] 18  SpO2:  [98 %-100 %] 100 %  BP: ()/(58-78) 103/58     Weight: 55.6 kg (122 lb 9.2 oz)  Body mass index is 21.04 kg/m².      Intake/Output Summary (Last 24 hours) at 10/17/17 1329  Last data filed at 10/17/17 0900   Gross per 24 hour   Intake             1183 ml   Output                0 ml   Net             1183 ml       Physical Exam   Constitutional: No distress.   Elderly, thin   HENT:   Head: Normocephalic and atraumatic.   Eyes: Conjunctivae are normal. Pupils are equal, round, and reactive to light.   Neck: Normal range of motion.   Cardiovascular: Normal rate, regular rhythm and normal heart sounds.    Pulmonary/Chest: Effort normal and breath sounds normal. No respiratory distress. She has no wheezes. She has no rales. She exhibits no tenderness.   Abdominal: Soft. Bowel sounds are normal. She exhibits no distension. There is no tenderness.   Musculoskeletal: She exhibits no edema.   Neurological: She is alert.   Skin: Skin is warm and dry. She is not diaphoretic.   Psychiatric: She has a normal mood and affect.       Vents:       Lines/Drains/Airways     Peripheral Intravenous Line                 Peripheral IV - Single Lumen 09/09/17 1212 Right Antecubital 38 days         Peripheral IV - Single Lumen 10/15/17 2138 Right Hand 1 day                Significant Labs:    CBC/Anemia Profile:    Recent Labs  Lab 10/15/17  2202 10/16/17  0119   WBC  8.02  --    HGB 12.9  --    HCT 40.2 38   *  --    MCV 90  --    RDW 13.3  --         Chemistries:    Recent Labs  Lab 10/15/17  2202 10/17/17  0534    142   K 3.6 4.0    108   CO2 23 25   BUN 30* 15   CREATININE 1.3 0.8   CALCIUM 9.3 9.0   ALBUMIN 3.3*  --    PROT 6.8  --    BILITOT 0.5  --    ALKPHOS 87  --    ALT 67*  --    AST 96*  --        All pertinent labs within the past 24 hours have been reviewed.    Significant Imaging:   I have reviewed and interpreted all pertinent imaging results/findings within the past 24 hours.    Assessment/Plan:      * Acute hypoxemic respiratory failure    Duo to PE,stable on NC O 2.          Essential hypertension    · Goal while inpatient is a systolic blood pressure less than 160mmHg  · BP in acceptable range at this time  · Continue current home regimen with hold parameters  · PRN antihypertensives available            Hyperlipidemia    · Lipid panel - as an outpatient  · Cardiac diet  · Continue statin            Acute cystitis    · As evidenced by UA and history  · Urine culture and Gram stain obtained prior to antibiotics  · Given empiric antibiotics  · Will tailor antibiotic regimen according to culture & sensitivity results  · Maintain euvolemic status with IV fluids    Ecoli,on Cipro.          Acute respiratory distress    Acute respiratory distress secondary to acute on chronic pulmonary embolism as described below.  Full dose Lovenox given.          Chronic pulmonary embolism    VQ scan positive for perfusion abnormality is high probability of pulmonary embolism.  She currently on oral anticoagulation but I am concerned that this is not effective treatment.  We will give full dose Lovenox and reevaluate.  Also history of recent DVT.  US showed LLL DVT.she was non compliance with OAC.she has been cosnulted..  Also of note she has a CT of the pelvis with a possible adnexal lesion  which will be investigated further.  If she has malignancy this may be  a reason for a hypercoagulable state.          Abnormal CT scan, pelvis    3.2 cm low attenuation right adnexal lesion, incompletely characterized on this exam.  Recommend further characterization with nonemergent outpatient pelvic ultrasound.          Chest pain    Chest pain secondary to acute on chronic pulmonary embolism as described below.          Elevated troponin    Duo to PE,denioes chest pain at this time.            VTE Risk Mitigation         Ordered     enoxaparin injection 60 mg  Every 24 hours (non-standard times)     Route:  Subcutaneous        10/16/17 0910     Medium Risk of VTE  Once      10/16/17 0624     Place BAM hose  Until discontinued      10/16/17 0624              Leonidas Bartlett MD  Department of Hospital Medicine   [facility]

## 2017-10-17 NOTE — NURSING
Patient resting in bed no acute changes. Tolerating oxygen at 3 liters. Set up meal tray for lunch meal. Continue to monitor no acute changes. Bed alarm on and call light in hand side rails up x 2.

## 2017-10-17 NOTE — NURSING
0650- bedside rounding report received from arianna howell rn on patients progress overnight and hand off report sheet given to me.     0730- assessment completed and plan today explained in simple terms .call light on and bed alarm on . No changes on telemetry . Oxygen at 4 liters by nasal canula with aqua pack added for moisture. Head of bed elevated.

## 2017-10-17 NOTE — NURSING
Report received; pt awake and alert, resp even and unlabored no acute distress noted; visitor at bedside; pt denies any pain or discomfort; no needs voiced

## 2017-10-17 NOTE — PLAN OF CARE
Problem: Pain, Acute (Adult)  Intervention: Monitor/Manage Analgesia   10/17/17 0445   Safety Interventions   Medication Review/Management medications reviewed     Intervention: Mutually Develop/Implement Acute Pain Management Plan   10/17/17 0445   Pain/Comfort/Sleep Interventions   Pain Management Interventions pain management plan reviewed with patient/caregiver     Intervention: Support/Optimize Psychosocial Response to Acute Pain   10/17/17 0445   Coping/Psychosocial Interventions   Supportive Measures active listening utilized;verbalization of feelings encouraged      10/17/17 0445   Coping/Psychosocial Interventions   Supportive Measures active listening utilized;verbalization of feelings encouraged         Comments: Pt complained of mild headache; order for tylenol obtained; no further complaints of pain since Tylenol was given; will continue to assess pain level on patient rounds and PRN

## 2017-10-17 NOTE — SUBJECTIVE & OBJECTIVE
Past Medical History:   Diagnosis Date    GERD (gastroesophageal reflux disease)     Hyperlipidemia     Hypertension        History reviewed. No pertinent surgical history.    Review of patient's allergies indicates:   Allergen Reactions    Ibuprofen     Penicillins        No current facility-administered medications on file prior to encounter.      Current Outpatient Prescriptions on File Prior to Encounter   Medication Sig    albuterol 90 mcg/actuation inhaler Inhale 2 puffs into the lungs every 4 (four) hours as needed for Wheezing or Shortness of Breath. Rescue    apixaban 5 mg Tab Take 1 tablet (5 mg total) by mouth 2 (two) times daily.    atorvastatin (LIPITOR) 20 MG tablet Take 1 tablet (20 mg total) by mouth once daily.    cetirizine (ZYRTEC) 10 MG tablet Take 1 tablet (10 mg total) by mouth every evening.    clopidogrel (PLAVIX) 75 mg tablet Take 1 tablet (75 mg total) by mouth once daily.    fluticasone (FLONASE) 50 mcg/actuation nasal spray 1 spray by Each Nare route once daily.    hydrOXYzine pamoate (VISTARIL) 25 MG Cap Take 1 capsule (25 mg total) by mouth every 8 (eight) hours as needed.    ketoconazole (NIZORAL) 2 % cream     lisinopril (PRINIVIL,ZESTRIL) 40 MG tablet Take 1 tablet (40 mg total) by mouth once daily.    meclizine (ANTIVERT) 25 mg tablet TAKE ONE TABLET BY MOUTH THREE TIMES DAILY AS NEEDED FOR  DIZZINESS    ranitidine (ZANTAC) 150 MG tablet Take 1 tablet (150 mg total) by mouth 2 (two) times daily.    triamcinolone acetonide 0.1% (KENALOG) 0.1 % cream Apply topically 2 (two) times daily.     Family History     None        Social History Main Topics    Smoking status: Never Smoker    Smokeless tobacco: Never Used    Alcohol use No    Drug use: No    Sexual activity: No     Review of Systems   Constitution: Negative for chills, diaphoresis, fever, weakness and malaise/fatigue.   HENT: Negative for nosebleeds.    Eyes: Negative for blurred vision and double vision.    Cardiovascular: Negative for chest pain, claudication, cyanosis, dyspnea on exertion, leg swelling, orthopnea, palpitations, paroxysmal nocturnal dyspnea and syncope.   Respiratory: Negative for cough, shortness of breath and wheezing.    Skin: Negative for dry skin and poor wound healing.   Musculoskeletal: Negative for back pain, joint swelling and myalgias.   Gastrointestinal: Negative for abdominal pain, nausea and vomiting.   Genitourinary: Negative for hematuria.   Neurological: Negative for dizziness, headaches, numbness and seizures.   Psychiatric/Behavioral: Negative for altered mental status and depression.     Objective:     Vital Signs (Most Recent):  Temp: 97.5 °F (36.4 °C) (10/17/17 0803)  Pulse: 80 (10/17/17 0803)  Resp: 16 (10/17/17 0803)  BP: (!) 168/75 (10/17/17 0803)  SpO2: 99 % (10/17/17 0803) Vital Signs (24h Range):  Temp:  [97.4 °F (36.3 °C)-98.6 °F (37 °C)] 97.5 °F (36.4 °C)  Pulse:  [72-91] 80  Resp:  [16-18] 16  SpO2:  [99 %-100 %] 99 %  BP: ()/(61-78) 168/75     Weight: 55.6 kg (122 lb 9.2 oz)  Body mass index is 21.04 kg/m².    SpO2: 99 %  O2 Device (Oxygen Therapy): nasal cannula      Intake/Output Summary (Last 24 hours) at 10/17/17 0908  Last data filed at 10/16/17 1752   Gross per 24 hour   Intake              700 ml   Output                0 ml   Net              700 ml       Lines/Drains/Airways     Peripheral Intravenous Line                 Peripheral IV - Single Lumen 09/09/17 1212 Right Antecubital 37 days         Peripheral IV - Single Lumen 10/15/17 2138 Right Hand 1 day                Physical Exam   Constitutional: She is oriented to person, place, and time. She appears well-developed and well-nourished. No distress.   HENT:   Head: Normocephalic and atraumatic.   Mouth/Throat: No oropharyngeal exudate.   Eyes: Conjunctivae and EOM are normal. Pupils are equal, round, and reactive to light. No scleral icterus.   Neck: Normal range of motion. Neck supple. No JVD  present. No tracheal deviation present.   Cardiovascular: Normal rate, regular rhythm, S1 normal and S2 normal.  Exam reveals no gallop and no friction rub.    No murmur heard.  Pulmonary/Chest: Effort normal and breath sounds normal. No respiratory distress. She has no wheezes. She has no rales. She exhibits no tenderness.   Abdominal: Soft. Bowel sounds are normal. There is no tenderness.   Musculoskeletal: Normal range of motion. She exhibits no edema.   Neurological: She is alert and oriented to person, place, and time. No cranial nerve deficit.   Skin: Skin is warm and dry. She is not diaphoretic.   Psychiatric: She has a normal mood and affect. Her behavior is normal. Judgment normal.       Current Medications:   aspirin  81 mg Oral Daily    atorvastatin  20 mg Oral Daily    enoxaparin  1 mg/kg Subcutaneous Q24H    lisinopril  40 mg Oral Daily        acetaminophen, ondansetron, promethazine (PHENERGAN) IVPB, sodium chloride 0.9%    Laboratory:  CBC:    Recent Labs  Lab 08/05/17  0020 09/09/17  1100 10/15/17  2202 10/16/17  0119   WHITE BLOOD CELL COUNT 5.88 5.93 8.02  --    HEMOGLOBIN 13.4 13.5 12.9  --    POC HEMATOCRIT  --   --   --  38   HEMATOCRIT 42.6 42.6 40.2  --    PLATELETS 146 L 122 L 120 L  --        CHEMISTRIES:    Recent Labs  Lab 02/26/16  0515  09/09/17  1100 10/15/17  2202 10/17/17  0534   GLUCOSE 90  < > 126 H 172 H 93   SODIUM 143  < > 145 141 142   POTASSIUM 3.9  < > 3.7 3.6 4.0   BUN BLD 14  < > 14 30 H 15   CREATININE 0.8  < > 0.9 1.3 0.8   EGFR IF AFRICAN AMERICAN >60  < > >60 42 A >60   EGFR IF NON- >60  < > 57 A 37 A >60   CALCIUM 9.1  < > 9.6 9.3 9.0   MAGNESIUM 1.7  --   --   --   --    < > = values in this interval not displayed.    CARDIAC BIOMARKERS:    Recent Labs  Lab 10/15/17  2202 10/16/17  1250 10/16/17  1822   TROPONIN I 0.016 0.182 H  0.182 H 0.129 H  0.129 H       COAGS:    Recent Labs  Lab 05/16/16  1654 12/02/16  2316 10/17/17  0534   INR 1.1 1.1 1.1        LIPIDS/LFTS:    Recent Labs  Lab 04/15/15  1211 09/04/15  0934  05/17/16  1204  08/05/17  0020 09/09/17  1100 10/15/17  2202   CHOLESTEROL 286 H 192  --  206 H  --   --   --   --    TRIGLYCERIDES 99 47  --  51  --   --   --   --    HDL 73 66  --  76 H  --   --   --   --    LDL CHOLESTEROL 193.2 H 116.6  --  119.8  --   --   --   --    NON-HDL CHOLESTEROL 213 126  --  130  --   --   --   --    AST 22 21  < >  --   < > 21 18 96 H   ALT 14 11  < >  --   < > 10 10 67 H   < > = values in this interval not displayed.        Diagnostic Results:  ECG (personally reviewed tracings):   10/15/17 2143 , NSSTTW changes    Chest X-Ray (personally reviewed image(s)): 10/15/17 NAD    V/Q 10/16/17: High probability for pulmonary emboli.    LE venous US 10/16/17  Nonocclusive thrombus in the left femoral vein and popliteal vein, in a similar pattern to the 9/9/17 exam. No new thrombus identified.    CT A/P 10/16/17  No acute intra-abdominal/pelvic CT abnormalities to account for the reported history of epigastric pain. Note is made that the stomach is decompressed, limiting accurate evaluation.  Findings include:  - Extensive colonic diverticulosis without associated inflammatory changes.  - 3.2 cm low attenuation right adnexal lesion, incompletely characterized on this exam.  Recommend further characterization with nonemergent outpatient pelvic ultrasound.  - Extensive atherosclerotic calcification of the abdominal aorta.    CTA Chest 9/9/17  1.  Diffuse bilateral pulmonary thromboembolism as described above.  2.  Pulmonary nodule within the right lower lobe, one year followup advised.  Correlation with any previous exams would be helpful, this may reflect a chronic finding.  3.  Several additional findings above noting mild prominence of the thyroid gland, correlation with ultrasound if not previously performed.  Please see above.    Echo: 9/10/17    1 - Mildly depressed left ventricular systolic function (EF 45-50%).      2 - Mild global hypokinesis.     3 - Concentric remodeling.     4 - Impaired LV relaxation, normal LAP (grade 1 diastolic dysfunction).     5 - Mildly to moderately depressed right ventricular systolic function.     6 - Mild tricuspid regurgitation.     7 - Pulmonary hypertension. The estimated PA systolic pressure is 44 mmHg.

## 2017-10-17 NOTE — ASSESSMENT & PLAN NOTE
?Acute exacerbation in setting of med noncompliance.  Hemodynamically stable.  Elevated trop noted.  Suggest case management/social work consult for assistance with med mgmt (i.e. Assurance that pt is taking medications, specifically eliquis 5mg bid).  Do not plan IVC filter at this time.  Cardiology will sign off, pls call with questions.

## 2017-10-17 NOTE — ASSESSMENT & PLAN NOTE
· As evidenced by UA and history  · Urine culture and Gram stain obtained prior to antibiotics  · Given empiric antibiotics  · Will tailor antibiotic regimen according to culture & sensitivity results  · Maintain euvolemic status with IV fluids    Ecoli,on Cipro.

## 2017-10-17 NOTE — HOSPITAL COURSE
Daiana Mcduffie is a 88 y.o. female that (in part)  has a past medical history of GERD (gastroesophageal reflux disease); Hyperlipidemia; and Hypertension. Presents to [facility] Emergency Department brought in by ems c/o chest pressure 2/10. Pt was found on the floor at [facility] diaphoretic, tachypneic at 36 breaths per minute with o2 sat 84 %.  Upon arrival in the ED she was still in distress on a non re breather. Pt given 325 aspirin and 2 sprays of nitro in route without relief of her symptoms.  She was having chest pain associated with dyspnea with exertion.   severe intensity.  Constant duration.  Acute onset.  Progressively worsening since onset.   Denies fever or chills.  No hemoptysis.   Located in the chest.  Diffuse radiation throughout chest.  No relieving factors.  She has been taking Avandia and Plavix at home.  She has a history of recently being diagnosed with DVT and pulmonary embolism. Initially  states she is compliant with her home medication regimen.  No reported history of malignancy.    In the emergency department a VQ scan as well as a pelvic CT was obtained with renal stone protocol.  There is a high probability result for pulmonary embolism on VQ scan.  CT of pelvis showed evidence of adnexal lesion. US lower leg showed DVT on LLL,she admit later she was non compliant with OAC,she was started on lovenox and her sym,toms resolved,she had elevated Troponin duo to PE and has been evaluated by cardiology wit no need for intervention,she has been also treated for Ecoli UTI with IV Cipro.  Patient was stable at D/C time with no symptoms,she has been multiple times consulted compliance with OAC,also HH with social service at D/C time has been arranged.she will also follow with PCP in next few days.    .

## 2017-10-17 NOTE — NURSING
Bedside rounding report given to sudarshan  rn on patients progress and updated hand off report sheet given to her. No acute distress bed alarm on . Appetite good. No change on telemetry. Denies pain tolerating oxygen at 4 liters by nasal canula.

## 2017-10-17 NOTE — NURSING
0915- patient ate all of her meal. Tolerated well. Tolerating oxygen at 3 liters titrate to monitor tolerance. No change on telemetry . Bed alarm on and call light in hand. Head of bed elevated and side rails up x 2.

## 2017-10-17 NOTE — SUBJECTIVE & OBJECTIVE
Past Medical History:   Diagnosis Date    GERD (gastroesophageal reflux disease)     Hyperlipidemia     Hypertension        History reviewed. No pertinent surgical history.    Review of patient's allergies indicates:   Allergen Reactions    Ibuprofen     Penicillins        Family History     None        Social History Main Topics    Smoking status: Never Smoker    Smokeless tobacco: Never Used    Alcohol use No    Drug use: No    Sexual activity: No         Review of Systems   Constitutional: Negative.    HENT: Negative.    Eyes: Negative.    Respiratory: Negative for cough (occasional), chest tightness and shortness of breath.    Cardiovascular: Negative.  Negative for chest pain.   Gastrointestinal: Negative.    Endocrine: Negative.    Genitourinary: Negative.    Musculoskeletal: Negative.    Allergic/Immunologic: Negative.    Neurological: Negative for syncope.   Hematological: Negative.    Psychiatric/Behavioral: Negative.    All other systems reviewed and are negative.    Objective:     Vital Signs (Most Recent):  Temp: 98.2 °F (36.8 °C) (10/17/17 1208)  Pulse: 71 (10/17/17 1208)  Resp: 18 (10/17/17 1208)  BP: (!) 103/58 (10/17/17 1208)  SpO2: 100 % (10/17/17 1208) Vital Signs (24h Range):  Temp:  [97.4 °F (36.3 °C)-98.6 °F (37 °C)] 98.2 °F (36.8 °C)  Pulse:  [71-91] 71  Resp:  [16-18] 18  SpO2:  [98 %-100 %] 100 %  BP: ()/(58-78) 103/58     Weight: 55.6 kg (122 lb 9.2 oz)  Body mass index is 21.04 kg/m².      Intake/Output Summary (Last 24 hours) at 10/17/17 1329  Last data filed at 10/17/17 0900   Gross per 24 hour   Intake             1183 ml   Output                0 ml   Net             1183 ml       Physical Exam   Constitutional: No distress.   Elderly, thin   HENT:   Head: Normocephalic and atraumatic.   Eyes: Conjunctivae are normal. Pupils are equal, round, and reactive to light.   Neck: Normal range of motion.   Cardiovascular: Normal rate, regular rhythm and normal heart sounds.     Pulmonary/Chest: Effort normal and breath sounds normal. No respiratory distress. She has no wheezes. She has no rales. She exhibits no tenderness.   Abdominal: Soft. Bowel sounds are normal. She exhibits no distension. There is no tenderness.   Musculoskeletal: She exhibits no edema.   Neurological: She is alert.   Skin: Skin is warm and dry. She is not diaphoretic.   Psychiatric: She has a normal mood and affect.       Vents:       Lines/Drains/Airways     Peripheral Intravenous Line                 Peripheral IV - Single Lumen 09/09/17 1212 Right Antecubital 38 days         Peripheral IV - Single Lumen 10/15/17 2138 Right Hand 1 day                Significant Labs:    CBC/Anemia Profile:    Recent Labs  Lab 10/15/17  2202 10/16/17  0119   WBC 8.02  --    HGB 12.9  --    HCT 40.2 38   *  --    MCV 90  --    RDW 13.3  --         Chemistries:    Recent Labs  Lab 10/15/17  2202 10/17/17  0534    142   K 3.6 4.0    108   CO2 23 25   BUN 30* 15   CREATININE 1.3 0.8   CALCIUM 9.3 9.0   ALBUMIN 3.3*  --    PROT 6.8  --    BILITOT 0.5  --    ALKPHOS 87  --    ALT 67*  --    AST 96*  --        All pertinent labs within the past 24 hours have been reviewed.    Significant Imaging:   I have reviewed and interpreted all pertinent imaging results/findings within the past 24 hours.

## 2017-10-17 NOTE — NURSING
Patient denies needs. Tolerating oxygen at 3 liters. Bed alarm on and side rails up x 2. Call light in hand.

## 2017-10-17 NOTE — ASSESSMENT & PLAN NOTE
VQ scan positive for perfusion abnormality is high probability of pulmonary embolism.  She currently on oral anticoagulation but I am concerned that this is not effective treatment.  We will give full dose Lovenox and reevaluate.  Also history of recent DVT.  US showed LLL DVT.she was non compliance with OAC.she has been cosnulted..  Also of note she has a CT of the pelvis with a possible adnexal lesion  which will be investigated further.  If she has malignancy this may be a reason for a hypercoagulable state.

## 2017-10-17 NOTE — PROGRESS NOTES
[facility]  Cardiology  Progress Note    Patient Name: Daiana Mcduffie  MRN: [id number]  Admission Date: 10/15/2017  Hospital Length of Stay: 1 days  Code Status: Full Code   Attending Physician: Leonidas Bartlett MD   Primary Care Physician: Pawan Mcclain MD  Expected Discharge Date:   Principal Problem:<principal problem not specified>    Subjective:     Interval Hx: no complaints.  No cp/sob.    Tele: NSR (pers rev)      Past Medical History:   Diagnosis Date    GERD (gastroesophageal reflux disease)     Hyperlipidemia     Hypertension        History reviewed. No pertinent surgical history.    Review of patient's allergies indicates:   Allergen Reactions    Ibuprofen     Penicillins        No current facility-administered medications on file prior to encounter.      Current Outpatient Prescriptions on File Prior to Encounter   Medication Sig    albuterol 90 mcg/actuation inhaler Inhale 2 puffs into the lungs every 4 (four) hours as needed for Wheezing or Shortness of Breath. Rescue    apixaban 5 mg Tab Take 1 tablet (5 mg total) by mouth 2 (two) times daily.    atorvastatin (LIPITOR) 20 MG tablet Take 1 tablet (20 mg total) by mouth once daily.    cetirizine (ZYRTEC) 10 MG tablet Take 1 tablet (10 mg total) by mouth every evening.    clopidogrel (PLAVIX) 75 mg tablet Take 1 tablet (75 mg total) by mouth once daily.    fluticasone (FLONASE) 50 mcg/actuation nasal spray 1 spray by Each Nare route once daily.    hydrOXYzine pamoate (VISTARIL) 25 MG Cap Take 1 capsule (25 mg total) by mouth every 8 (eight) hours as needed.    ketoconazole (NIZORAL) 2 % cream     lisinopril (PRINIVIL,ZESTRIL) 40 MG tablet Take 1 tablet (40 mg total) by mouth once daily.    meclizine (ANTIVERT) 25 mg tablet TAKE ONE TABLET BY MOUTH THREE TIMES DAILY AS NEEDED FOR  DIZZINESS    ranitidine (ZANTAC) 150 MG tablet Take 1 tablet (150 mg total) by mouth 2 (two) times daily.    triamcinolone acetonide  0.1% (KENALOG) 0.1 % cream Apply topically 2 (two) times daily.     Family History     None        Social History Main Topics    Smoking status: Never Smoker    Smokeless tobacco: Never Used    Alcohol use No    Drug use: No    Sexual activity: No     Review of Systems   Constitution: Negative for chills, diaphoresis, fever, weakness and malaise/fatigue.   HENT: Negative for nosebleeds.    Eyes: Negative for blurred vision and double vision.   Cardiovascular: Negative for chest pain, claudication, cyanosis, dyspnea on exertion, leg swelling, orthopnea, palpitations, paroxysmal nocturnal dyspnea and syncope.   Respiratory: Negative for cough, shortness of breath and wheezing.    Skin: Negative for dry skin and poor wound healing.   Musculoskeletal: Negative for back pain, joint swelling and myalgias.   Gastrointestinal: Negative for abdominal pain, nausea and vomiting.   Genitourinary: Negative for hematuria.   Neurological: Negative for dizziness, headaches, numbness and seizures.   Psychiatric/Behavioral: Negative for altered mental status and depression.     Objective:     Vital Signs (Most Recent):  Temp: 97.5 °F (36.4 °C) (10/17/17 0803)  Pulse: 80 (10/17/17 0803)  Resp: 16 (10/17/17 0803)  BP: (!) 168/75 (10/17/17 0803)  SpO2: 99 % (10/17/17 0803) Vital Signs (24h Range):  Temp:  [97.4 °F (36.3 °C)-98.6 °F (37 °C)] 97.5 °F (36.4 °C)  Pulse:  [72-91] 80  Resp:  [16-18] 16  SpO2:  [99 %-100 %] 99 %  BP: ()/(61-78) 168/75     Weight: 55.6 kg (122 lb 9.2 oz)  Body mass index is 21.04 kg/m².    SpO2: 99 %  O2 Device (Oxygen Therapy): nasal cannula      Intake/Output Summary (Last 24 hours) at 10/17/17 0908  Last data filed at 10/16/17 1752   Gross per 24 hour   Intake              700 ml   Output                0 ml   Net              700 ml       Lines/Drains/Airways     Peripheral Intravenous Line                 Peripheral IV - Single Lumen 09/09/17 1212 Right Antecubital 37 days         Peripheral IV  - Single Lumen 10/15/17 2138 Right Hand 1 day                Physical Exam   Constitutional: She is oriented to person, place, and time. She appears well-developed and well-nourished. No distress.   HENT:   Head: Normocephalic and atraumatic.   Mouth/Throat: No oropharyngeal exudate.   Eyes: Conjunctivae and EOM are normal. Pupils are equal, round, and reactive to light. No scleral icterus.   Neck: Normal range of motion. Neck supple. No JVD present. No tracheal deviation present.   Cardiovascular: Normal rate, regular rhythm, S1 normal and S2 normal.  Exam reveals no gallop and no friction rub.    No murmur heard.  Pulmonary/Chest: Effort normal and breath sounds normal. No respiratory distress. She has no wheezes. She has no rales. She exhibits no tenderness.   Abdominal: Soft. Bowel sounds are normal. There is no tenderness.   Musculoskeletal: Normal range of motion. She exhibits no edema.   Neurological: She is alert and oriented to person, place, and time. No cranial nerve deficit.   Skin: Skin is warm and dry. She is not diaphoretic.   Psychiatric: She has a normal mood and affect. Her behavior is normal. Judgment normal.       Current Medications:   aspirin  81 mg Oral Daily    atorvastatin  20 mg Oral Daily    enoxaparin  1 mg/kg Subcutaneous Q24H    lisinopril  40 mg Oral Daily        acetaminophen, ondansetron, promethazine (PHENERGAN) IVPB, sodium chloride 0.9%    Laboratory:  CBC:    Recent Labs  Lab 08/05/17  0020 09/09/17  1100 10/15/17  2202 10/16/17  0119   WHITE BLOOD CELL COUNT 5.88 5.93 8.02  --    HEMOGLOBIN 13.4 13.5 12.9  --    POC HEMATOCRIT  --   --   --  38   HEMATOCRIT 42.6 42.6 40.2  --    PLATELETS 146 L 122 L 120 L  --        CHEMISTRIES:    Recent Labs  Lab 02/26/16  0515  09/09/17  1100 10/15/17  2202 10/17/17  0534   GLUCOSE 90  < > 126 H 172 H 93   SODIUM 143  < > 145 141 142   POTASSIUM 3.9  < > 3.7 3.6 4.0   BUN BLD 14  < > 14 30 H 15   CREATININE 0.8  < > 0.9 1.3 0.8   EGFR IF   >60  < > >60 42 A >60   EGFR IF NON- >60  < > 57 A 37 A >60   CALCIUM 9.1  < > 9.6 9.3 9.0   MAGNESIUM 1.7  --   --   --   --    < > = values in this interval not displayed.    CARDIAC BIOMARKERS:    Recent Labs  Lab 10/15/17  2202 10/16/17  1250 10/16/17  1822   TROPONIN I 0.016 0.182 H  0.182 H 0.129 H  0.129 H       COAGS:    Recent Labs  Lab 05/16/16  1654 12/02/16  2316 10/17/17  0534   INR 1.1 1.1 1.1       LIPIDS/LFTS:    Recent Labs  Lab 04/15/15  1211 09/04/15  0934  05/17/16  1204  08/05/17  0020 09/09/17  1100 10/15/17  2202   CHOLESTEROL 286 H 192  --  206 H  --   --   --   --    TRIGLYCERIDES 99 47  --  51  --   --   --   --    HDL 73 66  --  76 H  --   --   --   --    LDL CHOLESTEROL 193.2 H 116.6  --  119.8  --   --   --   --    NON-HDL CHOLESTEROL 213 126  --  130  --   --   --   --    AST 22 21  < >  --   < > 21 18 96 H   ALT 14 11  < >  --   < > 10 10 67 H   < > = values in this interval not displayed.        Diagnostic Results:  ECG (personally reviewed tracings):   10/15/17 2143 , NSSTTW changes    Chest X-Ray (personally reviewed image(s)): 10/15/17 NAD    V/Q 10/16/17: High probability for pulmonary emboli.    LE venous US 10/16/17  Nonocclusive thrombus in the left femoral vein and popliteal vein, in a similar pattern to the 9/9/17 exam. No new thrombus identified.    CT A/P 10/16/17  No acute intra-abdominal/pelvic CT abnormalities to account for the reported history of epigastric pain. Note is made that the stomach is decompressed, limiting accurate evaluation.  Findings include:  - Extensive colonic diverticulosis without associated inflammatory changes.  - 3.2 cm low attenuation right adnexal lesion, incompletely characterized on this exam.  Recommend further characterization with nonemergent outpatient pelvic ultrasound.  - Extensive atherosclerotic calcification of the abdominal aorta.    CTA Chest 9/9/17  1.  Diffuse bilateral pulmonary  thromboembolism as described above.  2.  Pulmonary nodule within the right lower lobe, one year followup advised.  Correlation with any previous exams would be helpful, this may reflect a chronic finding.  3.  Several additional findings above noting mild prominence of the thyroid gland, correlation with ultrasound if not previously performed.  Please see above.    Echo: 9/10/17    1 - Mildly depressed left ventricular systolic function (EF 45-50%).     2 - Mild global hypokinesis.     3 - Concentric remodeling.     4 - Impaired LV relaxation, normal LAP (grade 1 diastolic dysfunction).     5 - Mildly to moderately depressed right ventricular systolic function.     6 - Mild tricuspid regurgitation.     7 - Pulmonary hypertension. The estimated PA systolic pressure is 44 mmHg.      Assessment and Plan:     Chronic pulmonary embolism    ?Acute exacerbation in setting of med noncompliance.  Hemodynamically stable.  Elevated trop noted.  Suggest case management/social work consult for assistance with med mgmt (i.e. Assurance that pt is taking medications, specifically eliquis 5mg bid).  Do not plan IVC filter at this time.  Cardiology will sign off, pls call with questions.            VTE Risk Mitigation         Ordered     enoxaparin injection 60 mg  Every 24 hours (non-standard times)     Route:  Subcutaneous        10/16/17 0910     Medium Risk of VTE  Once      10/16/17 0624     Place BAM hose  Until discontinued      10/16/17 0624          Keith Bolton MD  Cardiology  [facility]

## 2017-10-18 VITALS
HEART RATE: 68 BPM | OXYGEN SATURATION: 98 % | WEIGHT: 122.56 LBS | DIASTOLIC BLOOD PRESSURE: 65 MMHG | BODY MASS INDEX: 20.92 KG/M2 | HEIGHT: 64 IN | RESPIRATION RATE: 18 BRPM | TEMPERATURE: 98 F | SYSTOLIC BLOOD PRESSURE: 132 MMHG

## 2017-10-18 LAB
ANION GAP SERPL CALC-SCNC: 7 MMOL/L
BASOPHILS # BLD AUTO: 0.02 K/UL
BASOPHILS NFR BLD: 0.4 %
BUN SERPL-MCNC: 13 MG/DL
CALCIUM SERPL-MCNC: 8.7 MG/DL
CHLORIDE SERPL-SCNC: 108 MMOL/L
CO2 SERPL-SCNC: 26 MMOL/L
CREAT SERPL-MCNC: 0.8 MG/DL
DIFFERENTIAL METHOD: ABNORMAL
EOSINOPHIL # BLD AUTO: 0.1 K/UL
EOSINOPHIL NFR BLD: 1.4 %
ERYTHROCYTE [DISTWIDTH] IN BLOOD BY AUTOMATED COUNT: 13.2 %
EST. GFR  (AFRICAN AMERICAN): >60 ML/MIN/1.73 M^2
EST. GFR  (NON AFRICAN AMERICAN): >60 ML/MIN/1.73 M^2
GLUCOSE SERPL-MCNC: 76 MG/DL
HCT VFR BLD AUTO: 38.5 %
HGB BLD-MCNC: 12.4 G/DL
LYMPHOCYTES # BLD AUTO: 2.4 K/UL
LYMPHOCYTES NFR BLD: 42 %
MCH RBC QN AUTO: 28.7 PG
MCHC RBC AUTO-ENTMCNC: 32.2 G/DL
MCV RBC AUTO: 89 FL
MONOCYTES # BLD AUTO: 0.6 K/UL
MONOCYTES NFR BLD: 11.3 %
NEUTROPHILS # BLD AUTO: 2.5 K/UL
NEUTROPHILS NFR BLD: 44.7 %
PLATELET # BLD AUTO: 138 K/UL
PMV BLD AUTO: 10.5 FL
POCT GLUCOSE: 99 MG/DL (ref 70–110)
POTASSIUM SERPL-SCNC: 4 MMOL/L
RBC # BLD AUTO: 4.32 M/UL
SODIUM SERPL-SCNC: 141 MMOL/L
WBC # BLD AUTO: 5.67 K/UL

## 2017-10-18 PROCEDURE — 97110 THERAPEUTIC EXERCISES: CPT

## 2017-10-18 PROCEDURE — 36415 COLL VENOUS BLD VENIPUNCTURE: CPT

## 2017-10-18 PROCEDURE — G8978 MOBILITY CURRENT STATUS: HCPCS | Mod: CJ

## 2017-10-18 PROCEDURE — 97165 OT EVAL LOW COMPLEX 30 MIN: CPT

## 2017-10-18 PROCEDURE — 97161 PT EVAL LOW COMPLEX 20 MIN: CPT

## 2017-10-18 PROCEDURE — 63600175 PHARM REV CODE 636 W HCPCS: Performed by: HOSPITALIST

## 2017-10-18 PROCEDURE — 25000003 PHARM REV CODE 250: Performed by: HOSPITALIST

## 2017-10-18 PROCEDURE — 25000003 PHARM REV CODE 250: Performed by: EMERGENCY MEDICINE

## 2017-10-18 PROCEDURE — G8989 SELF CARE D/C STATUS: HCPCS | Mod: CH

## 2017-10-18 PROCEDURE — G8979 MOBILITY GOAL STATUS: HCPCS | Mod: CI

## 2017-10-18 PROCEDURE — G8980 MOBILITY D/C STATUS: HCPCS | Mod: CJ

## 2017-10-18 PROCEDURE — G8988 SELF CARE GOAL STATUS: HCPCS | Mod: CH

## 2017-10-18 PROCEDURE — 85025 COMPLETE CBC W/AUTO DIFF WBC: CPT

## 2017-10-18 PROCEDURE — G8987 SELF CARE CURRENT STATUS: HCPCS | Mod: CH

## 2017-10-18 PROCEDURE — 63600175 PHARM REV CODE 636 W HCPCS: Performed by: INTERNAL MEDICINE

## 2017-10-18 PROCEDURE — 80048 BASIC METABOLIC PNL TOTAL CA: CPT

## 2017-10-18 RX ORDER — CIPROFLOXACIN 500 MG/1
500 TABLET ORAL 2 TIMES DAILY
Qty: 14 TABLET | Refills: 0 | Status: SHIPPED | OUTPATIENT
Start: 2017-10-18 | End: 2017-10-25

## 2017-10-18 RX ADMIN — ATORVASTATIN CALCIUM 20 MG: 10 TABLET, FILM COATED ORAL at 10:10

## 2017-10-18 RX ADMIN — LISINOPRIL 40 MG: 20 TABLET ORAL at 10:10

## 2017-10-18 RX ADMIN — CIPROFLOXACIN 400 MG: 2 INJECTION, SOLUTION INTRAVENOUS at 01:10

## 2017-10-18 RX ADMIN — ENOXAPARIN SODIUM 60 MG: 100 INJECTION SUBCUTANEOUS at 05:10

## 2017-10-18 RX ADMIN — ASPIRIN 81 MG 81 MG: 81 TABLET ORAL at 10:10

## 2017-10-18 NOTE — NURSING
1800- patient in bed ate her supper meal tolerated well. Tolerating oxygen at 2 liters by nasal canula. Bed alarm on and call light in hand side rail up x 2. No telemetry changes denies pain or breathing problems.     1900- rounding report given to sudarshan urena on patients progress and updated hand off report sheet given . No acute changes this shift. No telemetry changes .

## 2017-10-18 NOTE — DISCHARGE SUMMARY
[facility] Hospital Medicine  Discharge Summary      Patient Name: Daiana Mcduffie  MRN: [id number]  Admission Date: 10/15/2017  Hospital Length of Stay: 2 days  Discharge Date and Time:  10/18/2017 10:02 AM  Attending Physician: Leonidas Bartlett MD   Discharging Provider: Leonidas Bartlett MD  Primary Care Provider: Pawan Mcclain MD      HPI:     Daiana Mcduffie is a 88 y.o. female that (in part)  has a past medical history of GERD (gastroesophageal reflux disease); Hyperlipidemia; and Hypertension. Presents to [facility] Emergency Department brought in by ems c/o chest pressure 2/10. Pt was found on the floor at [facility] diaphoretic, tachypneic at 36 breaths per minute with o2 sat 84 %.  Upon arrival in the ED she was still in distress on a non re breather. Pt given 325 aspirin and 2 sprays of nitro in route without relief of her symptoms.  She was having chest pain associated with dyspnea with exertion.   severe intensity.  Constant duration.  Acute onset.  Progressively worsening since onset.   Denies fever or chills.  No hemoptysis.   Located in the chest.  Diffuse radiation throughout chest.  No relieving factors.  She has been taking Avandia and Plavix at home.  She has a history of recently being diagnosed with DVT and pulmonary embolism.  States she is compliant with her home medication regimen.  No reported history of malignancy.      In the emergency department a VQ scan as well as a pelvic CT was obtained with renal stone protocol.  There is a high probability result for pulmonary embolism on VQ scan.  CT of pelvis showed evidence of adnexal lesion.      Hospital medicine has been asked to admit for further evaluation and treatment.       * No surgery found *      Indwelling Lines/Drains at time of discharge:   Lines/Drains/Airways          No matching active lines, drains, or airways        Hospital Course:   Daiana Mcduffie is a 88 y.o. female  that (in part)  has a past medical history of GERD (gastroesophageal reflux disease); Hyperlipidemia; and Hypertension. Presents to [facility] Emergency Department brought in by ems c/o chest pressure 2/10. Pt was found on the floor at [facility] diaphoretic, tachypneic at 36 breaths per minute with o2 sat 84 %.  Upon arrival in the ED she was still in distress on a non re breather. Pt given 325 aspirin and 2 sprays of nitro in route without relief of her symptoms.  She was having chest pain associated with dyspnea with exertion.   severe intensity.  Constant duration.  Acute onset.  Progressively worsening since onset.   Denies fever or chills.  No hemoptysis.   Located in the chest.  Diffuse radiation throughout chest.  No relieving factors.  She has been taking Avandia and Plavix at home.  She has a history of recently being diagnosed with DVT and pulmonary embolism. Initially  states she is compliant with her home medication regimen.  No reported history of malignancy.    In the emergency department a VQ scan as well as a pelvic CT was obtained with renal stone protocol.  There is a high probability result for pulmonary embolism on VQ scan.  CT of pelvis showed evidence of adnexal lesion. US lower leg showed DVT on LLL,she admit later she was non compliant with OAC,she was started on lovenox and her sym,toms resolved,she had elevated Troponin duo to PE and has been evaluated by cardiology wit no need for intervention,she has been also treated for Ecoli UTI with IV Cipro.  Patient was stable at D/C time with no symptoms,she has been multiple times consulted compliance with OAC,also HH with social service at D/C time has been arranged.she will also follow with PCP in next few days.    . she had also still evidence of DVT on LLL.  Patient had oxygen saturation of 78% by exertion on D/C day on RA,she would be benefit from home oxygen.    Consults:   Consults         Status Ordering Provider      Inpatient consult to Cardiology  Once     Provider:  Keith Bolton MD    Completed ZAID THOMPSON     Inpatient consult to Pulmonology  Once     Provider:  Rupert Sandy MD    Acknowledged KEITH LAMA     Inpatient consult to Social Work  Once     Provider:  (Not yet assigned)    Completed ZAID THOMPSON          Significant Diagnostic Studies: Labs:   BMP:   Recent Labs  Lab 10/17/17  0534 10/18/17  0526   GLU 93 76    141   K 4.0 4.0    108   CO2 25 26   BUN 15 13   CREATININE 0.8 0.8   CALCIUM 9.0 8.7   , CBC   Recent Labs  Lab 10/18/17  0526   WBC 5.67   HGB 12.4   HCT 38.5   *    and Troponin   Recent Labs  Lab 10/16/17  1822   TROPONINI 0.129*  0.129*     Microbiology:   Urine Culture    Lab Results   Component Value Date    LABURIN ESCHERICHIA COLI  10,000 - 49,999 cfu/ml   10/15/2017       Nuclear Medicine: V/Q scan,high probability for PE  US lower leg,DVT     Pending Diagnostic Studies:     None        Final Active Diagnoses:    Diagnosis Date Noted POA    PRINCIPAL PROBLEM:  Acute hypoxemic respiratory failure [J96.01] 09/11/2017 Yes    Essential hypertension [I10] 12/17/2012 Yes    Hyperlipidemia [E78.5] 09/09/2017 Yes    Chronic pulmonary embolism [I27.82] 10/16/2017 Yes    Abnormal CT scan, pelvis [R93.5] 10/16/2017 Yes    Acute cystitis [N30.00] 10/16/2017 Yes    Elevated troponin [R74.8] 09/10/2017 Yes      Problems Resolved During this Admission:    Diagnosis Date Noted Date Resolved POA      No new Assessment & Plan notes have been filed under this hospital service since the last note was generated.  Service: Hospital Medicine      Discharged Condition: stable    Disposition: Home or Self Care    Follow Up:  Follow-up Information     Pawan Mcclain MD In 1 week.    Specialty:  Internal Medicine  Contact information:  [address] [place], [place] [address]MIKE BREWSTER [id number] [phone]                 Patient Instructions:     Diet general     Activity as tolerated        Medications:  Reconciled Home Medications:   Current Discharge Medication List      START taking these medications    Details   ciprofloxacin HCl (CIPRO) 500 MG tablet Take 1 tablet (500 mg total) by mouth 2 (two) times daily.  Qty: 14 tablet, Refills: 0         CONTINUE these medications which have CHANGED    Details   apixaban 5 mg Tab Take 1 tablet (5 mg total) by mouth 2 (two) times daily.  Qty: 60 tablet, Refills: 5         CONTINUE these medications which have NOT CHANGED    Details   albuterol 90 mcg/actuation inhaler Inhale 2 puffs into the lungs every 4 (four) hours as needed for Wheezing or Shortness of Breath. Rescue  Qty: 1 Inhaler, Refills: 2    Associated Diagnoses: SOB (shortness of breath)      atorvastatin (LIPITOR) 20 MG tablet Take 1 tablet (20 mg total) by mouth once daily.  Qty: 30 tablet, Refills: 2    Associated Diagnoses: Hyperlipidemia, unspecified hyperlipidemia type      cetirizine (ZYRTEC) 10 MG tablet Take 1 tablet (10 mg total) by mouth every evening.  Refills: 0      fluticasone (FLONASE) 50 mcg/actuation nasal spray 1 spray by Each Nare route once daily.  Qty: 1 Bottle, Refills: 1    Associated Diagnoses: Allergic rhinitis, unspecified chronicity, unspecified seasonality, unspecified trigger      hydrOXYzine pamoate (VISTARIL) 25 MG Cap Take 1 capsule (25 mg total) by mouth every 8 (eight) hours as needed.  Qty: 30 capsule, Refills: 0    Associated Diagnoses: Allergic dermatitis      ketoconazole (NIZORAL) 2 % cream       lisinopril (PRINIVIL,ZESTRIL) 40 MG tablet Take 1 tablet (40 mg total) by mouth once daily.  Qty: 30 tablet, Refills: 2      meclizine (ANTIVERT) 25 mg tablet TAKE ONE TABLET BY MOUTH THREE TIMES DAILY AS NEEDED FOR  DIZZINESS  Qty: 30 tablet, Refills: 0    Associated Diagnoses: Dizziness      ranitidine (ZANTAC) 150 MG tablet Take 1 tablet (150 mg total) by mouth 2 (two) times daily.  Qty: 60 tablet, Refills: 1    Associated Diagnoses: Chest pain, unspecified  type      triamcinolone acetonide 0.1% (KENALOG) 0.1 % cream Apply topically 2 (two) times daily.  Qty: 28.4 g, Refills: 0    Associated Diagnoses: Contact dermatitis, unspecified contact dermatitis type, unspecified trigger         STOP taking these medications       clopidogrel (PLAVIX) 75 mg tablet Comments:   Reason for Stopping:             Time spent on the discharge of patient: 30  minutes      Leonidas Bartlett MD  Department of Hospital Medicine  [facility]

## 2017-10-18 NOTE — PLAN OF CARE
[facility] HOME HEALTH ORDERS  FACE TO FACE ENCOUNTER    Patient Name: Daiana Mcduffie  YOB: 1929    PCP: Pawan Mcclain MD   PCP Address: [address]  PCP Phone Number: [phone]  PCP Fax: [phone]    Encounter Date: 10/18/2017    Admit to Home Health    Diagnoses:  Active Hospital Problems    Diagnosis  POA    *Acute hypoxemic respiratory failure [J96.01]  Yes    Essential hypertension [I10]  Yes     Priority: 2     Hyperlipidemia [E78.5]  Yes     Priority: 3     Chronic pulmonary embolism [I27.82]  Yes    Abnormal CT scan, pelvis [R93.5]  Yes    Acute cystitis [N30.00]  Yes    Elevated troponin [R74.8]  Yes      Resolved Hospital Problems    Diagnosis Date Resolved POA   No resolved problems to display.       Future Appointments  Date Time Provider Department Center   10/18/2017 10:00 AM Pawan Mcclain MD [facility]     Follow-up Information     Pawan Mcclain MD In 1 week.    Specialty:  Internal Medicine  Contact information:  [id number]  [phone]                     I have seen and examined this patient face to face today. My clinical findings that support the need for the home health skilled services and home bound status are the following:  Weakness/numbness causing balance and gait disturbance due to Infection, Weakness/Debility, Anemia and PE  making it taxing to leave home.  Patient with medication mismanagement issues requiring home bound status as evidenced by  Infection, Weakness/Debility, Anemia and PE .    Allergies:  Review of patient's allergies indicates:   Allergen Reactions    Ibuprofen     Penicillins        Diet: cardiac diet    Activities: activity as tolerated    Nursing:   SN to complete comprehensive assessment including routine vital signs. Instruct on disease process and s/s of complications to report to MD. Review/verify medication list sent home with the patient at  time of discharge  and instruct patient/caregiver as needed. Frequency may be adjusted depending on start of care date.    Notify MD if SBP > 160 or < 90; DBP > 90 or < 50; HR > 120 or < 50; Temp > 101; Other:         CONSULTS:    Physical Therapy to evaluate and treat. Evaluate for home safety and equipment needs; Establish/upgrade home exercise program. Perform / instruct on therapeutic exercises, gait training, transfer training, and Range of Motion.  Occupational Therapy to evaluate and treat. Evaluate home environment for safety and equipment needs. Perform/Instruct on transfers, ADL training, ROM, and therapeutic exercises.   to evaluate for community resources/long-range planning.    MISCELLANEOUS CARE:  Routine Skin for Bedridden Patients: Instruct patient/caregiver to apply moisture barrier cream to all skin folds and wet areas in perineal area daily and after baths and all bowel movements.  please make sure patient is taking Eliquis as prescribed     WOUND CARE ORDERS  n/a      Medications: Review discharge medications with patient and family and provide education.      Current Discharge Medication List      START taking these medications    Details   ciprofloxacin HCl (CIPRO) 500 MG tablet Take 1 tablet (500 mg total) by mouth 2 (two) times daily.  Qty: 14 tablet, Refills: 0         CONTINUE these medications which have CHANGED    Details   apixaban 5 mg Tab Take 1 tablet (5 mg total) by mouth 2 (two) times daily.  Qty: 60 tablet, Refills: 5         CONTINUE these medications which have NOT CHANGED    Details   albuterol 90 mcg/actuation inhaler Inhale 2 puffs into the lungs every 4 (four) hours as needed for Wheezing or Shortness of Breath. Rescue  Qty: 1 Inhaler, Refills: 2    Associated Diagnoses: SOB (shortness of breath)      atorvastatin (LIPITOR) 20 MG tablet Take 1 tablet (20 mg total) by mouth once daily.  Qty: 30 tablet, Refills: 2    Associated Diagnoses: Hyperlipidemia,  unspecified hyperlipidemia type      cetirizine (ZYRTEC) 10 MG tablet Take 1 tablet (10 mg total) by mouth every evening.  Refills: 0      fluticasone (FLONASE) 50 mcg/actuation nasal spray 1 spray by Each Nare route once daily.  Qty: 1 Bottle, Refills: 1    Associated Diagnoses: Allergic rhinitis, unspecified chronicity, unspecified seasonality, unspecified trigger      hydrOXYzine pamoate (VISTARIL) 25 MG Cap Take 1 capsule (25 mg total) by mouth every 8 (eight) hours as needed.  Qty: 30 capsule, Refills: 0    Associated Diagnoses: Allergic dermatitis      ketoconazole (NIZORAL) 2 % cream       lisinopril (PRINIVIL,ZESTRIL) 40 MG tablet Take 1 tablet (40 mg total) by mouth once daily.  Qty: 30 tablet, Refills: 2      meclizine (ANTIVERT) 25 mg tablet TAKE ONE TABLET BY MOUTH THREE TIMES DAILY AS NEEDED FOR  DIZZINESS  Qty: 30 tablet, Refills: 0    Associated Diagnoses: Dizziness      ranitidine (ZANTAC) 150 MG tablet Take 1 tablet (150 mg total) by mouth 2 (two) times daily.  Qty: 60 tablet, Refills: 1    Associated Diagnoses: Chest pain, unspecified type      triamcinolone acetonide 0.1% (KENALOG) 0.1 % cream Apply topically 2 (two) times daily.  Qty: 28.4 g, Refills: 0    Associated Diagnoses: Contact dermatitis, unspecified contact dermatitis type, unspecified trigger         STOP taking these medications       clopidogrel (PLAVIX) 75 mg tablet Comments:   Reason for Stopping:               I certify that this patient is confined to her home and needs intermittent skilled nursing care, physical therapy and occupational therapy.

## 2017-10-18 NOTE — PROGRESS NOTES
[facility]    WRITTEN HEALTHCARE AND DISCHARGE INFORMATION     Follow-up Information     Pawan Mcclain MD On 10/25/2017.    Specialty:  Internal Medicine  Why:  @09:00am for hospital follow up, Outpatient services  Contact information:  [address]  [place] [address]  [phone]             [facility].    Specialty:  DME Provider  Why:  DME  Contact information:  [address]  [place] [id number] [phone]             [facility] [place].    Specialty:  Home Health Services  Why:  Home Health  Contact information:  [address]  [address]  [place] [id number] [phone]                                      [facility]           [phone]  After discharge for assistance [facility] On Call Nurse Care Line 24/7  Assistance    Things You are responsible For To Manage Your Care At Home:  1.    Getting your prescriptions filled   2.    Taking your medications as directed, DO NOT MISS ANY DOSES!  3.    Going to your follow-up doctor appointment. This is important because it  allow the doctor to monitor your progress and determine if  any changes need to made to your treatment plan.     Thank you for choosing [facility] for your care.  Please answer any calls you may receive from [facility] we want to continue to support you as you manage your healthcare needs. [facility] is happy to have the opportunity to serve you.     Sincerely,  Your [facility] Team,  CHRISTIE Mullen, ACM-RN; Senior Transition Navigator [phone]

## 2017-10-18 NOTE — NURSING
Report received; pt awake and alert resp even and unlabored no acute distress noted; denies any pain or discomfort; no needs voiced

## 2017-10-18 NOTE — PLAN OF CARE
Problem: Occupational Therapy Goal  Goal: Occupational Therapy Goal  Outcome: Outcome(s) achieved Date Met: 10/18/17  OT eval is complete. The patient is able to perform self care and functional mobility with (S). The patient is being D/C to home today per MD. The patient will benfit from  OT to assess safety at home.

## 2017-10-18 NOTE — PROGRESS NOTES
Order received for DME:  Home O2. Facesheet, Orders and H&P sent to [facility] @ [phone].  Awaiting fax confirmation and return call to confirm equipment will be provided.    1546:  TN spoke with Radha at [facility].  O2 will be supplies within the hour.  All information placed on AVS.

## 2017-10-18 NOTE — NURSING
Testing for Home O2    O2 Sats on RA at rest =88    O2 Sats on RA with exercise =78    O2 sats on _3L O2 with exercise =92

## 2017-10-18 NOTE — PT/OT/SLP EVAL
Physical Therapy  Evaluation    Daiana Mcduffie   MRN: [id number]   Admitting Diagnosis: Acute hypoxemic respiratory failure    PT Received On: 10/18/17  PT Start Time: 0937     PT Stop Time: 1000    PT Total Time (min): 23 min       Billable Minutes:  Evaluation  13 and Therapeutic Exercise 10     Diagnosis: Acute hypoxemic respiratory failure    Past Medical History:   Diagnosis Date    GERD (gastroesophageal reflux disease)     Hyperlipidemia     Hypertension       History reviewed. No pertinent surgical history.    Referring physician: Tri  Date referred to PT: 10/17/17    General Precautions: Standard, fall, respiratory  Orthopedic Precautions: N/A   Braces: N/A       Patient History:  Lives With: alone  Living Arrangements: apartment  Home Accessibility:  (elevator access)  Living Environment Comment: Patient stated that she has moved to a new apartment recently due to her old one having mold.   Equipment Currently Used at Home: none    Previous Level of Function:  Ambulation Skills: independent  Transfer Skills: independent    Subjective:  Communicated with nurse Danielle prior to session.  Patient agreeable to participate in PT evaluation.   Chief Complaint: She is stiff from laying in bed.   Patient goals: To get back to PLOF.     Pain/Comfort  Pain Rating 1: 0/10    Objective:   Patient found with: peripheral IV, oxygen     Cognitive Exam:  Oriented to: Person    Follows Commands/attention: Follows one-step commands  Communication: clear/fluent  Safety awareness/insight to disability: impaired    Physical Exam:  Postural examination/scapula alignment: Rounded shoulder and Head forward    Skin integrity: Visible skin intact  Edema: None noted     Sensation: Intact    Lower Extremity Range of Motion:  Right Lower Extremity: WFL  Left Lower Extremity: WFL    Lower Extremity Strength:  Right Lower Extremity: WFL  Left Lower Extremity: WFL     Gross motor coordination: WFL    Functional Mobility:  Bed  Mobility:  Supine to Sit: Stand by Assistance    Transfers:  Sit <> Stand Assistance: Stand By Assistance  Sit <> Stand Assistive Device: No Assistive Device    Gait:   Gait Distance: ~140ft with 1 loss of balance with patient needing mod A to regain upright posture. Patient ambulated with 2L NC O2.   Assistance 1: Contact Guard Assistance  Gait Assistive Device: No device  Gait Pattern: reciprocal  Gait Deviation(s): decreased dirk, increased time in double stance, decreased step length    Balance:   Static Sit: GOOD: Takes MODERATE challenges from all directions  Dynamic Sit: GOOD-: Maintains balance through MODERATE excursions of active trunk movement,     Static Stand: FAIR: Maintains without assist but unable to take challenges  Dynamic stand: FAIR: Needs CONTACT GUARD during gait    Therapeutic Activities and Exercises:  Patient performed B LE seated therex x10 reps for A/P, LAQ, and hip flex.     AM-PAC 6 CLICK MOBILITY  How much help from another person does this patient currently need?   1 = Unable, Total/Dependent Assistance  2 = A lot, Maximum/Moderate Assistance  3 = A little, Minimum/Contact Guard/Supervision  4 = None, Modified [place]/Independent    Turning over in bed (including adjusting bedclothes, sheets and blankets)?: 4  Sitting down on and standing up from a chair with arms (e.g., wheelchair, bedside commode, etc.): 4  Moving from lying on back to sitting on the side of the bed?: 4  Moving to and from a bed to a chair (including a wheelchair)?: 3  Need to walk in hospital room?: 3  Climbing 3-5 steps with a railing?: 3  Total Score: 21     AM-PAC Raw Score CMS G-Code Modifier Level of Impairment Assistance   6 % Total / Unable   7 - 9 CM 80 - 100% Maximal Assist   10 - 14 CL 60 - 80% Moderate Assist   15 - 19 CK 40 - 60% Moderate Assist   20 - 22 CJ 20 - 40% Minimal Assist   23 CI 1-20% SBA / CGA   24 CH 0% Independent/ Mod I     Patient left up in chair with all lines intact,  call button in reach and nurse Lolis notified that Dr Bartlett present at end of session and removed patient's O2. O2 sats for RA were 97% at that time.     Assessment:   Daiana Mcduffie is a 88 y.o. female with a medical diagnosis of Acute hypoxemic respiratory failure and presents with impaired balance and endurance for functional mobility. Patient known to PT due to just being here on 9/10/17 for the exact same problem. She stated that home health did not come after last admission because she was moving to a new apartment. Patient educated that she needs to allow home health to come after this admission. She would benefit from 24 hour supervision at home due to decreased safety awareness. She is scheduled to discharge home this PM.     Rehab identified problem list/impairments: Rehab identified problem list/impairments: impaired endurance, weakness, impaired functional mobilty, gait instability, impaired balance, decreased safety awareness    Rehab potential is good.    Activity tolerance: Fair    Discharge recommendations: Discharge Facility/Level Of Care Needs: home health PT (she would benefit from 24 hour assistance at discharge )     Barriers to discharge: Barriers to Discharge: Decreased caregiver support (patient lives alone )    Equipment recommendations: Equipment Needed After Discharge: none     GOALS:    Physical Therapy Goals     Not on file          Multidisciplinary Problems (Resolved)        Problem: Physical Therapy Goal    Goal Priority Disciplines Outcome Goal Variances Interventions   Physical Therapy Goal   (Resolved)     PT/OT, PT Outcome(s) achieved                   PLAN:    Plan of Care reviewed with: patient    Functional Assessment Tool Used: AM PAC   Score: 21  Functional Limitation: Mobility: Walking and moving around  Mobility: Walking and Moving Around Current Status (): CJ  Mobility: Walking and Moving Around Goal Status (): MANUEL Maddox, PT,  MOT  10/18/2017

## 2017-10-18 NOTE — PROGRESS NOTES
Patient's oxygen arrived. Patient education was reinforce on oxygen safety and use. Patient and caregiver repeated oxygen use and safety . Patient off unit with transport and belonging .

## 2017-10-18 NOTE — PLAN OF CARE
"   10/18/17 2054   Final Note   Assessment Type Final Discharge Note   Discharge Disposition Home-Health   What phone number can be called within the next 1-3 days to see how you are doing after discharge? ([phone])   Hospital Follow Up  Appt(s) scheduled? Yes   Discharge plans and expectations educations in teach back method with documentation complete? Yes   Right Care Referral Info   Post Acute Recommendation Home-care   Referral Type Home health   Facility Name [facility]   Patient stated that she had [facility] and wishes to resume.  All information placed in right care.  JACK Oakes RN, CCM-RN; [facility] rep stated patient accepted for services and will be seen on Friday.  All information placed on AVS.      Patient provided with educational information on respiratory distress.  Information reviewed and placed in :My Healthcare Packet" to be brought home for patient to use as resource after discharge.  Information included:  signs and symptoms to look for and call the doctor if experiencing, and symptoms that may indicate a medical emergency: CALL 911.    Reminded patient things she will be responsible for to manage her healthcare at home: getting Rx filled, attending follow up appointments, and taking medication as prescribed were discussed.   Teach back method used.  All questions answered.  Patient verbalized understanding of all information.      NurseLolis notified that all CM needs are met and patient ok to DC from a CM standpoint once O2 tank received.        "

## 2017-10-18 NOTE — PROGRESS NOTES
TN  Spoke with nurse, Lolis about pt's DC.  Lolis will check pt's sats on RA with ambulation prior to DC.  Pt's ride will not be here until 1300.

## 2017-10-18 NOTE — PT/OT/SLP EVAL
Occupational Therapy  Evaluation/Discharge    Daiana Mcduffie   MRN: [id number]   Admitting Diagnosis: Acute hypoxemic respiratory failure    OT Date of Treatment: 10/18/17   OT Start Time: 1605  OT Stop Time: 1623  OT Total Time (min): 18 min    Billable Minutes:  Evaluation 18    Diagnosis: Acute hypoxemic respiratory failure       Past Medical History:   Diagnosis Date    GERD (gastroesophageal reflux disease)     Hyperlipidemia     Hypertension       History reviewed. No pertinent surgical history.    Referring physician: Tri  Date referred to OT: 10/17/17    General Precautions: Standard, respiratory, fall  Orthopedic Precautions: N/A  Braces: N/A    Do you have any cultural, spiritual, Gnosticist conflicts, given your current situation?: no     Patient History:  Living Environment  Lives With: alone  Living Arrangements: apartment  Home Accessibility:  (elevatot present)  Living Environment Comment: Patient lives [address]  Equipment Currently Used at Home: none    Prior level of function:   Bed Mobility/Transfers: independent  Grooming: independent  Bathing: independent  Upper Body Dressing: independent  Lower Body Dressing: independent  Toileting: independent  Driving License: No  IADL Comments: The patient states she has been able to care for herself but has difficulty. The patient states she does not have help at home.     Dominant hand: right    Subjective:  Communicated with nurseLolis prior to session.    Chief Complaint: The patient is confused about her need for home oxygen  Patient/Family stated goals: wants to have some help at home    Pain/Comfort  Pain Rating 1: 0/10    Objective:  Patient found with: telemetry    Cognitive Exam:  Oriented to: Person, Place and Situation  Follows Commands/attention: Follows two-step commands  Communication: clear/fluent  Memory:  Impaired STM and Poor immediate recall  Safety awareness/insight to disability: impaired  Coping skills/emotional  control: Appropriate to situation    Visual/perceptual:  Intact    Physical Exam:  Postural examination/scapula alignment: Rounded shoulder and Head forward  Skin integrity: Visible skin intact  Edema: None noted     Sensation:   Intact    Upper Extremity Range of Motion:  Right Upper Extremity: WFL  Left Upper Extremity: WFL    Upper Extremity Strength:  Right Upper Extremity: WFL  Left Upper Extremity: WFL   Strength: WFL    Fine motor coordination:   Intact    Gross motor coordination: WFL    Functional Mobility:  Bed Mobility:  Scooting/Bridging: Modified Independent  Supine to Sit: Modified Independent    Transfers:  Sit <> Stand Assistance: Stand By Assistance  Sit <> Stand Assistive Device: No Assistive Device  Toilet Transfer Assistance: Stand By Assistance  Toilet Transfer Assistive Device: No Assistive Device    Functional Ambulation: The patient amb with SBA from the bed to the toilet>sink to bed.    Activities of Daily Living:  UE Dressing Level of Assistance: Modified independent (to don button up shirt)  LE Dressing Level of Assistance: Contact guard (The patient stood with CGA to don her underpants. The patient donned her slacks with SBA after VC to sit to don pants for saferty.)  Grooming Position: Standing at sink  Grooming Level of Assistance: Supervision (to wash her hands)  Toileting Where Assessed: Toilet  Toileting Level of Assistance: Modified independent  Bathing Where Assessed: Edge of bed  Bathing Level of Assistance: Set-up Assistance (Patient used wipes for a sponge bath.)      Balance:   Static Sit: GOOD: Takes MODERATE challenges from all directions  Dynamic Sit: GOOD: Maintains balance through MODERATE excursions of active trunk movement  Static Stand: FAIR+: Takes MINIMAL challenges from all directions  Dynamic stand: FAIR+: Needs CLOSE SUPERVISION during gait and is able to right self with minor LOB    Therapeutic Activities and Exercises:  The patient tolerated OT eval. The  "patient was educated re: OT recommendations and documented need for home oxygen. Patient was confused about how long she will dariusz O2. The TN (Lavelle) came to educate the patient at the end ot the OT eval.    AM-PAC 6 CLICK ADL  How much help from another person does this patient currently need?  1 = Unable, Total/Dependent Assistance  2 = A lot, Maximum/Moderate Assistance  3 = A little, Minimum/Contact Guard/Supervision  4 = None, Modified [place]/Independent    Putting on and taking off regular lower body clothing? : 4  Bathing (including washing, rinsing, drying)?: 4  Toileting, which includes using toilet, bedpan, or urinal? : 4  Putting on and taking off regular upper body clothing?: 4  Taking care of personal grooming such as brushing teeth?: 4  Eating meals?: 4  Total Score: 24    AM-PAC Raw Score CMS "G-Code Modifier Level of Impairment Assistance   6 % Total / Unable   7 - 9 CM 80 - 100% Maximal Assist   10-14 CL 60 - 80% Moderate Assist   15 - 19 CK 40 - 60% Moderate Assist   20 - 22 CJ 20 - 40% Minimal Assist   23 CI 1-20% SBA / CGA   24 CH 0% Independent/ Mod I       Patient left up in chair with call button in reach and LOI Valderrama present    Assessment:  Daiana Mcduffie is a 88 y.o. female with a medical diagnosis of Acute hypoxemic respiratory failure .OT eval is complete. The patient is able to perform self care and functional mobility with (S). The patient is being D/C to home today per MD. The patient will benfit from  OT to assess safety at home.      Rehab identified problem list/impairments: Rehab identified problem list/impairments: impaired self care skills, impaired functional mobilty, decreased safety awareness    Rehab potential is good.    Activity tolerance: Good    Discharge recommendations: Discharge Facility/Level Of Care Needs: home health OT (for safety eval , Patient will benefit from 24* (S) at home)     Barriers to discharge: Barriers to Discharge: Decreased " caregiver support (Patient lives alone)    Equipment recommendations: none     GOALS:    Occupational Therapy Goals     Not on file          Multidisciplinary Problems (Resolved)        Problem: Occupational Therapy Goal    Goal Priority Disciplines Outcome Interventions   Occupational Therapy Goal   (Resolved)     OT, PT/OT Outcome(s) achieved                    PLAN:    (Patient is D/C today)  Plan of Care expires:   N/A  Plan of Care reviewed with: patient    OT G-codes  Functional Assessment Tool Used: AM PAC  Score: 24  Functional Limitation: Self care  Self Care Current Status ():   Self Care Goal Status ():   Self Care Discharge Status (): ZAID Valadez OT  10/18/2017

## 2017-10-20 ENCOUNTER — PATIENT OUTREACH (OUTPATIENT)
Dept: ADMINISTRATIVE | Facility: CLINIC | Age: 82
End: 2017-10-20

## 2017-10-20 ENCOUNTER — TELEPHONE (OUTPATIENT)
Dept: FAMILY MEDICINE | Facility: CLINIC | Age: 82
End: 2017-10-20

## 2017-10-20 NOTE — PT/OT/SLP DISCHARGE
Physical Therapy Discharge Summary    Daiana Mcduffie  MRN: [id number]   Acute hypoxemic respiratory failure   Patient Discharged from acute Physical Therapy on 10/18/17.  Please refer to prior PT noted date on 10/18/17 for functional status.     Assessment:   Patient was discharge unexpectedly.  Information required to complete and accurate discharge summary is unknown.  Refer to therapy initial evaluation and last progress note for initial and most recent functional status and goal achievement.  Recommendations made may be found in medical record.  GOALS:    Physical Therapy Goals     Not on file          Multidisciplinary Problems (Resolved)        Problem: Physical Therapy Goal    Goal Priority Disciplines Outcome Goal Variances Interventions   Physical Therapy Goal   (Resolved)     PT/OT, PT Outcome(s) achieved                   Reasons for Discontinuation of Therapy Services  Transfer to alternate level of care.      Plan:  Patient Discharged to: Home with Home Health Service.

## 2017-10-20 NOTE — PATIENT INSTRUCTIONS
Fall Prevention   Falls often occur due to slipping, tripping or losing your balance. Here are ways to reduce your risk of falling again.   Was there anything that caused your fall that can be fixed, removed or replaced?   Make your home safe by keeping walkways clear of objects you may trip over.   Use non-slip pads under rugs.   Do not walk in poorly lit areas.   Do not stand on chairs or wobbly ladders.   Use caution when reaching overhead or looking upward. This position can cause a loss of balance.   Be sure your shoes fit properly, have non-slip bottoms and are in good condition.   Be cautious when going up and down stairs, curbs, and when walking on uneven sidewalks.   If your balance is poor, consider using a cane or walker.   If your fall was related to alcohol use, stop or limit alcohol intake.   If your fall was related to use of sleeping medicines, talk to your doctor about this. You may need to reduce your dosage at bedtime if you awaken during the night to go to the bathroom.   To reduce the need for nighttime bathroom trips:   Avoid drinking fluids for several hours before going to bed   Empty your bladder before going to bed   Men can keep a urinal at the bedside  Stay as active as you can. Balance, flexibility, strength, and endurance all come from exercise. They all play a role in preventing falls. Ask your heathcare provider which types of activity are right for you.  © 5586-1590 [employer]. [address] [place] [address], [place] [address]. All rights reserved. This information is not intended as a substitute for professional medical care. Always follow your healthcare professional's instructions.

## 2017-10-20 NOTE — TELEPHONE ENCOUNTER
----- Message from Saritha Pedraza LPN sent at 10/20/2017 12:14 PM CDT -----      ----- Message -----  From: Krista Noel RN  Sent: 10/20/2017   9:52 AM  To: [facility] Staff    Please forward this important TCC information to your provider in order to maximize the post discharge care delivery of this patient.    C3 nurse spoke with Daiana Mcduffie  for a TCC post hospital discharge follow up call. The patient has a scheduled HOSFU appointment with Pawan Mcclain MD on 10/25 @ 0900.  Patient reporting she is only taking apixaban and nothing else. Please reach out to patient to clarify meds to be taken.  Pt. may be reached at .    THANKING YOU IN ADVANCE.  Respectfully,  Krista Noel RN    [facility]    [email or web address]       Please do not reply to this message, as this inbox is not routinely monitored.

## 2017-10-20 NOTE — Clinical Note
Please forward this important TCC information to your provider in order to maximize the post discharge care delivery of this patient.  C3 nurse spoke with Daiana Mcduffie  for a TCC post hospital discharge follow up call. The patient has a scheduled HOSFU appointment with Pawan Mcclain MD on 10/25 @ 0900. Patient reporting she is only taking apixaban and nothing else. Please reach out to patient to clarify meds to be taken.  Pt. may be reached at .  THANKING YOU IN ADVANCE. Respectfully, Krista Noel RN  [facility]   [email or web address]     Please do not reply to this message, as this inbox is not routinely monitored.

## 2017-10-20 NOTE — PROGRESS NOTES
C3 nurse contacted [facility] at [phone] to check on any pending medication. Pharmacist stating the last med picked up was cipro, however pt reporting she is not taking.   C3 nurse routed message to Dr Mcclain for clarification and advised to call pt.

## 2017-10-20 NOTE — TELEPHONE ENCOUNTER
Please call the patient and review her medications.  She should be take her meds as listed on her discharge summary from 10/18.

## 2017-10-21 ENCOUNTER — HOSPITAL ENCOUNTER (EMERGENCY)
Facility: HOSPITAL | Age: 82
Discharge: HOME | End: 2017-10-22
Attending: EMERGENCY MEDICINE
Payer: MEDICARE

## 2017-10-21 DIAGNOSIS — R53.83 FATIGUE, UNSPECIFIED TYPE: Primary | ICD-10-CM

## 2017-10-21 DIAGNOSIS — R07.9 CHEST PAIN: ICD-10-CM

## 2017-10-21 LAB
ALBUMIN SERPL BCP-MCNC: 3.3 G/DL
ALP SERPL-CCNC: 86 U/L
ALT SERPL W/O P-5'-P-CCNC: 30 U/L
ANION GAP SERPL CALC-SCNC: 8 MMOL/L
AST SERPL-CCNC: 26 U/L
BASOPHILS # BLD AUTO: 0.02 K/UL
BASOPHILS NFR BLD: 0.5 %
BILIRUB SERPL-MCNC: 0.4 MG/DL
BNP SERPL-MCNC: 85 PG/ML
BUN SERPL-MCNC: 13 MG/DL
CALCIUM SERPL-MCNC: 9.4 MG/DL
CHLORIDE SERPL-SCNC: 110 MMOL/L
CO2 SERPL-SCNC: 27 MMOL/L
CREAT SERPL-MCNC: 0.9 MG/DL
DIFFERENTIAL METHOD: ABNORMAL
EOSINOPHIL # BLD AUTO: 0 K/UL
EOSINOPHIL NFR BLD: 0.7 %
ERYTHROCYTE [DISTWIDTH] IN BLOOD BY AUTOMATED COUNT: 13.2 %
EST. GFR  (AFRICAN AMERICAN): >60 ML/MIN/1.73 M^2
EST. GFR  (NON AFRICAN AMERICAN): 57 ML/MIN/1.73 M^2
GLUCOSE SERPL-MCNC: 113 MG/DL
HCT VFR BLD AUTO: 39.7 %
HGB BLD-MCNC: 12.5 G/DL
LYMPHOCYTES # BLD AUTO: 1.7 K/UL
LYMPHOCYTES NFR BLD: 39.8 %
MCH RBC QN AUTO: 28.5 PG
MCHC RBC AUTO-ENTMCNC: 31.5 G/DL
MCV RBC AUTO: 91 FL
MONOCYTES # BLD AUTO: 0.5 K/UL
MONOCYTES NFR BLD: 10.9 %
NEUTROPHILS # BLD AUTO: 2.1 K/UL
NEUTROPHILS NFR BLD: 48.1 %
PLATELET # BLD AUTO: 158 K/UL
PMV BLD AUTO: 9.6 FL
POTASSIUM SERPL-SCNC: 4.4 MMOL/L
PROT SERPL-MCNC: 6.7 G/DL
RBC # BLD AUTO: 4.38 M/UL
SODIUM SERPL-SCNC: 145 MMOL/L
TROPONIN I SERPL DL<=0.01 NG/ML-MCNC: 0.02 NG/ML
WBC # BLD AUTO: 4.32 K/UL

## 2017-10-21 PROCEDURE — 80053 COMPREHEN METABOLIC PANEL: CPT

## 2017-10-21 PROCEDURE — 83880 ASSAY OF NATRIURETIC PEPTIDE: CPT

## 2017-10-21 PROCEDURE — 93005 ELECTROCARDIOGRAM TRACING: CPT

## 2017-10-21 PROCEDURE — 84484 ASSAY OF TROPONIN QUANT: CPT

## 2017-10-21 PROCEDURE — 93010 ELECTROCARDIOGRAM REPORT: CPT | Mod: ,,, | Performed by: INTERNAL MEDICINE

## 2017-10-21 PROCEDURE — 99285 EMERGENCY DEPT VISIT HI MDM: CPT

## 2017-10-21 PROCEDURE — 85025 COMPLETE CBC W/AUTO DIFF WBC: CPT

## 2017-10-22 VITALS
TEMPERATURE: 98 F | RESPIRATION RATE: 18 BRPM | HEART RATE: 88 BPM | OXYGEN SATURATION: 95 % | DIASTOLIC BLOOD PRESSURE: 88 MMHG | SYSTOLIC BLOOD PRESSURE: 152 MMHG | HEIGHT: 66 IN | WEIGHT: 120 LBS | BODY MASS INDEX: 19.29 KG/M2

## 2017-10-22 NOTE — ED NOTES
[facility]D notified of transport back to the patient's residence. SPD states that it will be about 2.5 hours. SPD notified that they will have to stop at the security office of the patient's apartment to get her keys, which the dispatcher states will be no problem.

## 2017-10-22 NOTE — ED NOTES
Dr. Hancock states to observe pt's O2 sats with pt off of O2.  Pt's O2 stats currently stable at 99%.

## 2017-10-22 NOTE — ED TRIAGE NOTES
88 y.o female presents to the ED via EMS. The pt reports she was dc from the hospital 3-4 days ago after being dx w/ a PE. Pt reports since dc she is still feeling SOB. Pt reports she is supposed to be using o2 daily but does not like the NC. Pt also reporting generalized weakness. AAOX4, no distress is noted.   Pt is on 6L nonrebreather per EMS for comfort. Pt is on cardiac monitor, blood pressure cuff and pulse ox. ER staff at the bedside.

## 2017-10-22 NOTE — ED NOTES
Asked pt if she had any family or friends who could transport her home. Pt gave the number for a Ms. Torres. Upon calling, Ms. Torres stated that she was just a neighbor and did not know anyway to get the pt home.

## 2017-10-22 NOTE — ED NOTES
Contacted pt's friend Melissa who lives in same apartment complex as the pt. Melissa stated that she left the pt's key at the security desk of the apartment complex. Will contact [employer] to set up transport and confirm that SPD can stop at security desk to  key.

## 2017-10-22 NOTE — ED NOTES
Ms. Mable Larsen called back and stated that she was out of town and would not be able to transport the pt home. Ms Larsen reported that Ms Torres has a bruno to the pt's apartment.

## 2017-10-22 NOTE — ED PROVIDER NOTES
Encounter Date: 10/21/2017    SCRIBE #1 NOTE: I, Krista Carballo, am scribing for, and in the presence of,  Sonia Hancock MD. I have scribed the following portions of the note - Other sections scribed: HPI, ROS, and Physical Exam.       History     Chief Complaint   Patient presents with    Shortness of Breath     SOB x3 days. Pt was dc from here 3-4 days ago, dx w/ PE.      CC: Shortness of Breath    HPI: The pt is an 88 y.o. F with a PMHx of GERD, HLD, HTN, and PE who presents to the ED via EMS c/o acute onset generalized weakness, SOB, and chills for the past 3 days. Pt states that she was dc from this hospital 2 days ago after being admitted for acute PE w/ acute cor pulmonale. Pt says that she was told that she needs to be on oxygen at home but doesn't know how to operate the machine and lives alone. She reports that home health will start working with her next Monday. Pt states that she is on apixaban, 5 mg tablet and has taken that today. No attempted treatments. No alleviating/exacerbating factors. Pt otherwise denies fever, nausea, vomiting, cough, chest pain, and other associated symptoms.          The history is provided by the patient. No  was used.     Review of patient's allergies indicates:   Allergen Reactions    Ibuprofen     Penicillins      Past Medical History:   Diagnosis Date    GERD (gastroesophageal reflux disease)     Hyperlipidemia     Hypertension      History reviewed. No pertinent surgical history.  History reviewed. No pertinent family history.  Social History   Substance Use Topics    Smoking status: Never Smoker    Smokeless tobacco: Never Used    Alcohol use No     Review of Systems   Constitutional: Positive for chills. Negative for diaphoresis and fever.   HENT: Negative for ear pain and sore throat.    Eyes: Negative for redness.   Respiratory: Positive for shortness of breath. Negative for cough.    Cardiovascular: Negative for chest pain.    Gastrointestinal: Negative for abdominal pain, diarrhea, nausea and vomiting.   Genitourinary: Negative for dysuria.   Musculoskeletal: Negative for back pain.   Skin: Negative for rash.   Neurological: Positive for weakness (generalized). Negative for headaches.       Physical Exam     Initial Vitals [10/21/17 2041]   BP Pulse Resp Temp SpO2   (!) 186/81 82 20 98.1 °F (36.7 °C) 100 %      MAP       116         Physical Exam    Nursing note and vitals reviewed.  Constitutional: She appears well-developed and well-nourished. She is cooperative.  Non-toxic appearance. No distress.   HENT:   Head: Normocephalic and atraumatic.   Mouth/Throat: Mucous membranes are dry (mild).   Eyes: Conjunctivae and EOM are normal. Pupils are equal, round, and reactive to light.   Neck: Normal range of motion and full passive range of motion without pain. Neck supple. No thyromegaly present. No JVD present.   Cardiovascular: Normal rate, regular rhythm, normal heart sounds and normal pulses.   Pulmonary/Chest: Effort normal and breath sounds normal. No respiratory distress.   No increase work breathing   Abdominal: Soft. Normal appearance and bowel sounds are normal. She exhibits no distension and no mass. There is no tenderness.   Musculoskeletal: Normal range of motion.   Neurological: She is alert and oriented to person, place, and time. She has normal strength. No cranial nerve deficit or sensory deficit.   Skin: Skin is warm, dry and intact. No rash noted.   Psychiatric: She has a normal mood and affect. Her speech is normal and behavior is normal. Judgment and thought content normal.         ED Course   Procedures  Labs Reviewed   CBC W/ AUTO DIFFERENTIAL - Abnormal; Notable for the following:        Result Value    MCHC 31.5 (*)     All other components within normal limits   COMPREHENSIVE METABOLIC PANEL - Abnormal; Notable for the following:     Glucose 113 (*)     Albumin 3.3 (*)     eGFR if non  57 (*)      All other components within normal limits   TROPONIN I   B-TYPE NATRIURETIC PEPTIDE     EKG Readings: (Independently Interpreted)   Previous EKG: Compared with most recent EKG   Normal sinus rhythm, heart rate 94, normal axis, normal intervals, T-wave inversions in V3, V4          Medical Decision Making:   Initial Assessment:   Urgent evaluation of an 88-year-old female with history of GERD, hyperlipidemia, hypertension, and recent PE diagnosis here with vague complaints of weakness and fatigue. Pt endorses complaince w all medications, but unclear if she grasps understanding of her consition or oxygen needs, unfortunately lives alone and reportedly has home health starting on Monday. Pt recently discharged from hospital 10/18 after admission for chest pain in setting of non compliance with Eliquis and non occlusive L fem DVT, discharged with home NC secondary to exertional hypoxia. Decision made not to pursue IVC at discharge. Here pt well appearing, though thin and chronically ill, no resp distress off oxygen, but will eval for fluid overload, pna, potential for worsening clot burden, acs.     Clinical Tests:   Lab Tests: Ordered and Reviewed  Radiological Study: Ordered and Reviewed  Medical Tests: Ordered and Reviewed  ED Management:  Neg trop, no obvious pulm edema, and no hypoxia here in the ED to suggest increasingly large clot burden requiring more than already scheduled anticoagulant medications and supplemental home oxygen.             Scribe Attestation:   Scribe #1: I performed the above scribed service and the documentation accurately describes the services I performed. I attest to the accuracy of the note.    Attending Attestation:           Physician Attestation for Scribe:  Physician Attestation Statement for Scribe #1: I, Sonia Hancock MD, reviewed documentation, as scribed by Krista Carballo in my presence, and it is both accurate and complete.                 ED Course      Clinical Impression:   The  primary encounter diagnosis was Fatigue, unspecified type. A diagnosis of Chest pain was also pertinent to this visit.    Disposition:   Disposition: Discharged  Condition: Stable                        Sonia Hancock MD  10/24/17 0521

## 2017-10-25 ENCOUNTER — OFFICE VISIT (OUTPATIENT)
Dept: FAMILY MEDICINE | Facility: CLINIC | Age: 82
End: 2017-10-25
Payer: MEDICARE

## 2017-10-25 VITALS
HEART RATE: 80 BPM | SYSTOLIC BLOOD PRESSURE: 160 MMHG | BODY MASS INDEX: 23.04 KG/M2 | OXYGEN SATURATION: 98 % | DIASTOLIC BLOOD PRESSURE: 78 MMHG | RESPIRATION RATE: 17 BRPM | WEIGHT: 134.94 LBS | TEMPERATURE: 98 F | HEIGHT: 64 IN

## 2017-10-25 DIAGNOSIS — I82.4Y9 ACUTE DEEP VEIN THROMBOSIS (DVT) OF PROXIMAL VEIN OF LOWER EXTREMITY, UNSPECIFIED LATERALITY: Primary | ICD-10-CM

## 2017-10-25 DIAGNOSIS — I26.09 OTHER ACUTE PULMONARY EMBOLISM WITH ACUTE COR PULMONALE: ICD-10-CM

## 2017-10-25 PROCEDURE — 99496 TRANSJ CARE MGMT HIGH F2F 7D: CPT | Mod: PBBFAC,PN | Performed by: NURSE PRACTITIONER

## 2017-10-25 PROCEDURE — 99496 TRANSJ CARE MGMT HIGH F2F 7D: CPT | Mod: S$PBB,,, | Performed by: NURSE PRACTITIONER

## 2017-10-25 PROCEDURE — 99999 PR PBB SHADOW E&M-EST. PATIENT-LVL IV: CPT | Mod: PBBFAC,,, | Performed by: NURSE PRACTITIONER

## 2017-10-25 PROCEDURE — 99214 OFFICE O/P EST MOD 30 MIN: CPT | Mod: PBBFAC,PN | Performed by: NURSE PRACTITIONER

## 2017-10-25 NOTE — PATIENT INSTRUCTIONS
Follow up in 1 month, sooner if needed  ER for new worse or concerning symptoms    Low-Salt Diet  This diet removes foods that are high in salt. It also limits the amount of salt you use when cooking. It is most often used for people with high blood pressure, edema (fluid retention), and kidney, liver, or heart disease.  Table salt contains the mineral sodium. Your body needs sodium to work normally. But too much sodium can make your health problems worse. Your healthcare provider is recommending a low-salt (also called low-sodium) diet for you. Your total daily allowance of salt is 1,500 to 2,300 milligrams (mg). It is less than 1 teaspoon of table salt. This means you can have only about 500 to 700 mg of sodium at each meal. People with certain health problems should limit salt intake to the lower end of the recommended range.    When you cook, dont add much salt. If you can cook without using salt, even better. Dont add salt to your food at the table.  When shopping, read food labels. Salt is often called sodium on the label. Choose foods that are salt-free, low salt, or very low salt. Note that foods with reduced salt may not lower your salt intake enough.    Beans, potatoes, and pasta  Ok: Dry beans, split peas, lentils, potatoes, rice, macaroni, pasta, spaghetti without added salt  Avoid: Potato chips, tortilla chips, and similar products  Breads and cereals  Ok: Low-sodium breads, rolls, cereals, and cakes; low-salt crackers, matzo crackers  Avoid: Salted crackers, pretzels, popcorn, Belarusian toast, pancakes, muffins  Dairy  Ok: Milk, chocolate milk, hot chocolate mix, low-salt cheeses, and yogurt  Avoid: Processed cheese and cheese spreads; Roquefort, Camembert, and cottage cheese; buttermilk, instant breakfast drink  Desserts  Ok: Ice cream, frozen yogurt, juice bars, gelatin, cookies and pies, sugar, honey, jelly, hard candy  Avoid: Most pies, cakes and cookies prepared or processed with salt; instant  pudding  Drinks  Ok: Tea, coffee, fizzy (carbonated) drinks, juices  Avoid: Flavored coffees, electrolyte replacement drinks, sports drinks  Meats  Ok: All fresh meat, fish, poultry, low-salt tuna, eggs, egg substitute  Avoid: Smoked, pickled, brine-cured, or salted meats and fish. This includes horowitz, chipped beef, corned beef, hot dogs, deli meats, ham, kosher meats, salt pork, sausage, canned tuna, salted codfish, smoked salmon, herring, sardines, or anchovies.  Seasonings and spices  Ok: Most seasonings are okay. Good substitutes for salt include: fresh herb blends, hot sauce, lemon, garlic, goins, vinegar, dry mustard, parsley, cilantro, horseradish, tomato paste, regular margarine, mayonnaise, unsalted butter, cream cheese, vegetable oil, cream, low-salt salad dressing and gravy.  Avoid: Regular ketchup, relishes, pickles, soy sauce, teriyaki sauce, Worcestershire sauce, BBQ sauce, tartar sauce, meat tenderizer, chili sauce, regular gravy, regular salad dressing, salted butter  Soups  Ok: Low-salt soups and broths made with allowed foods  Avoid: Bouillon cubes, soups with smoked or salted meats, regular soup and broth  Vegetables  Ok: Most vegetables are okay; also low-salt tomato and vegetable juices  Avoid: Sauerkraut and other brine-soaked vegetables; pickles and other pickled vegetables; tomato juice, olives  Date Last Reviewed: 8/1/2016 © 2000-2017 [employer]. [address] [place] [address], [place] [address]. All rights reserved. This information is not intended as a substitute for professional medical care. Always follow your healthcare professional's instructions.

## 2017-10-25 NOTE — PROGRESS NOTES
Subjective:       Patient ID: Daiana Mcduffie is a 88 y.o. female.    Chief Complaint: Chest Pain; Fatigue; and Follow-up (ED)    HPI Ms Mcduffie is here for her hospital followup.  She was hospitalized for a PE earlier this month.  She reports she is feeling much better, no SOB, she is not needing her nebulizer.  She declines a flu shot.  She is unclear if she is taking a blood thinner or not.  Review of Systems   Constitutional: Negative for fever.   Respiratory: Negative.    Cardiovascular: Negative.        Objective:      Physical Exam   Constitutional: She is oriented to person, place, and time. She appears well-developed and well-nourished. She does not appear ill. No distress.   HENT:   Head: Normocephalic and atraumatic.   Cardiovascular: Normal rate, regular rhythm and normal heart sounds.  Exam reveals no friction rub.    No murmur heard.  Pulmonary/Chest: Effort normal and breath sounds normal. No respiratory distress. She has no decreased breath sounds. She has no wheezes. She has no rhonchi. She has no rales.   Musculoskeletal: Normal range of motion.   Neurological: She is alert and oriented to person, place, and time.   Skin: Skin is warm and dry.   Psychiatric: She has a normal mood and affect. Her behavior is normal.   Vitals reviewed.      Assessment:       No diagnosis found.    Plan:       Acute deep vein thrombosis (DVT) of proximal vein of lower extremity, unspecified laterality    Other acute pulmonary embolism with acute cor pulmonale    Doing well.  I called [facility] on [address] and confirmed she has not picked up apixaban since 9/11.  There are refills, they will fill it and she can come pick it up.  The patient was notified of this.  F/u in 1 month, sooner if needed  ER for new worse or concerning symptoms    Transitional Care Note    Family and/or Caretaker present at visit?  No.  Diagnostic tests reviewed/disposition: No diagnosic tests pending after this hospitalization.  Disease/illness  education: take apixiban twice daily  Home health/community services discussion/referrals: Patient has home health established at [facility].   Establishment or re-establishment of referral orders for community resources: No other necessary community resources.   Discussion with other health care providers: No discussion with other health care providers necessary.

## 2017-10-26 ENCOUNTER — TELEPHONE (OUTPATIENT)
Dept: FAMILY MEDICINE | Facility: CLINIC | Age: 82
End: 2017-10-26

## 2017-10-26 DIAGNOSIS — I10 ESSENTIAL HYPERTENSION: Primary | ICD-10-CM

## 2017-10-26 DIAGNOSIS — E78.5 HYPERLIPIDEMIA, UNSPECIFIED HYPERLIPIDEMIA TYPE: ICD-10-CM

## 2017-10-26 NOTE — TELEPHONE ENCOUNTER
----- Message from Katelyn Jorge sent at 10/26/2017  1:21 PM CDT -----  Contact: Jolie Stewart  with [facility] home [facility]   Home health nurse calling to report a reaction between Cipro & Zyrtec. Also, patient is not taking meds that were prescribed at hospital discharge .  [phone]         LL

## 2017-10-27 RX ORDER — LISINOPRIL 40 MG/1
40 TABLET ORAL DAILY
Qty: 30 TABLET | Refills: 2 | Status: SHIPPED | OUTPATIENT
Start: 2017-10-27 | End: 2017-11-27 | Stop reason: SDUPTHER

## 2017-10-27 RX ORDER — ATORVASTATIN CALCIUM 20 MG/1
20 TABLET, FILM COATED ORAL DAILY
Qty: 30 TABLET | Refills: 2 | Status: SHIPPED | OUTPATIENT
Start: 2017-10-27 | End: 2017-11-27 | Stop reason: SDUPTHER

## 2017-10-27 NOTE — TELEPHONE ENCOUNTER
Pt should only be taking Atorvastatin, Lisinopril and Eliquis.  All other meds are prn.  Can take Loratadine in place of Zyrtec if needed.  She should be finishing Cipro soon however.  New scripts sent for Atorvastatin and Lisinopril.

## 2017-10-27 NOTE — TELEPHONE ENCOUNTER
Home health nurse return call and was informed of charted medications that patient is to be on and that it was sent to the pharmacy.

## 2017-10-27 NOTE — TELEPHONE ENCOUNTER
Spoke with nurse Jolie and she just wanted to let provider know that patient did recently moved from her place of living and patient does not have any of her medications in the home. She also said that patient told her she was not taking medications when she had them. She said that she was calling because in there computer it says that Cipro has a reaction with the Zyrtec but patient is not even taking medication.

## 2017-10-27 NOTE — TELEPHONE ENCOUNTER
Second attempt to contact Home Health nurse Jolie. Left message on answering machine to return call to clinic.

## 2017-10-28 ENCOUNTER — NURSE TRIAGE (OUTPATIENT)
Dept: ADMINISTRATIVE | Facility: CLINIC | Age: 82
End: 2017-10-28

## 2017-10-28 NOTE — TELEPHONE ENCOUNTER
Reason for Disposition   General information question, no triage required and triager able to answer question    Protocols used: ST INFORMATION ONLY CALL - NO TRIAGE-P-    Ms. Mcduffie had a number of questions about  services. Call transferred to [facility] for assistance, Deidra SORIANO took the contact information for the patient.

## 2017-10-31 ENCOUNTER — TELEPHONE (OUTPATIENT)
Dept: FAMILY MEDICINE | Facility: CLINIC | Age: 82
End: 2017-10-31

## 2017-10-31 NOTE — TELEPHONE ENCOUNTER
Ange a nurse with home health agency would like to stop home health aid services because pt is independent. And she would like to schedule MSW appointment tomorrow.

## 2017-11-27 ENCOUNTER — OFFICE VISIT (OUTPATIENT)
Dept: FAMILY MEDICINE | Facility: CLINIC | Age: 82
End: 2017-11-27
Payer: MEDICARE

## 2017-11-27 VITALS
HEART RATE: 74 BPM | RESPIRATION RATE: 17 BRPM | DIASTOLIC BLOOD PRESSURE: 74 MMHG | HEIGHT: 64 IN | BODY MASS INDEX: 22.62 KG/M2 | WEIGHT: 132.5 LBS | OXYGEN SATURATION: 96 % | SYSTOLIC BLOOD PRESSURE: 136 MMHG | TEMPERATURE: 99 F

## 2017-11-27 DIAGNOSIS — I10 ESSENTIAL HYPERTENSION: ICD-10-CM

## 2017-11-27 DIAGNOSIS — E78.5 HYPERLIPIDEMIA, UNSPECIFIED HYPERLIPIDEMIA TYPE: ICD-10-CM

## 2017-11-27 DIAGNOSIS — I82.4Y9 DEEP VEIN THROMBOSIS (DVT) OF PROXIMAL LOWER EXTREMITY, UNSPECIFIED CHRONICITY, UNSPECIFIED LATERALITY: Primary | ICD-10-CM

## 2017-11-27 PROCEDURE — 99213 OFFICE O/P EST LOW 20 MIN: CPT | Mod: PBBFAC,PN | Performed by: NURSE PRACTITIONER

## 2017-11-27 PROCEDURE — 99999 PR PBB SHADOW E&M-EST. PATIENT-LVL III: CPT | Mod: PBBFAC,,, | Performed by: NURSE PRACTITIONER

## 2017-11-27 PROCEDURE — 99213 OFFICE O/P EST LOW 20 MIN: CPT | Mod: S$PBB,,, | Performed by: NURSE PRACTITIONER

## 2017-11-27 RX ORDER — LISINOPRIL 20 MG/1
20 TABLET ORAL DAILY
Qty: 90 TABLET | Refills: 1 | Status: SHIPPED | OUTPATIENT
Start: 2017-11-27 | End: 2018-10-17 | Stop reason: SDUPTHER

## 2017-11-27 RX ORDER — APIXABAN 5 MG/1
TABLET, FILM COATED ORAL
COMMUNITY
Start: 2017-11-25 | End: 2017-11-27 | Stop reason: SDUPTHER

## 2017-11-27 RX ORDER — ATORVASTATIN CALCIUM 20 MG/1
20 TABLET, FILM COATED ORAL DAILY
Qty: 90 TABLET | Refills: 1 | Status: SHIPPED | OUTPATIENT
Start: 2017-11-27 | End: 2019-09-11

## 2017-11-27 RX ORDER — APIXABAN 5 MG/1
5 TABLET, FILM COATED ORAL 2 TIMES DAILY
Qty: 180 TABLET | Refills: 1 | Status: SHIPPED | OUTPATIENT
Start: 2017-11-27 | End: 2019-09-11

## 2017-11-27 NOTE — PATIENT INSTRUCTIONS
"Follow up with primary care provider as needed  Go to ER for new, worse or concerning symptoms    Controlling Your Cholesterol  Cholesterol is a waxy substance. It travels in your blood through the blood vessels. When you have high cholesterol, it builds up in the walls of the blood vessels. This makes the vessels narrower. Blood flow decreases. You are then at greater risk for having a heart attack or a stroke.  Good and bad cholesterol  Lipids are fats. Blood is mostly water. Fat and water don't mix. So our bodies need lipoproteins (lipids inside a protein shell) to carry the lipids. The protein shell carries its lipids through the bloodstream. There are two main kinds of lipoproteins:  · LDL (low-density lipoprotein) is known as "bad cholesterol." It mainly carries cholesterol. It delivers this cholesterol to body cells. Excess LDL cholesterol will build up in artery walls. This increases your risk for heart disease and stroke.  · HDL (high-density lipoprotein) is known as "good cholesterol." This protein shell collects excess cholesterol that LDLs have left behind on blood vessel walls. That's why high levels of HDL cholesterol can decrease your risk of heart disease and stroke.  Controlling cholesterol levels  Total cholesterol includes LDL and HDL cholesterol, as well as other fats in the bloodstream. If your total cholesterol is high, follow the steps below to help lower your total cholesterol level:  · Eat less unhealthy fat:  ¨ Cut back on saturated fats and trans (also called hydrogenated) fats by selecting lean cuts of meat, low-fat dairy, and using oils instead of solid fats. Limit baked goods, processed meats, and fried foods. A diet thats high in these fats increases your bad cholesterol. It's not enough to just cut back on foods containing cholesterol.  ¨ Eat about 2 servings of fish per week. Most fish contain omega-3 fatty acids. These help lower blood cholesterol.  ¨ Eat more whole grains and " soluble fiber (such as oat bran). These lower overall cholesterol.  · Be active:  ¨ Choose an activity you enjoy. Walking, swimming, and riding a bike are some good ways to be active.  ¨ Start at a level where you feel comfortable. Increase your time and pace a little each week.  ¨ Work up to 40 minutes of moderate to high intensity physical activity at least 3 to 4 days per week.  ¨ Remember, some activity is better than none.  ¨ If you haven't been exercising regularly, start slowly. Check with your doctor to make sure the exercise plan is right for you.  · Quit smoking. Quitting smoking can improve your lipid levels. It also lowers your risk for heart disease and stroke.  · Weight management. If you are overweight or obese, your health care provider will work with you to lose weight and lower your BMI (body mass index) to a normal or near-normal level. Making diet changes and increasing physical activity can help.  · Take medication as directed. Many people need medication to get their LDL levels to a safe level. Medication to lower cholesterol levels is effective and safe. (But taking medication is not a substitute for exercise or watching your diet!) Your doctor can tell you whether you might benefit from a cholesterol-lowering medication.  Date Last Reviewed: 5/11/2015  © 0797-4449 [employer]. [address] [place] [address], [place] [address]. All rights reserved. This information is not intended as a substitute for professional medical care. Always follow your healthcare professional's instructions.

## 2017-11-27 NOTE — PROGRESS NOTES
Subjective:       Patient ID: Daiana Mcduffie is a 88 y.o. female.    Chief Complaint: Hypertension; Follow-up; Medication Management (discuss Lisinopril  ); and Elbow pain - left    HPI Ms Mcduffie is here for a f/u for htn and DVT.  She feels well today, she is accompanied by her daughter who states her blood pressure drops on Lisinopril 40mg.  Review of Systems   Constitutional: Negative for fever.   Respiratory: Negative.    Cardiovascular: Negative.        Objective:      Physical Exam   Constitutional: She is oriented to person, place, and time. She appears well-developed and well-nourished. She does not appear ill. No distress.   HENT:   Head: Normocephalic and atraumatic.   Cardiovascular: Normal rate, regular rhythm and normal heart sounds.  Exam reveals no friction rub.    No murmur heard.  Pulmonary/Chest: Effort normal. No respiratory distress. She has no decreased breath sounds. She has no wheezes. She has no rhonchi. She has no rales.   Musculoskeletal: Normal range of motion.   Neurological: She is alert and oriented to person, place, and time.   Skin: Skin is warm and dry.   Psychiatric: She has a normal mood and affect. Her behavior is normal.   Vitals reviewed.      Assessment:       1. Deep vein thrombosis (DVT) of proximal lower extremity, unspecified chronicity, unspecified laterality    2. Hyperlipidemia, unspecified hyperlipidemia type    3. Essential hypertension        Plan:       Deep vein thrombosis (DVT) of proximal lower extremity, unspecified chronicity, unspecified laterality  -     ELIQUIS 5 mg Tab; Take 1 tablet (5 mg total) by mouth 2 (two) times daily.  Dispense: 180 tablet; Refill: 1    Hyperlipidemia, unspecified hyperlipidemia type  -     atorvastatin (LIPITOR) 20 MG tablet; Take 1 tablet (20 mg total) by mouth once daily.  Dispense: 90 tablet; Refill: 1    Essential hypertension  -     lisinopril (PRINIVIL,ZESTRIL) 20 MG tablet; Take 1 tablet (20 mg total) by mouth once daily.   Dispense: 90 tablet; Refill: 1    Doing well, f/u in 3-6 months as needed, sooner if necessary

## 2018-01-23 ENCOUNTER — HOSPITAL ENCOUNTER (EMERGENCY)
Facility: HOSPITAL | Age: 83
Discharge: HOME | End: 2018-01-24
Attending: EMERGENCY MEDICINE
Payer: MEDICARE

## 2018-01-23 DIAGNOSIS — R06.02 SOB (SHORTNESS OF BREATH): ICD-10-CM

## 2018-01-23 LAB
ALBUMIN SERPL BCP-MCNC: 3.5 G/DL
ALP SERPL-CCNC: 88 U/L
ALT SERPL W/O P-5'-P-CCNC: 13 U/L
ANION GAP SERPL CALC-SCNC: 11 MMOL/L
AST SERPL-CCNC: 23 U/L
BASOPHILS # BLD AUTO: 0.01 K/UL
BASOPHILS NFR BLD: 0.2 %
BILIRUB SERPL-MCNC: 0.6 MG/DL
BILIRUB UR QL STRIP: NEGATIVE
BNP SERPL-MCNC: 90 PG/ML
BUN SERPL-MCNC: 10 MG/DL
CALCIUM SERPL-MCNC: 9.6 MG/DL
CHLORIDE SERPL-SCNC: 110 MMOL/L
CLARITY UR: CLEAR
CO2 SERPL-SCNC: 26 MMOL/L
COLOR UR: ABNORMAL
CREAT SERPL-MCNC: 0.8 MG/DL
DIFFERENTIAL METHOD: NORMAL
EOSINOPHIL # BLD AUTO: 0 K/UL
EOSINOPHIL NFR BLD: 0.4 %
ERYTHROCYTE [DISTWIDTH] IN BLOOD BY AUTOMATED COUNT: 13.6 %
EST. GFR  (AFRICAN AMERICAN): >60 ML/MIN/1.73 M^2
EST. GFR  (NON AFRICAN AMERICAN): >60 ML/MIN/1.73 M^2
GLUCOSE SERPL-MCNC: 105 MG/DL
GLUCOSE UR QL STRIP: NEGATIVE
HCT VFR BLD AUTO: 40.1 %
HGB BLD-MCNC: 13.2 G/DL
HGB UR QL STRIP: NEGATIVE
KETONES UR QL STRIP: ABNORMAL
LEUKOCYTE ESTERASE UR QL STRIP: ABNORMAL
LYMPHOCYTES # BLD AUTO: 1.5 K/UL
LYMPHOCYTES NFR BLD: 29.6 %
MCH RBC QN AUTO: 29.4 PG
MCHC RBC AUTO-ENTMCNC: 32.9 G/DL
MCV RBC AUTO: 89 FL
MICROSCOPIC COMMENT: NORMAL
MONOCYTES # BLD AUTO: 0.4 K/UL
MONOCYTES NFR BLD: 7.4 %
NEUTROPHILS # BLD AUTO: 3.2 K/UL
NEUTROPHILS NFR BLD: 62.2 %
NITRITE UR QL STRIP: NEGATIVE
PH UR STRIP: 8 [PH] (ref 5–8)
PLATELET # BLD AUTO: 173 K/UL
PMV BLD AUTO: 10.2 FL
POTASSIUM SERPL-SCNC: 4.6 MMOL/L
PROT SERPL-MCNC: 7.2 G/DL
PROT UR QL STRIP: NEGATIVE
RBC # BLD AUTO: 4.49 M/UL
SODIUM SERPL-SCNC: 147 MMOL/L
SP GR UR STRIP: 1 (ref 1–1.03)
SQUAMOUS #/AREA URNS HPF: 2 /HPF
TROPONIN I SERPL DL<=0.01 NG/ML-MCNC: <0.006 NG/ML
URN SPEC COLLECT METH UR: ABNORMAL
UROBILINOGEN UR STRIP-ACNC: NEGATIVE EU/DL
WBC # BLD AUTO: 5.13 K/UL
WBC #/AREA URNS HPF: 3 /HPF (ref 0–5)

## 2018-01-23 PROCEDURE — 85025 COMPLETE CBC W/AUTO DIFF WBC: CPT

## 2018-01-23 PROCEDURE — 84484 ASSAY OF TROPONIN QUANT: CPT

## 2018-01-23 PROCEDURE — 93010 ELECTROCARDIOGRAM REPORT: CPT | Mod: ,,, | Performed by: INTERNAL MEDICINE

## 2018-01-23 PROCEDURE — 99285 EMERGENCY DEPT VISIT HI MDM: CPT | Mod: 25

## 2018-01-23 PROCEDURE — 83880 ASSAY OF NATRIURETIC PEPTIDE: CPT

## 2018-01-23 PROCEDURE — 80053 COMPREHEN METABOLIC PANEL: CPT

## 2018-01-23 PROCEDURE — 93005 ELECTROCARDIOGRAM TRACING: CPT

## 2018-01-23 PROCEDURE — 81000 URINALYSIS NONAUTO W/SCOPE: CPT

## 2018-01-24 VITALS
SYSTOLIC BLOOD PRESSURE: 185 MMHG | HEIGHT: 64 IN | OXYGEN SATURATION: 97 % | RESPIRATION RATE: 16 BRPM | BODY MASS INDEX: 22.2 KG/M2 | WEIGHT: 130 LBS | DIASTOLIC BLOOD PRESSURE: 88 MMHG | TEMPERATURE: 98 F | HEART RATE: 86 BPM

## 2018-01-24 NOTE — ED PROVIDER NOTES
"Encounter Date: 1/23/2018    SCRIBE #1 NOTE: I, Yury Montes, am scribing for, and in the presence of,  Nixon Hill MD. I have scribed the following portions of the note - Other sections scribed: HPI and ROS.       History     Chief Complaint   Patient presents with    Shortness of Breath     via ems , since this morning found witn 95% O2 sat,     CC: Shortness of Breath     HPI: This 88 y.o F with GERD, hyperlipidemia and HTN presents to the ED via EMS c/o SOB since this AM. The pt states she "started feeling bad" after waking up from a nap this morning. The pt reports an episode of syncope followed by SOB. The pt attempted tx with a breathing tx which did not provide any relief. Per EMS, the pt had 95% O2 sat upon arrival. The pt's SOB has since resolved. The pt denies chest pain, abdominal pain, N/V/D,       The history is provided by the patient. No  was used.     Review of patient's allergies indicates:   Allergen Reactions    Ibuprofen     Penicillins      Past Medical History:   Diagnosis Date    GERD (gastroesophageal reflux disease)     Hyperlipidemia     Hypertension      No past surgical history on file.  No family history on file.  Social History   Substance Use Topics    Smoking status: Never Smoker    Smokeless tobacco: Never Used    Alcohol use No     Review of Systems   Constitutional: Negative for chills, diaphoresis and fever.   HENT: Negative for rhinorrhea and sore throat.    Eyes: Negative for redness.   Respiratory: Positive for shortness of breath. Negative for cough.    Cardiovascular: Negative for chest pain.   Gastrointestinal: Negative for abdominal pain, diarrhea, nausea and vomiting.   Genitourinary: Negative for dysuria, frequency and urgency.   Musculoskeletal: Negative for back pain and neck pain.   Skin: Negative for rash.   Neurological: Positive for syncope.   Psychiatric/Behavioral: The patient is not nervous/anxious.        Physical Exam "     Initial Vitals [18 1730]   BP Pulse Resp Temp SpO2   (!) 175/89 88 (!) 34 98.6 °F (37 °C) 100 %      MAP       117.67         Physical Exam  Nursing note and vitals reviewed.  Constitutional:  Nontoxic elderly female in no obvious distress or discomfort.  HENT:    Head: NC/ AT   Eyes: Conjunctivae normal.   (-) scleral icterus              Mouth/Throat: MMM.     Neck: Neck supple, normal rom.    Cardiovascular: RRR  Pulmonary/Chest: Diminished breath sounds bilaterally.  Abdominal: Soft. ND/NT.  (-) CVA tenderness.  Musculoskeletal: FROM of all major joints. No extremity edema or tenderness.  Neurological: A&Ox3, Normal speech.  No acute focal motor deficits.    Skin: Skin is warm and dry.  Psychiatric: normal mood and affect.      ED Course   Procedures  Labs Reviewed   COMPREHENSIVE METABOLIC PANEL - Abnormal; Notable for the following:        Result Value    Sodium 147 (*)     All other components within normal limits   URINALYSIS - Abnormal; Notable for the following:     Ketones, UA Trace (*)     Leukocytes, UA 1+ (*)     All other components within normal limits   CBC W/ AUTO DIFFERENTIAL   TROPONIN I   B-TYPE NATRIURETIC PEPTIDE   URINALYSIS MICROSCOPIC     EKG Readings: (Independently Interpreted)   Initial Reading: No STEMI.   Nsr, rate 90, Normal intervals.  T wave inversion in leads III, aVF, V5-6       X-Rays:   Independently Interpreted Readings:   Chest X-Ray: No infiltrates.  No acute abnormalities. Cardiomegaly present.       Differential Diagnosis:   Initial differential diagnosis includes but is not limited to... URI, bronchitis, pneumonia, influenza, undiagnosed COPD versus CHF, ACS, hypertensive emergency.    Additional Medical Decision Makin-year-old female with goiter, hypertension, and dyslipidemia presents the ED via ems complaining of generalized body aches and shortness of breath since early this morning.  She reports a home breathing treatments provided little if any  benefit.  Backspace - see HPI for additional details.  Physical exam reveals an otherwise healthy appearing female with slight increased work of breathing.  Breath sounds diminished bilaterally but are otherwise clear to auscultation.  Aside from hypertension, vital signs reassuring.  She is currently afebrile.  Chest x-ray reveals mild cardiomegaly is unchanged from previous imaging.  There is no evidence of consolidation, pleural effusion or pneumothorax.  EKG demonstrates T wave inversion in the inferior lateral leads which are largely unchanged from previous ekg.  Troponin negative.  bnp 90.  Basic labs within normal limits.      Last 2D echocardiogram 4 months ago demonstrated a normal EF with diastolic dysfunction and pulmonary hypertension.  Abnormal echo as well as her current symptoms likely secondary to recent PE in September.  She is currently asymptomatic.  She reports compliance with all home medications, specifically Eliquis.   No medication adjustments or new medications were warranted at this time.  She has been advised to follow-up with both her primary care physician within 5-7 days.  Return instructions have been discussed at length prior to discharge.                 Scribe Attestation:   Scribe #1: I performed the above scribed service and the documentation accurately describes the services I performed. I attest to the accuracy of the note.    Attending Attestation:           Physician Attestation for Scribe:  Physician Attestation Statement for Scribe #1: I, Nixon Hill MD, reviewed documentation, as scribed by Yury Montes in my presence, and it is both accurate and complete.                 ED Course      Clinical Impression:   The encounter diagnosis was SOB (shortness of breath).                           Nixon Hill MD  02/03/18 2640

## 2018-01-30 ENCOUNTER — NURSE TRIAGE (OUTPATIENT)
Dept: ADMINISTRATIVE | Facility: CLINIC | Age: 83
End: 2018-01-30

## 2018-01-30 ENCOUNTER — HOSPITAL ENCOUNTER (EMERGENCY)
Facility: HOSPITAL | Age: 83
Discharge: HOME | End: 2018-01-30
Attending: EMERGENCY MEDICINE
Payer: MEDICARE

## 2018-01-30 VITALS
WEIGHT: 130 LBS | HEART RATE: 78 BPM | HEIGHT: 64 IN | BODY MASS INDEX: 22.2 KG/M2 | TEMPERATURE: 98 F | DIASTOLIC BLOOD PRESSURE: 88 MMHG | OXYGEN SATURATION: 98 % | RESPIRATION RATE: 18 BRPM | SYSTOLIC BLOOD PRESSURE: 178 MMHG

## 2018-01-30 DIAGNOSIS — R06.02 SOB (SHORTNESS OF BREATH): ICD-10-CM

## 2018-01-30 LAB
ALBUMIN SERPL BCP-MCNC: 3.6 G/DL
ALP SERPL-CCNC: 86 U/L
ALT SERPL W/O P-5'-P-CCNC: 10 U/L
ANION GAP SERPL CALC-SCNC: 8 MMOL/L
AST SERPL-CCNC: 18 U/L
BASOPHILS # BLD AUTO: 0.02 K/UL
BASOPHILS NFR BLD: 0.4 %
BILIRUB SERPL-MCNC: 0.4 MG/DL
BUN SERPL-MCNC: 11 MG/DL
CALCIUM SERPL-MCNC: 9.7 MG/DL
CHLORIDE SERPL-SCNC: 109 MMOL/L
CO2 SERPL-SCNC: 27 MMOL/L
CREAT SERPL-MCNC: 0.9 MG/DL
DIFFERENTIAL METHOD: ABNORMAL
EOSINOPHIL # BLD AUTO: 0 K/UL
EOSINOPHIL NFR BLD: 0.9 %
ERYTHROCYTE [DISTWIDTH] IN BLOOD BY AUTOMATED COUNT: 13.8 %
EST. GFR  (AFRICAN AMERICAN): >60 ML/MIN/1.73 M^2
EST. GFR  (NON AFRICAN AMERICAN): 57 ML/MIN/1.73 M^2
GLUCOSE SERPL-MCNC: 123 MG/DL
HCT VFR BLD AUTO: 40.5 %
HGB BLD-MCNC: 12.7 G/DL
LYMPHOCYTES # BLD AUTO: 1.7 K/UL
LYMPHOCYTES NFR BLD: 37.1 %
MCH RBC QN AUTO: 28.5 PG
MCHC RBC AUTO-ENTMCNC: 31.4 G/DL
MCV RBC AUTO: 91 FL
MONOCYTES # BLD AUTO: 0.4 K/UL
MONOCYTES NFR BLD: 8.4 %
NEUTROPHILS # BLD AUTO: 2.5 K/UL
NEUTROPHILS NFR BLD: 53.2 %
PLATELET # BLD AUTO: 203 K/UL
PMV BLD AUTO: 10.2 FL
POTASSIUM SERPL-SCNC: 4 MMOL/L
PROT SERPL-MCNC: 7 G/DL
RBC # BLD AUTO: 4.46 M/UL
SODIUM SERPL-SCNC: 144 MMOL/L
TROPONIN I SERPL DL<=0.01 NG/ML-MCNC: 0.01 NG/ML
WBC # BLD AUTO: 4.63 K/UL

## 2018-01-30 PROCEDURE — 84484 ASSAY OF TROPONIN QUANT: CPT

## 2018-01-30 PROCEDURE — 93010 ELECTROCARDIOGRAM REPORT: CPT | Mod: ,,, | Performed by: INTERNAL MEDICINE

## 2018-01-30 PROCEDURE — 80053 COMPREHEN METABOLIC PANEL: CPT

## 2018-01-30 PROCEDURE — 93005 ELECTROCARDIOGRAM TRACING: CPT

## 2018-01-30 PROCEDURE — 99285 EMERGENCY DEPT VISIT HI MDM: CPT | Mod: 25

## 2018-01-30 PROCEDURE — 85025 COMPLETE CBC W/AUTO DIFF WBC: CPT

## 2018-01-30 NOTE — TELEPHONE ENCOUNTER
Reason for Disposition   MODERATE difficulty breathing (e.g., speaks in phrases, SOB even at rest, pulse 100-120) of new onset or worse than normal    Protocols used: ST BREATHING DIFFICULTY-A-OH    Called pt back- next door neighbor is coming over to help until EMS arrives

## 2018-01-31 NOTE — ED PROVIDER NOTES
"Encounter Date: 1/30/2018    SCRIBE #1 NOTE: I, Fidel Wayne, am scribing for, and in the presence of, David Adler MD. Other sections scribed: HPI, ROS, PE, EKG.       History     Chief Complaint   Patient presents with    Shortness of Breath     with chest pressure when she breathes since last night. Pt wears 2L of Home O2. No cough. D/c 1 week ago.      CC: Shortness of Breath  HPI: This 88 y.o. female with Hx of HTN, HLD, GERD, home 02 dependence, DVT, PE, anticoagulation on Eliquis presents to the ED via EMS c/o shortness of breath and generalized weakness that developed while she was putting up dishes in her kitchen this morning. She is still feeling SOB like this morning. She states that she has felt SOB like this in the past. She states that she was just started on a blood thinner, but does not know what it is or why exactly she is taking it. Pt admits that she only "sometimes" takes her blood pressure medication. Pt denies sore throat, cough, fever, chest pain, abdominal pain, N/V/D, constipation. EMS reports pt wears 2L O2 NC at home.   Pt is a poor historian.         The history is provided by the patient and the EMS personnel.     Review of patient's allergies indicates:   Allergen Reactions    Ibuprofen     Penicillins      Past Medical History:   Diagnosis Date    GERD (gastroesophageal reflux disease)     Hyperlipidemia     Hypertension      History reviewed. No pertinent surgical history.  History reviewed. No pertinent family history.  Social History   Substance Use Topics    Smoking status: Never Smoker    Smokeless tobacco: Never Used    Alcohol use No     Review of Systems   Constitutional: Negative for chills and fever.   HENT: Negative for ear pain, rhinorrhea and sore throat.    Eyes: Negative for pain and visual disturbance.   Respiratory: Positive for shortness of breath. Negative for cough.    Cardiovascular: Negative for chest pain.   Gastrointestinal: Negative for abdominal " pain, diarrhea, nausea and vomiting.   Genitourinary: Negative for dysuria and hematuria.   Musculoskeletal: Negative for arthralgias and myalgias.   Skin: Negative for rash.   Neurological: Positive for weakness (generalized). Negative for headaches.   Hematological: Bruises/bleeds easily (Eliquis).       Physical Exam     Initial Vitals [01/30/18 1750]   BP Pulse Resp Temp SpO2   (!) 176/89 86 16 97.7 °F (36.5 °C) 100 %      MAP       118         Physical Exam    Nursing note and vitals reviewed.  Constitutional: She appears well-developed and well-nourished. She is cooperative.  Non-toxic appearance. She does not appear ill. No distress.   HENT:   Head: Normocephalic and atraumatic.   Mouth/Throat: Oropharynx is clear and moist.   Eyes: Conjunctivae and EOM are normal. Pupils are equal, round, and reactive to light.   Neck: Normal range of motion and full passive range of motion without pain. Neck supple. No thyromegaly present. No JVD present.   Cardiovascular: Normal rate, regular rhythm, normal heart sounds and normal pulses.   Pulmonary/Chest: Effort normal and breath sounds normal. No respiratory distress. She has no wheezes. She has no rhonchi. She has no rales.   Abdominal: Soft. Normal appearance and bowel sounds are normal. She exhibits no distension and no mass. There is no tenderness.   Musculoskeletal: Normal range of motion.   Neurological: She is alert and oriented to person, place, and time. She has normal strength. No cranial nerve deficit or sensory deficit.   Skin: Skin is warm, dry and intact. No rash noted.   Psychiatric: She has a normal mood and affect. Her speech is normal and behavior is normal. Judgment and thought content normal.         ED Course   Procedures  Labs Reviewed   CBC W/ AUTO DIFFERENTIAL - Abnormal; Notable for the following:        Result Value    MCHC 31.4 (*)     All other components within normal limits   COMPREHENSIVE METABOLIC PANEL - Abnormal; Notable for the  following:     Glucose 123 (*)     eGFR if non  57 (*)     All other components within normal limits   TROPONIN I     EKG Readings: (Independently Interpreted)   Rhythm: Normal Sinus Rhythm. Heart Rate: 80.   Normal axis, normal QRS and QT intervals, no ST segment elevation or depression. Inferior T-wave inversion and anterolateral T-wave inversion. No significant change from previous EKG.          Medical Decision Making:   ED Management:  This patient was evaluated for shortness of breath that began this morning. The patient is a very poor historian. This symptom began while she was performing light household work. She denies cough. She reported chest pain at triage but denied chest pain to me. She is afebrile. She is in no respiratory distress and has clear breath sounds. EKG is negative for dysrhythmia and acute ischemic changes. Troponin is not elevated. CXR is negative for acute processes. She is satting 100% on 2L nasal canula. She is supposed to be on supplemental oxygen at home. It is unclear whether she is compliant with this. Her BP is moderately elevated. She reports only taking her BP med(s) sporadically. She has no findings that warrant admission or emergent specialist consultation. Differential included dysrhythmia, Mi, ACS, pulmonary embolism. Her presentation and workup are not felt to be consistent with these or other conditions with significant morbidity or mortality. She is stable for discharge and will follow up with her PCP within the next few days. She has been instructed to return for any concerns.            Scribe Attestation:   Scribe #1: I performed the above scribed service and the documentation accurately describes the services I performed. I attest to the accuracy of the note.    Attending Attestation:           Physician Attestation for Scribe:  Physician Attestation Statement for Scribe #1: I, David Adler MD, reviewed documentation, as scribed by Fidel Wayen in my  presence, and it is both accurate and complete.                 ED Course      Clinical Impression:   Shortness of breath    Disposition:   Disposition: Discharged  Condition: Stable                        David Adler III, MD  01/30/18 2034

## 2018-01-31 NOTE — ED TRIAGE NOTES
Pt arrived to the ED via EMS with c/o of SOB and chest heaviness starting this morning. Pt does wear home O2 as needed. Pt denies any pain to the chest, N/V/D or any other symptoms at this time.  She is AAO x 4

## 2018-02-01 ENCOUNTER — OFFICE VISIT (OUTPATIENT)
Dept: FAMILY MEDICINE | Facility: CLINIC | Age: 83
End: 2018-02-01
Payer: MEDICARE

## 2018-02-01 VITALS
HEART RATE: 72 BPM | TEMPERATURE: 98 F | HEIGHT: 66 IN | DIASTOLIC BLOOD PRESSURE: 60 MMHG | RESPIRATION RATE: 17 BRPM | BODY MASS INDEX: 20.8 KG/M2 | WEIGHT: 129.44 LBS | OXYGEN SATURATION: 97 % | SYSTOLIC BLOOD PRESSURE: 110 MMHG

## 2018-02-01 DIAGNOSIS — R53.83 FATIGUE, UNSPECIFIED TYPE: Primary | ICD-10-CM

## 2018-02-01 DIAGNOSIS — R14.2 BELCHING: ICD-10-CM

## 2018-02-01 DIAGNOSIS — R10.13 PAIN, ABDOMINAL, EPIGASTRIC: ICD-10-CM

## 2018-02-01 PROCEDURE — 99999 PR PBB SHADOW E&M-EST. PATIENT-LVL III: CPT | Mod: PBBFAC,,, | Performed by: NURSE PRACTITIONER

## 2018-02-01 PROCEDURE — 81000 URINALYSIS NONAUTO W/SCOPE: CPT

## 2018-02-01 PROCEDURE — 99214 OFFICE O/P EST MOD 30 MIN: CPT | Mod: S$PBB,,, | Performed by: NURSE PRACTITIONER

## 2018-02-01 PROCEDURE — 1126F AMNT PAIN NOTED NONE PRSNT: CPT | Mod: ,,, | Performed by: NURSE PRACTITIONER

## 2018-02-01 PROCEDURE — 87086 URINE CULTURE/COLONY COUNT: CPT

## 2018-02-01 PROCEDURE — 99213 OFFICE O/P EST LOW 20 MIN: CPT | Mod: PBBFAC,PN | Performed by: NURSE PRACTITIONER

## 2018-02-01 PROCEDURE — 1159F MED LIST DOCD IN RCRD: CPT | Mod: ,,, | Performed by: NURSE PRACTITIONER

## 2018-02-01 RX ORDER — KETOCONAZOLE 20 MG/G
CREAM TOPICAL 2 TIMES DAILY
Qty: 15 G | Refills: 0 | Status: SHIPPED | OUTPATIENT
Start: 2018-02-01 | End: 2019-09-11

## 2018-02-01 NOTE — PROGRESS NOTES
Subjective:       Patient ID: Daiana Mcduffie is a 88 y.o. female.    Chief Complaint: No chief complaint on file.    HPI Ms Mcduffie is here for some vague symptoms, she reports some gas and fatigue.  She has not been eating much because she does not want gas.  She reports epigastric pain, improves when belching.  No treatment attempted, she has a h/o GERD and has been out of zantac.  She denies any N/V/D/F/C, no bloody or black stool. Denies any pain  Review of Systems   Constitutional: Positive for fatigue. Negative for fever.   Respiratory: Negative.    Cardiovascular: Negative.        Objective:      Physical Exam   Constitutional: She is oriented to person, place, and time. She appears well-developed and well-nourished. She does not appear ill. No distress.   HENT:   Head: Normocephalic and atraumatic.   Cardiovascular: Normal rate, regular rhythm and normal heart sounds.  Exam reveals no friction rub.    No murmur heard.  Pulmonary/Chest: Effort normal and breath sounds normal. No respiratory distress. She has no wheezes. She has no rales.   Musculoskeletal: Normal range of motion. She exhibits no edema.   Neurological: She is alert and oriented to person, place, and time.   Skin: Skin is warm and dry.   Psychiatric: She has a normal mood and affect. Her behavior is normal.   Vitals reviewed.      Assessment:       1. Fatigue, unspecified type        Plan:       Fatigue, unspecified type  -     Urinalysis  -     Urine culture    Belching    Pain, abdominal, epigastric    Other orders  -     ketoconazole (NIZORAL) 2 % cream; Apply topically 2 (two) times daily.  Dispense: 15 g; Refill: 0  -     ranitidine (ZANTAC) 150 MG capsule; Take 1 capsule (150 mg total) by mouth 2 (two) times daily.  Dispense: 60 capsule; Refill: 2    Will refill H2, instructed to eat foods with low gas, also will check urine for any infectious process, no red flags on exam today.

## 2018-02-01 NOTE — PATIENT INSTRUCTIONS
Follow up with primary care provider if not improved  Go to ER for new, worse or concerning symptoms  PHAZYME over the counter as needed    Tips to Control Acid Reflux    To control acid reflux, youll need to make some basic diet and lifestyle changes. The simple steps outlined below may be all youll need to ease discomfort.  Watch what you eat  · Avoid fatty foods and spicy foods.  · Eat fewer acidic foods, such as citrus and tomato-based foods. These can increase symptoms.  · Limit drinking alcohol, caffeine, and fizzy beverages. All increase acid reflux.  · Try limiting chocolate, peppermint, and spearmint. These can worsen acid reflux in some people.  Watch when you eat  · Avoid lying down for 3 hours after eating.  · Do not snack before going to bed.  Raise your head  Raising your head and upper body by 4 to 6 inches helps limit reflux when youre lying down. Put blocks under the head of your bed frame to raise it.  Other changes  · Lose weight, if you need to  · Dont exercise near bedtime  · Avoid tight-fitting clothes  · Limit aspirin and ibuprofen  · Stop smoking   Date Last Reviewed: 7/1/2016  © 4604-4648 [employer]. [address] [place] [address], [place] [address]. All rights reserved. This information is not intended as a substitute for professional medical care. Always follow your healthcare professional's instructions.

## 2018-02-06 ENCOUNTER — TELEPHONE (OUTPATIENT)
Dept: FAMILY MEDICINE | Facility: CLINIC | Age: 83
End: 2018-02-06

## 2018-02-06 LAB
BACTERIA #/AREA URNS HPF: NORMAL /HPF
BILIRUB UR QL STRIP: NEGATIVE
CLARITY UR: CLEAR
COLOR UR: YELLOW
GLUCOSE UR QL STRIP: NEGATIVE
HGB UR QL STRIP: NEGATIVE
KETONES UR QL STRIP: NEGATIVE
LEUKOCYTE ESTERASE UR QL STRIP: ABNORMAL
MICROSCOPIC COMMENT: NORMAL
NITRITE UR QL STRIP: NEGATIVE
PH UR STRIP: 5 [PH] (ref 5–8)
PROT UR QL STRIP: NEGATIVE
RBC #/AREA URNS HPF: 1 /HPF (ref 0–4)
SP GR UR STRIP: 1.01 (ref 1–1.03)
URN SPEC COLLECT METH UR: ABNORMAL
UROBILINOGEN UR STRIP-ACNC: NEGATIVE EU/DL
WBC #/AREA URNS HPF: 1 /HPF (ref 0–5)

## 2018-02-06 NOTE — TELEPHONE ENCOUNTER
Patient is requesting liquid states that she can not swallow tabs or capsules and is requesting the liquid for of ranitidine.

## 2018-02-07 ENCOUNTER — TELEPHONE (OUTPATIENT)
Dept: FAMILY MEDICINE | Facility: CLINIC | Age: 83
End: 2018-02-07

## 2018-02-08 ENCOUNTER — TELEPHONE (OUTPATIENT)
Dept: FAMILY MEDICINE | Facility: CLINIC | Age: 83
End: 2018-02-08

## 2018-02-08 LAB — BACTERIA UR CULT: NORMAL

## 2018-02-08 NOTE — TELEPHONE ENCOUNTER
Pt. Was seen at urgent care today and given Zantac 150 capsules. Insurance will only pay for the tablets.     Please notify Mrs. Sousa at [phone] when script has been sent.

## 2018-02-08 NOTE — TELEPHONE ENCOUNTER
----- Message from Zen Rogers sent at 2/8/2018 12:43 PM CST -----  Contact: Merry  States insurance will not pay for ranitidine (ZANTAC) 150 MG capsule ONLY Tablets. Merry can be reached @ [phone].

## 2018-02-08 NOTE — TELEPHONE ENCOUNTER
----- Message from Sanjana Rascon sent at 2/8/2018 11:15 AM CST -----  Contact: Daiana [phone] or [facility] [phone]  Pt needs a new RX for RANITIDINE HCL TABLETS. Her insurance will not cover the liquid. Please call at your earliest convenience.

## 2018-03-01 ENCOUNTER — TELEPHONE (OUTPATIENT)
Dept: FAMILY MEDICINE | Facility: CLINIC | Age: 83
End: 2018-03-01

## 2018-03-01 NOTE — TELEPHONE ENCOUNTER
Spoke with patient and she said that his is having problem with gas on her stomach. She said that the Zantac does not help at all and she scared to take anything else due to her being on coumadin. Please advise.

## 2018-03-12 ENCOUNTER — HOSPITAL ENCOUNTER (EMERGENCY)
Facility: HOSPITAL | Age: 83
Discharge: HOME | End: 2018-03-13
Attending: EMERGENCY MEDICINE
Payer: MEDICARE

## 2018-03-12 DIAGNOSIS — R14.1 GAS PAIN: ICD-10-CM

## 2018-03-12 DIAGNOSIS — R07.9 CHEST PAIN: Primary | ICD-10-CM

## 2018-03-12 DIAGNOSIS — I27.82 OTHER CHRONIC PULMONARY EMBOLISM WITHOUT ACUTE COR PULMONALE: ICD-10-CM

## 2018-03-12 DIAGNOSIS — I10 HYPERTENSION, UNSPECIFIED TYPE: ICD-10-CM

## 2018-03-12 DIAGNOSIS — R06.02 SHORTNESS OF BREATH: ICD-10-CM

## 2018-03-12 DIAGNOSIS — R53.83 FATIGUE, UNSPECIFIED TYPE: ICD-10-CM

## 2018-03-12 LAB
ALBUMIN SERPL BCP-MCNC: 3.5 G/DL
ALP SERPL-CCNC: 80 U/L
ALT SERPL W/O P-5'-P-CCNC: 11 U/L
ANION GAP SERPL CALC-SCNC: 10 MMOL/L
APTT BLDCRRT: 24.1 SEC
AST SERPL-CCNC: 16 U/L
BASOPHILS # BLD AUTO: 0.01 K/UL
BASOPHILS NFR BLD: 0.3 %
BILIRUB SERPL-MCNC: 0.3 MG/DL
BNP SERPL-MCNC: 58 PG/ML
BUN SERPL-MCNC: 11 MG/DL
CALCIUM SERPL-MCNC: 9.2 MG/DL
CHLORIDE SERPL-SCNC: 110 MMOL/L
CO2 SERPL-SCNC: 26 MMOL/L
CREAT SERPL-MCNC: 0.9 MG/DL
D DIMER PPP IA.FEU-MCNC: 0.43 MG/L FEU
DIFFERENTIAL METHOD: ABNORMAL
EOSINOPHIL # BLD AUTO: 0 K/UL
EOSINOPHIL NFR BLD: 0.3 %
ERYTHROCYTE [DISTWIDTH] IN BLOOD BY AUTOMATED COUNT: 13.5 %
EST. GFR  (AFRICAN AMERICAN): >60 ML/MIN/1.73 M^2
EST. GFR  (NON AFRICAN AMERICAN): 57 ML/MIN/1.73 M^2
GLUCOSE SERPL-MCNC: 101 MG/DL
HCT VFR BLD AUTO: 40.3 %
HGB BLD-MCNC: 12.7 G/DL
INR PPP: 1
LIPASE SERPL-CCNC: 64 U/L
LYMPHOCYTES # BLD AUTO: 1.6 K/UL
LYMPHOCYTES NFR BLD: 41.8 %
MCH RBC QN AUTO: 29.5 PG
MCHC RBC AUTO-ENTMCNC: 31.5 G/DL
MCV RBC AUTO: 94 FL
MONOCYTES # BLD AUTO: 0.4 K/UL
MONOCYTES NFR BLD: 11 %
NEUTROPHILS # BLD AUTO: 1.8 K/UL
NEUTROPHILS NFR BLD: 46.3 %
PLATELET # BLD AUTO: 168 K/UL
PMV BLD AUTO: 10.4 FL
POTASSIUM SERPL-SCNC: 3.6 MMOL/L
PROT SERPL-MCNC: 6.7 G/DL
PROTHROMBIN TIME: 10.9 SEC
RBC # BLD AUTO: 4.3 M/UL
SODIUM SERPL-SCNC: 146 MMOL/L
TROPONIN I SERPL DL<=0.01 NG/ML-MCNC: 0.01 NG/ML
WBC # BLD AUTO: 3.83 K/UL

## 2018-03-12 PROCEDURE — 85730 THROMBOPLASTIN TIME PARTIAL: CPT

## 2018-03-12 PROCEDURE — 85379 FIBRIN DEGRADATION QUANT: CPT

## 2018-03-12 PROCEDURE — 25000242 PHARM REV CODE 250 ALT 637 W/ HCPCS: Performed by: EMERGENCY MEDICINE

## 2018-03-12 PROCEDURE — 93005 ELECTROCARDIOGRAM TRACING: CPT

## 2018-03-12 PROCEDURE — 85610 PROTHROMBIN TIME: CPT

## 2018-03-12 PROCEDURE — 84484 ASSAY OF TROPONIN QUANT: CPT

## 2018-03-12 PROCEDURE — 99285 EMERGENCY DEPT VISIT HI MDM: CPT | Mod: 25

## 2018-03-12 PROCEDURE — 83690 ASSAY OF LIPASE: CPT

## 2018-03-12 PROCEDURE — 94640 AIRWAY INHALATION TREATMENT: CPT

## 2018-03-12 PROCEDURE — 93010 ELECTROCARDIOGRAM REPORT: CPT | Mod: ,,, | Performed by: INTERNAL MEDICINE

## 2018-03-12 PROCEDURE — 85025 COMPLETE CBC W/AUTO DIFF WBC: CPT

## 2018-03-12 PROCEDURE — 83880 ASSAY OF NATRIURETIC PEPTIDE: CPT

## 2018-03-12 PROCEDURE — 80053 COMPREHEN METABOLIC PANEL: CPT

## 2018-03-12 RX ORDER — ONDANSETRON 4 MG/1
4 TABLET, ORALLY DISINTEGRATING ORAL EVERY 4 HOURS PRN
Qty: 20 TABLET | Refills: 0 | Status: SHIPPED | OUTPATIENT
Start: 2018-03-12 | End: 2018-03-21 | Stop reason: SDUPTHER

## 2018-03-12 RX ORDER — IPRATROPIUM BROMIDE AND ALBUTEROL SULFATE 2.5; .5 MG/3ML; MG/3ML
3 SOLUTION RESPIRATORY (INHALATION)
Status: COMPLETED | OUTPATIENT
Start: 2018-03-12 | End: 2018-03-12

## 2018-03-12 RX ORDER — SUCRALFATE 1 G/10ML
1 SUSPENSION ORAL EVERY 6 HOURS PRN
Qty: 414 ML | Refills: 2 | Status: SHIPPED | OUTPATIENT
Start: 2018-03-12 | End: 2018-03-21 | Stop reason: SDUPTHER

## 2018-03-12 RX ADMIN — IOHEXOL 70 ML: 350 INJECTION, SOLUTION INTRAVENOUS at 11:03

## 2018-03-12 RX ADMIN — IPRATROPIUM BROMIDE AND ALBUTEROL SULFATE 3 ML: .5; 2.5 SOLUTION RESPIRATORY (INHALATION) at 10:03

## 2018-03-13 VITALS
HEART RATE: 92 BPM | RESPIRATION RATE: 16 BRPM | BODY MASS INDEX: 20.73 KG/M2 | SYSTOLIC BLOOD PRESSURE: 178 MMHG | DIASTOLIC BLOOD PRESSURE: 87 MMHG | WEIGHT: 129 LBS | HEIGHT: 66 IN | TEMPERATURE: 98 F | OXYGEN SATURATION: 100 %

## 2018-03-13 PROCEDURE — 25500020 PHARM REV CODE 255: Performed by: EMERGENCY MEDICINE

## 2018-03-13 NOTE — ED PROVIDER NOTES
Encounter Date: 3/12/2018    SCRIBE #1 NOTE: I, Krista Carballo, am scribing for, and in the presence of,  Jenna Adkins MD. I have scribed the following portions of the note - Other sections scribed: HPI, ROS, and PE.       History     Chief Complaint   Patient presents with    Chest Pain     epigastric pain, daughter reports pt suffers from GERD, given 243mg asa and 1 spray nitro with no relief      CC: SOB    HPI: The patientt is a 88 y.o. F who presents to the ED c/o SOB every night for the past 2 days. She also reports intermittent weakness. Patient states that she feels like it is difficult for her to belch and pass gas, but that when she does pass gas, symptoms temporarily alleviate. She also says that getting up and walking around helps to alleviate SOB, and that eating helps to alleviate weakness. Patient activated EMS and patient was given ASA and NTG SL spray PTA. No relief with EMS tx; patient states symptoms later improved spontaneously, but she did develop a mild headaceh. Patient does report some relief with Gas-X, which she took 2 days ago.  Patient has been seen multiple times in this emergency department for the same symptoms, as recently as 1/30/18.  She was seen by her primary 2/1/18, who prescribed Zantac. Patient denies relief with Zantac.  Patient has chronic unchanged bilateral lower extremity edema which improves with the compression stockings she sometimes wears. She otherwise denies abdominal pain, chest pain, and other associated symptoms. No nausea. No vomiting.    PMHx GERD, HTN, DLD, pulmonary embolism (October 2017, approx 5 months ago) on Eliquis    Hx limited because pt is hard of hearing and is a poor historian.    Echocardiogram 9/10/17  CONCLUSIONS     1 - Mildly depressed left ventricular systolic function (EF 45-50%).     2 - Mild global hypokinesis.     3 - Concentric remodeling.     4 - Impaired LV relaxation, normal LAP (grade 1 diastolic dysfunction).     5 - Mildly to  moderately depressed right ventricular systolic function .     6 - Mild tricuspid regurgitation.     7 - Pulmonary hypertension. The estimated PA systolic pressure is 44 mmHg.       The history is provided by the patient. The history is limited by the condition of the patient. No  was used.     Review of patient's allergies indicates:   Allergen Reactions    Ibuprofen     Penicillins      Past Medical History:   Diagnosis Date    GERD (gastroesophageal reflux disease)     Hyperlipidemia     Hypertension      History reviewed. No pertinent surgical history.  History reviewed. No pertinent family history.  Social History   Substance Use Topics    Smoking status: Never Smoker    Smokeless tobacco: Never Used    Alcohol use No     Review of Systems   Constitutional: Negative for chills, diaphoresis and fever.   HENT: Negative for nosebleeds.    Eyes: Negative for photophobia.   Respiratory: Positive for shortness of breath. Negative for cough.    Cardiovascular: Negative for chest pain and leg swelling.   Gastrointestinal: Negative for abdominal pain, diarrhea, nausea and vomiting.   Genitourinary: Negative for dysuria and flank pain.   Musculoskeletal: Negative for neck stiffness.   Skin: Negative for rash.   Neurological: Positive for weakness. Negative for headaches.       Physical Exam     Initial Vitals [03/12/18 2054]   BP Pulse Resp Temp SpO2   (!) 153/97 84 (!) 30 97.7 °F (36.5 °C) 96 %      MAP       115.67         Physical Exam    Nursing note and vitals reviewed.  Constitutional: She appears well-developed and well-nourished. She is not diaphoretic.  Non-toxic appearance. She does not appear ill.   Nontoxic elderly woman, awake/alert.    HENT:   Head: Normocephalic and atraumatic.   Pt is hard of hearing.   Eyes: Conjunctivae and EOM are normal. Pupils are equal, round, and reactive to light.   Neck: Normal range of motion. Neck supple.   Cardiovascular: Normal rate, regular rhythm  and intact distal pulses.   Murmur heard.  Pulmonary/Chest: Effort normal. No respiratory distress. She has wheezes (minimal, expiratory). She has no rhonchi. She has no rales.   Abdominal: Soft. Normal appearance and bowel sounds are normal. She exhibits no distension. There is no tenderness.   Musculoskeletal: Normal range of motion. She exhibits edema (1+ to bilateral lower extremities ).   Neurological: She is alert and oriented to person, place, and time. She has normal strength.   Skin: Skin is warm and dry.   Psychiatric: She has a normal mood and affect.         ED Course   Procedures  Labs Reviewed   CBC W/ AUTO DIFFERENTIAL - Abnormal; Notable for the following:        Result Value    WBC 3.83 (*)     MCHC 31.5 (*)     All other components within normal limits   B-TYPE NATRIURETIC PEPTIDE   D DIMER, QUANTITATIVE   COMPREHENSIVE METABOLIC PANEL   PROTIME-INR   TROPONIN I   LIPASE   APTT   URINALYSIS     EKG Readings: (Independently Interpreted)   20:12: NSR, HR 86. Normal axis. Q wave aVL.  Diffuse T wave flattening. No STEMI. Poor baseline.  20:36: NSR, HR 85. Normal axis. Q waves II, aVL. TWI in III, V3, V4, V5. No STEMI.  21:08: NSR, HR 82. Normal axis. TWI in III, avF. No STEMI. Poor baseline.  Morphologies c/w 1/30/18.       X-Rays:   Independently Interpreted Readings:   Other Readings:  CXR mild cardiomegaly, no acute process    Medical Decision Making:   History:   Old Medical Records: I decided to obtain old medical records.  Old Records Summarized: records from previous admission(s) and records from clinic visits.  Initial Assessment:   This is an emergent evaluation of an 88-year-old female with hypertension, dyslipidemia, GERD, pulmonary embolism, who presents with fatigue and shortness of breath.  The patient describes her shortness of breath as feeling like an epigastric pressure that makes it hard for her to breathe.  She denies rashad pain to her chest or abdomen.  She has not been nauseated  or vomited.  She has been seen both by this ED and her PMD for same.  Differential Diagnosis:   Ddx includes ACS, CHF, PE (acute vs chronic), symptomatic anemia, GERD, PUD, other.  Independently Interpreted Test(s):   I have ordered and independently interpreted X-rays - see prior notes.  I have ordered and independently interpreted EKG Reading(s) - see prior notes  Clinical Tests:   Lab Tests: Ordered and Reviewed  Radiological Study: Ordered and Reviewed  Medical Tests: Ordered and Reviewed  ED Management:  CT abdom 10/16/17 showed no gallbladder pathology. No Davila's sign to exam. Low suspicion for cholelithiasis/cholecystitis.    Echo 9/10/17 as above, generally reassuring, EF 45-50%.     EKGs unchanged from prior.    CXR read by radiology and myself as NAD.    Labs with stable CBC, CMP, troponin, BNP, lipase, ddimer.     As patient is moderate PE risk due to prior PE, and dimer may not be interpretable in patient on Eliquis, I did check CTA chest, which showed improvement of prior clot burden.    Patient overall is well-appearing and states aside from her post-NTG headache she feels well to go home.  Given her multiple reassuring workups in the past several months in this emergency department, I feel she is stable for discharge with outpatient follow-up.  I have re-rx'ed zantac but have added carafate PRN.  Patient is waiting for relative to pick her up.            Scribe Attestation:   Scribe #1: I performed the above scribed service and the documentation accurately describes the services I performed. I attest to the accuracy of the note.    Attending Attestation:           Physician Attestation for Scribe:  Physician Attestation Statement for Scribe #1: I, Jenna Adkins MD, reviewed documentation, as scribed by Krista Carballo in my presence, and it is both accurate and complete.                    Clinical Impression:   The primary encounter diagnosis was Chest pain. Diagnoses of Gas pain, Shortness of breath,  Other chronic pulmonary embolism without acute cor pulmonale, Hypertension, unspecified type, and Fatigue, unspecified type were also pertinent to this visit.                           Jenna Adkins MD  03/13/18 0026       Jenna Adkins MD  03/13/18 0029

## 2018-03-14 ENCOUNTER — PES CALL (OUTPATIENT)
Dept: ADMINISTRATIVE | Facility: CLINIC | Age: 83
End: 2018-03-14

## 2018-03-16 ENCOUNTER — NURSE TRIAGE (OUTPATIENT)
Dept: ADMINISTRATIVE | Facility: CLINIC | Age: 83
End: 2018-03-16

## 2018-03-17 NOTE — TELEPHONE ENCOUNTER
"  Reason for Disposition   Caller has medication question about med not prescribed by PCP and triager unable to answer question (e.g., compatibility with other med, storage)    Protocols used: ST MEDICATION QUESTION CALL-A-AH      Spoke to Felipa at [facility]. She states Zantac rx was sent over in capsules but insurance does not cover capsules, it will only cover tablets. Order on medication list shows "tablets". Told her ok to change rx to tablets. Caller advised and voices understanding.  "

## 2018-03-21 ENCOUNTER — OFFICE VISIT (OUTPATIENT)
Dept: FAMILY MEDICINE | Facility: CLINIC | Age: 83
End: 2018-03-21
Payer: MEDICARE

## 2018-03-21 ENCOUNTER — TELEPHONE (OUTPATIENT)
Dept: FAMILY MEDICINE | Facility: CLINIC | Age: 83
End: 2018-03-21

## 2018-03-21 VITALS
WEIGHT: 133.81 LBS | BODY MASS INDEX: 21.51 KG/M2 | SYSTOLIC BLOOD PRESSURE: 132 MMHG | DIASTOLIC BLOOD PRESSURE: 70 MMHG | RESPIRATION RATE: 17 BRPM | TEMPERATURE: 98 F | HEIGHT: 66 IN | HEART RATE: 80 BPM | OXYGEN SATURATION: 95 %

## 2018-03-21 DIAGNOSIS — K21.9 GASTROESOPHAGEAL REFLUX DISEASE, ESOPHAGITIS PRESENCE NOT SPECIFIED: ICD-10-CM

## 2018-03-21 DIAGNOSIS — I82.4Y9 ACUTE DEEP VEIN THROMBOSIS (DVT) OF PROXIMAL VEIN OF LOWER EXTREMITY, UNSPECIFIED LATERALITY: Primary | ICD-10-CM

## 2018-03-21 DIAGNOSIS — I27.82 OTHER CHRONIC PULMONARY EMBOLISM WITHOUT ACUTE COR PULMONALE: ICD-10-CM

## 2018-03-21 PROCEDURE — 99214 OFFICE O/P EST MOD 30 MIN: CPT | Mod: S$PBB,,, | Performed by: NURSE PRACTITIONER

## 2018-03-21 PROCEDURE — 99999 PR PBB SHADOW E&M-EST. PATIENT-LVL IV: CPT | Mod: PBBFAC,,, | Performed by: NURSE PRACTITIONER

## 2018-03-21 PROCEDURE — 99214 OFFICE O/P EST MOD 30 MIN: CPT | Mod: PBBFAC,PN | Performed by: NURSE PRACTITIONER

## 2018-03-21 RX ORDER — ONDANSETRON 4 MG/1
8 TABLET, ORALLY DISINTEGRATING ORAL EVERY 8 HOURS PRN
Qty: 20 TABLET | Refills: 0 | Status: SHIPPED | OUTPATIENT
Start: 2018-03-21 | End: 2019-09-11

## 2018-03-21 RX ORDER — SUCRALFATE 1 G/10ML
1 SUSPENSION ORAL EVERY 6 HOURS PRN
Qty: 414 ML | Refills: 2 | Status: SHIPPED | OUTPATIENT
Start: 2018-03-21 | End: 2019-09-11

## 2018-03-21 NOTE — PATIENT INSTRUCTIONS
Tips to Control Acid Reflux    To control acid reflux, youll need to make some basic diet and lifestyle changes. The simple steps outlined below may be all youll need to ease discomfort.  Watch what you eat  · Avoid fatty foods and spicy foods.  · Eat fewer acidic foods, such as citrus and tomato-based foods. These can increase symptoms.  · Limit drinking alcohol, caffeine, and fizzy beverages. All increase acid reflux.  · Try limiting chocolate, peppermint, and spearmint. These can worsen acid reflux in some people.  Watch when you eat  · Avoid lying down for 3 hours after eating.  · Do not snack before going to bed.  Raise your head  Raising your head and upper body by 4 to 6 inches helps limit reflux when youre lying down. Put blocks under the head of your bed frame to raise it.  Other changes  · Lose weight, if you need to  · Dont exercise near bedtime  · Avoid tight-fitting clothes  · Limit aspirin and ibuprofen  · Stop smoking   Date Last Reviewed: 7/1/2016 © 2000-2017 [employer]. [address] [place] [address]. All rights reserved. This information is not intended as a substitute for professional medical care. Always follow your healthcare professional's instructions.        Lifestyle Changes for Controlling GERD  When you have GERD, stomach acid feels as if its backing up toward your mouth. Whether or not you take medicine to control your GERD, your symptoms can often be improved with lifestyle changes. Talk to your healthcare provider about the following suggestions. These suggestions may help you get relief from your symptoms.      Raise your head  Reflux is more likely to strike when youre lying down flat, because stomach fluid can flow backward more easily. Raising the head of your bed 4 to 6 inches can help. To do this:  · Slide blocks or books under the legs at the head of your bed. Or, place a wedge under the mattress. Many [employer] stores can make a suitable wedge for  you. The wedge should run from your waist to the top of your head.  · Dont just prop your head on several pillows. This increases pressure on your stomach. It can make GERD worse.  Watch your eating habits  Certain foods may increase the acid in your stomach or relax the lower esophageal sphincter. This makes GERD more likely. Its best to avoid the following if they cause you symptoms:  · Coffee, tea, and carbonated drinks (with and without caffeine)  · Fatty, fried, or spicy food  · Mint, chocolate, onions, and tomatoes  · Peppermint  · Any other foods that seem to irritate your stomach or cause you pain  Relieve the pressure  Tips include the following:  · Eat smaller meals, even if you have to eat more often.  · Dont lie down right after you eat. Wait a few hours for your stomach to empty.  · Avoid tight belts and tight-fitting clothes.  · Lose excess weight.  Tobacco and alcohol  Avoid smoking tobacco and drinking alcohol. They can make GERD symptoms worse.  Date Last Reviewed: 7/1/2016  © 4552-9575 [employer]. [address] [place] [address], [place] [address]. All rights reserved. This information is not intended as a substitute for professional medical care. Always follow your healthcare professional's instructions.

## 2018-03-21 NOTE — PROGRESS NOTES
Subjective:       Patient ID: Daiana Mcduffie is a 88 y.o. female.    Chief Complaint: Gastroesophageal Reflux and Hospital Follow Up    HPI Ms Mcduffie is here for an ER followup.  She is still having some GERD symptoms.  She would also like me to complete papers for her to take the RTA lift.  She otherwise feels well today.  Review of Systems   Constitutional: Negative for fever.   Respiratory: Negative.    Cardiovascular: Negative.        Objective:      Physical Exam   Constitutional: She is oriented to person, place, and time. She appears well-developed and well-nourished. She does not appear ill. No distress.   HENT:   Head: Normocephalic and atraumatic.   Cardiovascular: Normal rate, regular rhythm and normal heart sounds.  Exam reveals no friction rub.    No murmur heard.  Pulmonary/Chest: Effort normal and breath sounds normal. She has no decreased breath sounds. She has no wheezes. She has no rhonchi. She has no rales.   Musculoskeletal: Normal range of motion.   Neurological: She is alert and oriented to person, place, and time.   Skin: Skin is warm and dry.   Psychiatric: She has a normal mood and affect. Her behavior is normal.   Vitals reviewed.      Assessment:       1. Acute deep vein thrombosis (DVT) of proximal vein of lower extremity, unspecified laterality    2. Other chronic pulmonary embolism without acute cor pulmonale    3. Gastroesophageal reflux disease, esophagitis presence not specified        Plan:       Acute deep vein thrombosis (DVT) of proximal vein of lower extremity, unspecified laterality    Other chronic pulmonary embolism without acute cor pulmonale    Gastroesophageal reflux disease, esophagitis presence not specified  -     ondansetron (ZOFRAN-ODT) 4 MG TbDL; Take 2 tablets (8 mg total) by mouth every 8 (eight) hours as needed (nausea).  Dispense: 20 tablet; Refill: 0  -     ranitidine (ZANTAC) 150 MG tablet; Take 1 tablet (150 mg total) by mouth 2 (two) times daily.   Dispense: 60 tablet; Refill: 11  -     sucralfate (CARAFATE) 100 mg/mL suspension; Take 10 mLs (1 g total) by mouth every 6 (six) hours as needed (abdominal discomfort / gas pain).  Dispense: 414 mL; Refill: 2    I have completed her papers for the RTA lift.  We also discussed at length things she can do to prevent GERD, also to take her zantac every day.  She has a pill sorter, and uses it.    Follow up with primary care provider if not improved  Go to ER for new, worse or concerning symptoms

## 2018-03-22 ENCOUNTER — HOSPITAL ENCOUNTER (EMERGENCY)
Facility: HOSPITAL | Age: 83
Discharge: HOME | End: 2018-03-22
Attending: EMERGENCY MEDICINE
Payer: MEDICARE

## 2018-03-22 VITALS
OXYGEN SATURATION: 98 % | SYSTOLIC BLOOD PRESSURE: 154 MMHG | HEART RATE: 96 BPM | WEIGHT: 128 LBS | RESPIRATION RATE: 18 BRPM | TEMPERATURE: 98 F | BODY MASS INDEX: 21.85 KG/M2 | DIASTOLIC BLOOD PRESSURE: 78 MMHG | HEIGHT: 64 IN

## 2018-03-22 DIAGNOSIS — K21.9 GERD WITHOUT ESOPHAGITIS: Primary | ICD-10-CM

## 2018-03-22 DIAGNOSIS — R06.02 SOB (SHORTNESS OF BREATH): ICD-10-CM

## 2018-03-22 PROCEDURE — 99284 EMERGENCY DEPT VISIT MOD MDM: CPT

## 2018-03-22 PROCEDURE — 25000003 PHARM REV CODE 250: Performed by: EMERGENCY MEDICINE

## 2018-03-22 RX ORDER — FAMOTIDINE 20 MG/1
20 TABLET, FILM COATED ORAL 2 TIMES DAILY
Qty: 60 TABLET | Refills: 11 | Status: SHIPPED | OUTPATIENT
Start: 2018-03-22 | End: 2018-09-10

## 2018-03-22 RX ADMIN — LIDOCAINE HYDROCHLORIDE: 20 SOLUTION ORAL; TOPICAL at 01:03

## 2018-03-22 NOTE — DISCHARGE INSTRUCTIONS
"Return to the Emergency Department of any acute worsening of your symptoms or for any other concern.     You should return to the ED for fever/chills, shortness of breath, chest pain, weakness or "passing out".     Pt should take all medications as prescribed.    Pt should follow up with PCP as soon as possible.    The risks associated with not taking your medications as prescribed and not following up with your Primary Care doctor or sub specialist includes worsening of your condition, pain, disability, loss of function or livelihood, and death      QAMAR Valverde M.D. 2:24 PM 3/22/2018      Our goal in the emergency department is to always give you outstanding care and exceptional service. You may receive a survey by mail or e-mail in the next week regarding your experience in our ED. We would greatly appreciate your completing and returning the survey. Your feedback provides us with a way to recognize our staff who give very good care and it helps us learn how to improve when your experience was below our aspiration of excellence.     "

## 2018-03-22 NOTE — ED PROVIDER NOTES
"Encounter Date: 3/22/2018    SCRIBE #1 NOTE: I, Yury Montes, am scribing for, and in the presence of,  Dl Valverde MD. I have scribed the following portions of the note - Other sections scribed: HPI and ROS.       History     Chief Complaint   Patient presents with    Shortness of Breath     sob earlier today but has resolved now.  denies n/v/d or fever.  denies chest pains.  Hx Anxiety.     CC: Shortness of Breath     HPI: This 88 y.o F with GERD, HLD and HTN presents to the ED c/o acute onset of intermittent SOB which began after eating at 0945. The pt reports SOB currently, but notes that it is not as severe as initial onset. The pt states "If I could just belch I would feel better." No previous episode of similar symptoms. The pt's last BM was this AM. The pt denies chest pain, cough, abdominal pain, N/V/D, weakness, dizziness, fever, chills and leg swelling. No prior tx.     The pt was [facility] by AGUILAR FernándezC.      The history is provided by the patient. No  was used.     Review of patient's allergies indicates:   Allergen Reactions    Ibuprofen Shortness Of Breath    Penicillins Other (See Comments)     Goes in and out     Past Medical History:   Diagnosis Date    GERD (gastroesophageal reflux disease)     Hyperlipidemia     Hypertension      History reviewed. No pertinent surgical history.  History reviewed. No pertinent family history.  Social History   Substance Use Topics    Smoking status: Never Smoker    Smokeless tobacco: Never Used    Alcohol use No     Review of Systems   Constitutional: Negative for chills, diaphoresis and fever.   HENT: Negative for rhinorrhea and sore throat.    Eyes: Negative for redness.   Respiratory: Positive for shortness of breath. Negative for cough.    Cardiovascular: Negative for chest pain and leg swelling.   Gastrointestinal: Negative for abdominal pain, diarrhea, nausea and " vomiting.   Genitourinary: Negative for dysuria, frequency and urgency.   Musculoskeletal: Negative for back pain and neck pain.   Skin: Negative for rash.   Neurological: Negative for dizziness and weakness.   Psychiatric/Behavioral: The patient is not nervous/anxious.        Physical Exam     Initial Vitals [03/22/18 1157]   BP Pulse Resp Temp SpO2   135/73 76 (!) 24 98.9 °F (37.2 °C) 100 %      MAP       93.67         Physical Exam    Vitals reviewed.  Constitutional: She appears well-developed and well-nourished.   HENT:   Head: Normocephalic and atraumatic.   Nose: Nose normal.   Mouth/Throat: No oropharyngeal exudate.   Eyes: EOM are normal. Pupils are equal, round, and reactive to light.   Neck: Normal range of motion. Neck supple. No JVD present.   Cardiovascular: Regular rhythm and normal heart sounds. Exam reveals no gallop and no friction rub.    No murmur heard.  Pulmonary/Chest: Breath sounds normal. No stridor. No respiratory distress. She has no wheezes. She has no rhonchi. She has no rales. She exhibits no tenderness.   Abdominal: Soft. She exhibits no distension, no ascites and no mass. Bowel sounds are increased. There is no tenderness. There is no rigidity, no rebound, no guarding and no CVA tenderness.   Musculoskeletal: Normal range of motion. She exhibits no edema or tenderness.   Neurological: She is alert and oriented to person, place, and time. She has normal strength. No sensory deficit.   Skin: Skin is warm and dry.   Psychiatric: She has a normal mood and affect. Thought content normal.         ED Course   Procedures  Labs Reviewed - No data to display  EKG Readings: (Independently Interpreted)   Initial Reading: No STEMI. Rhythm: Normal Sinus Rhythm. Ectopy: No Ectopy. Conduction: Normal. ST Segments: Normal ST Segments. T Waves: Normal. Clinical Impression: Normal Sinus Rhythm       X-Rays:   Independently Interpreted Readings:   Chest X-Ray: Normal heart size.  No infiltrates.  No  acute abnormalities.     Medical Decision Making:   History:   Old Medical Records: I decided to obtain old medical records.  Initial Assessment:   Medical decision-making:    The patient received a medical screening exam. If performed, the EKG was independently evaluated by me and is pending final cardiology evaluation.  If performed, all radiographic studies were independently evaluated by me and are pending final radiology evaluation. If labs were ordered, they were reviewed. Vital signs are independently assessed by me.  If performed, the pulse oximetry was independently evaluated by me.  I decided to obtain the patient's past medical record.  If available, I reviewed the patient's past medical record, including most recent labs and radiology reports.    ED Management:  Patient presents to the emergency department for evaluation of shortness breath and GERD-like symptoms.  No real active shortness breath or dyspnea at this time.  She is speaking in full complete sentences.  She has even and unlabored .  Respiratory mechanics with clear breath sounds.  X-ray does not reveal any sign of pneumonia or decompensated heart failure.  I consider but doubt pulmonary embolism.  Patient is already anticoagulated.  She is not hypoxic.  Consider but doubt atypical presentation for ACS.  EKG is completely nonischemic without any ST segment elevations.  Patient received a GI cocktail in the emergency department and did indeed have a belch which relieved her symptoms.  She is symptom-free on reevaluation.  Her vital signs are reassuring.  She is stable for discharge.  Follow-up with primary care.      The results and physical exam findings were reviewed with the patient. Pt agrees with assessment, disposition and treatment plan and has no further questions or complaints at this time.    QAMAR Valverde M.D. 2:28 PM 3/22/2018              Scribe Attestation:   Scribe #1: I performed the above scribed service and the  documentation accurately describes the services I performed. I attest to the accuracy of the note.    Attending Attestation:           Physician Attestation for Scribe:  Physician Attestation Statement for Scribe #1: I, Dl Valverde MD, reviewed documentation, as scribed by Yury Montes in my presence, and it is both accurate and complete.                    Clinical Impression:   The primary encounter diagnosis was GERD without esophagitis. A diagnosis of SOB (shortness of breath) was also pertinent to this visit.                           Dl Valverde MD  03/22/18 9541

## 2018-03-22 NOTE — ED TRIAGE NOTES
"Pt state " I was eating my breakfast, after I finish eating I started to feeling bad and then I couldn't catch my breath."  "

## 2018-03-23 ENCOUNTER — TELEPHONE (OUTPATIENT)
Dept: FAMILY MEDICINE | Facility: CLINIC | Age: 83
End: 2018-03-23

## 2018-03-23 NOTE — TELEPHONE ENCOUNTER
----- Message from Teresa De La Cruz sent at 3/22/2018  4:11 PM CDT -----  Contact: goddaughter  Pt's goddaughter (Hilda) requests to speak to staff regarding breathing treatments. She can be reached at [phone]. Thank you!

## 2018-04-03 ENCOUNTER — TELEPHONE (OUTPATIENT)
Dept: FAMILY MEDICINE | Facility: CLINIC | Age: 83
End: 2018-04-03

## 2018-04-03 NOTE — TELEPHONE ENCOUNTER
LMOVM at home re: picking up for m for shower chair. Unable to get a ringtone on cell number. Shower chair form will be at the  for pickup.

## 2018-05-08 ENCOUNTER — PES CALL (OUTPATIENT)
Dept: ADMINISTRATIVE | Facility: CLINIC | Age: 83
End: 2018-05-08

## 2018-05-23 ENCOUNTER — OFFICE VISIT (OUTPATIENT)
Dept: FAMILY MEDICINE | Facility: CLINIC | Age: 83
End: 2018-05-23
Payer: MEDICARE

## 2018-05-23 VITALS
TEMPERATURE: 98 F | HEIGHT: 64 IN | DIASTOLIC BLOOD PRESSURE: 70 MMHG | WEIGHT: 130.94 LBS | SYSTOLIC BLOOD PRESSURE: 110 MMHG | BODY MASS INDEX: 22.35 KG/M2 | HEART RATE: 80 BPM | OXYGEN SATURATION: 96 %

## 2018-05-23 DIAGNOSIS — R10.31 RIGHT GROIN PAIN: Primary | ICD-10-CM

## 2018-05-23 PROCEDURE — 99999 PR PBB SHADOW E&M-EST. PATIENT-LVL III: CPT | Mod: PBBFAC,,, | Performed by: FAMILY MEDICINE

## 2018-05-23 PROCEDURE — 99213 OFFICE O/P EST LOW 20 MIN: CPT | Mod: PBBFAC,PN | Performed by: FAMILY MEDICINE

## 2018-05-23 PROCEDURE — 99213 OFFICE O/P EST LOW 20 MIN: CPT | Mod: S$PBB,,, | Performed by: FAMILY MEDICINE

## 2018-05-27 PROBLEM — I82.409 ACUTE DVT (DEEP VENOUS THROMBOSIS): Status: RESOLVED | Noted: 2017-09-10 | Resolved: 2018-05-27

## 2018-05-27 PROBLEM — I26.09 PULMONARY EMBOLISM WITH ACUTE COR PULMONALE: Status: RESOLVED | Noted: 2017-09-09 | Resolved: 2018-05-27

## 2018-05-27 PROBLEM — R06.03 ACUTE RESPIRATORY DISTRESS: Status: RESOLVED | Noted: 2017-10-16 | Resolved: 2018-05-27

## 2018-05-27 PROBLEM — J96.01 ACUTE HYPOXEMIC RESPIRATORY FAILURE: Status: RESOLVED | Noted: 2017-09-11 | Resolved: 2018-05-27

## 2018-05-27 PROBLEM — R07.9 CHEST PAIN: Status: RESOLVED | Noted: 2017-10-16 | Resolved: 2018-05-27

## 2018-05-27 NOTE — PROGRESS NOTES
Routine Office Visit    Patient Name: Daiana Mcduffie    : 1929  MRN: [id number]    Subjective:  Dainaa is a 89 y.o. female who presents today for:    1. Right groin pain  Patient presenting today with her daughter for intermittent right groin pain that radiates from the hip to the going.  There has been no redness or swelling.  She denies any recent falls or trauma to the hip.  She continues to walk without difficulty, but prolonged standing or walking brings on her pain and the pain resolves with rest.  She does not take any medication daily for the pain.  She denies ever being diagnosed with osteoarthritis of the hip.      Past Medical History  Past Medical History:   Diagnosis Date    GERD (gastroesophageal reflux disease)     Hyperlipidemia     Hypertension        Past Surgical History  History reviewed. No pertinent surgical history.    Family History  History reviewed. No pertinent family history.    Social History  Social History     Social History    Marital status:      Spouse name: N/A    Number of children: N/A    Years of education: N/A     Occupational History    Not on file.     Social History Main Topics    Smoking status: Never Smoker    Smokeless tobacco: Never Used    Alcohol use No    Drug use: No    Sexual activity: No     Other Topics Concern    Not on file     Social History Narrative    No narrative on file       Current Medications  Current Outpatient Prescriptions on File Prior to Visit   Medication Sig Dispense Refill    atorvastatin (LIPITOR) 20 MG tablet Take 1 tablet (20 mg total) by mouth once daily. 90 tablet 1    ELIQUIS 5 mg Tab Take 1 tablet (5 mg total) by mouth 2 (two) times daily. 180 tablet 1    famotidine (PEPCID) 20 MG tablet Take 1 tablet (20 mg total) by mouth 2 (two) times daily. 60 tablet 11    fluticasone (FLONASE) 50 mcg/actuation nasal spray 1 spray by Each Nare route once daily. 1 Bottle 1    ketoconazole (NIZORAL) 2 % cream Apply  "topically 2 (two) times daily. 15 g 0    lisinopril (PRINIVIL,ZESTRIL) 20 MG tablet Take 1 tablet (20 mg total) by mouth once daily. 90 tablet 1    meclizine (ANTIVERT) 25 mg tablet TAKE ONE TABLET BY MOUTH THREE TIMES DAILY AS NEEDED FOR  DIZZINESS 30 tablet 0    ondansetron (ZOFRAN-ODT) 4 MG TbDL Take 2 tablets (8 mg total) by mouth every 8 (eight) hours as needed (nausea). 20 tablet 0    simethicone 80 mg Tab Take 1 tablet by mouth 2 (two) times daily as needed. 30 each 3    sucralfate (CARAFATE) 100 mg/mL suspension Take 10 mLs (1 g total) by mouth every 6 (six) hours as needed (abdominal discomfort / gas pain). 414 mL 2    triamcinolone acetonide 0.1% (KENALOG) 0.1 % cream Apply topically 2 (two) times daily. 28.4 g 0     No current facility-administered medications on file prior to visit.        Allergies   Review of patient's allergies indicates:   Allergen Reactions    Ibuprofen Shortness Of Breath    Penicillins Other (See Comments)     Goes in and out       Review of Systems (Pertinent positives)  Review of Systems   Constitutional: Negative.    HENT: Negative.    Eyes: Negative.    Respiratory: Negative.    Cardiovascular: Negative.    Musculoskeletal: Positive for joint pain.   Skin: Negative.          /70 (BP Location: Left arm, Patient Position: Sitting, BP Method: Medium (Manual))   Pulse 80   Temp 97.8 °F (36.6 °C) (Oral)   Ht 5' 4" (1.626 m)   Wt 59.4 kg (130 lb 15.3 oz)   SpO2 96%   BMI 22.48 kg/m²     GENERAL APPEARANCE: in no apparent distress and well developed and well nourished  HEENT: PERRL, EOMI, Sclera clear, anicteric, Oropharynx clear, no lesions, Neck supple with midline trachea  NECK: normal, supple, no adenopathy, thyroid normal in size  RESPIRATORY: appears well, vitals normal, no respiratory distress, acyanotic, normal RR, chest clear, no wheezing, crepitations, rhonchi, normal symmetric air entry  HEART: regular rate and rhythm, S1, S2 normal, no murmur, click, " rub or gallop.    ABDOMEN: abdomen is soft without tenderness, no masses, no hernias, no organomegaly, no rebound, no guarding. Suprapubic tenderness absent. No CVA tenderness.  Extremities: warm/well perfused.  No abnormal hair patterns.  No clubbing, cyanosis or edema.  no muscular tenderness noted, full range of motion without pain.   SKIN: no rashes, no wounds, no other lesions  PSYCH: Alert, oriented x 3, thought content appropriate, speech normal, pleasant and cooperative, good eye contact, well groomed,    Assessment/Plan:  Daiana Mcduffie is a 89 y.o. female who presents today for :    Daiana was seen today for ingroin pain.    Diagnoses and all orders for this visit:    Right groin pain      1.  Pain likely arthritis related  2.  Follow up as needed  3.  Tylenol for pain to protect kidneys      Nino Treviño MD

## 2018-07-20 ENCOUNTER — HOSPITAL ENCOUNTER (EMERGENCY)
Facility: HOSPITAL | Age: 83
Discharge: HOME | End: 2018-07-20
Attending: EMERGENCY MEDICINE
Payer: MEDICARE

## 2018-07-20 VITALS
OXYGEN SATURATION: 96 % | RESPIRATION RATE: 22 BRPM | TEMPERATURE: 98 F | WEIGHT: 130 LBS | HEIGHT: 64 IN | HEART RATE: 78 BPM | SYSTOLIC BLOOD PRESSURE: 168 MMHG | DIASTOLIC BLOOD PRESSURE: 89 MMHG | BODY MASS INDEX: 22.2 KG/M2

## 2018-07-20 DIAGNOSIS — M25.552 CHRONIC LEFT HIP PAIN: Primary | ICD-10-CM

## 2018-07-20 DIAGNOSIS — M25.562 LEFT KNEE PAIN: ICD-10-CM

## 2018-07-20 DIAGNOSIS — G89.29 CHRONIC LEFT HIP PAIN: Primary | ICD-10-CM

## 2018-07-20 PROCEDURE — 63600175 PHARM REV CODE 636 W HCPCS: Performed by: EMERGENCY MEDICINE

## 2018-07-20 PROCEDURE — 99284 EMERGENCY DEPT VISIT MOD MDM: CPT | Mod: 25

## 2018-07-20 RX ORDER — PREDNISONE 20 MG/1
40 TABLET ORAL
Status: COMPLETED | OUTPATIENT
Start: 2018-07-20 | End: 2018-07-20

## 2018-07-20 RX ORDER — PREDNISONE 20 MG/1
40 TABLET ORAL DAILY
Qty: 10 TABLET | Refills: 0 | Status: SHIPPED | OUTPATIENT
Start: 2018-07-20 | End: 2018-07-25

## 2018-07-20 RX ADMIN — PREDNISONE 40 MG: 20 TABLET ORAL at 02:07

## 2018-07-20 NOTE — ED PROVIDER NOTES
Encounter Date: 7/20/2018    SCRIBE #1 NOTE: I, Raven Varela, am scribing for, and in the presence of,  Marquez Infante MD. I have scribed the following portions of the note - Other sections scribed: ROS, HPI.       History     Chief Complaint   Patient presents with    Shortness of Breath     thats intermittent upon exertion. Pt reports that its worse while lying down     CC: Shortness of Breath    HPI: Patient is a 89 y.o. F with a past medical history of GERD; Hyperlipidemia; and Hypertension who presents to the ED for evaluation of a 3-4 week-history of pain extending from the L hip to the L knee. The pain is so severe it takes her breath away, but she denies dyspnea and/or chest pain. She has difficulty ambulating due to the severity of her pain. No symptomatic treatment prior to arrival. She adds that she experiences intermittent BLE edema which resolves after wearing compression stockings, but states her legs are not currently swollen.         The history is provided by the patient. No  was used.     Review of patient's allergies indicates:   Allergen Reactions    Ibuprofen Shortness Of Breath    Penicillins Other (See Comments)     Goes in and out     Past Medical History:   Diagnosis Date    GERD (gastroesophageal reflux disease)     Hyperlipidemia     Hypertension      History reviewed. No pertinent surgical history.  No family history on file.  Social History   Substance Use Topics    Smoking status: Never Smoker    Smokeless tobacco: Never Used    Alcohol use No     Review of Systems   Constitutional: Negative for chills and fever.   HENT: Negative for sore throat.    Eyes: Negative for visual disturbance.   Respiratory: Negative for shortness of breath.    Cardiovascular: Negative for chest pain and leg swelling.   Gastrointestinal: Negative for abdominal pain, diarrhea, nausea and vomiting.   Genitourinary: Negative for dysuria.   Musculoskeletal: Positive for  arthralgias (L hip, L knee).   Skin: Negative for rash.   Neurological: Negative for headaches.       Physical Exam     Initial Vitals [07/20/18 1310]   BP Pulse Resp Temp SpO2   (!) 153/84 75 (!) 22 98 °F (36.7 °C) 99 %      MAP       --         Physical Exam    Nursing note and vitals reviewed.  Constitutional: She appears well-developed and well-nourished. She is not diaphoretic. No distress.   HENT:   Head: Normocephalic and atraumatic.   Nose: Nose normal.   Eyes: EOM are normal. Pupils are equal, round, and reactive to light. No scleral icterus.   Periscleral corneal whitish discoloration, not arcus senilis.   Neck: Normal range of motion. Neck supple.   Cardiovascular: Normal rate, regular rhythm, normal heart sounds and intact distal pulses. Exam reveals no gallop and no friction rub.    No murmur heard.  Pulmonary/Chest: Breath sounds normal. No stridor. No respiratory distress. She has no wheezes. She has no rhonchi. She has no rales.   Abdominal: Soft. Normal appearance and bowel sounds are normal. She exhibits no distension. There is no tenderness. There is no rebound and no guarding.   Musculoskeletal: Normal range of motion. She exhibits tenderness (L hip). She exhibits no edema (peripherally).   Stable pelvis.   Neurological: She is oriented to person, place, and time. No cranial nerve deficit.   Skin: Skin is warm and dry. No rash noted.   Psychiatric: She has a normal mood and affect. Her behavior is normal.         ED Course   Procedures  Labs Reviewed - No data to display       Imaging Results          X-Ray Hip 2 View Left (Final result)  Result time 07/20/18 14:23:50    Final result by Moriah Pablo MD (07/20/18 14:23:50)                 Impression:      There is no evidence of left hip fracture however there is osteopenia which reduces the sensitivity of plain film.  If there is high clinical concern for fracture MRI should be obtained.      Electronically signed by: Moriah Pablo  MD  Date:    07/20/2018  Time:    14:23             Narrative:    EXAMINATION:  XR HIP 2 VIEW LEFT    CLINICAL HISTORY:  left hip pain;    TECHNIQUE:  AP view of the pelvis and frog leg lateral view of the left hip were performed.    COMPARISON:  None    FINDINGS:  Air is seen within loops of small large bowel.  There is osteopenia.  There are degenerative changes of both hips and the spine.  There is no evidence hip fracture.                               X-Ray Knee 3 View Left (Final result)  Result time 07/20/18 14:23:14    Final result by Moriah Pablo MD (07/20/18 14:23:14)                 Impression:      There is osteopenia and vascular calcifications.      Electronically signed by: Moriah Pablo MD  Date:    07/20/2018  Time:    14:23             Narrative:    EXAMINATION:  XR KNEE 3 VIEW LEFT    CLINICAL HISTORY:  Pain in left knee    TECHNIQUE:  AP, lateral, and Merchant views of the left knee were performed.    COMPARISON:  None    FINDINGS:  There is no evidence fracture or malalignment.  There is osteopenia.  There is no joint effusion.  There are vascular calcifications.                                 Medical Decision Making:   Differential Diagnosis:   I suspect hip arthritis.             Scribe Attestation:   Scribe #1: I performed the above scribed service and the documentation accurately describes the services I performed. I attest to the accuracy of the note.    Attending Attestation:           Physician Attestation for Scribe:  Physician Attestation Statement for Scribe #1: I, Marquez Infante MD, reviewed documentation, as scribed by Raven Varela in my presence, and it is both accurate and complete.                    Clinical Impression:   The primary encounter diagnosis was Chronic left hip pain. A diagnosis of Left knee pain was also pertinent to this visit.                             Marquez Infante MD  07/20/18 7339

## 2018-08-22 ENCOUNTER — HOSPITAL ENCOUNTER (EMERGENCY)
Facility: HOSPITAL | Age: 83
Discharge: HOME | End: 2018-08-22
Attending: EMERGENCY MEDICINE
Payer: MEDICARE

## 2018-08-22 VITALS
WEIGHT: 130 LBS | HEIGHT: 64 IN | TEMPERATURE: 98 F | BODY MASS INDEX: 22.2 KG/M2 | OXYGEN SATURATION: 98 % | HEART RATE: 80 BPM | RESPIRATION RATE: 20 BRPM | SYSTOLIC BLOOD PRESSURE: 141 MMHG | DIASTOLIC BLOOD PRESSURE: 67 MMHG

## 2018-08-22 DIAGNOSIS — R06.02 SHORTNESS OF BREATH: ICD-10-CM

## 2018-08-22 DIAGNOSIS — J44.1 ACUTE EXACERBATION OF CHRONIC OBSTRUCTIVE PULMONARY DISEASE (COPD): Primary | ICD-10-CM

## 2018-08-22 LAB
ALBUMIN SERPL BCP-MCNC: 3.5 G/DL
ALP SERPL-CCNC: 94 U/L
ALT SERPL W/O P-5'-P-CCNC: 9 U/L
ANION GAP SERPL CALC-SCNC: 7 MMOL/L
APTT BLDCRRT: 23 SEC
AST SERPL-CCNC: 16 U/L
BASOPHILS # BLD AUTO: 0.02 K/UL
BASOPHILS NFR BLD: 0.4 %
BILIRUB SERPL-MCNC: 0.3 MG/DL
BNP SERPL-MCNC: 17 PG/ML
BUN SERPL-MCNC: 24 MG/DL
CALCIUM SERPL-MCNC: 9.3 MG/DL
CHLORIDE SERPL-SCNC: 105 MMOL/L
CO2 SERPL-SCNC: 28 MMOL/L
CREAT SERPL-MCNC: 1 MG/DL
DIFFERENTIAL METHOD: ABNORMAL
EOSINOPHIL # BLD AUTO: 0 K/UL
EOSINOPHIL NFR BLD: 0.2 %
ERYTHROCYTE [DISTWIDTH] IN BLOOD BY AUTOMATED COUNT: 13.8 %
EST. GFR  (AFRICAN AMERICAN): 58 ML/MIN/1.73 M^2
EST. GFR  (NON AFRICAN AMERICAN): 50 ML/MIN/1.73 M^2
GLUCOSE SERPL-MCNC: 129 MG/DL
HCT VFR BLD AUTO: 42 %
HGB BLD-MCNC: 12.8 G/DL
INR PPP: 1
LYMPHOCYTES # BLD AUTO: 2.1 K/UL
LYMPHOCYTES NFR BLD: 43.3 %
MCH RBC QN AUTO: 28.7 PG
MCHC RBC AUTO-ENTMCNC: 30.5 G/DL
MCV RBC AUTO: 94 FL
MONOCYTES # BLD AUTO: 0.5 K/UL
MONOCYTES NFR BLD: 9.1 %
NEUTROPHILS # BLD AUTO: 2.3 K/UL
NEUTROPHILS NFR BLD: 46.8 %
PLATELET # BLD AUTO: 187 K/UL
PMV BLD AUTO: 10.1 FL
POTASSIUM SERPL-SCNC: 4.7 MMOL/L
PROT SERPL-MCNC: 6.9 G/DL
PROTHROMBIN TIME: 10.4 SEC
RBC # BLD AUTO: 4.46 M/UL
SODIUM SERPL-SCNC: 140 MMOL/L
TROPONIN I SERPL DL<=0.01 NG/ML-MCNC: 0.01 NG/ML
WBC # BLD AUTO: 4.92 K/UL

## 2018-08-22 PROCEDURE — 93005 ELECTROCARDIOGRAM TRACING: CPT

## 2018-08-22 PROCEDURE — 85730 THROMBOPLASTIN TIME PARTIAL: CPT

## 2018-08-22 PROCEDURE — 99284 EMERGENCY DEPT VISIT MOD MDM: CPT | Mod: 25

## 2018-08-22 PROCEDURE — 83880 ASSAY OF NATRIURETIC PEPTIDE: CPT

## 2018-08-22 PROCEDURE — 94761 N-INVAS EAR/PLS OXIMETRY MLT: CPT

## 2018-08-22 PROCEDURE — 85610 PROTHROMBIN TIME: CPT

## 2018-08-22 PROCEDURE — 85025 COMPLETE CBC W/AUTO DIFF WBC: CPT

## 2018-08-22 PROCEDURE — 93010 ELECTROCARDIOGRAM REPORT: CPT | Mod: ,,, | Performed by: INTERNAL MEDICINE

## 2018-08-22 PROCEDURE — 94640 AIRWAY INHALATION TREATMENT: CPT

## 2018-08-22 PROCEDURE — 84484 ASSAY OF TROPONIN QUANT: CPT

## 2018-08-22 PROCEDURE — 80053 COMPREHEN METABOLIC PANEL: CPT

## 2018-08-22 PROCEDURE — 25000242 PHARM REV CODE 250 ALT 637 W/ HCPCS: Performed by: EMERGENCY MEDICINE

## 2018-08-22 RX ORDER — IPRATROPIUM BROMIDE AND ALBUTEROL SULFATE 2.5; .5 MG/3ML; MG/3ML
3 SOLUTION RESPIRATORY (INHALATION)
Status: COMPLETED | OUTPATIENT
Start: 2018-08-22 | End: 2018-08-22

## 2018-08-22 RX ADMIN — IPRATROPIUM BROMIDE AND ALBUTEROL SULFATE 3 ML: .5; 2.5 SOLUTION RESPIRATORY (INHALATION) at 06:08

## 2018-08-22 NOTE — ED NOTES
Report received from Tami SORIANO. Pt resting comfortably in stretcher in no acute distress. VSS. Family at bedside. Will continue to monitor.

## 2018-08-22 NOTE — ED TRIAGE NOTES
"Patient's daughter reports SOB started last night. Patient daughter called the clinic ant the patient's doctor was not in today. Pt daughter decided to come to ER instead. Patient Denies SOB currently, dizziness, CP, or weakness. Patient daughter states  "This always happens when the weather changes, she come to the hospital to get a breathing tx and leaves"  Hx of Acid reflex, sinuses. Patient is no longer on oxygen at home.   "

## 2018-08-22 NOTE — ED PROVIDER NOTES
Encounter Date: 8/22/2018  SORT: This is a 89 y.o. female with chronic pulmonary embolism, pulmonary nodule, HTN, GERD who presents for emergent consideration of shortness of breath that began today.  Initial exam: no evidence of acute airway compromise. Patient will be moved to a room when one is available. Orders placed.  PIOTR Zheng PA-C       SCRIBE #1 NOTE: IKim, am scribing for, and in the presence of,  Zabrina Rivas MD. I have scribed the following portions of the note - Other sections scribed: HPI, ROS, PE.       History   No chief complaint on file.    CC: Shortness of Breath    HPI: 88 y/o female with PMHx of GERD, Hyperlipidemia, and Hypertension presents to the ED for emergent evaluation of acute onset shortness of breath that began last night. SOB is intermittent and only happens at night. Daughter states pt had s/s in the past and is usually given breathing treatments at ED that relieve SOB. Pt denies any associated pain, dizziness, or light-headedness. No further symptoms reported.     Patient lives alone at home.      The history is provided by the patient and a relative. No  was used.     Review of patient's allergies indicates:   Allergen Reactions    Ibuprofen Shortness Of Breath    Penicillins Other (See Comments)     Goes in and out     Past Medical History:   Diagnosis Date    GERD (gastroesophageal reflux disease)     Hyperlipidemia     Hypertension      No past surgical history on file.  No family history on file.  Social History     Tobacco Use    Smoking status: Never Smoker    Smokeless tobacco: Never Used   Substance Use Topics    Alcohol use: No    Drug use: No     Review of Systems   Constitutional: Negative for chills and fever.   HENT: Negative for congestion, ear pain, rhinorrhea and sore throat.    Eyes: Negative for pain and visual disturbance.   Respiratory: Positive for shortness of breath. Negative for cough.     Cardiovascular: Negative for chest pain.   Gastrointestinal: Negative for abdominal pain, diarrhea, nausea and vomiting.   Genitourinary: Negative for dysuria.   Musculoskeletal: Negative for back pain and neck pain.   Skin: Negative for rash.   Neurological: Negative for headaches.   All other systems reviewed and are negative.      Physical Exam     Initial Vitals   BP Pulse Resp Temp SpO2   -- -- -- -- --      MAP       --         Physical Exam    Nursing note and vitals reviewed.  Constitutional: She appears well-developed and well-nourished.  Non-toxic appearance. She does not appear ill.   HENT:   Head: Normocephalic and atraumatic.   Eyes: EOM are normal.   Neck: Neck supple.   Cardiovascular: Normal rate and regular rhythm.   Murmur (small) heard.  Pulmonary/Chest: Effort normal and breath sounds normal. No respiratory distress. She has no wheezes.   Abdominal: Soft. Normal appearance and bowel sounds are normal. She exhibits no distension. There is no tenderness.   Musculoskeletal: Normal range of motion.   Neurological: She is alert.   Skin: Skin is warm and dry.   Psychiatric: She has a normal mood and affect.         ED Course   Procedures  Labs Reviewed - No data to display  EKG Readings: (Independently Interpreted)   Initial Reading: No STEMI. Previous EKG: Compared with most recent EKG Rhythm: Normal Sinus Rhythm. Heart Rate: 82. Ectopy: No Ectopy. Axis: Normal. Other Impression: atrial disease. no ST elevation       Imaging Results    None          Medical Decision Making:   Initial Assessment:   Presents with complaints of shortness of breath.  Family states that when she gets overheated it exacerbates her COPD.  We will start b-agonist  Differential Diagnosis:   COPD exacerbation  Pulmonary edema  Pulmonary embolism  Pneumonia  Cardiac ischemia  Independently Interpreted Test(s):   I have ordered and independently interpreted EKG Reading(s) - see prior notes  Clinical Tests:   Lab Tests: Ordered  and Reviewed  Radiological Study: Ordered and Reviewed  Medical Tests: Ordered and Reviewed  ED Management:  Patient had a normal troponin and no acute EKG changes.  She improved after nebulized beta agonist.  She feels comfortable with discharge and has good family support            Scribe Attestation:   Scribe #1: I performed the above scribed service and the documentation accurately describes the services I performed. I attest to the accuracy of the note.    Attending Attestation:           Physician Attestation for Scribe:  Physician Attestation Statement for Scribe #1: I, Zabrina Rivas MD, reviewed documentation, as scribed by Kim Wade in my presence, and it is both accurate and complete.                    Clinical Impression:   The primary encounter diagnosis was Acute exacerbation of chronic obstructive pulmonary disease (COPD). A diagnosis of Shortness of breath was also pertinent to this visit.      Disposition:   Disposition: Discharged  Condition: Stable                        Zabrina Rivas MD  08/22/18 1295

## 2018-08-24 ENCOUNTER — TELEPHONE (OUTPATIENT)
Dept: FAMILY MEDICINE | Facility: CLINIC | Age: 83
End: 2018-08-24

## 2018-08-24 NOTE — TELEPHONE ENCOUNTER
----- Message from Nino Treviño MD sent at 8/24/2018  9:09 AM CDT -----  Contact: Hilda Mahoney (god daughter)  I can refer her to pulmonary as that is what she likely needs if she is having this many issues.    Dr. Treviño     ----- Message -----  From: Isis Delgado MA  Sent: 8/24/2018   9:07 AM  To: Nino Treviño MD     Refer her to Pulmonologist?  ----- Message -----  From: Nino Treviño MD  Sent: 8/24/2018   8:59 AM  To: Isis Delgado MA    She has never been tested and formally diagnosed with COPD.  Insurance will not approve a nebulizer without that test being done.      Dr. Treviño     ----- Message -----  From: Isis Delgado MA  Sent: 8/24/2018   7:55 AM  To: MD Dr. Kamila Tolentino,  Do you want to see patient or order her a nebulizer machine?  ----- Message -----  From: Unruly Sheehan  Sent: 8/22/2018   4:15 PM  To: [facility]ila Oneill Staff      Name of Who is Calling: Hilda Mahoney (god daughter)      What is the request in detail: Would like to speak with staff in regards to getting a neubalizer treatment for the patient to take at home. States she is wheezing again and if she goes to ER then she will just get a treatment and be sent home. Next appointment with Dr. Treviño is 8/29. Please advise      Can the clinic reply by [facility]: no    What Number to Call Back if not in [facility]: [phone]

## 2018-08-24 NOTE — TELEPHONE ENCOUNTER
ALEKSANDR for Mrs. Stevens, patient's God child re: concerns with wheezing.    When patient calls, let her know we can put in a referral for a pulmonary eval. If patient approves, Dr. Treviño will put in referral.

## 2018-08-30 ENCOUNTER — HOSPITAL ENCOUNTER (EMERGENCY)
Facility: HOSPITAL | Age: 83
Discharge: HOME | End: 2018-08-31
Attending: EMERGENCY MEDICINE
Payer: MEDICARE

## 2018-08-30 DIAGNOSIS — R06.02 SHORTNESS OF BREATH: Primary | ICD-10-CM

## 2018-08-30 DIAGNOSIS — R07.9 CHEST PAIN: ICD-10-CM

## 2018-08-30 LAB
ALBUMIN SERPL BCP-MCNC: 3.6 G/DL
ALP SERPL-CCNC: 102 U/L
ALT SERPL W/O P-5'-P-CCNC: 7 U/L
ANION GAP SERPL CALC-SCNC: 11 MMOL/L
AST SERPL-CCNC: 16 U/L
BASOPHILS # BLD AUTO: 0.01 K/UL
BASOPHILS NFR BLD: 0.2 %
BILIRUB SERPL-MCNC: 0.4 MG/DL
BNP SERPL-MCNC: 43 PG/ML
BUN SERPL-MCNC: 19 MG/DL
CALCIUM SERPL-MCNC: 9.5 MG/DL
CHLORIDE SERPL-SCNC: 108 MMOL/L
CO2 SERPL-SCNC: 24 MMOL/L
CREAT SERPL-MCNC: 0.9 MG/DL
D DIMER PPP IA.FEU-MCNC: 1.75 MG/L FEU
DIFFERENTIAL METHOD: ABNORMAL
EOSINOPHIL # BLD AUTO: 0 K/UL
EOSINOPHIL NFR BLD: 0.2 %
ERYTHROCYTE [DISTWIDTH] IN BLOOD BY AUTOMATED COUNT: 13.3 %
EST. GFR  (AFRICAN AMERICAN): >60 ML/MIN/1.73 M^2
EST. GFR  (NON AFRICAN AMERICAN): 57 ML/MIN/1.73 M^2
GLUCOSE SERPL-MCNC: 99 MG/DL
HCT VFR BLD AUTO: 40.4 %
HGB BLD-MCNC: 12.8 G/DL
LYMPHOCYTES # BLD AUTO: 2.2 K/UL
LYMPHOCYTES NFR BLD: 41.3 %
MCH RBC QN AUTO: 29.5 PG
MCHC RBC AUTO-ENTMCNC: 31.7 G/DL
MCV RBC AUTO: 93 FL
MONOCYTES # BLD AUTO: 0.4 K/UL
MONOCYTES NFR BLD: 6.8 %
NEUTROPHILS # BLD AUTO: 2.7 K/UL
NEUTROPHILS NFR BLD: 51.5 %
PLATELET # BLD AUTO: 172 K/UL
PMV BLD AUTO: 10.1 FL
POTASSIUM SERPL-SCNC: 3.8 MMOL/L
PROT SERPL-MCNC: 7 G/DL
RBC # BLD AUTO: 4.34 M/UL
SODIUM SERPL-SCNC: 143 MMOL/L
TROPONIN I SERPL DL<=0.01 NG/ML-MCNC: <0.006 NG/ML
WBC # BLD AUTO: 5.3 K/UL

## 2018-08-30 PROCEDURE — 25000003 PHARM REV CODE 250: Performed by: EMERGENCY MEDICINE

## 2018-08-30 PROCEDURE — 85025 COMPLETE CBC W/AUTO DIFF WBC: CPT

## 2018-08-30 PROCEDURE — 93010 ELECTROCARDIOGRAM REPORT: CPT | Mod: ,,, | Performed by: INTERNAL MEDICINE

## 2018-08-30 PROCEDURE — 80053 COMPREHEN METABOLIC PANEL: CPT

## 2018-08-30 PROCEDURE — 83880 ASSAY OF NATRIURETIC PEPTIDE: CPT

## 2018-08-30 PROCEDURE — 99285 EMERGENCY DEPT VISIT HI MDM: CPT | Mod: 25

## 2018-08-30 PROCEDURE — 85379 FIBRIN DEGRADATION QUANT: CPT

## 2018-08-30 PROCEDURE — 84484 ASSAY OF TROPONIN QUANT: CPT

## 2018-08-30 PROCEDURE — 93005 ELECTROCARDIOGRAM TRACING: CPT

## 2018-08-30 RX ORDER — LISINOPRIL 20 MG/1
20 TABLET ORAL
Status: COMPLETED | OUTPATIENT
Start: 2018-08-30 | End: 2018-08-30

## 2018-08-30 RX ADMIN — LISINOPRIL 20 MG: 20 TABLET ORAL at 10:08

## 2018-08-31 ENCOUNTER — TELEPHONE (OUTPATIENT)
Dept: FAMILY MEDICINE | Facility: CLINIC | Age: 83
End: 2018-08-31

## 2018-08-31 VITALS
BODY MASS INDEX: 22.2 KG/M2 | OXYGEN SATURATION: 96 % | HEIGHT: 64 IN | SYSTOLIC BLOOD PRESSURE: 117 MMHG | DIASTOLIC BLOOD PRESSURE: 72 MMHG | RESPIRATION RATE: 16 BRPM | HEART RATE: 78 BPM | WEIGHT: 130 LBS | TEMPERATURE: 98 F

## 2018-08-31 LAB — TROPONIN I SERPL DL<=0.01 NG/ML-MCNC: <0.006 NG/ML

## 2018-08-31 PROCEDURE — 84484 ASSAY OF TROPONIN QUANT: CPT

## 2018-08-31 PROCEDURE — 25500020 PHARM REV CODE 255: Performed by: EMERGENCY MEDICINE

## 2018-08-31 RX ADMIN — IOHEXOL 70 ML: 350 INJECTION, SOLUTION INTRAVENOUS at 12:08

## 2018-08-31 NOTE — ED TRIAGE NOTES
Presented with c/o SOB x 3 days. Reports her SOB comes and goes. 02 sat - 99% on RA, but has some dyspnea while speaking. Denies fever, N/V, or CP.

## 2018-08-31 NOTE — TELEPHONE ENCOUNTER
----- Message from Sanjana Rascon sent at 8/31/2018  8:47 AM CDT -----  Contact: Gin (daughter) [phone]  Patient's daughter is calling to speak to the staff. They would like to discuss getting a nebulizer and inhaler for the patient, for her breathing. The patient was seen at the ER, recently for shortness of breath. Please call at your earliest convenience.

## 2018-08-31 NOTE — ED NOTES
Pt resting quietly in bed with easy rr/nonlabored, pt states I called my ride, pt assisted with clothing

## 2018-08-31 NOTE — DISCHARGE INSTRUCTIONS
You were seen in the emergency department for shortness of breath.  Your EKG, labs, exam, are all reassuring.  We are not sure what the cause of your chest pain is however we do not believe it is anything immediately dangerous.  Please follow-up with your primary care provider early this week.  Please return for any new or worsening chest pain, nausea, vomiting, difficulty breathing, coughing up blood, profuse sweating, dizziness, lightheadedness, numbness, weakness, or any other new or worsening concerns.

## 2018-08-31 NOTE — ED PROVIDER NOTES
"Encounter Date: 8/30/2018    SCRIBE #1 NOTE: I, Yury Montes, am scribing for, and in the presence of,  Moris Vargas MD. I have scribed the following portions of the note - Other sections scribed: HPI, ROS and PE.       History     Chief Complaint   Patient presents with    Shortness of Breath     Pt to ED by way of ambulance transportation with complaints of feeling SOB for the past 3 days (intermittently). Pt states she the SOB episodes "come and go". Pt also states its been hard for her to catch her breath and she has been mouth breathing. Room air O2 sats 98% in triage. Hx includes HTN, HLD, GERD.      CC: Shortness of Breath     HPI: This 89 y.o F with GERD, HLD and HTN presents to the ED via EMS c/o acute onset of intermittent SOB which began today, but worsened tonight. She states "it makes it hard to catch a breath. I have to breath through my mouth." She states she called EMS because she felt "like I was about to go out." She also reports a productive cough yesterday which has since resolved. She did not take her HTN medicine today. The pt denies fever, chills, diaphoresis, nausea, emesis, diarrhea, abdominal pain, back pain and rash. No prior tx.           Review of patient's allergies indicates:   Allergen Reactions    Ibuprofen Shortness Of Breath    Penicillins Other (See Comments)     Goes in and out     Past Medical History:   Diagnosis Date    GERD (gastroesophageal reflux disease)     Hyperlipidemia     Hypertension      History reviewed. No pertinent surgical history.  History reviewed. No pertinent family history.  Social History     Tobacco Use    Smoking status: Never Smoker    Smokeless tobacco: Never Used   Substance Use Topics    Alcohol use: No    Drug use: No     Review of Systems   Constitutional: Negative for chills, diaphoresis and fever.   HENT: Negative for rhinorrhea and sore throat.    Eyes: Negative for redness.   Respiratory: Positive for cough (productive, " resolved) and shortness of breath (intermittent).    Cardiovascular: Negative for chest pain.   Gastrointestinal: Negative for abdominal pain, diarrhea, nausea and vomiting.   Genitourinary: Negative for dysuria, frequency and urgency.   Musculoskeletal: Negative for back pain and neck pain.   Skin: Negative for rash.   Psychiatric/Behavioral: The patient is not nervous/anxious.    All other systems reviewed and are negative.      Physical Exam     Initial Vitals [08/30/18 2122]   BP Pulse Resp Temp SpO2   (!) 186/95 84 18 97.8 °F (36.6 °C) 98 %      MAP       --         Physical Exam    Nursing note and vitals reviewed.  Constitutional: She appears well-developed and well-nourished. She is not diaphoretic. No distress.   HENT:   Head: Normocephalic and atraumatic.   Nose: Nose normal.   Eyes: EOM are normal. Pupils are equal, round, and reactive to light. No scleral icterus.   Neck: Normal range of motion. Neck supple.   Cardiovascular: Normal rate, regular rhythm, normal heart sounds and intact distal pulses. Exam reveals no gallop and no friction rub.    No murmur heard.  Pulmonary/Chest: Breath sounds normal. No stridor. No respiratory distress. She has no wheezes. She has no rhonchi. She has no rales.   Abdominal: Soft. Normal appearance and bowel sounds are normal. She exhibits no distension. There is no tenderness. There is no rebound and no guarding.   Musculoskeletal: Normal range of motion. She exhibits no edema or tenderness.   Neurological: She is oriented to person, place, and time. No cranial nerve deficit.   Skin: Skin is warm and dry. No rash noted.   Psychiatric: She has a normal mood and affect. Her behavior is normal.         ED Course   Procedures  Labs Reviewed   CBC W/ AUTO DIFFERENTIAL - Abnormal; Notable for the following components:       Result Value    MCHC 31.7 (*)     All other components within normal limits   COMPREHENSIVE METABOLIC PANEL - Abnormal; Notable for the following  components:    ALT 7 (*)     eGFR if non  57 (*)     All other components within normal limits   D DIMER, QUANTITATIVE - Abnormal; Notable for the following components:    D-Dimer 1.75 (*)     All other components within normal limits   TROPONIN I   B-TYPE NATRIURETIC PEPTIDE   TROPONIN I          Imaging Results          CTA Chest Non-Coronary (PE Study) (Final result)  Result time 08/31/18 00:17:21    Final result by Angeles Patino MD (08/31/18 00:17:21)                 Impression:      1. No evidence of pulmonary thromboembolism through the segmental levels.  No acute intrathoracic abnormality identified.  2. Aortic and coronary artery atherosclerosis.  3. Mild dependent bibasilar atelectatic change.      Electronically signed by: Angeles Patino MD  Date:    08/31/2018  Time:    00:17             Narrative:    EXAMINATION:  CTA CHEST NON CORONARY    CLINICAL HISTORY:  Hx of PE. Now with shortness of breath.;    TECHNIQUE:  Low dose axial images, sagittal and coronal reformations were obtained from the thoracic inlet to the lung bases following the IV administration of 70 mL of Omnipaque 350.  Contrast timing was optimized to evaluate the pulmonary arteries.  MIP images were performed.    COMPARISON:  CTA chest 03/12/2018, chest radiograph 08/30/2018.    FINDINGS:  The soft tissue and vascular structures at the base of the neck demonstrates an enlarged thyroid with multiple hypodense small nodules.  The thoracic aorta maintains normal caliber, contour, and course with associated moderate atherosclerotic calcification within its course.  There is no evidence of aneurysmal dilation or dissection. The heart is not significantly enlarged.  No significant pericardial fluid.  There is significant calcific atherosclerosis of the coronary vessels.  The esophagus maintains a normal course and caliber.There is no axillary, mediastinal, or hilar lymph node enlargement.    The trachea is midline and proximal  airways are patent. Evaluation of pulmonary parenchyma is limited due to respiratory motion artifact.  There is dependent bibasilar atelectasis.  No focal consolidation, pleural fluid or pneumothorax.    The pulmonary arteries demonstrate no filling defects to suggest pulmonary embolus through the segmental level.    Limited images of the upper abdomen obtained during the course of this dedicated thoracic CT demonstrate no acute abnormalities.  The visualized osseous structures demonstrate degenerative change without acute abnormality.                               X-Ray Chest AP Portable (Final result)  Result time 08/30/18 21:54:50    Final result by Cherie Arita MD (08/30/18 21:54:50)                 Impression:      No acute cardiopulmonary process identified.      Electronically signed by: Cherie Arita MD  Date:    08/30/2018  Time:    21:54             Narrative:    EXAMINATION:  XR CHEST AP PORTABLE    CLINICAL HISTORY:  Chest Pain;    TECHNIQUE:  Single frontal view of the chest was performed.    COMPARISON:  08/22/2018.    FINDINGS:  Cardiac silhouette is normal in size.  Lungs are symmetrically expanded.  Mild chronic interstitial changes are seen.  No evidence of focal consolidative process, pneumothorax, or significant effusion.  No acute osseous abnormality identified.                                 Medical Decision Making:   Initial Assessment:   A 9-year-old female with history of PE, hypertension, hyperlipidemia presenting with intermittent shortness of breath x3 days.  Patient states symptoms are intermittent, lasting for few minutes a time.  States that acutely worsened tonight.  On exam, patient notes her symptoms have resolved.  Exam benign and reassuring.  No significant lower extremity edema, cough.  Lungs clear to auscultation. Patient not in any respiratory distress. Blood pressure elevated, though breathing and satting well. EKG shows NSR, rate 87 without significant ST segment  elevation or depression or other symptoms concerning for acute ischemia.  Normal intervals..  Differential includes COPD exacerbation, ACS, recurrent PE, pneumonia, pneumothorax.  We will get labs, chest x-ray, EKG, and reassess.  Patient given aspirin.  ED Management:  Lungs completely clear.  Patient not having any further episodes of shortness of breath while in the emergency department.  Patient observed without any the arrhythmia is noted on the monitor.  Second troponin negative.  Heart score 3 based on age and risk factors.  Legs completely soft without any significant swelling, edema, unilateral size changes, redness, warmth, pain, tenderness or other signs of DVT.  I believe she is otherwise safe for discharge home.  Discussed strict return precautions as well as need for primary care follow-up.             Scribe Attestation:   Scribe #1: I performed the above scribed service and the documentation accurately describes the services I performed. I attest to the accuracy of the note.    Attending Attestation:           Physician Attestation for Scribe:  Physician Attestation Statement for Scribe #1: I, Moris Vargas MD, reviewed documentation, as scribed by Yury Montes in my presence, and it is both accurate and complete.                    Clinical Impression:   The primary encounter diagnosis was Shortness of breath. A diagnosis of Chest pain was also pertinent to this visit.      Disposition:   Disposition: Discharged  Condition: Stable                        Moris Vargas MD  08/31/18 0119       Moris Vargas MD  08/31/18 0126

## 2018-08-31 NOTE — TELEPHONE ENCOUNTER
Pt was notified that per Dr Treviño's orders, she needs to come in for an appt. She had already scheduled one since I spoke to Gin this morning.

## 2018-08-31 NOTE — TELEPHONE ENCOUNTER
The patient needs to be seen and have pulmonary function testing ordered.  She has not been seen by me in quite some time.  I cannot order a nebulizer without testing showing she has asthma/copd.    Thanks,  Dr. Treviño

## 2018-08-31 NOTE — TELEPHONE ENCOUNTER
See message from pt's god daughter - Gin. She states that pt has been going to the ER (2-3)  times a month for SOB, and they always give her a breathing treatment, which has been effective. She would like to get a nebulizer and inhaler to use at home to prevent ER visits.

## 2018-09-10 ENCOUNTER — OFFICE VISIT (OUTPATIENT)
Dept: FAMILY MEDICINE | Facility: CLINIC | Age: 83
End: 2018-09-10
Payer: MEDICARE

## 2018-09-10 VITALS
TEMPERATURE: 98 F | OXYGEN SATURATION: 96 % | BODY MASS INDEX: 22.13 KG/M2 | DIASTOLIC BLOOD PRESSURE: 82 MMHG | WEIGHT: 129.63 LBS | HEIGHT: 64 IN | SYSTOLIC BLOOD PRESSURE: 126 MMHG | RESPIRATION RATE: 20 BRPM | HEART RATE: 76 BPM

## 2018-09-10 DIAGNOSIS — K21.9 GASTROESOPHAGEAL REFLUX DISEASE, ESOPHAGITIS PRESENCE NOT SPECIFIED: Primary | ICD-10-CM

## 2018-09-10 PROBLEM — J41.0 SIMPLE CHRONIC BRONCHITIS: Status: RESOLVED | Noted: 2018-09-10 | Resolved: 2018-09-10

## 2018-09-10 PROBLEM — J41.0 SIMPLE CHRONIC BRONCHITIS: Status: ACTIVE | Noted: 2018-09-10

## 2018-09-10 PROBLEM — J44.1 ACUTE EXACERBATION OF CHRONIC OBSTRUCTIVE PULMONARY DISEASE (COPD): Status: RESOLVED | Noted: 2018-08-22 | Resolved: 2018-09-10

## 2018-09-10 PROCEDURE — 99999 PR PBB SHADOW E&M-EST. PATIENT-LVL III: CPT | Mod: PBBFAC,,, | Performed by: FAMILY MEDICINE

## 2018-09-10 PROCEDURE — 99214 OFFICE O/P EST MOD 30 MIN: CPT | Mod: S$PBB,,, | Performed by: FAMILY MEDICINE

## 2018-09-10 PROCEDURE — 99213 OFFICE O/P EST LOW 20 MIN: CPT | Mod: PBBFAC,PN | Performed by: FAMILY MEDICINE

## 2018-09-10 RX ORDER — PANTOPRAZOLE SODIUM 20 MG/1
20 TABLET, DELAYED RELEASE ORAL DAILY
Qty: 90 TABLET | Refills: 0 | Status: SHIPPED | OUTPATIENT
Start: 2018-09-10 | End: 2019-01-17 | Stop reason: SDUPTHER

## 2018-09-10 NOTE — PROGRESS NOTES
Routine Office Visit    Patient Name: Daiana Mcduffie    : 1929  MRN: [id number]    Subjective:  Daiana is a 89 y.o. female who presents today for:    1. Shortness of breath  Patient presenting today with recurring shortness of breath that is associated with a burning sensation in her chest.  She has been treated as if this is asthma despite never being diagnosed with asthma as a child or young adult.  She has no recurrent problems with asthma.  There has been on fevers, chills, cough or sweats.  She states that the shortness of breath doesn't last long and doesn't occur all of the time.  The burning sensation and shortness of breath have woken her from sleep.  She has tried pepcid with no relief.      Past Medical History  Past Medical History:   Diagnosis Date    GERD (gastroesophageal reflux disease)     Hyperlipidemia     Hypertension        Past Surgical History  History reviewed. No pertinent surgical history.    Family History  History reviewed. No pertinent family history.    Social History  Social History     Socioeconomic History    Marital status:      Spouse name: Not on file    Number of children: Not on file    Years of education: Not on file    Highest education level: Not on file   Social Needs    Financial resource strain: Not on file    Food insecurity - worry: Not on file    Food insecurity - inability: Not on file    Transportation needs - medical: Not on file    Transportation needs - non-medical: Not on file   Occupational History    Not on file   Tobacco Use    Smoking status: Never Smoker    Smokeless tobacco: Never Used   Substance and Sexual Activity    Alcohol use: No    Drug use: No    Sexual activity: No   Other Topics Concern    Not on file   Social History Narrative    Not on file       Current Medications  Current Outpatient Medications on File Prior to Visit   Medication Sig Dispense Refill    atorvastatin (LIPITOR) 20 MG tablet Take 1 tablet (20 mg  "total) by mouth once daily. 90 tablet 1    ELIQUIS 5 mg Tab Take 1 tablet (5 mg total) by mouth 2 (two) times daily. 180 tablet 1    fluticasone (FLONASE) 50 mcg/actuation nasal spray 1 spray by Each Nare route once daily. 1 Bottle 1    ketoconazole (NIZORAL) 2 % cream Apply topically 2 (two) times daily. 15 g 0    lisinopril (PRINIVIL,ZESTRIL) 20 MG tablet Take 1 tablet (20 mg total) by mouth once daily. 90 tablet 1    meclizine (ANTIVERT) 25 mg tablet TAKE ONE TABLET BY MOUTH THREE TIMES DAILY AS NEEDED FOR  DIZZINESS 30 tablet 0    ondansetron (ZOFRAN-ODT) 4 MG TbDL Take 2 tablets (8 mg total) by mouth every 8 (eight) hours as needed (nausea). 20 tablet 0    simethicone 80 mg Tab Take 1 tablet by mouth 2 (two) times daily as needed. 30 each 3    sucralfate (CARAFATE) 100 mg/mL suspension Take 10 mLs (1 g total) by mouth every 6 (six) hours as needed (abdominal discomfort / gas pain). 414 mL 2    triamcinolone acetonide 0.1% (KENALOG) 0.1 % cream Apply topically 2 (two) times daily. 28.4 g 0    [DISCONTINUED] famotidine (PEPCID) 20 MG tablet Take 1 tablet (20 mg total) by mouth 2 (two) times daily. 60 tablet 11     No current facility-administered medications on file prior to visit.        Allergies   Review of patient's allergies indicates:   Allergen Reactions    Ibuprofen Shortness Of Breath    Penicillins Other (See Comments)     Goes in and out       Review of Systems (Pertinent positives)  Review of Systems   Constitutional: Negative.    HENT: Negative.    Eyes: Negative.    Respiratory: Positive for shortness of breath.    Cardiovascular: Negative.    Gastrointestinal: Positive for heartburn.   Musculoskeletal: Negative.    Skin: Negative.          /82 (BP Location: Left arm, Patient Position: Sitting, BP Method: Small (Manual))   Pulse 76   Temp 97.9 °F (36.6 °C) (Oral)   Resp 20   Ht 5' 4" (1.626 m)   Wt 58.8 kg (129 lb 10.1 oz)   SpO2 96%   BMI 22.25 kg/m²     GENERAL APPEARANCE: " in no apparent distress and well developed and well nourished  HEENT: PERRL, EOMI, Sclera clear, anicteric, Oropharynx clear, no lesions, Neck supple with midline trachea  NECK: normal, supple, no adenopathy, thyroid normal in size  RESPIRATORY: appears well, vitals normal, no respiratory distress, acyanotic, normal RR, chest clear, no wheezing, crepitations, rhonchi, normal symmetric air entry  HEART: regular rate and rhythm, S1, S2 normal, no murmur, click, rub or gallop.    ABDOMEN: abdomen is soft without tenderness, no masses, no hernias, no organomegaly, no rebound, no guarding. Suprapubic tenderness absent. No CVA tenderness.  SKIN: no rashes, no wounds, no other lesions  PSYCH: Alert, oriented x 3, thought content appropriate, speech normal, pleasant and cooperative, good eye contact, well groomed    Assessment/Plan:  Daiana Mcduffie is a 89 y.o. female who presents today for :    Daiana was seen today for wheezing.    Diagnoses and all orders for this visit:    Gastroesophageal reflux disease, esophagitis presence not specified  -     pantoprazole (PROTONIX) 20 MG tablet; Take 1 tablet (20 mg total) by mouth once daily.      1.  Patient to take medications as prescribed  2.  Shortness of breath likely due to GERD  3.  Asymptomatic today  4.  Try protonix for 4 weeks and if no improvement will get PFT's  5.  Family told to call if symptoms worsen or become more frequent.  6.  Physical exam negative for wheezing or respiratory distress      Nino Treviño MD

## 2018-09-25 ENCOUNTER — NURSE TRIAGE (OUTPATIENT)
Dept: ADMINISTRATIVE | Facility: CLINIC | Age: 83
End: 2018-09-25

## 2018-09-25 NOTE — TELEPHONE ENCOUNTER
Reason for Disposition   MODERATE difficulty breathing (e.g., speaks in phrases, SOB even at rest, pulse 100-120) of new onset or worse than normal    Protocols used: ST BREATHING DIFFICULTY-A-OH    Pt states that she is short winded even if she is just sitting there. States it does come and go. ED advised.

## 2018-10-01 DIAGNOSIS — J30.9 ALLERGIC RHINITIS: ICD-10-CM

## 2018-10-01 RX ORDER — FLUTICASONE PROPIONATE 50 MCG
1 SPRAY, SUSPENSION (ML) NASAL DAILY
Qty: 1 BOTTLE | Refills: 1 | Status: SHIPPED | OUTPATIENT
Start: 2018-10-01 | End: 2019-09-11

## 2018-10-17 DIAGNOSIS — I10 ESSENTIAL HYPERTENSION: ICD-10-CM

## 2018-10-19 RX ORDER — LISINOPRIL 20 MG/1
TABLET ORAL
Qty: 30 TABLET | Refills: 0 | Status: SHIPPED | OUTPATIENT
Start: 2018-10-19 | End: 2018-12-08 | Stop reason: SDUPTHER

## 2018-12-08 DIAGNOSIS — I10 ESSENTIAL HYPERTENSION: ICD-10-CM

## 2018-12-10 RX ORDER — LISINOPRIL 20 MG/1
TABLET ORAL
Qty: 30 TABLET | Refills: 0 | Status: SHIPPED | OUTPATIENT
Start: 2018-12-10 | End: 2018-12-26 | Stop reason: SDUPTHER

## 2018-12-26 DIAGNOSIS — I10 ESSENTIAL HYPERTENSION: ICD-10-CM

## 2018-12-26 RX ORDER — LISINOPRIL 20 MG/1
TABLET ORAL
Qty: 30 TABLET | Refills: 0 | Status: SHIPPED | OUTPATIENT
Start: 2018-12-26 | End: 2019-02-21 | Stop reason: SDUPTHER

## 2019-01-17 DIAGNOSIS — K21.9 GASTROESOPHAGEAL REFLUX DISEASE, ESOPHAGITIS PRESENCE NOT SPECIFIED: ICD-10-CM

## 2019-01-17 RX ORDER — PANTOPRAZOLE SODIUM 20 MG/1
TABLET, DELAYED RELEASE ORAL
Qty: 90 TABLET | Refills: 0 | Status: SHIPPED | OUTPATIENT
Start: 2019-01-17 | End: 2019-05-23 | Stop reason: SDUPTHER

## 2019-02-10 ENCOUNTER — HOSPITAL ENCOUNTER (EMERGENCY)
Facility: HOSPITAL | Age: 84
Discharge: HOME | End: 2019-02-10
Attending: EMERGENCY MEDICINE
Payer: MEDICARE

## 2019-02-10 VITALS
BODY MASS INDEX: 21.85 KG/M2 | HEIGHT: 64 IN | OXYGEN SATURATION: 99 % | DIASTOLIC BLOOD PRESSURE: 115 MMHG | RESPIRATION RATE: 20 BRPM | TEMPERATURE: 98 F | WEIGHT: 128 LBS | HEART RATE: 71 BPM | SYSTOLIC BLOOD PRESSURE: 167 MMHG

## 2019-02-10 DIAGNOSIS — R06.02 SOB (SHORTNESS OF BREATH): Primary | ICD-10-CM

## 2019-02-10 DIAGNOSIS — I10 HYPERTENSION, UNSPECIFIED TYPE: ICD-10-CM

## 2019-02-10 LAB
ALBUMIN SERPL BCP-MCNC: 3.6 G/DL
ALP SERPL-CCNC: 97 U/L
ALT SERPL W/O P-5'-P-CCNC: 12 U/L
ANION GAP SERPL CALC-SCNC: 10 MMOL/L
AST SERPL-CCNC: 22 U/L
BASOPHILS # BLD AUTO: 0.02 K/UL
BASOPHILS NFR BLD: 0.3 %
BILIRUB SERPL-MCNC: 0.5 MG/DL
BNP SERPL-MCNC: 124 PG/ML
BUN SERPL-MCNC: 11 MG/DL
CALCIUM SERPL-MCNC: 9.6 MG/DL
CHLORIDE SERPL-SCNC: 108 MMOL/L
CO2 SERPL-SCNC: 27 MMOL/L
CREAT SERPL-MCNC: 0.8 MG/DL
DIFFERENTIAL METHOD: ABNORMAL
EOSINOPHIL # BLD AUTO: 0.1 K/UL
EOSINOPHIL NFR BLD: 1 %
ERYTHROCYTE [DISTWIDTH] IN BLOOD BY AUTOMATED COUNT: 13.4 %
EST. GFR  (AFRICAN AMERICAN): >60 ML/MIN/1.73 M^2
EST. GFR  (NON AFRICAN AMERICAN): >60 ML/MIN/1.73 M^2
GLUCOSE SERPL-MCNC: 93 MG/DL
HCT VFR BLD AUTO: 42.9 %
HGB BLD-MCNC: 13.4 G/DL
LYMPHOCYTES # BLD AUTO: 3.2 K/UL
LYMPHOCYTES NFR BLD: 55.6 %
MCH RBC QN AUTO: 29.2 PG
MCHC RBC AUTO-ENTMCNC: 31.2 G/DL
MCV RBC AUTO: 94 FL
MONOCYTES # BLD AUTO: 0.5 K/UL
MONOCYTES NFR BLD: 9.2 %
NEUTROPHILS # BLD AUTO: 2 K/UL
NEUTROPHILS NFR BLD: 33.9 %
PLATELET # BLD AUTO: 175 K/UL
PMV BLD AUTO: 10.2 FL
POTASSIUM SERPL-SCNC: 3.9 MMOL/L
PROT SERPL-MCNC: 7.1 G/DL
RBC # BLD AUTO: 4.59 M/UL
SODIUM SERPL-SCNC: 145 MMOL/L
TROPONIN I SERPL DL<=0.01 NG/ML-MCNC: <0.006 NG/ML
WBC # BLD AUTO: 5.76 K/UL

## 2019-02-10 PROCEDURE — 93010 EKG 12-LEAD: ICD-10-PCS | Mod: ,,, | Performed by: INTERNAL MEDICINE

## 2019-02-10 PROCEDURE — 80053 COMPREHEN METABOLIC PANEL: CPT

## 2019-02-10 PROCEDURE — 93005 ELECTROCARDIOGRAM TRACING: CPT

## 2019-02-10 PROCEDURE — 93010 ELECTROCARDIOGRAM REPORT: CPT | Mod: ,,, | Performed by: INTERNAL MEDICINE

## 2019-02-10 PROCEDURE — 85025 COMPLETE CBC W/AUTO DIFF WBC: CPT

## 2019-02-10 PROCEDURE — 83880 ASSAY OF NATRIURETIC PEPTIDE: CPT

## 2019-02-10 PROCEDURE — 99285 EMERGENCY DEPT VISIT HI MDM: CPT | Mod: 25

## 2019-02-10 PROCEDURE — 84484 ASSAY OF TROPONIN QUANT: CPT

## 2019-02-10 RX ORDER — CETIRIZINE HYDROCHLORIDE 10 MG/1
10 TABLET ORAL DAILY
Qty: 30 TABLET | Refills: 0 | Status: SHIPPED | OUTPATIENT
Start: 2019-02-10 | End: 2019-09-11

## 2019-02-10 NOTE — DISCHARGE INSTRUCTIONS

## 2019-02-10 NOTE — ED NOTES
Attempted to call pt's family; no answer; pt informed not able to reach family; d/c instructions gone over with pt; verbalized understanding; pt AAO X4 and in NAD; d/c to lobby

## 2019-02-10 NOTE — ED PROVIDER NOTES
Encounter Date: 2/10/2019       History     Chief Complaint   Patient presents with    Shortness of Breath     x 1 hour since waking up, 100% on RA upon EMS arrival, denies cough     89 y.o. female Past Medical History:  No date: GERD (gastroesophageal reflux disease)  No date: Hyperlipidemia  No date: Hypertension   Presents for evaluation of sob. Notes that it started while sleeping, has now improved/resolved. Does endorse post nasal drip and occasional non productive cough. No chest pain.          Review of patient's allergies indicates:   Allergen Reactions    Ibuprofen Shortness Of Breath    Penicillins Other (See Comments)     Goes in and out     Past Medical History:   Diagnosis Date    GERD (gastroesophageal reflux disease)     Hyperlipidemia     Hypertension      No past surgical history on file.  No family history on file.  Social History     Tobacco Use    Smoking status: Never Smoker    Smokeless tobacco: Never Used   Substance Use Topics    Alcohol use: No    Drug use: No     Review of Systems   Constitutional: Negative for fever.   HENT: Negative for sore throat.    Respiratory: Positive for cough and shortness of breath.    Cardiovascular: Negative for chest pain.   Gastrointestinal: Negative for nausea.   Genitourinary: Negative for dysuria.   Musculoskeletal: Negative for back pain.   Skin: Negative for rash.   Neurological: Negative for weakness.   Hematological: Does not bruise/bleed easily.   All other systems reviewed and are negative.      Physical Exam     Initial Vitals   BP Pulse Resp Temp SpO2   -- -- -- -- --      MAP       --         Physical Exam    Nursing note and vitals reviewed.  Constitutional: She appears well-developed and well-nourished.   HENT:   Head: Normocephalic and atraumatic.   Eyes: Conjunctivae and EOM are normal. Pupils are equal, round, and reactive to light.   Neck: Normal range of motion.   Cardiovascular: Normal rate.   Pulmonary/Chest: No respiratory  distress.   Abdominal: She exhibits no distension.   Musculoskeletal: Normal range of motion.   Neurological: She is alert. No cranial nerve deficit. GCS score is 15. GCS eye subscore is 4. GCS verbal subscore is 5. GCS motor subscore is 6.   Skin: Skin is warm and dry.   Psychiatric: She has a normal mood and affect. Thought content normal.         ED Course   Procedures  Labs Reviewed   CBC W/ AUTO DIFFERENTIAL   TROPONIN I   B-TYPE NATRIURETIC PEPTIDE   COMPREHENSIVE METABOLIC PANEL     EKG Readings: (Independently Interpreted)   Hr 80, sinus, nl axis, short [place] interval, no thomas, +twi III       Imaging Results    None          Medical Decision Making:   Initial Assessment:   89 y.o. Female here for sob now resolved.  Differential Diagnosis:   Will do ekg, screening labs, xr                 Labs Reviewed   CBC W/ AUTO DIFFERENTIAL - Abnormal; Notable for the following components:       Result Value    MCHC 31.2 (*)     Gran% 33.9 (*)     Lymph% 55.6 (*)     All other components within normal limits   B-TYPE NATRIURETIC PEPTIDE - Abnormal; Notable for the following components:     (*)     All other components within normal limits   TROPONIN I   COMPREHENSIVE METABOLIC PANEL       X-Ray Chest AP Portable   Final Result      No acute abnormality.         Electronically signed by: Jovanni Newman MD   Date:    02/10/2019   Time:    07:46                Clinical Impression:   The primary encounter diagnosis was SOB (shortness of breath). A diagnosis of Hypertension, unspecified type was also pertinent to this visit.                             Mindy Schmid MD  02/10/19 0809

## 2019-02-10 NOTE — ED TRIAGE NOTES
Pt arrived via ems with no complaints at this time but states she became sob last night while sleeping; denies cp, sob, n/v/d, fever, chills at this time

## 2019-02-21 DIAGNOSIS — I10 ESSENTIAL HYPERTENSION: ICD-10-CM

## 2019-02-21 RX ORDER — LISINOPRIL 20 MG/1
TABLET ORAL
Qty: 30 TABLET | Refills: 0 | Status: SHIPPED | OUTPATIENT
Start: 2019-02-21 | End: 2019-03-08

## 2019-02-21 RX ORDER — LISINOPRIL 20 MG/1
20 TABLET ORAL DAILY
Qty: 30 TABLET | Refills: 2 | Status: SHIPPED | OUTPATIENT
Start: 2019-02-21 | End: 2019-05-13 | Stop reason: SDUPTHER

## 2019-02-21 NOTE — TELEPHONE ENCOUNTER
----- Message from Amanda Rivera sent at 2/20/2019  5:15 PM CST -----  Contact: daughter   Patient would like refill of lisinopril (PRINIVIL,ZESTRIL) 20 MG tablet sent to [id number] - [place], [place] - [address]. Please advise.

## 2019-03-08 ENCOUNTER — OFFICE VISIT (OUTPATIENT)
Dept: FAMILY MEDICINE | Facility: CLINIC | Age: 84
End: 2019-03-08
Payer: MEDICARE

## 2019-03-08 VITALS
SYSTOLIC BLOOD PRESSURE: 134 MMHG | WEIGHT: 128.06 LBS | DIASTOLIC BLOOD PRESSURE: 70 MMHG | RESPIRATION RATE: 16 BRPM | BODY MASS INDEX: 21.86 KG/M2 | TEMPERATURE: 98 F | HEART RATE: 74 BPM | HEIGHT: 64 IN

## 2019-03-08 DIAGNOSIS — H91.93 BILATERAL HEARING LOSS, UNSPECIFIED HEARING LOSS TYPE: Primary | ICD-10-CM

## 2019-03-08 PROCEDURE — 99214 PR OFFICE/OUTPT VISIT, EST, LEVL IV, 30-39 MIN: ICD-10-PCS | Mod: S$PBB,,, | Performed by: FAMILY MEDICINE

## 2019-03-08 PROCEDURE — 99214 OFFICE O/P EST MOD 30 MIN: CPT | Mod: S$PBB,,, | Performed by: FAMILY MEDICINE

## 2019-03-08 PROCEDURE — 99999 PR PBB SHADOW E&M-EST. PATIENT-LVL III: CPT | Mod: PBBFAC,,, | Performed by: FAMILY MEDICINE

## 2019-03-08 PROCEDURE — 99213 OFFICE O/P EST LOW 20 MIN: CPT | Mod: PBBFAC,PN | Performed by: FAMILY MEDICINE

## 2019-03-08 PROCEDURE — 99999 PR PBB SHADOW E&M-EST. PATIENT-LVL III: ICD-10-PCS | Mod: PBBFAC,,, | Performed by: FAMILY MEDICINE

## 2019-03-08 RX ORDER — AMMONIUM LACTATE 12 G/100G
LOTION TOPICAL
COMMUNITY
Start: 2018-12-19 | End: 2019-09-11

## 2019-03-08 NOTE — PROGRESS NOTES
Routine Office Visit    Patient Name: Daiana Mcduffie    : 1929  MRN: [id number]    Subjective:  Daiana is a 89 y.o. female who presents today for:    1. Bilateral hearing loss  Patient presenting today with her daughter for increased hearing loss for 2 years.  No trauma to the ears and no ringing in the hears.  No recurring ear infections.  Her daughter is concerned because Mrs. Ahmadi misses a lot of phone calls due to not being able to hear the phone ring.  After not answering the phone a couple of times her daughter automatically checks on her.    Past Medical History  Past Medical History:   Diagnosis Date    GERD (gastroesophageal reflux disease)     Hyperlipidemia     Hypertension        Past Surgical History  History reviewed. No pertinent surgical history.    Family History  History reviewed. No pertinent family history.    Social History  Social History     Socioeconomic History    Marital status:      Spouse name: Not on file    Number of children: Not on file    Years of education: Not on file    Highest education level: Not on file   Social Needs    Financial resource strain: Not on file    Food insecurity - worry: Not on file    Food insecurity - inability: Not on file    Transportation needs - medical: Not on file    Transportation needs - non-medical: Not on file   Occupational History    Not on file   Tobacco Use    Smoking status: Never Smoker    Smokeless tobacco: Never Used   Substance and Sexual Activity    Alcohol use: No    Drug use: No    Sexual activity: No   Other Topics Concern    Not on file   Social History Narrative    Not on file       Current Medications  Current Outpatient Medications on File Prior to Visit   Medication Sig Dispense Refill    ammonium lactate (LAC-HYDRIN) 12 % lotion       atorvastatin (LIPITOR) 20 MG tablet Take 1 tablet (20 mg total) by mouth once daily. 90 tablet 1    cetirizine (ZYRTEC) 10 MG tablet Take 1 tablet (10 mg total)  "by mouth once daily. 30 tablet 0    ELIQUIS 5 mg Tab Take 1 tablet (5 mg total) by mouth 2 (two) times daily. 180 tablet 1    fluticasone (FLONASE) 50 mcg/actuation nasal spray 1 spray (50 mcg total) by Each Nare route once daily. 1 Bottle 1    lisinopril (PRINIVIL,ZESTRIL) 20 MG tablet Take 1 tablet (20 mg total) by mouth once daily. 30 tablet 2    ondansetron (ZOFRAN-ODT) 4 MG TbDL Take 2 tablets (8 mg total) by mouth every 8 (eight) hours as needed (nausea). 20 tablet 0    pantoprazole (PROTONIX) 20 MG tablet TAKE 1 TABLET BY MOUTH ONCE DAILY 90 tablet 0    simethicone 80 mg Tab Take 1 tablet by mouth 2 (two) times daily as needed. 30 each 3    sucralfate (CARAFATE) 100 mg/mL suspension Take 10 mLs (1 g total) by mouth every 6 (six) hours as needed (abdominal discomfort / gas pain). 414 mL 2    triamcinolone acetonide 0.1% (KENALOG) 0.1 % cream Apply topically 2 (two) times daily. 28.4 g 0    ketoconazole (NIZORAL) 2 % cream Apply topically 2 (two) times daily. 15 g 0    meclizine (ANTIVERT) 25 mg tablet TAKE ONE TABLET BY MOUTH THREE TIMES DAILY AS NEEDED FOR  DIZZINESS 30 tablet 0    [DISCONTINUED] lisinopril (PRINIVIL,ZESTRIL) 20 MG tablet TAKE 1 TABLET BY MOUTH ONCE DAILY 30 tablet 0     No current facility-administered medications on file prior to visit.        Allergies   Review of patient's allergies indicates:   Allergen Reactions    Ibuprofen Shortness Of Breath    Penicillins Other (See Comments)     Goes in and out       Review of Systems (Pertinent positives)  Review of Systems   Constitutional: Negative.    HENT: Positive for hearing loss. Negative for ear discharge, ear pain and tinnitus.    Eyes: Negative.    Respiratory: Negative.    Cardiovascular: Negative.    Gastrointestinal: Negative.    Skin: Negative.          /70   Pulse 74   Temp 98.3 °F (36.8 °C) (Oral)   Resp 16   Ht 5' 4" (1.626 m)   Wt 58.1 kg (128 lb 1.4 oz)   BMI 21.99 kg/m²     GENERAL APPEARANCE: in no " apparent distress and well developed and well nourished  HEENT: PERRL, EOMI, Sclera clear, anicteric, Oropharynx clear, no lesions, Neck supple with midline trachea  NECK: normal, supple, no adenopathy, thyroid normal in size  RESPIRATORY: appears well, vitals normal, no respiratory distress, acyanotic, normal RR, chest clear, no wheezing, crepitations, rhonchi, normal symmetric air entry  HEART: regular rate and rhythm, S1, S2 normal, no murmur, click, rub or gallop.    ABDOMEN: abdomen is soft without tenderness, no masses, no hernias, no organomegaly, no rebound, no guarding. Suprapubic tenderness absent. No CVA tenderness.  SKIN: no rashes, no wounds, no other lesions  PSYCH: Alert, oriented x 3, thought content appropriate, speech normal, pleasant and cooperative, good eye contact, well groomed    Assessment/Plan:  Daiana Mcduffie is a 89 y.o. female who presents today for :    Daiana was seen today for hearing loss.    Diagnoses and all orders for this visit:    Bilateral hearing loss, unspecified hearing loss type  -     Ambulatory referral to ENT      1.  Refer to ENT for hearing testing  2.  Patient voiced concerns that she isn't gaining weight  3.  Encouraged patient to eat whatever she wants at this time  4.  Follow up as needed    Nino Treviño MD

## 2019-03-14 ENCOUNTER — TELEPHONE (OUTPATIENT)
Dept: FAMILY MEDICINE | Facility: CLINIC | Age: 84
End: 2019-03-14

## 2019-03-14 DIAGNOSIS — R54 AGE-RELATED PHYSICAL DEBILITY: Primary | ICD-10-CM

## 2019-03-15 ENCOUNTER — TELEPHONE (OUTPATIENT)
Dept: OTOLARYNGOLOGY | Facility: CLINIC | Age: 84
End: 2019-03-15

## 2019-03-15 NOTE — TELEPHONE ENCOUNTER
appts with dr migdalia verdugo, she does treat ear problems. Left message for relative care taker to call office

## 2019-03-20 ENCOUNTER — TELEPHONE (OUTPATIENT)
Dept: OTOLARYNGOLOGY | Facility: CLINIC | Age: 84
End: 2019-03-20

## 2019-03-20 NOTE — TELEPHONE ENCOUNTER
tried calling patient again to reschedule her appointment with dr negron to dr caldera. No answer, left voicemail to call office. Will schedule her on dr master schedule for his 1st available

## 2019-05-13 DIAGNOSIS — I10 ESSENTIAL HYPERTENSION: ICD-10-CM

## 2019-05-14 ENCOUNTER — HOSPITAL ENCOUNTER (EMERGENCY)
Facility: HOSPITAL | Age: 84
Discharge: HOME | End: 2019-05-15
Attending: EMERGENCY MEDICINE
Payer: MEDICARE

## 2019-05-14 DIAGNOSIS — R03.0 ELEVATED BLOOD PRESSURE READING: Primary | ICD-10-CM

## 2019-05-14 DIAGNOSIS — R60.0 BILATERAL LOWER EXTREMITY EDEMA: ICD-10-CM

## 2019-05-14 DIAGNOSIS — R51.9 ACUTE NONINTRACTABLE HEADACHE, UNSPECIFIED HEADACHE TYPE: ICD-10-CM

## 2019-05-14 DIAGNOSIS — N30.00 ACUTE CYSTITIS WITHOUT HEMATURIA: ICD-10-CM

## 2019-05-14 PROCEDURE — 93010 ELECTROCARDIOGRAM REPORT: CPT | Mod: ,,, | Performed by: INTERNAL MEDICINE

## 2019-05-14 PROCEDURE — 93005 ELECTROCARDIOGRAM TRACING: CPT

## 2019-05-14 PROCEDURE — 93010 EKG 12-LEAD: ICD-10-PCS | Mod: ,,, | Performed by: INTERNAL MEDICINE

## 2019-05-14 PROCEDURE — 99285 EMERGENCY DEPT VISIT HI MDM: CPT | Mod: 25

## 2019-05-14 PROCEDURE — 96374 THER/PROPH/DIAG INJ IV PUSH: CPT

## 2019-05-14 RX ORDER — LISINOPRIL 20 MG/1
TABLET ORAL
Qty: 90 TABLET | Refills: 0 | Status: SHIPPED | OUTPATIENT
Start: 2019-05-14 | End: 2019-08-07 | Stop reason: SDUPTHER

## 2019-05-15 VITALS
RESPIRATION RATE: 22 BRPM | HEIGHT: 64 IN | OXYGEN SATURATION: 96 % | BODY MASS INDEX: 21.85 KG/M2 | HEART RATE: 76 BPM | WEIGHT: 128 LBS | SYSTOLIC BLOOD PRESSURE: 167 MMHG | DIASTOLIC BLOOD PRESSURE: 92 MMHG | TEMPERATURE: 98 F

## 2019-05-15 LAB
ALBUMIN SERPL BCP-MCNC: 3.7 G/DL (ref 3.5–5.2)
ALP SERPL-CCNC: 85 U/L (ref 55–135)
ALT SERPL W/O P-5'-P-CCNC: 9 U/L (ref 10–44)
ANION GAP SERPL CALC-SCNC: 9 MMOL/L (ref 8–16)
AST SERPL-CCNC: 19 U/L (ref 10–40)
BASOPHILS # BLD AUTO: 0.02 K/UL (ref 0–0.2)
BASOPHILS NFR BLD: 0.4 % (ref 0–1.9)
BILIRUB SERPL-MCNC: 0.4 MG/DL (ref 0.1–1)
BILIRUB UR QL STRIP: NEGATIVE
BNP SERPL-MCNC: 198 PG/ML (ref 0–99)
BUN SERPL-MCNC: 12 MG/DL (ref 8–23)
CALCIUM SERPL-MCNC: 9.8 MG/DL (ref 8.7–10.5)
CHLORIDE SERPL-SCNC: 111 MMOL/L (ref 95–110)
CLARITY UR: CLEAR
CO2 SERPL-SCNC: 24 MMOL/L (ref 23–29)
COLOR UR: ABNORMAL
CREAT SERPL-MCNC: 0.8 MG/DL (ref 0.5–1.4)
DIFFERENTIAL METHOD: ABNORMAL
EOSINOPHIL # BLD AUTO: 0 K/UL (ref 0–0.5)
EOSINOPHIL NFR BLD: 0.4 % (ref 0–8)
ERYTHROCYTE [DISTWIDTH] IN BLOOD BY AUTOMATED COUNT: 13.1 % (ref 11.5–14.5)
EST. GFR  (AFRICAN AMERICAN): >60 ML/MIN/1.73 M^2
EST. GFR  (NON AFRICAN AMERICAN): >60 ML/MIN/1.73 M^2
GLUCOSE SERPL-MCNC: 96 MG/DL (ref 70–110)
GLUCOSE UR QL STRIP: NEGATIVE
HCT VFR BLD AUTO: 42.5 % (ref 37–48.5)
HGB BLD-MCNC: 13.4 G/DL (ref 12–16)
HGB UR QL STRIP: NEGATIVE
KETONES UR QL STRIP: ABNORMAL
LEUKOCYTE ESTERASE UR QL STRIP: ABNORMAL
LYMPHOCYTES # BLD AUTO: 2.5 K/UL (ref 1–4.8)
LYMPHOCYTES NFR BLD: 46.5 % (ref 18–48)
MCH RBC QN AUTO: 29.6 PG (ref 27–31)
MCHC RBC AUTO-ENTMCNC: 31.5 G/DL (ref 32–36)
MCV RBC AUTO: 94 FL (ref 82–98)
MICROSCOPIC COMMENT: NORMAL
MONOCYTES # BLD AUTO: 0.5 K/UL (ref 0.3–1)
MONOCYTES NFR BLD: 8.5 % (ref 4–15)
NEUTROPHILS # BLD AUTO: 2.3 K/UL (ref 1.8–7.7)
NEUTROPHILS NFR BLD: 44 % (ref 38–73)
NITRITE UR QL STRIP: NEGATIVE
PH UR STRIP: 7 [PH] (ref 5–8)
PLATELET # BLD AUTO: 163 K/UL (ref 150–350)
PMV BLD AUTO: 10 FL (ref 9.2–12.9)
POTASSIUM SERPL-SCNC: 3.7 MMOL/L (ref 3.5–5.1)
PROT SERPL-MCNC: 7 G/DL (ref 6–8.4)
PROT UR QL STRIP: NEGATIVE
RBC # BLD AUTO: 4.53 M/UL (ref 4–5.4)
RBC #/AREA URNS HPF: 1 /HPF (ref 0–4)
SODIUM SERPL-SCNC: 144 MMOL/L (ref 136–145)
SP GR UR STRIP: 1 (ref 1–1.03)
SQUAMOUS #/AREA URNS HPF: NORMAL /HPF
TROPONIN I SERPL DL<=0.01 NG/ML-MCNC: <0.006 NG/ML (ref 0–0.03)
URN SPEC COLLECT METH UR: ABNORMAL
UROBILINOGEN UR STRIP-ACNC: NEGATIVE EU/DL
WBC # BLD AUTO: 5.31 K/UL (ref 3.9–12.7)
WBC #/AREA URNS HPF: 1 /HPF (ref 0–5)

## 2019-05-15 PROCEDURE — 63600175 PHARM REV CODE 636 W HCPCS: Performed by: EMERGENCY MEDICINE

## 2019-05-15 PROCEDURE — 25000003 PHARM REV CODE 250: Performed by: EMERGENCY MEDICINE

## 2019-05-15 PROCEDURE — 81000 URINALYSIS NONAUTO W/SCOPE: CPT

## 2019-05-15 PROCEDURE — 85025 COMPLETE CBC W/AUTO DIFF WBC: CPT

## 2019-05-15 PROCEDURE — 84484 ASSAY OF TROPONIN QUANT: CPT

## 2019-05-15 PROCEDURE — 80053 COMPREHEN METABOLIC PANEL: CPT

## 2019-05-15 PROCEDURE — 83880 ASSAY OF NATRIURETIC PEPTIDE: CPT

## 2019-05-15 RX ORDER — LISINOPRIL 20 MG/1
20 TABLET ORAL
Status: COMPLETED | OUTPATIENT
Start: 2019-05-15 | End: 2019-05-15

## 2019-05-15 RX ORDER — ACETAMINOPHEN 500 MG
1000 TABLET ORAL
Status: COMPLETED | OUTPATIENT
Start: 2019-05-15 | End: 2019-05-15

## 2019-05-15 RX ORDER — METOCLOPRAMIDE HYDROCHLORIDE 5 MG/ML
10 INJECTION INTRAMUSCULAR; INTRAVENOUS
Status: COMPLETED | OUTPATIENT
Start: 2019-05-15 | End: 2019-05-15

## 2019-05-15 RX ORDER — CEPHALEXIN 250 MG/5ML
500 POWDER, FOR SUSPENSION ORAL
Status: COMPLETED | OUTPATIENT
Start: 2019-05-15 | End: 2019-05-15

## 2019-05-15 RX ORDER — CEPHALEXIN 250 MG/5ML
500 POWDER, FOR SUSPENSION ORAL 3 TIMES DAILY
Qty: 300 ML | Refills: 0 | Status: SHIPPED | OUTPATIENT
Start: 2019-05-15 | End: 2019-05-25

## 2019-05-15 RX ADMIN — LISINOPRIL 20 MG: 20 TABLET ORAL at 12:05

## 2019-05-15 RX ADMIN — CEPHALEXIN 500 MG: 250 POWDER, FOR SUSPENSION ORAL at 01:05

## 2019-05-15 RX ADMIN — ACETAMINOPHEN 1000 MG: 500 TABLET, FILM COATED ORAL at 12:05

## 2019-05-15 RX ADMIN — METOCLOPRAMIDE 10 MG: 5 INJECTION, SOLUTION INTRAMUSCULAR; INTRAVENOUS at 12:05

## 2019-05-15 NOTE — ED NOTES
Spoke with [facility] on updated ETA and they reported they thought they communicated they did not have anyone to come that way, pt was not a pysch so they do not have to come for her, but since it was Daiana Mcduffie they will try to have someone come get her

## 2019-05-15 NOTE — ED TRIAGE NOTES
Pt here with c/o HTN. Pt reports she ran out of her BP meds today. Pt is not sure of the name of the medicine. Pt reports she had a slight headache today but is now better.

## 2019-05-15 NOTE — ED PROVIDER NOTES
"Encounter Date: 5/14/2019       History     Chief Complaint   Patient presents with    Headache     x 1 day, hx of htn, reports running out of her BP meds      This is an emergent evaluation of a 89-year-old female brought in by EMS for headache and lower extremity swelling.  The patient reports she ran out of her blood pressure medication today, though she has a refill at the pharmacy.  She developed a generalized headache around 10:00 a.m..  She took some Tylenol, but thinks that made it worse.  No numbness or weakness. No photophobia.  Patient also reports bilateral lower extremity swelling which she thinks started yesterday.  No nausea or vomiting. No diaphoresis.  No chest pain or shortness of breath.  Patient reports new intermittent loss of voice which she attributes to "sinus."    On review of Epic, patient is seen here most often for shortness of breath. I do not see any prior visits for headache.    History limited because patient is hard of hearing and is a poor historian.    PMH: HTN, DLD, pulmonary embolism (October 2017) on Eliquis, GERD      Echocardiogram 9/10/17  CONCLUSIONS     1 - Mildly depressed left ventricular systolic function (EF 45-50%).     2 - Mild global hypokinesis.     3 - Concentric remodeling.     4 - Impaired LV relaxation, normal LAP (grade 1 diastolic dysfunction).     5 - Mildly to moderately depressed right ventricular systolic function .     6 - Mild tricuspid regurgitation.     7 - Pulmonary hypertension. The estimated PA systolic pressure is 44 mmHg.           Review of patient's allergies indicates:   Allergen Reactions    Ibuprofen Shortness Of Breath    Penicillins Other (See Comments)     Goes in and out     Past Medical History:   Diagnosis Date    GERD (gastroesophageal reflux disease)     Hyperlipidemia     Hypertension      History reviewed. No pertinent surgical history.  History reviewed. No pertinent family history.  Social History     Tobacco Use    Smoking " status: Never Smoker    Smokeless tobacco: Never Used   Substance Use Topics    Alcohol use: No    Drug use: No     Review of Systems   Constitutional: Negative for fever.   HENT: Positive for hearing loss (chronic) and voice change (intermittently hoarse/soft).    Eyes: Negative for visual disturbance.   Respiratory: Negative for shortness of breath.    Cardiovascular: Positive for leg swelling (since yesterday). Negative for chest pain.   Gastrointestinal: Negative for abdominal pain, nausea and vomiting.   Genitourinary: Negative for dysuria.   Musculoskeletal: Negative for neck pain.   Skin: Negative for rash.   Neurological: Positive for headaches.   Hematological: Bruises/bleeds easily (on Eliquis).       Physical Exam     Initial Vitals [05/14/19 2301]   BP Pulse Resp Temp SpO2   (!) 188/100 88 18 97.4 °F (36.3 °C) 99 %      MAP       --         Physical Exam    Nursing note and vitals reviewed.  Constitutional: She appears well-developed and well-nourished. She is not diaphoretic.   Awake and alert elderly female. Thin, frail.    HENT:   Head: Normocephalic and atraumatic.   Edentulous. Dry oropharynx. Intermittently hoarse phonation, clears with effort.   Eyes: Conjunctivae and EOM are normal. Pupils are equal, round, and reactive to light.   Neck: Normal range of motion. Neck supple.   Cardiovascular: Normal rate, regular rhythm and intact distal pulses.   Murmur heard.  Pulmonary/Chest: Breath sounds normal. No respiratory distress. She has no wheezes. She has no rhonchi. She has no rales.   Abdominal: Soft. Bowel sounds are normal. She exhibits no distension. There is no tenderness. There is no rebound and no guarding.   Musculoskeletal: Normal range of motion. She exhibits edema (1+ bilat LE). She exhibits no tenderness.   Neurological: She is alert and oriented to person, place, and time. She has normal strength.   Moving all extremities.   Skin: Skin is warm and dry. No erythema. No pallor.    Psychiatric: She has a normal mood and affect.         ED Course   Procedures  Labs Reviewed   CBC W/ AUTO DIFFERENTIAL - Abnormal; Notable for the following components:       Result Value    Mean Corpuscular Hemoglobin Conc 31.5 (*)     All other components within normal limits   COMPREHENSIVE METABOLIC PANEL - Abnormal; Notable for the following components:    Chloride 111 (*)     ALT 9 (*)     All other components within normal limits   B-TYPE NATRIURETIC PEPTIDE - Abnormal; Notable for the following components:     (*)     All other components within normal limits   URINALYSIS, REFLEX TO URINE CULTURE - Abnormal; Notable for the following components:    Ketones, UA Trace (*)     Leukocytes, UA 1+ (*)     All other components within normal limits    Narrative:     Preferred Collection Type->Urine, Clean Catch   TROPONIN I   URINALYSIS MICROSCOPIC    Narrative:     Preferred Collection Type->Urine, Clean Catch     EKG Readings: (Independently Interpreted)   23:21: NSR, HR 83. Normal axis. TWI in III. No STEMI.      ECG Results          EKG 12-lead (Final result)  Result time 05/15/19 18:41:08    Final result by Interface, Lab [facility] (05/15/19 18:41:08)                 Narrative:    Test Reason : R03.0,    Vent. Rate : 083 BPM     Atrial Rate : 083 BPM     P-R Int : 090 ms          QRS Dur : 078 ms      QT Int : 360 ms       P-R-T Axes : 040 035 -70 degrees     QTc Int : 423 ms    Sinus rhythm with short AK  Nonspecific T wave abnormality  Abnormal ECG  When compared with ECG of 10-FEB-2019 07:13,  No significant change was found  Confirmed by Raul Olivera MD ([id number]) on 5/15/2019 6:41:01 PM    Referred By: [email or web address]   SELF           Confirmed By:Raul Olivera MD                            Imaging Results          X-Ray Chest AP Portable (Final result)  Result time 05/15/19 04:09:47    Final result by Ronald Isaac MD (05/15/19 04:09:47)                 Impression:      Borderline cardiomegaly.  No  "detrimental change when compared with 02/10/2019.      Electronically signed by: Ronald Isaac MD  Date:    05/15/2019  Time:    04:09             Narrative:    EXAMINATION:  XR CHEST AP PORTABLE    CLINICAL HISTORY:  Provided history is "  Localized edema".    TECHNIQUE:  One view of the chest.    COMPARISON:  02/10/2019.    FINDINGS:  Cardiac silhouette is mildly prominent but stable when compared with the prior study.  Atherosclerotic calcifications overlie the aortic arch.  No focal consolidation.  No sizable pleural effusion.  No pneumothorax.  No detrimental change.                               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  If persistent neurological abnormality, recommend MRI of the brain with diffusion-weighted sequencing.      Electronically signed by: Sherin Argueta  Date:    05/15/2019  Time:    00:27             Narrative:    EXAMINATION:  CT OF THE HEAD WITHOUT    CLINICAL HISTORY:  Headache, acute, norm neuro exam;on Eliquis;    TECHNIQUE:  5 mm unenhanced axial images were obtained from the skull base to the vertex.    COMPARISON:  None.    FINDINGS:  There is stable cerebral atrophy and chronic small vessel ischemic changes.  There are bilateral age indeterminate lacunar infarcts in bilateral caudate nuclei.  Senescent calcifications are seen in bilateral basal ganglia.  There is no acute intracranial hemorrhage, territorial infarct or mass effect, or midline shift. The visualized paranasal sinuses and mastoid air cells are clear. There is hyperostosis frontalis.                              X-Rays:   Independently Interpreted Readings:   Other Readings:  CXR cardiomegaly    Medical Decision Making:   History:   Old Medical Records: I decided to obtain old medical records.  Old Records Summarized: records " from previous admission(s).  Initial Assessment:   89-year-old female with headache and lower extremity swelling.  Elevated BP reading -- missed meds today.  Differential Diagnosis:   Ddx includes hypertensive urgency vs emergency, ICH, CHF, ACS, other.  Independently Interpreted Test(s):   I have ordered and independently interpreted X-rays - see prior notes.  I have ordered and independently interpreted EKG Reading(s) - see prior notes  Clinical Tests:   Lab Tests: Ordered and Reviewed  Radiological Study: Ordered and Reviewed  Medical Tests: Ordered and Reviewed  ED Management:  EKG no STEMI.    CXR cardiomegaly c/w prior, no findings of acute overload.    CT head no ICH. Prior infarcts I suspect are chronic.    UA 1+ leuks. CBC, CMP, troponin, BNP reassuring.    I suspect elevated BP reading due to essential HTN + missed med. Headache may be related to UTI but as not thunderclap or worst of life, no meningismus, no new neuro deficits, I doubt other acute intracranial pathology.     Patient had lisinopril PO in ED, reglan IV, tylenol PO, and keflex for HTN, HA, UTI. She did feel better after interventions.     I have d/c'ed patient with refills of regular meds. I have stressed compliance with HTN meds. I have rx'ed keflex for UTI. I have stressed PMD f/u.                       Clinical Impression:       ICD-10-CM ICD-9-CM   1. Elevated blood pressure reading R03.0 796.2   2. Bilateral lower extremity edema R60.0 782.3   3. Acute nonintractable headache, unspecified headache type R51 784.0   4. Acute cystitis without hematuria N30.00 595.0                                Jenna Adkins MD  05/16/19 [id number]

## 2019-05-23 DIAGNOSIS — K21.9 GASTROESOPHAGEAL REFLUX DISEASE, ESOPHAGITIS PRESENCE NOT SPECIFIED: ICD-10-CM

## 2019-05-23 RX ORDER — PANTOPRAZOLE SODIUM 20 MG/1
TABLET, DELAYED RELEASE ORAL
Qty: 90 TABLET | Refills: 0 | Status: SHIPPED | OUTPATIENT
Start: 2019-05-23 | End: 2020-07-08 | Stop reason: SDUPTHER

## 2019-05-31 ENCOUNTER — EXTERNAL CHRONIC CARE MANAGEMENT (OUTPATIENT)
Dept: PRIMARY CARE CLINIC | Facility: CLINIC | Age: 84
End: 2019-05-31
Payer: MEDICARE

## 2019-05-31 PROCEDURE — 99490 CHRNC CARE MGMT STAFF 1ST 20: CPT | Mod: PBBFAC,PN | Performed by: INTERNAL MEDICINE

## 2019-05-31 PROCEDURE — 99490 PR CHRONIC CARE MGMT, 1ST 20 MIN: ICD-10-PCS | Mod: S$PBB,,, | Performed by: INTERNAL MEDICINE

## 2019-05-31 PROCEDURE — 99490 CHRNC CARE MGMT STAFF 1ST 20: CPT | Mod: S$PBB,,, | Performed by: INTERNAL MEDICINE

## 2019-06-10 ENCOUNTER — HOSPITAL ENCOUNTER (EMERGENCY)
Facility: HOSPITAL | Age: 84
Discharge: HOME | End: 2019-06-10
Attending: EMERGENCY MEDICINE
Payer: MEDICARE

## 2019-06-10 VITALS
HEIGHT: 64 IN | DIASTOLIC BLOOD PRESSURE: 92 MMHG | OXYGEN SATURATION: 98 % | WEIGHT: 130 LBS | RESPIRATION RATE: 18 BRPM | HEART RATE: 87 BPM | TEMPERATURE: 98 F | SYSTOLIC BLOOD PRESSURE: 176 MMHG | BODY MASS INDEX: 22.2 KG/M2

## 2019-06-10 DIAGNOSIS — R06.02 SHORTNESS OF BREATH: ICD-10-CM

## 2019-06-10 LAB
ALBUMIN SERPL BCP-MCNC: 3.7 G/DL (ref 3.5–5.2)
ALP SERPL-CCNC: 85 U/L (ref 55–135)
ALT SERPL W/O P-5'-P-CCNC: 12 U/L (ref 10–44)
ANION GAP SERPL CALC-SCNC: 8 MMOL/L (ref 8–16)
AST SERPL-CCNC: 33 U/L (ref 10–40)
BASOPHILS # BLD AUTO: 0.02 K/UL (ref 0–0.2)
BASOPHILS NFR BLD: 0.6 % (ref 0–1.9)
BILIRUB SERPL-MCNC: 0.5 MG/DL (ref 0.1–1)
BNP SERPL-MCNC: 76 PG/ML (ref 0–99)
BUN SERPL-MCNC: 16 MG/DL (ref 8–23)
CALCIUM SERPL-MCNC: 9.6 MG/DL (ref 8.7–10.5)
CHLORIDE SERPL-SCNC: 109 MMOL/L (ref 95–110)
CO2 SERPL-SCNC: 27 MMOL/L (ref 23–29)
CREAT SERPL-MCNC: 0.9 MG/DL (ref 0.5–1.4)
DIFFERENTIAL METHOD: ABNORMAL
EOSINOPHIL # BLD AUTO: 0 K/UL (ref 0–0.5)
EOSINOPHIL NFR BLD: 1.1 % (ref 0–8)
ERYTHROCYTE [DISTWIDTH] IN BLOOD BY AUTOMATED COUNT: 13.2 % (ref 11.5–14.5)
EST. GFR  (AFRICAN AMERICAN): >60 ML/MIN/1.73 M^2
EST. GFR  (NON AFRICAN AMERICAN): 56 ML/MIN/1.73 M^2
GLUCOSE SERPL-MCNC: 99 MG/DL (ref 70–110)
HCT VFR BLD AUTO: 42.2 % (ref 37–48.5)
HGB BLD-MCNC: 13.2 G/DL (ref 12–16)
INR PPP: 1 (ref 0.8–1.2)
LYMPHOCYTES # BLD AUTO: 1.6 K/UL (ref 1–4.8)
LYMPHOCYTES NFR BLD: 43.5 % (ref 18–48)
MAGNESIUM SERPL-MCNC: 2.2 MG/DL (ref 1.6–2.6)
MCH RBC QN AUTO: 29.9 PG (ref 27–31)
MCHC RBC AUTO-ENTMCNC: 31.3 G/DL (ref 32–36)
MCV RBC AUTO: 96 FL (ref 82–98)
MONOCYTES # BLD AUTO: 0.3 K/UL (ref 0.3–1)
MONOCYTES NFR BLD: 8.3 % (ref 4–15)
NEUTROPHILS # BLD AUTO: 1.7 K/UL (ref 1.8–7.7)
NEUTROPHILS NFR BLD: 47.6 % (ref 38–73)
PLATELET # BLD AUTO: ABNORMAL K/UL (ref 150–350)
PMV BLD AUTO: ABNORMAL FL (ref 9.2–12.9)
POTASSIUM SERPL-SCNC: 4.8 MMOL/L (ref 3.5–5.1)
PROT SERPL-MCNC: 7.2 G/DL (ref 6–8.4)
PROTHROMBIN TIME: 10.9 SEC (ref 9–12.5)
RBC # BLD AUTO: 4.42 M/UL (ref 4–5.4)
SODIUM SERPL-SCNC: 144 MMOL/L (ref 136–145)
TROPONIN I SERPL DL<=0.01 NG/ML-MCNC: 0.01 NG/ML (ref 0–0.03)
WBC # BLD AUTO: 3.61 K/UL (ref 3.9–12.7)

## 2019-06-10 PROCEDURE — 93010 EKG 12-LEAD: ICD-10-PCS | Mod: ,,, | Performed by: INTERNAL MEDICINE

## 2019-06-10 PROCEDURE — 83880 ASSAY OF NATRIURETIC PEPTIDE: CPT

## 2019-06-10 PROCEDURE — 85610 PROTHROMBIN TIME: CPT

## 2019-06-10 PROCEDURE — 93005 ELECTROCARDIOGRAM TRACING: CPT

## 2019-06-10 PROCEDURE — 84484 ASSAY OF TROPONIN QUANT: CPT

## 2019-06-10 PROCEDURE — 99284 EMERGENCY DEPT VISIT MOD MDM: CPT | Mod: 25

## 2019-06-10 PROCEDURE — 83735 ASSAY OF MAGNESIUM: CPT

## 2019-06-10 PROCEDURE — 25000003 PHARM REV CODE 250: Performed by: EMERGENCY MEDICINE

## 2019-06-10 PROCEDURE — 93010 ELECTROCARDIOGRAM REPORT: CPT | Mod: ,,, | Performed by: INTERNAL MEDICINE

## 2019-06-10 PROCEDURE — 80053 COMPREHEN METABOLIC PANEL: CPT

## 2019-06-10 PROCEDURE — 85025 COMPLETE CBC W/AUTO DIFF WBC: CPT

## 2019-06-10 RX ORDER — LISINOPRIL 20 MG/1
20 TABLET ORAL
Status: COMPLETED | OUTPATIENT
Start: 2019-06-10 | End: 2019-06-10

## 2019-06-10 RX ADMIN — LISINOPRIL 20 MG: 20 TABLET ORAL at 12:06

## 2019-06-10 NOTE — DISCHARGE INSTRUCTIONS
Your lab tests are within acceptable limits.  Chest x-ray does not show evidence of pneumonia.  Exact cause of your symptoms is unclear at this time.  Is important that you continue your medications as you have been prescribed and follow up with your primary physician as soon as possible to monitor your symptoms.  You have been evaluated by social Work in the emergency department and a nurse will come to your home to assess her needs and if you need any additional help at home.  Call 911 or return to the emergency department for chest pain persistent or worsening breathing difficulty, new numbness or weakness or any new, worsening or concerning symptoms.

## 2019-06-10 NOTE — CONSULTS
Follow-up Information     Nino Treviño MD. Schedule an appointment as soon as possible for a visit on 6/19/2019.    Specialties:  Family Medicine, Wound Care  Why:  Outpatient Services @10:40am   Contact information:  [address] [place] [address]  [phone]             [facility].    Specialties:  DME Provider, Home Health Services  Why:  Home Health  Contact information:  [address]  [facility]  [place] [id number] [phone]

## 2019-06-10 NOTE — ED PROVIDER NOTES
Encounter Date: 6/10/2019    SCRIBE #1 NOTE: I, Lala Sheehan, am scribing for, and in the presence of,  Elvia Crane MD. I have scribed the following portions of the note - Other sections scribed: HPI, ROS, PE.       History     Chief Complaint   Patient presents with    Shortness of Breath     reports difficulty breathing x 2 days, denies CP     CC: Shortness of Breath    HPI: This 89 y.o female who has HTN, GERD, and HLD presents to the ED for an evaluation of acute onset, intermittent shortness of breath that began last night.  Patient denies fever, chest pain, cough, abdominal pain, nausea, emesis, dysuria, or any other associated symptoms.  She reports she currently lives alone, but has a close family friend that checks on her occasionally and accompanies her to primary care appointments.  Patient reports that her friend has been very busy recently as her friend's  recently had an amputation.  She denies taking her medications this morning and reports missing a few doses of her prescribed medications over the past few days, because she gets confused about her medications.  She denies headache or vision changes.  She denies numbness or weakness. No prior tx.  No alleviating factors.  PCP is Dr. RADHA Treviño.     The history is provided by the patient. No  was used.     Review of patient's allergies indicates:   Allergen Reactions    Ibuprofen Shortness Of Breath    Penicillins Other (See Comments)     Goes in and out     Past Medical History:   Diagnosis Date    GERD (gastroesophageal reflux disease)     Hyperlipidemia     Hypertension      History reviewed. No pertinent surgical history.  History reviewed. No pertinent family history.  Social History     Tobacco Use    Smoking status: Never Smoker    Smokeless tobacco: Never Used   Substance Use Topics    Alcohol use: No    Drug use: No     Review of Systems   Constitutional: Negative for chills and fever.   HENT: Negative for  ear pain and sore throat.    Eyes: Negative for pain.   Respiratory: Positive for shortness of breath. Negative for cough.    Cardiovascular: Negative for chest pain.   Gastrointestinal: Negative for abdominal pain, diarrhea, nausea and vomiting.   Genitourinary: Negative for dysuria.   Musculoskeletal: Negative for back pain.   Skin: Negative for rash.   Neurological: Negative for headaches.       Physical Exam     Initial Vitals [06/10/19 1103]   BP Pulse Resp Temp SpO2   (!) 160/72 78 18 97.5 °F (36.4 °C) (!) 94 %      MAP       --         Physical Exam    Nursing note and vitals reviewed.  Constitutional: She is not diaphoretic. No distress.   HENT:   Head: Normocephalic and atraumatic.   Mouth/Throat: Oropharynx is clear and moist.   Eyes: EOM are normal. Pupils are equal, round, and reactive to light. No scleral icterus.   Neck: Normal range of motion. Neck supple. No JVD present.   Cardiovascular: Normal rate, regular rhythm and intact distal pulses.   Pulmonary/Chest: Breath sounds normal. No stridor. No respiratory distress.   Abdominal: Soft. Bowel sounds are normal. She exhibits no distension. There is no tenderness.   Musculoskeletal: Normal range of motion. She exhibits no tenderness.   Symmetrical lower extremity edema   Neurological: She is alert and oriented to person, place, and time. She has normal strength. No cranial nerve deficit or sensory deficit. GCS score is 15. GCS eye subscore is 4. GCS verbal subscore is 5. GCS motor subscore is 6.   Skin: Skin is warm and dry.   Psychiatric: She has a normal mood and affect.         ED Course   Procedures  Labs Reviewed   CBC W/ AUTO DIFFERENTIAL - Abnormal; Notable for the following components:       Result Value    WBC 3.61 (*)     Mean Corpuscular Hemoglobin Conc 31.3 (*)     Gran # (ANC) 1.7 (*)     All other components within normal limits   COMPREHENSIVE METABOLIC PANEL - Abnormal; Notable for the following components:    eGFR if non   American 56 (*)     All other components within normal limits   TROPONIN I   B-TYPE NATRIURETIC PEPTIDE   PROTIME-INR   MAGNESIUM     EKG Readings: (Independently Interpreted)   Initial Reading: No STEMI. Rhythm: Normal Sinus Rhythm. Heart Rate: 70. Ectopy: No Ectopy. T Waves Flipped: III and AVF. Axis: Normal.       Imaging Results          X-Ray Chest AP Portable (Final result)  Result time 06/10/19 12:38:18    Final result by Richard Boyce MD (06/10/19 12:38:18)                 Impression:      1. No acute cardiopulmonary process appreciated.      Electronically signed by: Richard Boyce  Date:    06/10/2019  Time:    12:38             Narrative:    EXAMINATION:  XR CHEST AP PORTABLE    CLINICAL HISTORY:  SOB;    TECHNIQUE:  Single frontal portable view of the chest was performed.    COMPARISON:  Chest radiograph 05/15/2019    FINDINGS:  Cardiac silhouette is top-normal in size, not significantly changed    No focal consolidation, overt interstitial edema, sizable pleural effusion or pneumothorax.    Multilevel degenerative changes of the imaged spine.                              X-Rays:   Independently Interpreted Readings:   Chest X-Ray: No infiltrates.  No acute abnormalities.     Medical Decision Making:   History:   Old Medical Records: I decided to obtain old medical records.  Old Records Summarized: other records.       <> Summary of Records: Recent ED visit for headache and elevated blood pressure  Differential Diagnosis:   Congestive Heart Failure, Pneumonia, chronic PE Deconditioning, Mild Dementia, Poor social support  Independently Interpreted Test(s):   I have ordered and independently interpreted X-rays - see prior notes.  I have ordered and independently interpreted EKG Reading(s) - see prior notes  Clinical Tests:   Lab Tests: Ordered and Reviewed  Radiological Study: Ordered and Reviewed  Medical Tests: Ordered and Reviewed  ED Management:  Patient is afebrile and in no acute distress at time  history and physical.  She is not tachypneic or hypoxic.  She is not tachycardic.  She is mildly hypertensive.  She reports sometimes forgetting to take her medications.  Patient given her prescribed blood pressure medication.  Labs within acceptable limits without leukocytosis or granulocytosis.  Renal function and electrolytes are within acceptable limits.  Troponin and BNP are negative. Patient has remained hemodynamically stable without episodes of hypoxia, tachypnea or tachycardia.  Patient has been evaluated by social Work in the emergency department.  Referral for home health evaluation have been made as patient appears to require additional support at home as well as assistance to get to her primary care appointments.  Patient has prescription for Eliquis that she takes for chronic PE.  It does not appear to be unstable require inpatient admission.  Patient is stable for discharge to continue her medications as prescribed and to have home health evaluation.  She has been referred to her primary care physician for follow-up appointment.  Patient has been counseled extensively on supportive care, appropriate medication usage, concerning symptoms for which to return to ER and the importance of follow up. Understanding and agreement with treatment plan was expressed.   This chart was completed using dictation software, as a result there may be some transcription errors.             Scribe Attestation:   Scribe #1: I performed the above scribed service and the documentation accurately describes the services I performed. I attest to the accuracy of the note.    Attending Attestation:           Physician Attestation for Scribe:  Physician Attestation Statement for Scribe #1: I, Elvia Crane MD, reviewed documentation, as scribed by Lala Sheehan in my presence, and it is both accurate and complete.                    Clinical Impression:       ICD-10-CM ICD-9-CM   1. Shortness of breath R06.02 786.05          Disposition:   Disposition: Discharged  Condition: Stable                        Elvia Crane MD  06/10/19 1270

## 2019-06-10 NOTE — ED TRIAGE NOTES
"Pt states she gets "SOB and sometimes it's just hard to breathe", denies chest pain; pt is aaox4, NAD  "

## 2019-06-13 ENCOUNTER — CLINICAL SUPPORT (OUTPATIENT)
Dept: AUDIOLOGY | Facility: CLINIC | Age: 84
End: 2019-06-13
Payer: MEDICARE

## 2019-06-13 ENCOUNTER — OFFICE VISIT (OUTPATIENT)
Dept: OTOLARYNGOLOGY | Facility: CLINIC | Age: 84
End: 2019-06-13
Payer: MEDICARE

## 2019-06-13 VITALS
WEIGHT: 127.31 LBS | DIASTOLIC BLOOD PRESSURE: 86 MMHG | HEIGHT: 64 IN | SYSTOLIC BLOOD PRESSURE: 144 MMHG | BODY MASS INDEX: 21.74 KG/M2

## 2019-06-13 DIAGNOSIS — H90.3 SENSORINEURAL HEARING LOSS, BILATERAL: Primary | ICD-10-CM

## 2019-06-13 PROCEDURE — 92557 COMPREHENSIVE HEARING TEST: CPT | Mod: S$GLB,,, | Performed by: AUDIOLOGIST

## 2019-06-13 PROCEDURE — 99203 PR OFFICE/OUTPT VISIT, NEW, LEVL III, 30-44 MIN: ICD-10-PCS | Mod: S$GLB,,, | Performed by: OTOLARYNGOLOGY

## 2019-06-13 PROCEDURE — 92557 PR COMPREHENSIVE HEARING TEST: ICD-10-PCS | Mod: S$GLB,,, | Performed by: AUDIOLOGIST

## 2019-06-13 PROCEDURE — 99203 OFFICE O/P NEW LOW 30 MIN: CPT | Mod: S$GLB,,, | Performed by: OTOLARYNGOLOGY

## 2019-06-13 PROCEDURE — 92550 TYMPANOMETRY & REFLEX THRESH: CPT | Mod: S$GLB,,, | Performed by: AUDIOLOGIST

## 2019-06-13 PROCEDURE — 92550 PR TYMPANOMETRY AND REFLEX THRESHOLD MEASUREMENTS: ICD-10-PCS | Mod: S$GLB,,, | Performed by: AUDIOLOGIST

## 2019-06-13 NOTE — PROGRESS NOTES
Click the link below to view the audiogram in full:  Document on 6/13/2019  3:01 PM by BERTO GreeneD: Audiogram    Findings:     Pt seen today for hearing test with complaint of poor hearing.  Type A tympanograms were recorded in both ears.  Audiogram revealed mild sloping to profound SNHL in the right ear and moderate sloping to profound SNHL in the left ear.  Pt is a good candidate for hearing aids.  She will follow up with Dr. Anne regarding hearing loss.

## 2019-06-13 NOTE — PROGRESS NOTES
Subjective:       Patient ID: Daiana Mcduffie is a 89 y.o. female.    Chief Complaint: Hearing Loss    Hearing Loss:   Chronicity:  Chronic  Onset:  Over 10 years ago  Progression since onset:  Gradually worsening  Frequency:  Constantly  Severity:  Severe  Hearing loss characteristics:  Difficult on telephone, trouble hearing TV, worse background noise and impaired select discriminationNo dizziness, no vertigo, no fever, no headaches and no tinnitus.  Treatments tried:  NothingNo dizziness.    Review of Systems   Constitutional: Negative for chills, fever and unexpected weight change.   HENT: Positive for hearing loss. Negative for sore throat, tinnitus and trouble swallowing.    Eyes: Negative for pain and visual disturbance.   Respiratory: Negative for apnea and shortness of breath.    Cardiovascular: Negative for chest pain and palpitations.   Gastrointestinal: Negative for abdominal pain and nausea.   Endocrine: Negative for cold intolerance and heat intolerance.   Musculoskeletal: Negative for joint swelling and neck stiffness.   Skin: Negative for color change and rash.   Neurological: Negative for dizziness, vertigo, facial asymmetry and headaches.   Hematological: Negative for adenopathy. Does not bruise/bleed easily.   Psychiatric/Behavioral: Negative for agitation. The patient is not nervous/anxious.        Objective:      Physical Exam   Constitutional: She is oriented to person, place, and time. She appears well-developed and well-nourished. No distress.   HENT:   Head: Normocephalic and atraumatic.   Right Ear: Tympanic membrane, external ear and ear canal normal.   Left Ear: Tympanic membrane, external ear and ear canal normal.   Nose: Nose normal.   Mouth/Throat: Uvula is midline, oropharynx is clear and moist and mucous membranes are normal.   Estes: Midline  Rinne: AC > BC @ 512Hz   Eyes: Pupils are equal, round, and reactive to light. Conjunctivae and EOM are normal.   Neck: Normal range of  motion. No tracheal deviation present. No thyromegaly present.   Cardiovascular: Normal rate and regular rhythm.   Pulmonary/Chest: Effort normal. No respiratory distress.   Musculoskeletal: Normal range of motion.   Lymphadenopathy:        Head (right side): No submental, no submandibular and no tonsillar adenopathy present.        Head (left side): No submental, no submandibular and no tonsillar adenopathy present.     She has no cervical adenopathy.   Neurological: She is alert and oriented to person, place, and time.   Psychiatric: She has a normal mood and affect. Her behavior is normal.   Nursing note and vitals reviewed.      Data:  Audiogram revealed mild sloping to profound SNHL in the right ear and moderate sloping to profound SNHL in the left ear.     Assessment:       1. Sensorineural hearing loss, bilateral        Plan:     -HEARING LOSS-  I spent a considerable amount of time educating the patient on hearing and hearing loss.  We discussed the basic characteristics of conductive hearing loss versus sensorineural hearing loss and the significant differences in treatment options between the two categories.      We discussed that in cases of conductive hearing loss, this suggests a mechanical disorder that sometimes can be improved with medications &/or surgery.  It may occur secondary to external ear pathology (atresia, otitis externa, etc), tympanic membrane disorders (large perforations, immobility due to scarring or eustachian tube dysfunction), and middle ear disorders (effusions, ossicular disorders).    Sensorineural hearing loss is the expected hearing loss pattern with aging, but some disorders such as Meniere's may accelerate this process.  Additionally, amplification with hearing aids is generally the best option for hearing rehabilitation, except where the hearing loss is profound.  We discussed that this generally does not represent a dangerous condition, but in cases where there is a large  discrepancy between the two ears in terms of nerve function, more investigation is often necessary due to the possiblity of vestibular schwannomas or meningiomas at the cerebellopontine angle.  The definitive test for this is an MRI with gadolinium.  However, these masses are usually very slow growing (1-2mm/year), so patients may elect to repeat an audiogram in about 6 months or obtain an ABR so long as they understand that this may result in a delay in diagnosis (although very unlikely that this would have a significant clinical impact on their outcome due to the slow growing nature of these masses) with the understanding that if ABR is abnormal or asymmetry increases, an MRI would then be required.    Daiana  presents with what appears to be sensorineural hearing loss.  Based on this, my recommendation is   1. Hearing aid evaluation  2. Recheck hearing in 1 yr

## 2019-06-14 ENCOUNTER — SSC ENCOUNTER (OUTPATIENT)
Dept: ADMINISTRATIVE | Facility: OTHER | Age: 84
End: 2019-06-14

## 2019-06-14 NOTE — PROGRESS NOTES
Please note, patient's information has been sent to Outpatient Care Management for high risk screening.    Reason for Referral: High Risk Eligible    Please contact [facility] with any questions (ext. [phone]).    Thank you,    Aster Wilkins    Outpatient Case Management

## 2019-06-17 NOTE — PLAN OF CARE
Problem: Physical Therapy Goal  Goal: Physical Therapy Goal  Goals to be met by: 17     Patient will increase functional independence with mobility by performin. Sit to stand transfer with Supervision  2. Gait  x500 feet with Supervision   3. Lower extremity exercise program x30 reps per handout, with supervision       Patient limited in ambulation distance due to SOB/fatigue.        Spoke with pt he was fasting. Message given. Pt verbalized understanding. Is willing to try Tricor. (uses [employer])  Medication/lab orders pended for you to sign.

## 2019-06-20 ENCOUNTER — OUTPATIENT CASE MANAGEMENT (OUTPATIENT)
Dept: ADMINISTRATIVE | Facility: OTHER | Age: 84
End: 2019-06-20

## 2019-06-20 NOTE — LETTER
June 27, 2019    Daiana Mcduffie  [address]  [place] [address]             [facility]  [address] [facility] [address] Dear Ms. Mcduffie,            I work with [facility]'s Outpatient Case Management Department. We received a referral to call you to discuss your medical history.These services are free of charge and are offered to [facility] patients who have recently been discharged from any of our facilities or who have complex medical conditions that may require the skill of a nurse to assist with management.           I attempted to reach you by telephone, but I was unsuccessful. Please call our department so that we can go over some questions with you regarding your health.    The Outpatient Case Management Department can be reached at [phone] from 8:00AM to 4:30 PM on Monday thru Friday. [facility] also has a program where a nurse is available 24/7 to answer questions or provide medical advice, their number is [phone].    Thanks,    Taty Greenberg, RN   Outpatient Care Management

## 2019-06-30 ENCOUNTER — EXTERNAL CHRONIC CARE MANAGEMENT (OUTPATIENT)
Dept: PRIMARY CARE CLINIC | Facility: CLINIC | Age: 84
End: 2019-06-30
Payer: MEDICARE

## 2019-06-30 PROCEDURE — 99490 PR CHRONIC CARE MGMT, 1ST 20 MIN: ICD-10-PCS | Mod: S$PBB,,, | Performed by: INTERNAL MEDICINE

## 2019-06-30 PROCEDURE — 99490 CHRNC CARE MGMT STAFF 1ST 20: CPT | Mod: S$PBB,,, | Performed by: INTERNAL MEDICINE

## 2019-06-30 PROCEDURE — 99490 CHRNC CARE MGMT STAFF 1ST 20: CPT | Mod: PBBFAC,PN | Performed by: INTERNAL MEDICINE

## 2019-07-31 ENCOUNTER — EXTERNAL CHRONIC CARE MANAGEMENT (OUTPATIENT)
Dept: PRIMARY CARE CLINIC | Facility: CLINIC | Age: 84
End: 2019-07-31
Payer: MEDICARE

## 2019-07-31 PROCEDURE — 99490 CHRNC CARE MGMT STAFF 1ST 20: CPT | Mod: S$PBB,,, | Performed by: INTERNAL MEDICINE

## 2019-07-31 PROCEDURE — 99490 PR CHRONIC CARE MGMT, 1ST 20 MIN: ICD-10-PCS | Mod: S$PBB,,, | Performed by: INTERNAL MEDICINE

## 2019-07-31 PROCEDURE — 99490 CHRNC CARE MGMT STAFF 1ST 20: CPT | Mod: PBBFAC,PN | Performed by: INTERNAL MEDICINE

## 2019-08-07 DIAGNOSIS — I10 ESSENTIAL HYPERTENSION: ICD-10-CM

## 2019-08-07 RX ORDER — LISINOPRIL 20 MG/1
TABLET ORAL
Qty: 90 TABLET | Refills: 0 | Status: SHIPPED | OUTPATIENT
Start: 2019-08-07 | End: 2019-11-06 | Stop reason: SDUPTHER

## 2019-08-31 ENCOUNTER — EXTERNAL CHRONIC CARE MANAGEMENT (OUTPATIENT)
Dept: PRIMARY CARE CLINIC | Facility: CLINIC | Age: 84
End: 2019-08-31
Payer: MEDICARE

## 2019-08-31 PROCEDURE — 99490 CHRNC CARE MGMT STAFF 1ST 20: CPT | Mod: S$PBB,,, | Performed by: INTERNAL MEDICINE

## 2019-08-31 PROCEDURE — 99490 CHRNC CARE MGMT STAFF 1ST 20: CPT | Mod: PBBFAC,PN | Performed by: INTERNAL MEDICINE

## 2019-08-31 PROCEDURE — 99490 PR CHRONIC CARE MGMT, 1ST 20 MIN: ICD-10-PCS | Mod: S$PBB,,, | Performed by: INTERNAL MEDICINE

## 2019-09-03 ENCOUNTER — EXTERNAL HOME HEALTH (OUTPATIENT)
Dept: HOME HEALTH SERVICES | Facility: HOSPITAL | Age: 84
End: 2019-09-03
Payer: MEDICARE

## 2019-09-06 ENCOUNTER — HOSPITAL ENCOUNTER (EMERGENCY)
Facility: HOSPITAL | Age: 84
Discharge: HOME | End: 2019-09-07
Attending: EMERGENCY MEDICINE
Payer: MEDICARE

## 2019-09-06 DIAGNOSIS — R06.02 SOB (SHORTNESS OF BREATH): ICD-10-CM

## 2019-09-06 DIAGNOSIS — F41.9 ANXIETY: Primary | ICD-10-CM

## 2019-09-06 LAB
ALBUMIN SERPL BCP-MCNC: 4 G/DL (ref 3.5–5.2)
ALP SERPL-CCNC: 103 U/L (ref 55–135)
ALT SERPL W/O P-5'-P-CCNC: 15 U/L (ref 10–44)
ANION GAP SERPL CALC-SCNC: 11 MMOL/L (ref 8–16)
AST SERPL-CCNC: 35 U/L (ref 10–40)
BACTERIA #/AREA URNS HPF: ABNORMAL /HPF
BASOPHILS # BLD AUTO: 0.01 K/UL (ref 0–0.2)
BASOPHILS NFR BLD: 0.2 % (ref 0–1.9)
BILIRUB SERPL-MCNC: 0.5 MG/DL (ref 0.1–1)
BILIRUB UR QL STRIP: NEGATIVE
BNP SERPL-MCNC: 53 PG/ML (ref 0–99)
BUN SERPL-MCNC: 14 MG/DL (ref 8–23)
CALCIUM SERPL-MCNC: 9.4 MG/DL (ref 8.7–10.5)
CHLORIDE SERPL-SCNC: 109 MMOL/L (ref 95–110)
CLARITY UR: CLEAR
CO2 SERPL-SCNC: 26 MMOL/L (ref 23–29)
COLOR UR: COLORLESS
CREAT SERPL-MCNC: 1 MG/DL (ref 0.5–1.4)
DIFFERENTIAL METHOD: ABNORMAL
EOSINOPHIL # BLD AUTO: 0 K/UL (ref 0–0.5)
EOSINOPHIL NFR BLD: 0.7 % (ref 0–8)
ERYTHROCYTE [DISTWIDTH] IN BLOOD BY AUTOMATED COUNT: 13.4 % (ref 11.5–14.5)
EST. GFR  (AFRICAN AMERICAN): 57 ML/MIN/1.73 M^2
EST. GFR  (NON AFRICAN AMERICAN): 50 ML/MIN/1.73 M^2
GLUCOSE SERPL-MCNC: 113 MG/DL (ref 70–110)
GLUCOSE UR QL STRIP: NEGATIVE
HCT VFR BLD AUTO: 43.7 % (ref 37–48.5)
HGB BLD-MCNC: 13.4 G/DL (ref 12–16)
HGB UR QL STRIP: NEGATIVE
KETONES UR QL STRIP: NEGATIVE
LEUKOCYTE ESTERASE UR QL STRIP: ABNORMAL
LYMPHOCYTES # BLD AUTO: 1.7 K/UL (ref 1–4.8)
LYMPHOCYTES NFR BLD: 40.5 % (ref 18–48)
MAGNESIUM SERPL-MCNC: 1.8 MG/DL (ref 1.6–2.6)
MCH RBC QN AUTO: 29.1 PG (ref 27–31)
MCHC RBC AUTO-ENTMCNC: 30.7 G/DL (ref 32–36)
MCV RBC AUTO: 95 FL (ref 82–98)
MICROSCOPIC COMMENT: ABNORMAL
MONOCYTES # BLD AUTO: 0.4 K/UL (ref 0.3–1)
MONOCYTES NFR BLD: 9.2 % (ref 4–15)
NEUTROPHILS # BLD AUTO: 2 K/UL (ref 1.8–7.7)
NEUTROPHILS NFR BLD: 49.4 % (ref 38–73)
NITRITE UR QL STRIP: NEGATIVE
PH UR STRIP: 7 [PH] (ref 5–8)
PLATELET # BLD AUTO: 172 K/UL (ref 150–350)
PMV BLD AUTO: 10.9 FL (ref 9.2–12.9)
POTASSIUM SERPL-SCNC: 4.4 MMOL/L (ref 3.5–5.1)
PROT SERPL-MCNC: 7.8 G/DL (ref 6–8.4)
PROT UR QL STRIP: NEGATIVE
RBC # BLD AUTO: 4.61 M/UL (ref 4–5.4)
RBC #/AREA URNS HPF: 2 /HPF (ref 0–4)
SODIUM SERPL-SCNC: 146 MMOL/L (ref 136–145)
SP GR UR STRIP: 1 (ref 1–1.03)
SQUAMOUS #/AREA URNS HPF: 1 /HPF
TROPONIN I SERPL DL<=0.01 NG/ML-MCNC: <0.006 NG/ML (ref 0–0.03)
URN SPEC COLLECT METH UR: ABNORMAL
UROBILINOGEN UR STRIP-ACNC: NEGATIVE EU/DL
WBC # BLD AUTO: 4.12 K/UL (ref 3.9–12.7)
WBC #/AREA URNS HPF: 5 /HPF (ref 0–5)
WBC CLUMPS URNS QL MICRO: ABNORMAL

## 2019-09-06 PROCEDURE — 85025 COMPLETE CBC W/AUTO DIFF WBC: CPT

## 2019-09-06 PROCEDURE — 83880 ASSAY OF NATRIURETIC PEPTIDE: CPT

## 2019-09-06 PROCEDURE — 81000 URINALYSIS NONAUTO W/SCOPE: CPT

## 2019-09-06 PROCEDURE — 84484 ASSAY OF TROPONIN QUANT: CPT

## 2019-09-06 PROCEDURE — 99285 EMERGENCY DEPT VISIT HI MDM: CPT | Mod: 25

## 2019-09-06 PROCEDURE — 80053 COMPREHEN METABOLIC PANEL: CPT

## 2019-09-06 PROCEDURE — 93010 EKG 12-LEAD: ICD-10-PCS | Mod: ,,, | Performed by: INTERNAL MEDICINE

## 2019-09-06 PROCEDURE — 83735 ASSAY OF MAGNESIUM: CPT

## 2019-09-06 PROCEDURE — 93010 ELECTROCARDIOGRAM REPORT: CPT | Mod: ,,, | Performed by: INTERNAL MEDICINE

## 2019-09-06 PROCEDURE — 93005 ELECTROCARDIOGRAM TRACING: CPT

## 2019-09-06 RX ORDER — HYDROXYZINE HYDROCHLORIDE 25 MG/1
25 TABLET, FILM COATED ORAL 3 TIMES DAILY PRN
Qty: 20 TABLET | Refills: 0 | Status: SHIPPED | OUTPATIENT
Start: 2019-09-06 | End: 2019-09-11 | Stop reason: SDUPTHER

## 2019-09-07 VITALS
BODY MASS INDEX: 20.49 KG/M2 | WEIGHT: 120 LBS | OXYGEN SATURATION: 100 % | SYSTOLIC BLOOD PRESSURE: 180 MMHG | HEIGHT: 64 IN | DIASTOLIC BLOOD PRESSURE: 80 MMHG | TEMPERATURE: 98 F | HEART RATE: 80 BPM | RESPIRATION RATE: 16 BRPM

## 2019-09-07 NOTE — ED NOTES
"Pt assisted to/from bathroom via wheelchair.  Pt given specimen cup and instructed of need for urine sample.  Pt states she was "in such a hurry" that she "didn't get it in the cup".    "

## 2019-09-07 NOTE — ED TRIAGE NOTES
"Pt presents to ED c/o SOB x3 days.  Pt states that she is "supposed to see her doctor on Wednesday" and that he "is supposed to set up oxygen at home" for her.  Currently O2 97% RA, HR 88  "

## 2019-09-07 NOTE — ED PROVIDER NOTES
Encounter Date: 9/6/2019    SCRIBE #1 NOTE: I, Scott Nick, am scribing for, and in the presence of,  Steven Griffith MD. I have scribed the following portions of the note - Other sections scribed: HPI, ROS, PE.       History     Chief Complaint   Patient presents with    Shortness of Breath     Reports SOB x3 days. Denies pain      CC: Shortness of Breath    HPI:   This 89 y.o female with medical h/o HTN, HLD, PE, GERD, [place] presents to the ED via EMS for emergent evaluation of intermittent SOB for past 3 days lasting a few minutes that is worse at night. Pt was evaluated here for similar complaints about 3 months ago (6/10/19). History is limited d/t pt being poor historian. She reports activating EMS tonight due to sudden onset of SOB again. It has improved since then. States it usually comes on when she feels anxious.  She states that her shortness of breath improves and she calmed herself down.  She also c/o dysuria lately.  No hematuria.  No abdominal pain.  She denies any CP, palpitations, leg swelling, fever, chills, congestion, sore throat, cough, N/V/D, HA, hematuria. No recent medication changes. Otherwise reports compliance with daily meds. She has a PCP appointment on 9/11/19.    The history is provided by the patient and medical records. No  was used.     Review of patient's allergies indicates:   Allergen Reactions    Ibuprofen Shortness Of Breath    Penicillins Other (See Comments)     Goes in and out     Past Medical History:   Diagnosis Date    GERD (gastroesophageal reflux disease)     Hyperlipidemia     Hypertension      History reviewed. No pertinent surgical history.  History reviewed. No pertinent family history.  Social History     Tobacco Use    Smoking status: Never Smoker    Smokeless tobacco: Never Used   Substance Use Topics    Alcohol use: No    Drug use: No     Review of Systems   Constitutional: Negative for chills, diaphoresis and fever.   HENT:  Negative for congestion, ear pain, rhinorrhea and sore throat.    Eyes: Negative for visual disturbance.   Respiratory: Positive for shortness of breath. Negative for cough.    Cardiovascular: Negative for chest pain, palpitations and leg swelling.   Gastrointestinal: Negative for abdominal pain, nausea and vomiting.   Genitourinary: Positive for dysuria. Negative for frequency and hematuria.   Musculoskeletal: Negative for back pain and neck pain.   Skin: Negative for rash.   Neurological: Negative for syncope and headaches.   Psychiatric/Behavioral: Negative for confusion.   All other systems reviewed and are negative.      Physical Exam     Initial Vitals [09/06/19 1921]   BP Pulse Resp Temp SpO2   (!) 210/99 84 18 97.4 °F (36.3 °C) 97 %      MAP       --         Physical Exam    Nursing note and vitals reviewed.  Constitutional: She appears well-developed and well-nourished. She is not diaphoretic. No distress.   HENT:   Head: Normocephalic and atraumatic.   Right Ear: External ear normal.   Left Ear: External ear normal.   Nose: Nose normal.   Mouth/Throat: Oropharynx is clear and moist.   Eyes: Conjunctivae and EOM are normal. Pupils are equal, round, and reactive to light. No scleral icterus.   Neck: Normal range of motion. Neck supple. No tracheal deviation present.   Cardiovascular: Normal rate, regular rhythm and normal heart sounds.   No murmur heard.  Pulmonary/Chest: No stridor. No respiratory distress. She has no wheezes. She has no rhonchi. She has no rales. She exhibits no tenderness.   Crackles at lung bases bilaterally.   Abdominal: Soft. Bowel sounds are normal. She exhibits no distension. There is no tenderness. There is no rebound and no guarding.   Musculoskeletal: Normal range of motion. She exhibits no edema or tenderness.   Neurological: She is alert and oriented to person, place, and time. She has normal strength. No cranial nerve deficit. GCS score is 15. GCS eye subscore is 4. GCS verbal  subscore is 5. GCS motor subscore is 6.   Skin: Skin is warm and dry. No rash noted.   Psychiatric: Her behavior is normal. Judgment and thought content normal.   Flat affect.         ED Course   Procedures  Labs Reviewed   CBC W/ AUTO DIFFERENTIAL - Abnormal; Notable for the following components:       Result Value    Mean Corpuscular Hemoglobin Conc 30.7 (*)     All other components within normal limits   COMPREHENSIVE METABOLIC PANEL - Abnormal; Notable for the following components:    Sodium 146 (*)     Glucose 113 (*)     eGFR if  57 (*)     eGFR if non  50 (*)     All other components within normal limits   TROPONIN I   B-TYPE NATRIURETIC PEPTIDE   MAGNESIUM   URINALYSIS, REFLEX TO URINE CULTURE     EKG Readings: (Independently Interpreted)   Initial Reading: No STEMI. Rhythm: Normal Sinus Rhythm. Heart Rate: 85. Ectopy: No Ectopy. Conduction: Normal. ST Segments: Non-Specific ST Segment Depression. T Waves: Normal. Axis: Normal.   No significant changes when compared to 06/10/2019.  No acute ischemic changes.       Imaging Results    None          Medical Decision Making:   Differential Diagnosis:   Heart failure, anxiety, acute coronary syndrome.  Clinical Tests:   Lab Tests: Ordered and Reviewed  The following lab test(s) were unremarkable: CBC, CMP, BNP and Troponin  Radiological Study: Ordered and Reviewed  Medical Tests: Ordered and Reviewed  ED Management:  2315:  No evidence of ischemia on EKG.  Troponin normal. Patient denies chest pain. currently not short of breath.  No evidence of congestive heart failure.  Chest x-ray negative for pneumonia.  Remainder of lab works at baseline.  No significant renal dysfunction electrolyte abnormalities.  No significantly anemia.  Patient is evaluate for similar symptoms in August 2018.  At that time her D-dimer is mildly elevated.  CT was negative for pulmonary embolism.  I believe the symptoms are most likely secondary to anxiety  given the patient's description of her symptoms as waxing and waning in being associated with improvement when she calms herself.  Patient stable for discharge. Patient may follow up with her primary care physician as needed.                   ED Course as of Sep 06 2316   Fri Sep 06, 2019   2315 Lymph%: 40.5 [KS]      ED Course User Index  [KS] Steven Griffith MD     Clinical Impression:       ICD-10-CM ICD-9-CM   1. Anxiety F41.9 300.00   2. SOB (shortness of breath) R06.02 786.05       Scribe Attestation: I, Steven Griffith MD, personally performed the services described in this documentation. All medical record entries made by the scribe were at my direction and in my presence. I have reviewed the chart and agree that the record reflects my personal performance and is accurate and complete.   Disposition:   Disposition: Discharged  Condition: Stable                        Steven Griffith MD  09/06/19 0980

## 2019-09-07 NOTE — ED NOTES
Pt sitting in wheelchair, wheels locked, next to nurse's station. [facility] to arrive for transport home within the hour.

## 2019-09-11 ENCOUNTER — TELEPHONE (OUTPATIENT)
Dept: FAMILY MEDICINE | Facility: CLINIC | Age: 84
End: 2019-09-11

## 2019-09-11 ENCOUNTER — OFFICE VISIT (OUTPATIENT)
Dept: FAMILY MEDICINE | Facility: CLINIC | Age: 84
End: 2019-09-11
Payer: MEDICARE

## 2019-09-11 VITALS
HEART RATE: 79 BPM | DIASTOLIC BLOOD PRESSURE: 82 MMHG | BODY MASS INDEX: 21.71 KG/M2 | OXYGEN SATURATION: 97 % | TEMPERATURE: 98 F | WEIGHT: 127.19 LBS | RESPIRATION RATE: 17 BRPM | SYSTOLIC BLOOD PRESSURE: 138 MMHG | HEIGHT: 64 IN

## 2019-09-11 DIAGNOSIS — H90.0 CONDUCTIVE HEARING LOSS, BILATERAL: ICD-10-CM

## 2019-09-11 DIAGNOSIS — Z71.89 HEARING AID CONSULTATION: ICD-10-CM

## 2019-09-11 DIAGNOSIS — J30.2 SEASONAL ALLERGIC RHINITIS, UNSPECIFIED TRIGGER: ICD-10-CM

## 2019-09-11 DIAGNOSIS — F41.9 ANXIETY: Primary | ICD-10-CM

## 2019-09-11 PROCEDURE — 99999 PR PBB SHADOW E&M-EST. PATIENT-LVL IV: ICD-10-PCS | Mod: PBBFAC,,, | Performed by: FAMILY MEDICINE

## 2019-09-11 PROCEDURE — 99214 OFFICE O/P EST MOD 30 MIN: CPT | Mod: S$PBB,,, | Performed by: FAMILY MEDICINE

## 2019-09-11 PROCEDURE — 99999 PR PBB SHADOW E&M-EST. PATIENT-LVL IV: CPT | Mod: PBBFAC,,, | Performed by: FAMILY MEDICINE

## 2019-09-11 PROCEDURE — 99214 PR OFFICE/OUTPT VISIT, EST, LEVL IV, 30-39 MIN: ICD-10-PCS | Mod: S$PBB,,, | Performed by: FAMILY MEDICINE

## 2019-09-11 PROCEDURE — 99214 OFFICE O/P EST MOD 30 MIN: CPT | Mod: PBBFAC,PN | Performed by: FAMILY MEDICINE

## 2019-09-11 RX ORDER — FLUTICASONE PROPIONATE 50 MCG
2 SPRAY, SUSPENSION (ML) NASAL DAILY
Qty: 1 BOTTLE | Refills: 6 | Status: SHIPPED | OUTPATIENT
Start: 2019-09-11 | End: 2022-04-19 | Stop reason: SDUPTHER

## 2019-09-11 RX ORDER — HYDROXYZINE HYDROCHLORIDE 25 MG/1
25 TABLET, FILM COATED ORAL 3 TIMES DAILY PRN
Qty: 270 TABLET | Refills: 1 | Status: SHIPPED | OUTPATIENT
Start: 2019-09-11 | End: 2019-09-11

## 2019-09-11 RX ORDER — HYDROXYZINE HYDROCHLORIDE 25 MG/1
25 TABLET, FILM COATED ORAL 3 TIMES DAILY PRN
Qty: 270 TABLET | Refills: 1 | Status: SHIPPED | OUTPATIENT
Start: 2019-09-11 | End: 2019-12-17 | Stop reason: SDUPTHER

## 2019-09-11 NOTE — PROGRESS NOTES
"Routine Office Visit    Patient Name: Daiana Mcduffie    : 1929  MRN: [id number]    Subjective:  Daiana is a 89 y.o. female who presents today for:    1. Anxiety  Patient presenting today for follow up from ED where she was told she likely had anxiety.  She states she does have "spells" from time to time where she feels overwhelmed.  She states she does not feel depressed.  She states that she is taking care of herself.  She has no physical concerns at this time.    Past Medical History  Past Medical History:   Diagnosis Date    GERD (gastroesophageal reflux disease)     Hyperlipidemia     Hypertension        Past Surgical History  History reviewed. No pertinent surgical history.    Family History  History reviewed. No pertinent family history.    Social History  Social History     Socioeconomic History    Marital status:      Spouse name: Not on file    Number of children: Not on file    Years of education: Not on file    Highest education level: Not on file   Occupational History    Not on file   Social Needs    Financial resource strain: Not on file    Food insecurity:     Worry: Not on file     Inability: Not on file    Transportation needs:     Medical: Not on file     Non-medical: Not on file   Tobacco Use    Smoking status: Never Smoker    Smokeless tobacco: Never Used   Substance and Sexual Activity    Alcohol use: No    Drug use: No    Sexual activity: Never   Lifestyle    Physical activity:     Days per week: Not on file     Minutes per session: Not on file    Stress: Not on file   Relationships    Social connections:     Talks on phone: Not on file     Gets together: Not on file     Attends [employer] service: Not on file     Active member of club or organization: Not on file     Attends meetings of clubs or organizations: Not on file     Relationship status: Not on file   Other Topics Concern    Not on file   Social History Narrative    Not on file       Current " Medications  Current Outpatient Medications on File Prior to Visit   Medication Sig Dispense Refill    lisinopril (PRINIVIL,ZESTRIL) 20 MG tablet TAKE 1 TABLET BY MOUTH ONCE DAILY 90 tablet 0    pantoprazole (PROTONIX) 20 MG tablet TAKE 1 TABLET BY MOUTH ONCE DAILY 90 tablet 0    [DISCONTINUED] ammonium lactate (LAC-HYDRIN) 12 % lotion       [DISCONTINUED] atorvastatin (LIPITOR) 20 MG tablet Take 1 tablet (20 mg total) by mouth once daily. 90 tablet 1    [DISCONTINUED] cetirizine (ZYRTEC) 10 MG tablet Take 1 tablet (10 mg total) by mouth once daily. 30 tablet 0    [DISCONTINUED] ELIQUIS 5 mg Tab Take 1 tablet (5 mg total) by mouth 2 (two) times daily. 180 tablet 1    [DISCONTINUED] fluticasone (FLONASE) 50 mcg/actuation nasal spray 1 spray (50 mcg total) by Each Nare route once daily. 1 Bottle 1    [DISCONTINUED] hydrOXYzine HCl (ATARAX) 25 MG tablet Take 1 tablet (25 mg total) by mouth 3 (three) times daily as needed for Anxiety. 20 tablet 0    [DISCONTINUED] ketoconazole (NIZORAL) 2 % cream Apply topically 2 (two) times daily. 15 g 0    [DISCONTINUED] meclizine (ANTIVERT) 25 mg tablet TAKE ONE TABLET BY MOUTH THREE TIMES DAILY AS NEEDED FOR  DIZZINESS 30 tablet 0    [DISCONTINUED] ondansetron (ZOFRAN-ODT) 4 MG TbDL Take 2 tablets (8 mg total) by mouth every 8 (eight) hours as needed (nausea). 20 tablet 0    [DISCONTINUED] simethicone 80 mg Tab Take 1 tablet by mouth 2 (two) times daily as needed. 30 each 3    [DISCONTINUED] sucralfate (CARAFATE) 100 mg/mL suspension Take 10 mLs (1 g total) by mouth every 6 (six) hours as needed (abdominal discomfort / gas pain). 414 mL 2    [DISCONTINUED] triamcinolone acetonide 0.1% (KENALOG) 0.1 % cream Apply topically 2 (two) times daily. 28.4 g 0     No current facility-administered medications on file prior to visit.        Allergies   Review of patient's allergies indicates:   Allergen Reactions    Ibuprofen Shortness Of Breath    Penicillins Other (See  "Comments)     Goes in and out       Review of Systems (Pertinent positives)  Review of Systems   Constitutional: Negative.    HENT: Negative.    Eyes: Negative.    Respiratory: Negative.    Cardiovascular: Negative.    Gastrointestinal: Negative.    Musculoskeletal: Negative.    Skin: Negative.    Neurological: Negative.    Psychiatric/Behavioral: The patient is nervous/anxious.          /82 (BP Location: Left arm, Patient Position: Sitting, BP Method: Large (Manual))   Pulse 79   Temp 98.2 °F (36.8 °C) (Oral)   Resp 17   Ht 5' 4.02" (1.626 m)   Wt 57.7 kg (127 lb 3.3 oz)   SpO2 97%   BMI 21.82 kg/m²     GENERAL APPEARANCE: in no apparent distress and well developed and well nourished  HEENT: PERRL, EOMI, Sclera clear, anicteric, Oropharynx clear, no lesions, Neck supple with midline trachea  NECK: normal, supple, no adenopathy, thyroid normal in size  RESPIRATORY: appears well, vitals normal, no respiratory distress, acyanotic, normal RR, chest clear, no wheezing, crepitations, rhonchi, normal symmetric air entry  HEART: regular rate and rhythm, S1, S2 normal, no murmur, click, rub or gallop.    ABDOMEN: abdomen is soft without tenderness, no masses, no hernias, no organomegaly, no rebound, no guarding. Suprapubic tenderness absent. No CVA tenderness.  SKIN: no rashes, no wounds, no other lesions  PSYCH: Alert, oriented x 3, thought content appropriate, speech normal, pleasant and cooperative, good eye contact, well groomed    Assessment/Plan:  Daiana Mcduffie is a 89 y.o. female who presents today for :    Daiana was seen today for follow-up.    Diagnoses and all orders for this visit:    Anxiety  -     Discontinue: hydrOXYzine HCl (ATARAX) 25 MG tablet; Take 1 tablet (25 mg total) by mouth 3 (three) times daily as needed for Anxiety.  -     hydrOXYzine HCl (ATARAX) 25 MG tablet; Take 1 tablet (25 mg total) by mouth 3 (three) times daily as needed for Anxiety.    Hearing aid consultation  -     " Ambulatory Referral to Audiology    Conductive hearing loss, bilateral   -     Ambulatory Referral to Audiology    Seasonal allergic rhinitis, unspecified trigger  -     fluticasone propionate (FLONASE) 50 mcg/actuation nasal spray; 2 sprays (100 mcg total) by Each Nostril route once daily.      1.  Refilled atarax for anxiety as states it is helping  2.  Referred to audiology per her request as she missed her last appointment  3.  flonase refilled for allergies  4.  Call with any concerns      Nino Treviño MD

## 2019-09-11 NOTE — TELEPHONE ENCOUNTER
Pt audiology yasmany has been scheduled....called pt to notify of upcoming appointment, no answer will try back.

## 2019-09-25 ENCOUNTER — CLINICAL SUPPORT (OUTPATIENT)
Dept: AUDIOLOGY | Facility: CLINIC | Age: 84
End: 2019-09-25
Payer: MEDICARE

## 2019-09-25 DIAGNOSIS — H90.3 SENSORINEURAL HEARING LOSS, BILATERAL: Primary | ICD-10-CM

## 2019-09-25 PROCEDURE — 99499 NO LOS: ICD-10-PCS | Mod: S$GLB,,, | Performed by: AUDIOLOGIST

## 2019-09-25 PROCEDURE — 99499 UNLISTED E&M SERVICE: CPT | Mod: S$GLB,,, | Performed by: AUDIOLOGIST

## 2019-09-25 NOTE — PROGRESS NOTES
Woodstockhipolito Mcduffie was seen today for a Hearing aid consult.  I discussed in detail with the patient and her family member the Audiogram results and hearing aid technology levels and pricing.  Measurements were taken for basic hearing aids, #2 , P4 color.  She will speak with Gin, who handles her finances, and place the order when she is ready.

## 2019-10-03 ENCOUNTER — HOSPITAL ENCOUNTER (EMERGENCY)
Facility: HOSPITAL | Age: 84
Discharge: HOME | End: 2019-10-03
Attending: EMERGENCY MEDICINE
Payer: MEDICARE

## 2019-10-03 VITALS
WEIGHT: 135 LBS | HEART RATE: 86 BPM | BODY MASS INDEX: 23.05 KG/M2 | RESPIRATION RATE: 14 BRPM | OXYGEN SATURATION: 98 % | TEMPERATURE: 98 F | HEIGHT: 64 IN | DIASTOLIC BLOOD PRESSURE: 106 MMHG | SYSTOLIC BLOOD PRESSURE: 185 MMHG

## 2019-10-03 DIAGNOSIS — R53.1 WEAKNESS: ICD-10-CM

## 2019-10-03 DIAGNOSIS — N39.0 URINARY TRACT INFECTION WITHOUT HEMATURIA, SITE UNSPECIFIED: Primary | ICD-10-CM

## 2019-10-03 LAB
ALBUMIN SERPL BCP-MCNC: 3.6 G/DL (ref 3.5–5.2)
ALP SERPL-CCNC: 91 U/L (ref 55–135)
ALT SERPL W/O P-5'-P-CCNC: 11 U/L (ref 10–44)
ANION GAP SERPL CALC-SCNC: 7 MMOL/L (ref 8–16)
AST SERPL-CCNC: 18 U/L (ref 10–40)
BACTERIA #/AREA URNS HPF: ABNORMAL /HPF
BASOPHILS # BLD AUTO: 0.02 K/UL (ref 0–0.2)
BASOPHILS NFR BLD: 0.5 % (ref 0–1.9)
BILIRUB SERPL-MCNC: 0.3 MG/DL (ref 0.1–1)
BILIRUB UR QL STRIP: NEGATIVE
BUN SERPL-MCNC: 16 MG/DL (ref 8–23)
CALCIUM SERPL-MCNC: 9.3 MG/DL (ref 8.7–10.5)
CHLORIDE SERPL-SCNC: 107 MMOL/L (ref 95–110)
CLARITY UR: CLEAR
CO2 SERPL-SCNC: 30 MMOL/L (ref 23–29)
COLOR UR: ABNORMAL
CREAT SERPL-MCNC: 0.9 MG/DL (ref 0.5–1.4)
CTP QC/QA: YES
DIFFERENTIAL METHOD: ABNORMAL
EOSINOPHIL # BLD AUTO: 0 K/UL (ref 0–0.5)
EOSINOPHIL NFR BLD: 0.7 % (ref 0–8)
ERYTHROCYTE [DISTWIDTH] IN BLOOD BY AUTOMATED COUNT: 13 % (ref 11.5–14.5)
EST. GFR  (AFRICAN AMERICAN): >60 ML/MIN/1.73 M^2
EST. GFR  (NON AFRICAN AMERICAN): 56 ML/MIN/1.73 M^2
GLUCOSE SERPL-MCNC: 102 MG/DL (ref 70–110)
GLUCOSE UR QL STRIP: NEGATIVE
HCT VFR BLD AUTO: 41.1 % (ref 37–48.5)
HGB BLD-MCNC: 12.9 G/DL (ref 12–16)
HGB UR QL STRIP: NEGATIVE
KETONES UR QL STRIP: NEGATIVE
LEUKOCYTE ESTERASE UR QL STRIP: ABNORMAL
LYMPHOCYTES # BLD AUTO: 2.1 K/UL (ref 1–4.8)
LYMPHOCYTES NFR BLD: 48.1 % (ref 18–48)
MCH RBC QN AUTO: 29.4 PG (ref 27–31)
MCHC RBC AUTO-ENTMCNC: 31.4 G/DL (ref 32–36)
MCV RBC AUTO: 94 FL (ref 82–98)
MICROSCOPIC COMMENT: ABNORMAL
MONOCYTES # BLD AUTO: 0.3 K/UL (ref 0.3–1)
MONOCYTES NFR BLD: 7.3 % (ref 4–15)
NEUTROPHILS # BLD AUTO: 1.9 K/UL (ref 1.8–7.7)
NEUTROPHILS NFR BLD: 43.9 % (ref 38–73)
NITRITE UR QL STRIP: NEGATIVE
PH UR STRIP: 7 [PH] (ref 5–8)
PLATELET # BLD AUTO: 166 K/UL (ref 150–350)
PMV BLD AUTO: 10 FL (ref 9.2–12.9)
POC MOLECULAR INFLUENZA A AGN: NEGATIVE
POC MOLECULAR INFLUENZA B AGN: NEGATIVE
POTASSIUM SERPL-SCNC: 4 MMOL/L (ref 3.5–5.1)
PROT SERPL-MCNC: 6.9 G/DL (ref 6–8.4)
PROT UR QL STRIP: NEGATIVE
RBC # BLD AUTO: 4.39 M/UL (ref 4–5.4)
SODIUM SERPL-SCNC: 144 MMOL/L (ref 136–145)
SP GR UR STRIP: 1.01 (ref 1–1.03)
SQUAMOUS #/AREA URNS HPF: 8 /HPF
TROPONIN I SERPL DL<=0.01 NG/ML-MCNC: <0.006 NG/ML (ref 0–0.03)
TSH SERPL DL<=0.005 MIU/L-ACNC: 0.66 UIU/ML (ref 0.4–4)
URN SPEC COLLECT METH UR: ABNORMAL
UROBILINOGEN UR STRIP-ACNC: NEGATIVE EU/DL
WBC # BLD AUTO: 4.37 K/UL (ref 3.9–12.7)
WBC #/AREA URNS HPF: 10 /HPF (ref 0–5)

## 2019-10-03 PROCEDURE — 81000 URINALYSIS NONAUTO W/SCOPE: CPT

## 2019-10-03 PROCEDURE — 99285 EMERGENCY DEPT VISIT HI MDM: CPT | Mod: 25

## 2019-10-03 PROCEDURE — 80053 COMPREHEN METABOLIC PANEL: CPT

## 2019-10-03 PROCEDURE — 85025 COMPLETE CBC W/AUTO DIFF WBC: CPT

## 2019-10-03 PROCEDURE — 93010 ELECTROCARDIOGRAM REPORT: CPT | Mod: ,,, | Performed by: INTERNAL MEDICINE

## 2019-10-03 PROCEDURE — 87502 INFLUENZA DNA AMP PROBE: CPT

## 2019-10-03 PROCEDURE — 93005 ELECTROCARDIOGRAM TRACING: CPT

## 2019-10-03 PROCEDURE — 25000003 PHARM REV CODE 250: Performed by: EMERGENCY MEDICINE

## 2019-10-03 PROCEDURE — 96360 HYDRATION IV INFUSION INIT: CPT

## 2019-10-03 PROCEDURE — 96361 HYDRATE IV INFUSION ADD-ON: CPT

## 2019-10-03 PROCEDURE — 84484 ASSAY OF TROPONIN QUANT: CPT

## 2019-10-03 PROCEDURE — 93010 EKG 12-LEAD: ICD-10-PCS | Mod: ,,, | Performed by: INTERNAL MEDICINE

## 2019-10-03 PROCEDURE — 63600175 PHARM REV CODE 636 W HCPCS: Performed by: EMERGENCY MEDICINE

## 2019-10-03 PROCEDURE — 84443 ASSAY THYROID STIM HORMONE: CPT

## 2019-10-03 RX ORDER — NITROFURANTOIN 25; 75 MG/1; MG/1
100 CAPSULE ORAL ONCE
Status: COMPLETED | OUTPATIENT
Start: 2019-10-03 | End: 2019-10-03

## 2019-10-03 RX ORDER — NITROFURANTOIN 25; 75 MG/1; MG/1
100 CAPSULE ORAL 2 TIMES DAILY
Qty: 20 CAPSULE | Refills: 0 | Status: SHIPPED | OUTPATIENT
Start: 2019-10-03 | End: 2019-10-13

## 2019-10-03 RX ADMIN — NITROFURANTOIN (MONOHYDRATE/MACROCRYSTALS) 100 MG: 75; 25 CAPSULE ORAL at 08:10

## 2019-10-03 RX ADMIN — SODIUM CHLORIDE 1000 ML: 0.9 INJECTION, SOLUTION INTRAVENOUS at 05:10

## 2019-10-03 NOTE — ED PROVIDER NOTES
"Encounter Date: 10/3/2019       History     Chief Complaint   Patient presents with    Fatigue     Arrives to ER complaining of weakness and chills, reports started this morning. Pt. states "I get to walking and I get weak." Uses walker to ambulate, denying any dizziness. -n/v/d    Chills     89 y.o. female Past Medical History:  No date: GERD (gastroesophageal reflux disease)  No date: Hyperlipidemia  No date: Hypertension     Notes that this morning she felt cold and felt as though she had generalized weakness/malaise. States that she just doesn't feel quite right and wants to be checked out. She denies headache/falls/trauma/dizziness/sob/chest pain.        Review of patient's allergies indicates:   Allergen Reactions    Ibuprofen Shortness Of Breath    Penicillins Other (See Comments)     Goes in and out     Past Medical History:   Diagnosis Date    GERD (gastroesophageal reflux disease)     Hyperlipidemia     Hypertension      No past surgical history on file.  No family history on file.  Social History     Tobacco Use    Smoking status: Never Smoker    Smokeless tobacco: Never Used   Substance Use Topics    Alcohol use: No    Drug use: No     Review of Systems   Constitutional: Negative for fever.   HENT: Negative for sore throat.    Respiratory: Negative for shortness of breath.    Cardiovascular: Negative for chest pain.   Gastrointestinal: Negative for nausea.   Genitourinary: Negative for dysuria.   Musculoskeletal: Negative for back pain.   Skin: Negative for rash.   Neurological: Negative for weakness.   Hematological: Does not bruise/bleed easily.   All other systems reviewed and are negative.      Physical Exam     Initial Vitals [10/03/19 1642]   BP Pulse Resp Temp SpO2   130/65 77 16 97.5 °F (36.4 °C) 98 %      MAP       --         Physical Exam    Nursing note and vitals reviewed.  Constitutional: She appears well-developed and well-nourished.   HENT:   Head: Normocephalic and atraumatic. "   Eyes: Conjunctivae and EOM are normal. Pupils are equal, round, and reactive to light.   Neck: Normal range of motion.   Cardiovascular: Normal rate and regular rhythm.   Pulmonary/Chest: Breath sounds normal. No respiratory distress.   Abdominal: Soft. She exhibits no distension. There is no tenderness. There is no rebound.   Musculoskeletal: Normal range of motion.   Neurological: She is alert. No cranial nerve deficit. GCS score is 15. GCS eye subscore is 4. GCS verbal subscore is 5. GCS motor subscore is 6.   Skin: Skin is warm and dry.   Psychiatric: She has a normal mood and affect. Thought content normal.         ED Course   Procedures  Labs Reviewed   CBC W/ AUTO DIFFERENTIAL   COMPREHENSIVE METABOLIC PANEL   TSH   TROPONIN I   URINALYSIS, REFLEX TO URINE CULTURE   POCT INFLUENZA A/B MOLECULAR          Imaging Results    None              Labs Reviewed   CBC W/ AUTO DIFFERENTIAL - Abnormal; Notable for the following components:       Result Value    Mean Corpuscular Hemoglobin Conc 31.4 (*)     Lymph% 48.1 (*)     All other components within normal limits   COMPREHENSIVE METABOLIC PANEL - Abnormal; Notable for the following components:    CO2 30 (*)     Anion Gap 7 (*)     eGFR if non  56 (*)     All other components within normal limits   URINALYSIS, REFLEX TO URINE CULTURE - Abnormal; Notable for the following components:    Leukocytes, UA 2+ (*)     All other components within normal limits    Narrative:     Preferred Collection Type->Urine, Clean Catch   URINALYSIS MICROSCOPIC - Abnormal; Notable for the following components:    WBC, UA 10 (*)     All other components within normal limits    Narrative:     Preferred Collection Type->Urine, Clean Catch   TSH   TROPONIN I   POCT INFLUENZA A/B MOLECULAR       X-Ray Chest AP Portable   Final Result      No acute intrathoracic abnormality detected.  No significant change.      Radiopaque foreign body projecting over the midline abdomen.          Electronically signed by: Sherin Argueta   Date:    10/03/2019   Time:    18:03          Given age and weakness will treat with macrobid x 10 days for uti.                    Clinical Impression:       ICD-10-CM ICD-9-CM   1. Urinary tract infection without hematuria, site unspecified N39.0 599.0   2. Weakness R53.1 780.79                                Mindy Schmid MD  10/03/19 1954

## 2019-10-04 NOTE — DISCHARGE INSTRUCTIONS
Thank you for coming to our Emergency Department today. It is important to remember that some problems are difficult to diagnose and may not be found during your first visit. Be sure to follow up with your primary care doctor and review any labs/imaging that was performed with them. If you do not have a primary care doctor, you may contact the one listed on your discharge paperwork or you may also call the [facility] Appointment Desk at [phone] to schedule an appointment with one.     All medications may potentially have side effects and it is impossible to predict which medications may give you side effects. If you feel that you are having a negative effect of any medication you should immediately stop taking them and seek medical attention.    Return to the ER with any questions/concerns, new/concerning symptoms, worsening or failure to improve. Do not drive or make any important decisions for 24 hours if you have received any pain medications, sedatives or mood altering drugs during your ER visit.

## 2019-10-07 ENCOUNTER — NURSE TRIAGE (OUTPATIENT)
Dept: ADMINISTRATIVE | Facility: CLINIC | Age: 84
End: 2019-10-07

## 2019-10-08 NOTE — TELEPHONE ENCOUNTER
Reason for Disposition   Severe difficulty breathing (e.g., struggling for each breath, speaks in single words)    Protocols used: DIZZINESS - [id number]    [id number] Cell

## 2019-10-08 NOTE — TELEPHONE ENCOUNTER
Please check on patient to see how she is doing this morning as she is not currently at [facility]    Thanks  Dr. Treviño

## 2019-10-09 ENCOUNTER — TELEPHONE (OUTPATIENT)
Dept: FAMILY MEDICINE | Facility: CLINIC | Age: 84
End: 2019-10-09

## 2019-10-11 ENCOUNTER — HOSPITAL ENCOUNTER (EMERGENCY)
Facility: HOSPITAL | Age: 84
Discharge: HOME | End: 2019-10-11
Attending: EMERGENCY MEDICINE
Payer: MEDICARE

## 2019-10-11 VITALS
TEMPERATURE: 98 F | HEART RATE: 90 BPM | WEIGHT: 130 LBS | BODY MASS INDEX: 22.2 KG/M2 | DIASTOLIC BLOOD PRESSURE: 93 MMHG | HEIGHT: 64 IN | OXYGEN SATURATION: 98 % | SYSTOLIC BLOOD PRESSURE: 190 MMHG | RESPIRATION RATE: 20 BRPM

## 2019-10-11 DIAGNOSIS — R06.02 SOB (SHORTNESS OF BREATH): ICD-10-CM

## 2019-10-11 DIAGNOSIS — K59.00 CONSTIPATION, UNSPECIFIED CONSTIPATION TYPE: Primary | ICD-10-CM

## 2019-10-11 DIAGNOSIS — I16.0 HYPERTENSIVE URGENCY: ICD-10-CM

## 2019-10-11 LAB
ANION GAP SERPL CALC-SCNC: 12 MMOL/L (ref 8–16)
BASOPHILS # BLD AUTO: 0.03 K/UL (ref 0–0.2)
BASOPHILS NFR BLD: 0.6 % (ref 0–1.9)
BNP SERPL-MCNC: 59 PG/ML (ref 0–99)
BUN SERPL-MCNC: 16 MG/DL (ref 8–23)
CALCIUM SERPL-MCNC: 9.6 MG/DL (ref 8.7–10.5)
CHLORIDE SERPL-SCNC: 108 MMOL/L (ref 95–110)
CO2 SERPL-SCNC: 22 MMOL/L (ref 23–29)
CREAT SERPL-MCNC: 0.8 MG/DL (ref 0.5–1.4)
DIFFERENTIAL METHOD: ABNORMAL
EOSINOPHIL # BLD AUTO: 0.1 K/UL (ref 0–0.5)
EOSINOPHIL NFR BLD: 1.9 % (ref 0–8)
ERYTHROCYTE [DISTWIDTH] IN BLOOD BY AUTOMATED COUNT: 12.6 % (ref 11.5–14.5)
EST. GFR  (AFRICAN AMERICAN): >60 ML/MIN/1.73 M^2
EST. GFR  (NON AFRICAN AMERICAN): >60 ML/MIN/1.73 M^2
GLUCOSE SERPL-MCNC: 111 MG/DL (ref 70–110)
HCT VFR BLD AUTO: 42.1 % (ref 37–48.5)
HGB BLD-MCNC: 12.7 G/DL (ref 12–16)
IMM GRANULOCYTES # BLD AUTO: 0.01 K/UL (ref 0–0.04)
IMM GRANULOCYTES NFR BLD AUTO: 0.2 % (ref 0–0.5)
LYMPHOCYTES # BLD AUTO: 1.9 K/UL (ref 1–4.8)
LYMPHOCYTES NFR BLD: 37.3 % (ref 18–48)
MCH RBC QN AUTO: 28.8 PG (ref 27–31)
MCHC RBC AUTO-ENTMCNC: 30.2 G/DL (ref 32–36)
MCV RBC AUTO: 96 FL (ref 82–98)
MONOCYTES # BLD AUTO: 0.5 K/UL (ref 0.3–1)
MONOCYTES NFR BLD: 8.7 % (ref 4–15)
NEUTROPHILS # BLD AUTO: 2.7 K/UL (ref 1.8–7.7)
NEUTROPHILS NFR BLD: 51.3 % (ref 38–73)
NRBC BLD-RTO: 0 /100 WBC
PLATELET # BLD AUTO: 165 K/UL (ref 150–350)
PMV BLD AUTO: 9.7 FL (ref 9.2–12.9)
POTASSIUM SERPL-SCNC: 3.8 MMOL/L (ref 3.5–5.1)
RBC # BLD AUTO: 4.41 M/UL (ref 4–5.4)
SODIUM SERPL-SCNC: 142 MMOL/L (ref 136–145)
TROPONIN I SERPL DL<=0.01 NG/ML-MCNC: <0.006 NG/ML (ref 0–0.03)
WBC # BLD AUTO: 5.18 K/UL (ref 3.9–12.7)

## 2019-10-11 PROCEDURE — 85025 COMPLETE CBC W/AUTO DIFF WBC: CPT

## 2019-10-11 PROCEDURE — 84484 ASSAY OF TROPONIN QUANT: CPT

## 2019-10-11 PROCEDURE — 80048 BASIC METABOLIC PNL TOTAL CA: CPT

## 2019-10-11 PROCEDURE — 93010 EKG 12-LEAD: ICD-10-PCS | Mod: ,,, | Performed by: INTERNAL MEDICINE

## 2019-10-11 PROCEDURE — 93005 ELECTROCARDIOGRAM TRACING: CPT

## 2019-10-11 PROCEDURE — 93010 ELECTROCARDIOGRAM REPORT: CPT | Mod: ,,, | Performed by: INTERNAL MEDICINE

## 2019-10-11 PROCEDURE — 36000 PLACE NEEDLE IN VEIN: CPT

## 2019-10-11 PROCEDURE — 99285 EMERGENCY DEPT VISIT HI MDM: CPT | Mod: 25

## 2019-10-11 PROCEDURE — 83880 ASSAY OF NATRIURETIC PEPTIDE: CPT

## 2019-10-11 RX ORDER — LACTULOSE 10 G/15ML
10 SOLUTION ORAL DAILY
Qty: 150 ML | Refills: 0 | Status: SHIPPED | OUTPATIENT
Start: 2019-10-11 | End: 2019-10-21
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
## 2019-10-16 ENCOUNTER — TELEPHONE (OUTPATIENT)
Dept: FAMILY MEDICINE | Facility: CLINIC | Age: 84
End: 2019-10-16

## 2019-10-16 NOTE — TELEPHONE ENCOUNTER
----- Message from Sanjana Rascon sent at 10/16/2019 10:22 AM CDT -----  Contact: Greer [phone]  Type: Patient Call Back    Who called:Daiana     What is the request in detail: The patient is requesting a call back from the staff. She reports that she spoke with someone and stated that they were going to try to get her an appointment for today. But she never heard back from anyone as of yet    Can the clinic reply by [facility]?no    Would the patient rather a call back or a response via [facility]? Call back    Best call back number: [phone]

## 2019-10-18 ENCOUNTER — OFFICE VISIT (OUTPATIENT)
Dept: FAMILY MEDICINE | Facility: CLINIC | Age: 84
End: 2019-10-18
Payer: MEDICARE

## 2019-10-18 VITALS
DIASTOLIC BLOOD PRESSURE: 70 MMHG | OXYGEN SATURATION: 98 % | HEIGHT: 64 IN | BODY MASS INDEX: 22.06 KG/M2 | SYSTOLIC BLOOD PRESSURE: 138 MMHG | RESPIRATION RATE: 16 BRPM | WEIGHT: 129.19 LBS | HEART RATE: 81 BPM | TEMPERATURE: 98 F

## 2019-10-18 DIAGNOSIS — R53.1 WEAKNESS: Primary | ICD-10-CM

## 2019-10-18 DIAGNOSIS — I10 ESSENTIAL HYPERTENSION: ICD-10-CM

## 2019-10-18 DIAGNOSIS — I51.7 CARDIOMEGALY: ICD-10-CM

## 2019-10-18 PROCEDURE — 99999 PR PBB SHADOW E&M-EST. PATIENT-LVL III: ICD-10-PCS | Mod: PBBFAC,,, | Performed by: NURSE PRACTITIONER

## 2019-10-18 PROCEDURE — 99213 OFFICE O/P EST LOW 20 MIN: CPT | Mod: PBBFAC,PN | Performed by: NURSE PRACTITIONER

## 2019-10-18 PROCEDURE — 99214 OFFICE O/P EST MOD 30 MIN: CPT | Mod: S$PBB,,, | Performed by: NURSE PRACTITIONER

## 2019-10-18 PROCEDURE — 99999 PR PBB SHADOW E&M-EST. PATIENT-LVL III: CPT | Mod: PBBFAC,,, | Performed by: NURSE PRACTITIONER

## 2019-10-18 PROCEDURE — 99214 PR OFFICE/OUTPT VISIT, EST, LEVL IV, 30-39 MIN: ICD-10-PCS | Mod: S$PBB,,, | Performed by: NURSE PRACTITIONER

## 2019-10-18 NOTE — PROGRESS NOTES
Routine Office Visit    Patient Name: Daiana Mcduffie    : 1929  MRN: [id number]    Chief Complaint:  Weakness    Subjective:  Daiana is a 89 y.o. female who presents today for:    1.  Weakness - patient reports today for evaluation of weakness.  States that she will experience weakness every once in a while mostly in the morning when she stands up and begins to walk around out of bed.  She states that the weakness does not last that long but it is difficult for her to exactly quantify how long she has the symptoms.  She is also unsure of how long this has been going on.  Of note, she does state that her blood pressure fluctuates at home states that she sometimes gets readings that are higher in the morning then in the afternoon.  She is taking lisinopril for her blood pressure and she states that she does not take this consistently at the same time every day.  She states that she thinks her weakness is due in part to her nasal congestion, for which she is using Flonase but again she is using this inconsistently as well.  She denies any fevers or chills.  Denies any shortness of breath, chest pain, wheezing, or palpitations.  Denies any lower leg swelling. She states that it is hard for her to exactly describe as weakness but states that sometimes she feels off balance and it may also happen when she is sitting or lying down.  She is with her daughter today who was concerned because the patient has been to the emergency room in the last 2 weeks with similar symptoms.  She denies any recent falls from her weakness.    Past Medical History  Past Medical History:   Diagnosis Date    GERD (gastroesophageal reflux disease)     Hyperlipidemia     Hypertension        Past Surgical History  History reviewed. No pertinent surgical history.    Family History  History reviewed. No pertinent family history.    Social History  Social History     Socioeconomic History    Marital status:      Spouse name:  Not on file    Number of children: Not on file    Years of education: Not on file    Highest education level: Not on file   Occupational History    Not on file   Social Needs    Financial resource strain: Not on file    Food insecurity:     Worry: Not on file     Inability: Not on file    Transportation needs:     Medical: Not on file     Non-medical: Not on file   Tobacco Use    Smoking status: Never Smoker    Smokeless tobacco: Never Used   Substance and Sexual Activity    Alcohol use: No    Drug use: No    Sexual activity: Never   Lifestyle    Physical activity:     Days per week: Not on file     Minutes per session: Not on file    Stress: Not on file   Relationships    Social connections:     Talks on phone: Not on file     Gets together: Not on file     Attends [employer] service: Not on file     Active member of club or organization: Not on file     Attends meetings of clubs or organizations: Not on file     Relationship status: Not on file   Other Topics Concern    Not on file   Social History Narrative    Not on file       Current Medications  Current Outpatient Medications on File Prior to Visit   Medication Sig Dispense Refill    fluticasone propionate (FLONASE) 50 mcg/actuation nasal spray 2 sprays (100 mcg total) by Each Nostril route once daily. 1 Bottle 6    hydrOXYzine HCl (ATARAX) 25 MG tablet Take 1 tablet (25 mg total) by mouth 3 (three) times daily as needed for Anxiety. 270 tablet 1    lisinopril (PRINIVIL,ZESTRIL) 20 MG tablet TAKE 1 TABLET BY MOUTH ONCE DAILY 90 tablet 0    pantoprazole (PROTONIX) 20 MG tablet TAKE 1 TABLET BY MOUTH ONCE DAILY 90 tablet 0     No current facility-administered medications on file prior to visit.        Allergies   Review of patient's allergies indicates:   Allergen Reactions    Ibuprofen Shortness Of Breath    Penicillins Other (See Comments)     Goes in and out       Review of Systems (Pertinent positives)  Review of Systems  "  Constitutional: Negative for chills, diaphoresis, fever, malaise/fatigue and weight loss.   HENT: Positive for congestion. Negative for ear discharge, ear pain, hearing loss, nosebleeds, sinus pain, sore throat and tinnitus.    Eyes: Negative for blurred vision, double vision, photophobia, pain, discharge and redness.   Respiratory: Negative for cough, hemoptysis, sputum production, shortness of breath, wheezing and stridor.    Cardiovascular: Negative for chest pain, palpitations, orthopnea, claudication, leg swelling and PND.   Gastrointestinal: Negative for abdominal pain, blood in stool, constipation, diarrhea, heartburn, melena, nausea and vomiting.   Genitourinary: Negative for dysuria, flank pain, frequency, hematuria and urgency.   Musculoskeletal: Negative.    Skin: Negative.    Neurological: Positive for dizziness and weakness. Negative for tingling, tremors, sensory change, speech change, focal weakness, seizures, loss of consciousness and headaches.   Endo/Heme/Allergies: Negative.    Psychiatric/Behavioral: Negative.        /70 (BP Location: Right arm, Patient Position: Sitting, BP Method: Medium (Manual))   Pulse 81   Temp 98 °F (36.7 °C) (Oral)   Resp 16   Ht 5' 4" (1.626 m)   Wt 58.6 kg (129 lb 3 oz)   SpO2 98%   BMI 22.18 kg/m²     Physical Exam   Constitutional: She is oriented to person, place, and time. She appears well-developed and well-nourished. No distress.   HENT:   Head: Normocephalic and atraumatic.   Right Ear: Tympanic membrane, external ear and ear canal normal.   Left Ear: Tympanic membrane, external ear and ear canal normal.   Nose: Nose normal. Right sinus exhibits no maxillary sinus tenderness and no frontal sinus tenderness. Left sinus exhibits no maxillary sinus tenderness and no frontal sinus tenderness.   Mouth/Throat: Uvula is midline, oropharynx is clear and moist and mucous membranes are normal. Tonsils are 0 on the right. Tonsils are 0 on the left. No " tonsillar exudate.   Eyes: Pupils are equal, round, and reactive to light. Conjunctivae and EOM are normal. Right eye exhibits no discharge. Left eye exhibits no discharge. No scleral icterus.   Neck: Normal range of motion. Neck supple. No JVD present.   Cardiovascular: Normal rate, regular rhythm, normal heart sounds and intact distal pulses. Exam reveals no gallop and no friction rub.   No murmur heard.  Clinically euvolemic no JVD no lower extremity swelling   Pulmonary/Chest: Effort normal and breath sounds normal. No stridor. No respiratory distress. She has no wheezes. She has no rales. She exhibits no tenderness.   Abdominal: Soft. Bowel sounds are normal. She exhibits no distension and no mass. There is no tenderness. There is no rebound and no guarding. No hernia.   Musculoskeletal: Normal range of motion.   Lymphadenopathy:     She has no cervical adenopathy.   Neurological: She is alert and oriented to person, place, and time. She has normal strength. She displays normal reflexes. No cranial nerve deficit or sensory deficit. She exhibits normal muscle tone. She displays a negative Romberg sign. Coordination and gait normal. GCS eye subscore is 4. GCS verbal subscore is 5. GCS motor subscore is 6.   Skin: Skin is warm and dry. Capillary refill takes less than 2 seconds. She is not diaphoretic.   Vitals reviewed.     Orthostatic VS:  Lying - /83, P81, [id number]  Sitting - /81, P76, [id number]  Standing - /79, P78, [id number]    Assessment/Plan:  Daiana Mcduffie is a 89 y.o. female who presents today for :    Diagnoses and all orders for this visit:    Weakness  -     Orthostatic vital signs  -     Ambulatory referral to ENT    Essential hypertension    Cardiomegaly  -     Ambulatory referral to Cardiology     Patient is not orthostatic on examination today.  I reviewed the labs, workup, and notes from her recent ER visits.  She does not report any dizziness or lightheadedness at the moment.  ENT  examination within normal limits.  I did instruct the patient to take her lisinopril consistently at the same time every day to see its full impact on her blood pressure.  I also instructed her to use her Flonase daily if she feels her weakness is due to her congestion. She should take her blood pressures at home in the morning and at night and write the logs down and bring the log to her next visit.  She does have cardiomegaly on recent chest x-rays; given this I will refer her to Cardiology for further evaluation as a potential cause for her dizziness and weakness.  I will also refer her to ENT to evaluate if there is some sort of inner or middle ear dysfunction contributing to her symptoms.  I instructed the patient to drink plenty of water and stay hydrated.  She is to follow up in the next 2-3 weeks for any new or worsening concerns.  Patient verbalized understanding of instructions.        This office note has been dictated.  This dictation has been generated using M-Modal Fluency Direct dictation; some phonetic errors may occur.   My collaborating physician is Dr. Masoud Al.

## 2019-10-31 ENCOUNTER — EXTERNAL CHRONIC CARE MANAGEMENT (OUTPATIENT)
Dept: PRIMARY CARE CLINIC | Facility: CLINIC | Age: 84
End: 2019-10-31
Payer: MEDICARE

## 2019-10-31 PROCEDURE — 99490 CHRNC CARE MGMT STAFF 1ST 20: CPT | Mod: PBBFAC,PN | Performed by: INTERNAL MEDICINE

## 2019-10-31 PROCEDURE — 99490 PR CHRONIC CARE MGMT, 1ST 20 MIN: ICD-10-PCS | Mod: S$PBB,,, | Performed by: INTERNAL MEDICINE

## 2019-10-31 PROCEDURE — 99490 CHRNC CARE MGMT STAFF 1ST 20: CPT | Mod: S$PBB,,, | Performed by: INTERNAL MEDICINE

## 2019-11-01 ENCOUNTER — TELEPHONE (OUTPATIENT)
Dept: FAMILY MEDICINE | Facility: CLINIC | Age: 84
End: 2019-11-01

## 2019-11-01 NOTE — LETTER
November 1, 2019    Daiana Mcduffie  [address]  [place] [address]             [facility]  [address] [place] [address]  Phone: [phone]  Fax: [phone] Dear Michael Froy:    Sorry we were unable to contact you to schedule your Cardiology and ENT appointment. Please give the referral department a call at [phone].        If you have any questions or concerns, please don't hesitate to call.    Sincerely,        Genet Sharma MA

## 2019-11-03 RX ORDER — LISINOPRIL 20 MG/1
TABLET ORAL
Qty: 90 TABLET | Refills: 0 | OUTPATIENT
Start: 2019-11-03

## 2019-11-06 DIAGNOSIS — I10 ESSENTIAL HYPERTENSION: ICD-10-CM

## 2019-11-06 RX ORDER — LISINOPRIL 20 MG/1
20 TABLET ORAL DAILY
Qty: 90 TABLET | Refills: 0 | Status: SHIPPED | OUTPATIENT
Start: 2019-11-06 | End: 2019-12-17 | Stop reason: SDUPTHER

## 2019-11-30 ENCOUNTER — EXTERNAL CHRONIC CARE MANAGEMENT (OUTPATIENT)
Dept: PRIMARY CARE CLINIC | Facility: CLINIC | Age: 84
End: 2019-11-30
Payer: MEDICARE

## 2019-11-30 PROCEDURE — 99490 CHRNC CARE MGMT STAFF 1ST 20: CPT | Mod: PBBFAC,PN | Performed by: INTERNAL MEDICINE

## 2019-11-30 PROCEDURE — 99490 PR CHRONIC CARE MGMT, 1ST 20 MIN: ICD-10-PCS | Mod: S$PBB,,, | Performed by: INTERNAL MEDICINE

## 2019-11-30 PROCEDURE — 99490 CHRNC CARE MGMT STAFF 1ST 20: CPT | Mod: S$PBB,,, | Performed by: INTERNAL MEDICINE

## 2019-12-05 NOTE — TELEPHONE ENCOUNTER
----- Message from Juanita Anaquanhipolito sent at 3/12/2019  3:53 PM CDT -----  Contact: self   Patient is asking to speak with  in ref to a private matter. She says she forgot but will like him to call her. Now she says she also needs an order for a shower head. Please call at [phone].  
Faxed out order for General supply shower head and for toilet seat / faxed to [facility] [phone]  
Orders printed    Thanks  Stefany Treviño  
Patient sounded confused but states she needs a TOILET SEAT  and a SHOWER HEAD. Please advise.   
No

## 2019-12-13 ENCOUNTER — TELEPHONE (OUTPATIENT)
Dept: FAMILY MEDICINE | Facility: CLINIC | Age: 84
End: 2019-12-13

## 2019-12-13 NOTE — TELEPHONE ENCOUNTER
----- Message from Nino Treviño MD sent at 12/13/2019  2:40 PM CST -----  Contact: Daiana [phone]  That is fine    Dr. Treviño     ----- Message -----  From: Ginna Gibbs LPN  Sent: 12/13/2019   2:17 PM CST  To: MD Dr. Kamila Tolentino, would you like your schedule overrode or double booked for patient? Please advise.   ----- Message -----  From: Sanjana Rascon  Sent: 12/13/2019   2:09 PM CST  To: Kamila Oneill Staff    Type:  Sooner Appointment Request    Patient is requesting a sooner appointment.  Patient declined first available appointment listed as well as another facility and provider .  Patient will not accept being placed on the waitlist and is requesting a message be sent to doctor.    Name of Caller: Daiana     When is the first available appointment? 01/08/2020 (patient missed her appt on today)    Symptoms: 3 month follow up and medication refill     Would the patient rather a call back or a response via [facility]? Call back    Best Call Back Number: [phone]

## 2019-12-15 ENCOUNTER — HOSPITAL ENCOUNTER (EMERGENCY)
Facility: HOSPITAL | Age: 84
Discharge: HOME | End: 2019-12-15
Attending: EMERGENCY MEDICINE
Payer: MEDICARE

## 2019-12-15 VITALS
OXYGEN SATURATION: 100 % | TEMPERATURE: 98 F | DIASTOLIC BLOOD PRESSURE: 83 MMHG | HEIGHT: 64 IN | WEIGHT: 130 LBS | RESPIRATION RATE: 17 BRPM | BODY MASS INDEX: 22.2 KG/M2 | HEART RATE: 71 BPM | SYSTOLIC BLOOD PRESSURE: 160 MMHG

## 2019-12-15 DIAGNOSIS — I10 UNCONTROLLED HYPERTENSION: Primary | ICD-10-CM

## 2019-12-15 PROCEDURE — 25000003 PHARM REV CODE 250: Performed by: EMERGENCY MEDICINE

## 2019-12-15 PROCEDURE — 99283 EMERGENCY DEPT VISIT LOW MDM: CPT

## 2019-12-15 RX ORDER — HYDROXYZINE PAMOATE 25 MG/1
25 CAPSULE ORAL
Status: COMPLETED | OUTPATIENT
Start: 2019-12-15 | End: 2019-12-15

## 2019-12-15 RX ORDER — LISINOPRIL 20 MG/1
20 TABLET ORAL
Status: COMPLETED | OUTPATIENT
Start: 2019-12-15 | End: 2019-12-15

## 2019-12-15 RX ADMIN — LISINOPRIL 20 MG: 20 TABLET ORAL at 02:12

## 2019-12-15 RX ADMIN — HYDROXYZINE PAMOATE 25 MG: 25 CAPSULE ORAL at 02:12

## 2019-12-15 NOTE — ED PROVIDER NOTES
Encounter Date: 12/15/2019    SCRIBE #1 NOTE: I, Surendra Gallegos, am scribing for, and in the presence of,  Elvia Crane MD. I have scribed the following portions of the note - Other sections scribed: HPI, ROS, PE.       History     Chief Complaint   Patient presents with    Hypertension     EMS called for elevated BP. Pt reports non-compliance with meds. Pt denies any symptoms     CC: HTN    HPI:  This is a 89 y.o. female with a PMHx of HTN who presents to the Emergency Department via EMS with a cc of hypertension  beginning today. Pt denies HA, CP, SOB, numbness or weakness. She reports being out of her blood pressure medication for the past approximately 1 week.  She notes she has been monitoring her blood pressure and has been within acceptable ranges however she had increased salt intake yesterday which she believes may have caused her blood pressure to elevate.  She reports having an appointment to see her PCP Dr. Treviño tomorrow for medication refills and management.        Review of patient's allergies indicates:   Allergen Reactions    Ibuprofen Shortness Of Breath    Penicillins Other (See Comments)     Goes in and out     Past Medical History:   Diagnosis Date    GERD (gastroesophageal reflux disease)     Hyperlipidemia     Hypertension      History reviewed. No pertinent surgical history.  History reviewed. No pertinent family history.  Social History     Tobacco Use    Smoking status: Never Smoker    Smokeless tobacco: Never Used   Substance Use Topics    Alcohol use: No    Drug use: No     Review of Systems   Constitutional: Negative for fever.        (+) Hypertension   HENT: Negative for sore throat.    Eyes: Negative for visual disturbance.   Respiratory: Negative for shortness of breath.    Cardiovascular: Negative for chest pain.   Gastrointestinal: Negative for nausea.   Genitourinary: Negative for dysuria.   Musculoskeletal: Negative for back pain.   Skin: Negative for rash.    Neurological: Negative for weakness and headaches.   Hematological: Does not bruise/bleed easily.       Physical Exam     Initial Vitals [12/15/19 1346]   BP Pulse Resp Temp SpO2   (!) 174/93 80 17 98.2 °F (36.8 °C) 97 %      MAP       --         Physical Exam    Nursing note and vitals reviewed.  Constitutional: She is not diaphoretic. No distress.   HENT:   Head: Normocephalic and atraumatic.   Mouth/Throat: Oropharynx is clear and moist.   Eyes: EOM are normal. Pupils are equal, round, and reactive to light. No scleral icterus.   Neck: Normal range of motion. Neck supple. No JVD present.   Cardiovascular: Normal rate, regular rhythm and intact distal pulses.   Pulmonary/Chest: Breath sounds normal. No stridor. No respiratory distress.   Abdominal: Soft. Bowel sounds are normal. She exhibits no distension. There is no tenderness.   Musculoskeletal: Normal range of motion. She exhibits no edema or tenderness.   Neurological: She is alert. She has normal strength. No cranial nerve deficit or sensory deficit. GCS score is 15. GCS eye subscore is 4. GCS verbal subscore is 5. GCS motor subscore is 6.   Skin: Skin is warm and dry.   Psychiatric: She has a normal mood and affect.         ED Course   Procedures  Labs Reviewed - No data to display       Imaging Results    None          Medical Decision Making:   History:   Old Medical Records: I decided to obtain old medical records.  Old Records Summarized: other records.       <> Summary of Records: Recent communication with PMD office regarding office visit for medication refill.  Patient is scheduled for an appointment to see PMD on 12/16/19 at 2pm  Initial Assessment:   Patient with history of hypertension presents for evaluation of elevated blood pressure in the setting of running out of her blood pressure medication and having increase dietary salt intake yesterday.  She denies any symptoms associated with her elevated blood pressure.  She is neurovascularly  intact. She also reports being out of hydroxyzine which she takes for anxiety. Patient does not appear to have hypertensive urgency, emergency or symptomatic hypertension.  Will give patient her prescribed medications and monitor for improvement in blood pressure.  Differential Diagnosis:   Medication noncompliance, dietary noncompliance, anxiety, renal failure, electrolyte abnormality, thyroid dysfunction  ED Management:  Patient given her prescribed blood pressure medications in the emergency department.  She has remained hemodynamically stable evidence of neurovascular compromise.  She is scheduled for close follow-up with her primary physician tomorrow.  She remains asymptomatic with her elevated blood pressures.  She is fit for discharge for further medication management with her primary physician. counseled on supportive care, appropriate medication usage, concerning symptoms for which to return to ER and the importance of follow up. Understanding and agreement with treatment plan was expressed.          Scribe attestation: I, Elvia Crane , personally performed the services described in this documentation. All medical record entries made by the scribe were at my direction and in my presence.  I have reviewed the chart and agree that the record reflects my personal performance and is accurate and complete.                           Clinical Impression:       ICD-10-CM ICD-9-CM   1. Uncontrolled hypertension I10 401.9         Disposition:   Disposition: Discharged  Condition: Stable                     Elvia Crane MD  12/15/19 7226

## 2019-12-15 NOTE — ED TRIAGE NOTES
89 y.o. Female presents to the ED with chief complaint of hypertension. Pt reports she has not taken her medication in two days because she ran out, states she is going to her PCP tomorrow. Pt denies headache and vision change. Pt denies CP and SOB. Pt denies numbness and tingling to extremities. Pt is alert and awake, disorientated to time, states its 2000. Pt is orientated to person, place, and situation. Pt states she is getting a hearing aid soon, patient is hard of hearing. Pt denies pain. No acute distress.

## 2019-12-15 NOTE — DISCHARGE INSTRUCTIONS
Your blood pressure improved with your prescribed blood pressure medications.  Is important that you attend your appointment with your primary physician tomorrow for refills of your medications.  Avoid excessive dietary salt intake.  Return to the emergency department for any new, worsening or concerning symptoms.    Thank you for coming to our Emergency Department today. It is important to remember that some problems are difficult to diagnose and may not be found during your first visit. Be sure to follow up with your primary care doctor and review any labs/imaging that was performed with them. If you do not have a primary care doctor, you may contact the one listed on your discharge paperwork or you may also call the [facility] Appointment Desk at [phone] to schedule an appointment with one.     All medications may potentially have side effects and it is impossible to predict which medications may give you side effects. If you feel that you are having a negative effect of any medication you should immediately stop taking them and seek medical attention.    Return to the ER with any questions/concerns, new/concerning symptoms, worsening or failure to improve. Do not drive or make any important decisions for 24 hours if you have received any pain medications, sedatives or mood altering drugs during your ER visit.

## 2019-12-17 ENCOUNTER — HOSPITAL ENCOUNTER (EMERGENCY)
Facility: HOSPITAL | Age: 84
Discharge: HOME | End: 2019-12-17
Attending: EMERGENCY MEDICINE
Payer: MEDICARE

## 2019-12-17 VITALS
OXYGEN SATURATION: 99 % | DIASTOLIC BLOOD PRESSURE: 88 MMHG | HEIGHT: 64 IN | WEIGHT: 130 LBS | BODY MASS INDEX: 22.2 KG/M2 | RESPIRATION RATE: 14 BRPM | TEMPERATURE: 98 F | HEART RATE: 79 BPM | SYSTOLIC BLOOD PRESSURE: 178 MMHG

## 2019-12-17 DIAGNOSIS — I10 ESSENTIAL HYPERTENSION: ICD-10-CM

## 2019-12-17 DIAGNOSIS — R53.83 FATIGUE: Primary | ICD-10-CM

## 2019-12-17 DIAGNOSIS — F41.9 ANXIETY: ICD-10-CM

## 2019-12-17 LAB
ALBUMIN SERPL BCP-MCNC: 3.9 G/DL (ref 3.5–5.2)
ALP SERPL-CCNC: 99 U/L (ref 55–135)
ALT SERPL W/O P-5'-P-CCNC: 13 U/L (ref 10–44)
ANION GAP SERPL CALC-SCNC: 6 MMOL/L (ref 8–16)
AST SERPL-CCNC: 22 U/L (ref 10–40)
BASOPHILS # BLD AUTO: 0.02 K/UL (ref 0–0.2)
BASOPHILS NFR BLD: 0.4 % (ref 0–1.9)
BILIRUB SERPL-MCNC: 0.4 MG/DL (ref 0.1–1)
BILIRUB UR QL STRIP: NEGATIVE
BNP SERPL-MCNC: 38 PG/ML (ref 0–99)
BUN SERPL-MCNC: 14 MG/DL (ref 8–23)
CALCIUM SERPL-MCNC: 9.5 MG/DL (ref 8.7–10.5)
CHLORIDE SERPL-SCNC: 107 MMOL/L (ref 95–110)
CLARITY UR: CLEAR
CO2 SERPL-SCNC: 30 MMOL/L (ref 23–29)
COLOR UR: COLORLESS
CREAT SERPL-MCNC: 0.9 MG/DL (ref 0.5–1.4)
DIFFERENTIAL METHOD: ABNORMAL
EOSINOPHIL # BLD AUTO: 0 K/UL (ref 0–0.5)
EOSINOPHIL NFR BLD: 0.4 % (ref 0–8)
ERYTHROCYTE [DISTWIDTH] IN BLOOD BY AUTOMATED COUNT: 13 % (ref 11.5–14.5)
EST. GFR  (AFRICAN AMERICAN): >60 ML/MIN/1.73 M^2
EST. GFR  (NON AFRICAN AMERICAN): 56 ML/MIN/1.73 M^2
GLUCOSE SERPL-MCNC: 105 MG/DL (ref 70–110)
GLUCOSE UR QL STRIP: NEGATIVE
HCT VFR BLD AUTO: 44.5 % (ref 37–48.5)
HGB BLD-MCNC: 13.6 G/DL (ref 12–16)
HGB UR QL STRIP: NEGATIVE
IMM GRANULOCYTES # BLD AUTO: 0 K/UL (ref 0–0.04)
IMM GRANULOCYTES NFR BLD AUTO: 0 % (ref 0–0.5)
KETONES UR QL STRIP: NEGATIVE
LEUKOCYTE ESTERASE UR QL STRIP: ABNORMAL
LYMPHOCYTES # BLD AUTO: 2.7 K/UL (ref 1–4.8)
LYMPHOCYTES NFR BLD: 55.3 % (ref 18–48)
MCH RBC QN AUTO: 28.9 PG (ref 27–31)
MCHC RBC AUTO-ENTMCNC: 30.6 G/DL (ref 32–36)
MCV RBC AUTO: 95 FL (ref 82–98)
MICROSCOPIC COMMENT: NORMAL
MONOCYTES # BLD AUTO: 0.4 K/UL (ref 0.3–1)
MONOCYTES NFR BLD: 9.2 % (ref 4–15)
NEUTROPHILS # BLD AUTO: 1.7 K/UL (ref 1.8–7.7)
NEUTROPHILS NFR BLD: 34.7 % (ref 38–73)
NITRITE UR QL STRIP: NEGATIVE
NRBC BLD-RTO: 0 /100 WBC
PH UR STRIP: 8 [PH] (ref 5–8)
PLATELET # BLD AUTO: 174 K/UL (ref 150–350)
PMV BLD AUTO: 10.4 FL (ref 9.2–12.9)
POTASSIUM SERPL-SCNC: 3.6 MMOL/L (ref 3.5–5.1)
PROT SERPL-MCNC: 7.5 G/DL (ref 6–8.4)
PROT UR QL STRIP: NEGATIVE
RBC # BLD AUTO: 4.71 M/UL (ref 4–5.4)
SODIUM SERPL-SCNC: 143 MMOL/L (ref 136–145)
SP GR UR STRIP: 1 (ref 1–1.03)
T4 FREE SERPL-MCNC: 1.06 NG/DL (ref 0.71–1.51)
TROPONIN I SERPL DL<=0.01 NG/ML-MCNC: 0.01 NG/ML (ref 0–0.03)
TSH SERPL DL<=0.005 MIU/L-ACNC: 0.96 UIU/ML (ref 0.4–4)
URN SPEC COLLECT METH UR: ABNORMAL
UROBILINOGEN UR STRIP-ACNC: NEGATIVE EU/DL
WBC # BLD AUTO: 4.79 K/UL (ref 3.9–12.7)
WBC #/AREA URNS HPF: 2 /HPF (ref 0–5)

## 2019-12-17 PROCEDURE — 85025 COMPLETE CBC W/AUTO DIFF WBC: CPT

## 2019-12-17 PROCEDURE — 81000 URINALYSIS NONAUTO W/SCOPE: CPT

## 2019-12-17 PROCEDURE — 25000003 PHARM REV CODE 250: Performed by: EMERGENCY MEDICINE

## 2019-12-17 PROCEDURE — 84484 ASSAY OF TROPONIN QUANT: CPT

## 2019-12-17 PROCEDURE — 96374 THER/PROPH/DIAG INJ IV PUSH: CPT

## 2019-12-17 PROCEDURE — 99285 EMERGENCY DEPT VISIT HI MDM: CPT | Mod: 25

## 2019-12-17 PROCEDURE — 84439 ASSAY OF FREE THYROXINE: CPT

## 2019-12-17 PROCEDURE — 93010 EKG 12-LEAD: ICD-10-PCS | Mod: ,,, | Performed by: INTERNAL MEDICINE

## 2019-12-17 PROCEDURE — 84443 ASSAY THYROID STIM HORMONE: CPT

## 2019-12-17 PROCEDURE — 80053 COMPREHEN METABOLIC PANEL: CPT

## 2019-12-17 PROCEDURE — 93005 ELECTROCARDIOGRAM TRACING: CPT

## 2019-12-17 PROCEDURE — 93010 ELECTROCARDIOGRAM REPORT: CPT | Mod: ,,, | Performed by: INTERNAL MEDICINE

## 2019-12-17 PROCEDURE — 83880 ASSAY OF NATRIURETIC PEPTIDE: CPT

## 2019-12-17 RX ORDER — LISINOPRIL 20 MG/1
20 TABLET ORAL DAILY
Qty: 30 TABLET | Refills: 0 | Status: SHIPPED | OUTPATIENT
Start: 2019-12-17 | End: 2020-02-04 | Stop reason: SDUPTHER

## 2019-12-17 RX ORDER — HYDROXYZINE HYDROCHLORIDE 25 MG/1
25 TABLET, FILM COATED ORAL 3 TIMES DAILY PRN
Qty: 90 TABLET | Refills: 0 | Status: SHIPPED | OUTPATIENT
Start: 2019-12-17 | End: 2020-01-16

## 2019-12-17 RX ORDER — LABETALOL HYDROCHLORIDE 5 MG/ML
10 INJECTION, SOLUTION INTRAVENOUS
Status: COMPLETED | OUTPATIENT
Start: 2019-12-17 | End: 2019-12-17

## 2019-12-17 RX ORDER — LISINOPRIL 20 MG/1
20 TABLET ORAL
Status: COMPLETED | OUTPATIENT
Start: 2019-12-17 | End: 2019-12-17

## 2019-12-17 RX ADMIN — LISINOPRIL 20 MG: 20 TABLET ORAL at 04:12

## 2019-12-17 RX ADMIN — LABETALOL HYDROCHLORIDE 10 MG: 5 INJECTION INTRAVENOUS at 04:12

## 2019-12-17 NOTE — ED PROVIDER NOTES
Encounter Date: 12/17/2019    SCRIBE #1 NOTE: I, Kade Toussaint, am scribing for, and in the presence of,  Wilmer Bañuelos MD. I have scribed the following portions of the note - Other sections scribed: HPI,ROS.       History     Chief Complaint   Patient presents with    Fatigue     Per EMS, neighbors report that patient appeared fatigued. Upon arrival to ED, pt aao x4.      CC: Fatigue     HPI:  89 y.o. female with a PMHx of Hypertension, Hyperlipidemia, and GERD presenting for evaluation via EMS for fatigue that started today. Per EMS pt lives in assisted living and neighbor reported pt appeared fatigue. Pt reports she felt like she was going to pass out so she called EMS. Reports she has been out of her blood pressure medication for about a week now. Pt denies nausea, vomiting, numbness, tingling, headache, or blurry vision. No alleviating factors. Reports Penicillin and Ibuprofen allergy.       The history is provided by the patient and the EMS personnel. No  was used.     Review of patient's allergies indicates:   Allergen Reactions    Ibuprofen Shortness Of Breath    Penicillins Other (See Comments)     Goes in and out     Past Medical History:   Diagnosis Date    GERD (gastroesophageal reflux disease)     Hyperlipidemia     Hypertension      No past surgical history on file.  No family history on file.  Social History     Tobacco Use    Smoking status: Never Smoker    Smokeless tobacco: Never Used   Substance Use Topics    Alcohol use: No    Drug use: No     Review of Systems   Constitutional: Positive for fatigue.   HENT: Negative.    Eyes: Negative.  Negative for visual disturbance.   Respiratory: Negative.    Cardiovascular: Negative.    Gastrointestinal: Negative.  Negative for nausea and vomiting.   Genitourinary: Negative.    Musculoskeletal: Negative.    Skin: Negative.    Neurological: Negative.  Negative for numbness and headaches.        (-)tingling       Physical Exam      Initial Vitals [12/17/19 1448]   BP Pulse Resp Temp SpO2   (!) 205/100 83 16 98.3 °F (36.8 °C) 97 %      MAP       --         Physical Exam    Nursing note and vitals reviewed.  Constitutional: She appears well-developed and well-nourished. She is not diaphoretic. No distress.   HENT:   Head: Normocephalic and atraumatic.   Nose: Nose normal.   Eyes: EOM are normal. Pupils are equal, round, and reactive to light.   Neck: Normal range of motion. Neck supple. No JVD present.   Cardiovascular: Normal rate, regular rhythm and normal heart sounds.   No murmur heard.  Pulmonary/Chest: Breath sounds normal. No stridor. No respiratory distress. She has no wheezes. She has no rales.   Abdominal: Soft. Bowel sounds are normal. She exhibits no distension. There is no tenderness.   Musculoskeletal: Normal range of motion. She exhibits no edema or tenderness.   Neurological: She is alert and oriented to person, place, and time. No cranial nerve deficit.   Skin: Skin is warm and dry. Capillary refill takes less than 2 seconds. No rash noted. No erythema.         ED Course   Procedures  Labs Reviewed   CBC W/ AUTO DIFFERENTIAL - Abnormal; Notable for the following components:       Result Value    Mean Corpuscular Hemoglobin Conc 30.6 (*)     Gran # (ANC) 1.7 (*)     Gran% 34.7 (*)     Lymph% 55.3 (*)     All other components within normal limits   COMPREHENSIVE METABOLIC PANEL - Abnormal; Notable for the following components:    CO2 30 (*)     Anion Gap 6 (*)     eGFR if non  56 (*)     All other components within normal limits   URINALYSIS, REFLEX TO URINE CULTURE - Abnormal; Notable for the following components:    Color, UA Colorless (*)     Specific Hart, UA 1.000 (*)     Leukocytes, UA Trace (*)     All other components within normal limits    Narrative:     Preferred Collection Type->Urine, Clean Catch   TROPONIN I   B-TYPE NATRIURETIC PEPTIDE   TSH   T4, FREE   URINALYSIS MICROSCOPIC    Narrative:      Preferred Collection Type->Urine, Clean Catch          Imaging Results          X-Ray Chest AP Portable (Final result)  Result time 12/17/19 17:33:26    Final result by Carlos Milner MD (12/17/19 17:33:26)                 Impression:      No detrimental change or radiographic acute intrathoracic process seen on this single view.      Electronically signed by: Carlos Milner MD  Date:    12/17/2019  Time:    17:33             Narrative:    EXAMINATION:  XR CHEST AP PORTABLE    CLINICAL HISTORY:  Other fatigue    TECHNIQUE:  Single frontal view of the chest was performed.    COMPARISON:  Chest radiograph 10/11/2019    FINDINGS:  Monitoring leads overlie the chest.  Patient is somewhat rotated.  No detrimental change.    Cardiomediastinal silhouette is midline and stable without evidence of failure.  Few scattered linear opacities at the lung bases consistent with subsegmental scarring versus atelectasis.  The lungs are otherwise symmetrically well expanded without focal consolidation, pleural effusion or pneumothorax.  No acute osseous process seen.  PA and lateral views can be obtained.                               CT Head Without Contrast (Final result)  Result time 12/17/19 16:11:24    Final result by Carlos Milner MD (12/17/19 16:11:24)                 Impression:      No acute large vascular territory infarct or intracranial hemorrhage identified.  Further evaluation/follow-up as warranted.    Bilateral thalamic more conspicuous small hypodense foci suggesting lacunar type infarcts, age-indeterminate.  Correlate clinically.    Generalized cerebral volume loss and supratentorial white matter mild chronic small vessel ischemic change.      Electronically signed by: Carlos Milner MD  Date:    12/17/2019  Time:    16:11             Narrative:    EXAMINATION:  CT HEAD WITHOUT CONTRAST    CLINICAL HISTORY:  Confusion/delirium, altered LOC, unexplained;    TECHNIQUE:  Low dose axial CT images obtained throughout  the head without intravenous contrast. Sagittal and coronal reconstructions were performed.    COMPARISON:  Head CT most recent 05/14/2019    FINDINGS:  Intracranial compartment:    Age-appropriate generalized cerebral volume loss.  Ventricles are midline and stable in size and configuration without distortion by mass effect or acute hydrocephalus.  No extra-axial blood or fluid collections.    Grossly stable distribution of supratentorial white matter mild chronic small vessel ischemic change.  Bilateral basal ganglia and skull base atherosclerotic vascular calcifications noted.  Bilateral thalamic small subtle hypoattenuating foci slightly more conspicuous from most recent prior.  No parenchymal mass, hemorrhage, edema or major vascular distribution infarct.    Skull/extracranial contents (limited evaluation): No fracture. Mastoid air cells and paranasal sinuses are essentially clear.                                 Medical Decision Making:   Clinical Tests:   Lab Tests: Ordered and Reviewed  Radiological Study: Ordered and Reviewed  Medical Tests: Ordered and Reviewed            Scribe Attestation:   Scribe #1: I performed the above scribed service and the documentation accurately describes the services I performed. I attest to the accuracy of the note.            ED Course as of Dec 18 0019   Tue Dec 17, 2019   1514 EKG 15 11-sinus rhythm, short p.r., nonspecific ST T wave changes compared to prior 11/08/2019, no ischemic pattern noted, no STEMI.    [BB]      ED Course User Index  [BB] Wilmer Bañuelos MD             89-year-old female past medical history as above presents with fatigue x1 week.  Physical exam showing well-appearing female, walking with ease, steady gait, alert and oriented, otherwise unremarkable physical exam.  ED workup showing baseline CBC/CMP, unremarkable urinalysis, troponin negative, EKG as noted above.  CT head showing chronic findings.  Patient presentation consistent with fatigue  likely secondary to recently increased activity reported yesterday.  Patient with unremarkable workup exam, an ED findings here today.  At this time based on physical evaluation, ED workup, reassessment do not suspect acute infectious process, urinary tract infection, acute MI/ACS, acute CVA/TIA, intracranial hemorrhage, acute electrolyte abnormality, or any further surgical or medical emergency.  Discussed with patient diagnosis here today, as well as nonspecific symptoms, patient reporting she understands, and would like to go home.  Further treatment discussed at home including ED return precautions and follow up with PCP in the next week.  Patient understanding of plan further management.  Patient discharged home improved and stable.        Clinical Impression:       ICD-10-CM ICD-9-CM   1. Fatigue R53.83 780.79   2. Anxiety F41.9 300.00   3. Essential hypertension I10 401.9            I, Wilmer Bañuelos M.D., personally performed the services described in this documentation. All medical record entries made by the scribe were at my direction and in my presence. I have reviewed the chart and agree that the record reflects my personal performance and is accurate and complete.                 Wilmer Bañuelos MD  12/18/19 0019

## 2019-12-17 NOTE — ED TRIAGE NOTES
"Pt  Complain of shortness of breath and  Fatigue x "a couple days." denies chest pain. Pt reports does all ADLs for self. NAD.  "

## 2019-12-20 NOTE — TELEPHONE ENCOUNTER
----- Message from Stacy Lobo sent at 3/1/2018  2:20 PM CST -----  Contact: Self   Patient says she has gas and her medication is not working . Please call patient at  [phone]   none

## 2019-12-31 ENCOUNTER — EXTERNAL CHRONIC CARE MANAGEMENT (OUTPATIENT)
Dept: PRIMARY CARE CLINIC | Facility: CLINIC | Age: 84
End: 2019-12-31
Payer: MEDICARE

## 2019-12-31 PROCEDURE — 99490 CHRNC CARE MGMT STAFF 1ST 20: CPT | Mod: S$PBB,,, | Performed by: INTERNAL MEDICINE

## 2019-12-31 PROCEDURE — 99490 PR CHRONIC CARE MGMT, 1ST 20 MIN: ICD-10-PCS | Mod: S$PBB,,, | Performed by: INTERNAL MEDICINE

## 2019-12-31 PROCEDURE — 99490 CHRNC CARE MGMT STAFF 1ST 20: CPT | Mod: PBBFAC,PN | Performed by: INTERNAL MEDICINE

## 2020-01-19 ENCOUNTER — HOSPITAL ENCOUNTER (EMERGENCY)
Facility: HOSPITAL | Age: 85
Discharge: HOME | End: 2020-01-19
Attending: EMERGENCY MEDICINE
Payer: MEDICARE

## 2020-01-19 VITALS
SYSTOLIC BLOOD PRESSURE: 168 MMHG | HEART RATE: 84 BPM | HEIGHT: 64 IN | RESPIRATION RATE: 16 BRPM | DIASTOLIC BLOOD PRESSURE: 98 MMHG | WEIGHT: 130 LBS | TEMPERATURE: 98 F | OXYGEN SATURATION: 99 % | BODY MASS INDEX: 22.2 KG/M2

## 2020-01-19 DIAGNOSIS — R10.12 LEFT UPPER QUADRANT PAIN: Primary | ICD-10-CM

## 2020-01-19 DIAGNOSIS — R07.9 CHEST PAIN: ICD-10-CM

## 2020-01-19 LAB
ALBUMIN SERPL BCP-MCNC: 3.5 G/DL (ref 3.5–5.2)
ALP SERPL-CCNC: 85 U/L (ref 55–135)
ALT SERPL W/O P-5'-P-CCNC: 11 U/L (ref 10–44)
ANION GAP SERPL CALC-SCNC: 9 MMOL/L (ref 8–16)
APTT BLDCRRT: 25.7 SEC (ref 21–32)
AST SERPL-CCNC: 16 U/L (ref 10–40)
BACTERIA #/AREA URNS HPF: NORMAL /HPF
BASOPHILS # BLD AUTO: 0.02 K/UL (ref 0–0.2)
BASOPHILS NFR BLD: 0.4 % (ref 0–1.9)
BILIRUB SERPL-MCNC: 0.3 MG/DL (ref 0.1–1)
BILIRUB UR QL STRIP: NEGATIVE
BNP SERPL-MCNC: 83 PG/ML (ref 0–99)
BUN SERPL-MCNC: 13 MG/DL (ref 8–23)
CALCIUM SERPL-MCNC: 9.1 MG/DL (ref 8.7–10.5)
CHLORIDE SERPL-SCNC: 109 MMOL/L (ref 95–110)
CLARITY UR: CLEAR
CO2 SERPL-SCNC: 25 MMOL/L (ref 23–29)
COLOR UR: YELLOW
CREAT SERPL-MCNC: 0.8 MG/DL (ref 0.5–1.4)
DIFFERENTIAL METHOD: ABNORMAL
EOSINOPHIL # BLD AUTO: 0 K/UL (ref 0–0.5)
EOSINOPHIL NFR BLD: 0.4 % (ref 0–8)
ERYTHROCYTE [DISTWIDTH] IN BLOOD BY AUTOMATED COUNT: 12.7 % (ref 11.5–14.5)
EST. GFR  (AFRICAN AMERICAN): >60 ML/MIN/1.73 M^2
EST. GFR  (NON AFRICAN AMERICAN): >60 ML/MIN/1.73 M^2
GLUCOSE SERPL-MCNC: 102 MG/DL (ref 70–110)
GLUCOSE UR QL STRIP: NEGATIVE
HCT VFR BLD AUTO: 39.4 % (ref 37–48.5)
HGB BLD-MCNC: 12.1 G/DL (ref 12–16)
HGB UR QL STRIP: NEGATIVE
IMM GRANULOCYTES # BLD AUTO: 0.01 K/UL (ref 0–0.04)
IMM GRANULOCYTES NFR BLD AUTO: 0.2 % (ref 0–0.5)
INR PPP: 1 (ref 0.8–1.2)
KETONES UR QL STRIP: NEGATIVE
LEUKOCYTE ESTERASE UR QL STRIP: ABNORMAL
LIPASE SERPL-CCNC: 74 U/L (ref 4–60)
LYMPHOCYTES # BLD AUTO: 2.5 K/UL (ref 1–4.8)
LYMPHOCYTES NFR BLD: 48.8 % (ref 18–48)
MCH RBC QN AUTO: 28.9 PG (ref 27–31)
MCHC RBC AUTO-ENTMCNC: 30.7 G/DL (ref 32–36)
MCV RBC AUTO: 94 FL (ref 82–98)
MICROSCOPIC COMMENT: NORMAL
MONOCYTES # BLD AUTO: 0.4 K/UL (ref 0.3–1)
MONOCYTES NFR BLD: 8.8 % (ref 4–15)
NEUTROPHILS # BLD AUTO: 2.1 K/UL (ref 1.8–7.7)
NEUTROPHILS NFR BLD: 41.4 % (ref 38–73)
NITRITE UR QL STRIP: NEGATIVE
NRBC BLD-RTO: 0 /100 WBC
PH UR STRIP: 6 [PH] (ref 5–8)
PLATELET # BLD AUTO: 155 K/UL (ref 150–350)
PMV BLD AUTO: 10.1 FL (ref 9.2–12.9)
POTASSIUM SERPL-SCNC: 3.6 MMOL/L (ref 3.5–5.1)
PROT SERPL-MCNC: 6.7 G/DL (ref 6–8.4)
PROT UR QL STRIP: NEGATIVE
PROTHROMBIN TIME: 10.7 SEC (ref 9–12.5)
RBC # BLD AUTO: 4.19 M/UL (ref 4–5.4)
RBC #/AREA URNS HPF: 2 /HPF (ref 0–4)
SODIUM SERPL-SCNC: 143 MMOL/L (ref 136–145)
SP GR UR STRIP: 1.01 (ref 1–1.03)
SQUAMOUS #/AREA URNS HPF: NORMAL /HPF
TROPONIN I SERPL DL<=0.01 NG/ML-MCNC: <0.006 NG/ML (ref 0–0.03)
URN SPEC COLLECT METH UR: ABNORMAL
UROBILINOGEN UR STRIP-ACNC: NEGATIVE EU/DL
WBC # BLD AUTO: 5.02 K/UL (ref 3.9–12.7)
WBC #/AREA URNS HPF: 0 /HPF (ref 0–5)

## 2020-01-19 PROCEDURE — 85730 THROMBOPLASTIN TIME PARTIAL: CPT

## 2020-01-19 PROCEDURE — 80053 COMPREHEN METABOLIC PANEL: CPT

## 2020-01-19 PROCEDURE — 93010 ELECTROCARDIOGRAM REPORT: CPT | Mod: ,,, | Performed by: INTERNAL MEDICINE

## 2020-01-19 PROCEDURE — 93010 EKG 12-LEAD: ICD-10-PCS | Mod: ,,, | Performed by: INTERNAL MEDICINE

## 2020-01-19 PROCEDURE — 85025 COMPLETE CBC W/AUTO DIFF WBC: CPT

## 2020-01-19 PROCEDURE — 84484 ASSAY OF TROPONIN QUANT: CPT

## 2020-01-19 PROCEDURE — 85610 PROTHROMBIN TIME: CPT

## 2020-01-19 PROCEDURE — 83880 ASSAY OF NATRIURETIC PEPTIDE: CPT

## 2020-01-19 PROCEDURE — 81000 URINALYSIS NONAUTO W/SCOPE: CPT

## 2020-01-19 PROCEDURE — 83690 ASSAY OF LIPASE: CPT

## 2020-01-19 PROCEDURE — 99285 EMERGENCY DEPT VISIT HI MDM: CPT | Mod: 25

## 2020-01-19 PROCEDURE — 93005 ELECTROCARDIOGRAM TRACING: CPT

## 2020-01-19 RX ORDER — DICYCLOMINE HYDROCHLORIDE 10 MG/5ML
SOLUTION ORAL
Status: DISCONTINUED
Start: 2020-01-19 | End: 2020-01-19 | Stop reason: HOSPADM

## 2020-01-19 RX ORDER — LIDOCAINE HYDROCHLORIDE 20 MG/ML
SOLUTION OROPHARYNGEAL
Status: DISCONTINUED
Start: 2020-01-19 | End: 2020-01-19 | Stop reason: HOSPADM

## 2020-01-19 RX ORDER — MAG HYDROX/ALUMINUM HYD/SIMETH 200-200-20
SUSPENSION, ORAL (FINAL DOSE FORM) ORAL
Status: DISCONTINUED
Start: 2020-01-19 | End: 2020-01-19 | Stop reason: HOSPADM

## 2020-01-19 NOTE — ED PROVIDER NOTES
Encounter Date: 1/19/2020    SCRIBE #1 NOTE: I, Heidi Jansen, am scribing for, and in the presence of,  Negro Mccullough MD. I have scribed the following portions of the note - Other sections scribed: HPI, ROS, PE, ED Management, EKG.       History     Chief Complaint   Patient presents with    Chest Pain     pt complains of intermitten  chest pain x 1 day. denies nausea or vomiting     CC: Abdominal Pain    Patient is a 89 y.o female with a PMHx of HTN and GERD who presents to the ED complaining of intermittent, epigastric to LUQ abdominal pain that began PTA. Patient reports of mild SOB that is unchanged from previous Hx of SOB. She denies fever and chills. No constipation, diarrhea, dysuria, or difficulty urinating. No recent consumption of greasy or spicy foods. Patient is compliant with her medications. Patient is allergic to Penicillins and Ibuprofen.      The history is provided by the patient. No  was used.     Review of patient's allergies indicates:   Allergen Reactions    Ibuprofen Shortness Of Breath    Penicillins Other (See Comments)     Goes in and out     Past Medical History:   Diagnosis Date    GERD (gastroesophageal reflux disease)     Hyperlipidemia     Hypertension      History reviewed. No pertinent surgical history.  History reviewed. No pertinent family history.  Social History     Tobacco Use    Smoking status: Never Smoker    Smokeless tobacco: Never Used   Substance Use Topics    Alcohol use: No    Drug use: No     Review of Systems   Constitutional: Negative for chills and fever.   HENT: Negative for congestion.    Respiratory: Positive for shortness of breath (Mild). Negative for cough.    Cardiovascular: Negative for chest pain.   Gastrointestinal: Positive for abdominal pain. Negative for constipation, diarrhea, nausea and vomiting.   Genitourinary: Negative for difficulty urinating and dysuria.   Musculoskeletal: Negative for back pain.   Skin: Negative for  rash and wound.   Neurological: Negative for headaches.   Psychiatric/Behavioral: Negative for confusion.       Physical Exam     Initial Vitals [01/19/20 0037]   BP Pulse Resp Temp SpO2   (!) 216/120 86 16 98.4 °F (36.9 °C) 100 %      MAP       --         Physical Exam    Nursing note and vitals reviewed.  Constitutional: She appears well-developed and well-nourished. She is not diaphoretic. No distress.   HENT:   Head: Normocephalic and atraumatic.   Mouth/Throat: Oropharynx is clear and moist.   Eyes: Conjunctivae and EOM are normal. Pupils are equal, round, and reactive to light. Right eye exhibits no discharge. Left eye exhibits no discharge.   Neck: Normal range of motion.   Cardiovascular: Normal rate, regular rhythm and normal heart sounds. Exam reveals no gallop and no friction rub.    No murmur heard.  Pulmonary/Chest: Breath sounds normal. No respiratory distress. She has no wheezes. She has no rhonchi. She has no rales. She exhibits no tenderness.   Abdominal: Soft. She exhibits no distension and no mass. There is no tenderness. There is no rebound and no guarding.   Musculoskeletal: Normal range of motion. She exhibits no edema or tenderness.   Neurological: She is alert and oriented to person, place, and time. She has normal strength. No cranial nerve deficit.   Skin: Skin is warm and dry. No rash noted. No erythema.   Psychiatric: She has a normal mood and affect. Her behavior is normal. Judgment and thought content normal.         ED Course   Procedures  Labs Reviewed   CBC W/ AUTO DIFFERENTIAL - Abnormal; Notable for the following components:       Result Value    Mean Corpuscular Hemoglobin Conc 30.7 (*)     Lymph% 48.8 (*)     All other components within normal limits   URINALYSIS, REFLEX TO URINE CULTURE - Abnormal; Notable for the following components:    Leukocytes, UA Trace (*)     All other components within normal limits    Narrative:     Preferred Collection Type->Urine, Clean Catch    LIPASE - Abnormal; Notable for the following components:    Lipase 74 (*)     All other components within normal limits   COMPREHENSIVE METABOLIC PANEL   TROPONIN I   B-TYPE NATRIURETIC PEPTIDE   APTT   PROTIME-INR   URINALYSIS MICROSCOPIC    Narrative:     Preferred Collection Type->Urine, Clean Catch     EKG Readings: (Independently Interpreted)   Previous EKG Date: 01/19/2020.   Sinus Rhythm with short SD. 82 bpm. Diffuse, Flat T waves. No STEMI.        Imaging Results          X-Ray Chest AP Portable (In process)                  Medical Decision Making:   Initial Assessment:   89 year old patient presenting 2/2 abdominal pain in left upper quadrant with no tenderness exam.  No rebound or guarding.  Tolerating p.o..  Repeat abdominal exam is benign.  Patient has a mildly elevated lipase.  Felt better after GI cocktail.  Discharging with Tylenol ranitidine.  I do not think this is cardiac in nature.  Troponin is reassuring along with BNP.  EKG nonspecific      Also considered but less likely:     AAA: location inconsistent, no pulsatile mass  Cholecystitis: location inconsistent, no relation with meals, negative aly's  SBO: normal BM and flatus. No vomiting  Appendicitis: location inconsistent, no fever, no rebound/guarding  Mesenteric ischemia: HPI inconsistent, does not coincide with meals, other dx more likely  Kidney stone: no radiation to back or cva tenderness, no dysuria, no hematuria  Pyelonephritis: no cva tenderness, no dysuria, no fever  Diverticulitis: no history of diverticulitis, no fever, no wbc, location not consistent     Return precautions given, patient understands and agrees with plan. All questions answered.  Instructed to follow up with PCP. I discussed with the patient the diagnosis, treatment plan, indications for return to the emergency department, and for expected follow-up. The patient verbalized an understanding. The patient is asked if there are any questions or concerns. We  discuss the case, until all issues are addressed to the patients satisfaction. Patient understands and is agreeable to the plan.   Negro Mccullough    Independently Interpreted Test(s):   I have ordered and independently interpreted X-rays - see prior notes.  I have ordered and independently interpreted EKG Reading(s) - see prior notes  Clinical Tests:   Lab Tests: Ordered and Reviewed  Radiological Study: Ordered and Reviewed  Medical Tests: Ordered and Reviewed  ED Management:  0302: I reassessed the patient.            Scribe Attestation:   Scribe #1: I performed the above scribed service and the documentation accurately describes the services I performed. I attest to the accuracy of the note.                          Clinical Impression:       ICD-10-CM ICD-9-CM   1. Left upper quadrant pain R10.12 789.02   2. Chest pain R07.9 786.50                  I, Negro Mccullough, personally performed the services described in this documentation. All medical record entries made by the scribe were at my direction and in my presence.  I have reviewed the chart and agree that the record reflects my personal performance and is accurate and complete.             Negro Mccullough MD  01/19/20 0359

## 2020-01-19 NOTE — ED TRIAGE NOTES
Pt reports that she had gas pain pta and was told that her pressure was high. Pt states that she is feeling better now. Pt hard of hearing but is a&ox4

## 2020-01-31 ENCOUNTER — EXTERNAL CHRONIC CARE MANAGEMENT (OUTPATIENT)
Dept: PRIMARY CARE CLINIC | Facility: CLINIC | Age: 85
End: 2020-01-31
Payer: MEDICARE

## 2020-01-31 PROCEDURE — 99490 PR CHRONIC CARE MGMT, 1ST 20 MIN: ICD-10-PCS | Mod: S$PBB,,, | Performed by: INTERNAL MEDICINE

## 2020-01-31 PROCEDURE — 99490 CHRNC CARE MGMT STAFF 1ST 20: CPT | Mod: S$PBB,,, | Performed by: INTERNAL MEDICINE

## 2020-01-31 PROCEDURE — 99490 CHRNC CARE MGMT STAFF 1ST 20: CPT | Mod: PBBFAC,PN | Performed by: INTERNAL MEDICINE

## 2020-02-03 PROCEDURE — 36000 PLACE NEEDLE IN VEIN: CPT

## 2020-02-03 PROCEDURE — 93010 EKG 12-LEAD: ICD-10-PCS | Mod: ,,, | Performed by: INTERNAL MEDICINE

## 2020-02-03 PROCEDURE — 93005 ELECTROCARDIOGRAM TRACING: CPT

## 2020-02-03 PROCEDURE — 93010 ELECTROCARDIOGRAM REPORT: CPT | Mod: ,,, | Performed by: INTERNAL MEDICINE

## 2020-02-03 PROCEDURE — 99285 EMERGENCY DEPT VISIT HI MDM: CPT | Mod: 25

## 2020-02-03 RX ORDER — ACETAMINOPHEN 500 MG
1000 TABLET ORAL
Status: COMPLETED | OUTPATIENT
Start: 2020-02-03 | End: 2020-02-04

## 2020-02-04 ENCOUNTER — HOSPITAL ENCOUNTER (EMERGENCY)
Facility: HOSPITAL | Age: 85
Discharge: HOME | End: 2020-02-04
Attending: EMERGENCY MEDICINE
Payer: MEDICARE

## 2020-02-04 VITALS
DIASTOLIC BLOOD PRESSURE: 95 MMHG | HEART RATE: 73 BPM | HEIGHT: 64 IN | BODY MASS INDEX: 22.2 KG/M2 | WEIGHT: 130 LBS | OXYGEN SATURATION: 97 % | TEMPERATURE: 99 F | SYSTOLIC BLOOD PRESSURE: 180 MMHG | RESPIRATION RATE: 16 BRPM

## 2020-02-04 DIAGNOSIS — R03.0 ELEVATED BLOOD PRESSURE READING: ICD-10-CM

## 2020-02-04 DIAGNOSIS — I10 HYPERTENSION, UNSPECIFIED TYPE: Primary | ICD-10-CM

## 2020-02-04 DIAGNOSIS — I10 ESSENTIAL HYPERTENSION: ICD-10-CM

## 2020-02-04 LAB
ALBUMIN SERPL BCP-MCNC: 3.6 G/DL (ref 3.5–5.2)
ALP SERPL-CCNC: 93 U/L (ref 55–135)
ALT SERPL W/O P-5'-P-CCNC: 12 U/L (ref 10–44)
ANION GAP SERPL CALC-SCNC: 9 MMOL/L (ref 8–16)
AST SERPL-CCNC: 16 U/L (ref 10–40)
BASOPHILS # BLD AUTO: 0.03 K/UL (ref 0–0.2)
BASOPHILS NFR BLD: 0.7 % (ref 0–1.9)
BILIRUB SERPL-MCNC: 0.3 MG/DL (ref 0.1–1)
BNP SERPL-MCNC: 113 PG/ML (ref 0–99)
BUN SERPL-MCNC: 10 MG/DL (ref 8–23)
CALCIUM SERPL-MCNC: 9.4 MG/DL (ref 8.7–10.5)
CHLORIDE SERPL-SCNC: 110 MMOL/L (ref 95–110)
CO2 SERPL-SCNC: 26 MMOL/L (ref 23–29)
CREAT SERPL-MCNC: 0.8 MG/DL (ref 0.5–1.4)
DIFFERENTIAL METHOD: ABNORMAL
EOSINOPHIL # BLD AUTO: 0 K/UL (ref 0–0.5)
EOSINOPHIL NFR BLD: 0.5 % (ref 0–8)
ERYTHROCYTE [DISTWIDTH] IN BLOOD BY AUTOMATED COUNT: 12.8 % (ref 11.5–14.5)
EST. GFR  (AFRICAN AMERICAN): >60 ML/MIN/1.73 M^2
EST. GFR  (NON AFRICAN AMERICAN): >60 ML/MIN/1.73 M^2
GLUCOSE SERPL-MCNC: 98 MG/DL (ref 70–110)
HCT VFR BLD AUTO: 43.1 % (ref 37–48.5)
HGB BLD-MCNC: 13.2 G/DL (ref 12–16)
IMM GRANULOCYTES # BLD AUTO: 0.01 K/UL (ref 0–0.04)
IMM GRANULOCYTES NFR BLD AUTO: 0.2 % (ref 0–0.5)
LYMPHOCYTES # BLD AUTO: 2.1 K/UL (ref 1–4.8)
LYMPHOCYTES NFR BLD: 48.5 % (ref 18–48)
MCH RBC QN AUTO: 28.6 PG (ref 27–31)
MCHC RBC AUTO-ENTMCNC: 30.6 G/DL (ref 32–36)
MCV RBC AUTO: 93 FL (ref 82–98)
MONOCYTES # BLD AUTO: 0.4 K/UL (ref 0.3–1)
MONOCYTES NFR BLD: 8.5 % (ref 4–15)
NEUTROPHILS # BLD AUTO: 1.8 K/UL (ref 1.8–7.7)
NEUTROPHILS NFR BLD: 41.6 % (ref 38–73)
NRBC BLD-RTO: 0 /100 WBC
PLATELET # BLD AUTO: 177 K/UL (ref 150–350)
PMV BLD AUTO: 9.6 FL (ref 9.2–12.9)
POTASSIUM SERPL-SCNC: 3.6 MMOL/L (ref 3.5–5.1)
PROT SERPL-MCNC: 7 G/DL (ref 6–8.4)
RBC # BLD AUTO: 4.62 M/UL (ref 4–5.4)
SODIUM SERPL-SCNC: 145 MMOL/L (ref 136–145)
TROPONIN I SERPL DL<=0.01 NG/ML-MCNC: 0.01 NG/ML (ref 0–0.03)
WBC # BLD AUTO: 4.37 K/UL (ref 3.9–12.7)

## 2020-02-04 PROCEDURE — 83880 ASSAY OF NATRIURETIC PEPTIDE: CPT

## 2020-02-04 PROCEDURE — 85025 COMPLETE CBC W/AUTO DIFF WBC: CPT

## 2020-02-04 PROCEDURE — 25000003 PHARM REV CODE 250: Performed by: EMERGENCY MEDICINE

## 2020-02-04 PROCEDURE — 80053 COMPREHEN METABOLIC PANEL: CPT

## 2020-02-04 PROCEDURE — 84484 ASSAY OF TROPONIN QUANT: CPT

## 2020-02-04 RX ORDER — AMLODIPINE BESYLATE 5 MG/1
5 TABLET ORAL
Status: COMPLETED | OUTPATIENT
Start: 2020-02-04 | End: 2020-02-04

## 2020-02-04 RX ORDER — LISINOPRIL 20 MG/1
20 TABLET ORAL
Status: COMPLETED | OUTPATIENT
Start: 2020-02-04 | End: 2020-02-04

## 2020-02-04 RX ORDER — LISINOPRIL 20 MG/1
20 TABLET ORAL DAILY
Qty: 30 TABLET | Refills: 11 | Status: SHIPPED | OUTPATIENT
Start: 2020-02-04 | End: 2020-02-14 | Stop reason: SDUPTHER

## 2020-02-04 RX ORDER — AMLODIPINE BESYLATE 5 MG/1
5 TABLET ORAL DAILY
Qty: 30 TABLET | Refills: 11 | Status: SHIPPED | OUTPATIENT
Start: 2020-02-04 | End: 2020-07-08 | Stop reason: SDUPTHER

## 2020-02-04 RX ADMIN — LISINOPRIL 20 MG: 20 TABLET ORAL at 01:02

## 2020-02-04 RX ADMIN — AMLODIPINE BESYLATE 5 MG: 5 TABLET ORAL at 01:02

## 2020-02-04 RX ADMIN — ACETAMINOPHEN 1000 MG: 500 TABLET ORAL at 12:02

## 2020-02-04 NOTE — ED PROVIDER NOTES
Encounter Date: 2/3/2020    SCRIBE #1 NOTE: I, Heidi Jansen, am scribing for, and in the presence of,  Shree Tillman MD. I have scribed the entire note.       History     Chief Complaint   Patient presents with    Hypertension     pt reports she has not been taking her medications, pt called EMS for headache, lives in assisted living center      CC: HTN    Patient is a 89 y.o female with a PMHx of HTN and HLD who presents to the ED, per EMS, complaining of HTN that began PTA. She reports of associated HA. Patient denies any other pain. She states that she doesn't take her HTN medication regularyly.    The history is provided by the patient. No  was used.     Review of patient's allergies indicates:   Allergen Reactions    Ibuprofen Shortness Of Breath    Penicillins Other (See Comments)     Goes in and out     Past Medical History:   Diagnosis Date    GERD (gastroesophageal reflux disease)     Hyperlipidemia     Hypertension      History reviewed. No pertinent surgical history.  History reviewed. No pertinent family history.  Social History     Tobacco Use    Smoking status: Never Smoker    Smokeless tobacco: Never Used   Substance Use Topics    Alcohol use: No    Drug use: No     Review of Systems   Constitutional: Negative for chills and fever.   HENT: Negative for congestion, postnasal drip, rhinorrhea, sinus pressure, sneezing and sore throat.    Eyes: Negative for discharge and redness.   Respiratory: Negative for cough and shortness of breath.    Cardiovascular: Negative for chest pain.   Gastrointestinal: Negative for abdominal pain, blood in stool, constipation, diarrhea, nausea and vomiting.   Genitourinary: Negative for dysuria, hematuria, pelvic pain, vaginal bleeding, vaginal discharge and vaginal pain.   Musculoskeletal: Negative for arthralgias, back pain and myalgias.   Skin: Negative for rash and wound.   Neurological: Positive for headaches. Negative for weakness  and light-headedness.   Hematological: Does not bruise/bleed easily.   Psychiatric/Behavioral: Negative for agitation and confusion. The patient is not nervous/anxious.        Physical Exam     Initial Vitals [02/03/20 2254]   BP Pulse Resp Temp SpO2   (!) 190/103 88 15 98.6 °F (37 °C) 98 %      MAP       --         Physical Exam    Nursing note and vitals reviewed.  Constitutional: She appears well-developed and well-nourished. She is not diaphoretic. No distress.   HENT:   Head: Normocephalic and atraumatic.   Eyes: Conjunctivae and EOM are normal. Pupils are equal, round, and reactive to light. Right eye exhibits no discharge. Left eye exhibits no discharge. No scleral icterus.   Neck: Normal range of motion. Neck supple. No thyromegaly present. No tracheal deviation present. No JVD present.   Cardiovascular: Normal rate, regular rhythm, normal heart sounds and intact distal pulses. Exam reveals no gallop and no friction rub.    No murmur heard.  Pulmonary/Chest: Breath sounds normal. No stridor. No respiratory distress. She has no wheezes. She has no rhonchi. She has no rales.   Abdominal: Soft. She exhibits no distension and no mass. There is no tenderness. There is no rebound and no guarding.   Musculoskeletal: Normal range of motion. She exhibits no edema or tenderness.   Lymphadenopathy:     She has no cervical adenopathy.   Neurological: She is alert and oriented to person, place, and time. She has normal strength. GCS score is 15. GCS eye subscore is 4. GCS verbal subscore is 5. GCS motor subscore is 6.   KALIA with NGND's   Skin: Skin is warm and dry. Capillary refill takes less than 2 seconds. No rash and no abscess noted. No erythema. No pallor.   Psychiatric: She has a normal mood and affect. Her behavior is normal. Judgment and thought content normal.         ED Course   Procedures  Labs Reviewed   CBC W/ AUTO DIFFERENTIAL - Abnormal; Notable for the following components:       Result Value    Mean  Corpuscular Hemoglobin Conc 30.6 (*)     Lymph% 48.5 (*)     All other components within normal limits   B-TYPE NATRIURETIC PEPTIDE - Abnormal; Notable for the following components:     (*)     All other components within normal limits   COMPREHENSIVE METABOLIC PANEL   TROPONIN I     EKG Readings: (Independently Interpreted)   Initial Reading: No STEMI. Rhythm: Normal Sinus Rhythm. Heart Rate: 82 bpm. Ectopy: No Ectopy. Conduction: Normal. ST Segments: Normal ST Segments. T Waves: Normal. Axis: Normal.     ECG Results          EKG 12-lead (In process)  Result time 02/04/20 14:59:10    In process by Interface, Lab In [facility] (02/04/20 14:59:10)                 Narrative:    Test Reason : R03.0,    Vent. Rate : 082 BPM     Atrial Rate : 357 BPM     P-R Int : 000 ms          QRS Dur : 098 ms      QT Int : 408 ms       P-R-T Axes : 000 042 167 degrees     QTc Int : 476 ms    Atrial flutter  Nonspecific ST and T wave abnormality  Prolonged QT  Abnormal ECG  When compared with ECG of 19-JAN-2020 01:51,  Significant changes have occurred    Referred By: [email or web address]   SELF           Confirmed By:                   In process by Interface, Lab In [facility] (02/04/20 14:55:20)                 Narrative:    Test Reason : R03.0,    Vent. Rate : 082 BPM     Atrial Rate : 357 BPM     P-R Int : 000 ms          QRS Dur : 098 ms      QT Int : 408 ms       P-R-T Axes : 000 042 167 degrees     QTc Int : 476 ms    Atrial flutter  Nonspecific ST and T wave abnormality  Prolonged QT  Abnormal ECG  When compared with ECG of 19-JAN-2020 01:51,  Significant changes have occurred    Referred By: [email or web address]   SELF           Confirmed By:                             Imaging Results          X-Ray Chest AP Portable (Final result)  Result time 02/04/20 00:44:05    Final result by Cherie Arita MD (02/04/20 00:44:05)                 Impression:      No acute cardiopulmonary process identified.      Electronically signed by: Cherie Arita  MD  Date:    02/04/2020  Time:    00:44             Narrative:    EXAMINATION:  XR CHEST AP PORTABLE    CLINICAL HISTORY:  elevated blood pressure reading;    TECHNIQUE:  Single frontal view of the chest was performed.    COMPARISON:  01/19/2020.    FINDINGS:  Cardiac silhouette is mildly enlarged but stable in size.  Lungs are symmetrically expanded.  No evidence of new focal consolidative process, pneumothorax, or significant effusion.  No acute osseous abnormality identified.                                 Medical Decision Making:   Independently Interpreted Test(s):   I have ordered and independently interpreted EKG Reading(s) - see prior notes  Clinical Tests:   Lab Tests: Ordered and Reviewed  Radiological Study: Ordered and Reviewed  Medical Tests: Ordered and Reviewed  ED Management:  0249: I reassessed the patient. I discussed with the patient the diagnosis, treatment plan, indications for return to the emergency department, and for expected follow-up. The patient verbalized understanding. The patient is asked if there are any questions or concerns. We discuss the case, until all issues are addressed to the patients satisfaction. Patient understands and is agreeable to the plan.             Scribe Attestation:   Scribe #1: I performed the above scribed service and the documentation accurately describes the services I performed. I attest to the accuracy of the note.      Pt arrived alert, afebrile, non-toxic in appearance, in no acute respiratory distress with HTN and remaining VSS.  Pt endorsed Hx of noncompliance with her BP meds and BP improved here following administration of meds.  PE unremarkable with no findings to warrant further evaluation at this time.  Given her profound hypertension I have given her Norvasc in addition to her home BP med for her to continue outpatient.  Pt discharged and counseled on the need to return to the nearest emergency room if they experience any other concerning  symptoms.  Pt counseled to F/U outpatient with a PCP over the next two to three days.    Shree Tillman MD                            Clinical Impression:       ICD-10-CM ICD-9-CM   1. Hypertension, unspecified type I10 401.9   2. Elevated blood pressure reading R03.0 796.2   3. Essential hypertension I10 401.9                   I, Shree Tillman, personally performed the services described in this documentation. All medical record entries made by the scribe were at my direction and in my presence.  I have reviewed the chart and agree that the record reflects my personal performance and is accurate and complete.               Shree Tillman MD  02/04/20 2043

## 2020-02-04 NOTE — ED TRIAGE NOTES
Pt presents to ED via EMS with c/o headache that has been bothering her intermittently x several days. It is a generalized headache, denies photophobia, nausea, vomiting, shortness of breath, chest pain. Pt reports not always being compliant with her blood pressure medications but took them tonight when she got the headache. Pt in no distress.

## 2020-02-06 ENCOUNTER — OFFICE VISIT (OUTPATIENT)
Dept: FAMILY MEDICINE | Facility: CLINIC | Age: 85
End: 2020-02-06
Payer: MEDICARE

## 2020-02-06 ENCOUNTER — LAB VISIT (OUTPATIENT)
Dept: LAB | Facility: HOSPITAL | Age: 85
End: 2020-02-06
Attending: FAMILY MEDICINE
Payer: MEDICARE

## 2020-02-06 ENCOUNTER — PATIENT OUTREACH (OUTPATIENT)
Dept: ADMINISTRATIVE | Facility: OTHER | Age: 85
End: 2020-02-06

## 2020-02-06 VITALS
HEIGHT: 64 IN | TEMPERATURE: 98 F | RESPIRATION RATE: 16 BRPM | HEART RATE: 73 BPM | WEIGHT: 128.5 LBS | BODY MASS INDEX: 21.94 KG/M2 | OXYGEN SATURATION: 98 % | SYSTOLIC BLOOD PRESSURE: 100 MMHG | DIASTOLIC BLOOD PRESSURE: 68 MMHG

## 2020-02-06 DIAGNOSIS — R51.9 LEFT TEMPORAL HEADACHE: Primary | ICD-10-CM

## 2020-02-06 DIAGNOSIS — F41.9 ANXIETY: ICD-10-CM

## 2020-02-06 DIAGNOSIS — F32.1 CURRENT MODERATE EPISODE OF MAJOR DEPRESSIVE DISORDER WITHOUT PRIOR EPISODE: ICD-10-CM

## 2020-02-06 DIAGNOSIS — R51.9 LEFT TEMPORAL HEADACHE: ICD-10-CM

## 2020-02-06 DIAGNOSIS — R94.31 ABNORMAL EKG: ICD-10-CM

## 2020-02-06 PROBLEM — I27.82 CHRONIC PULMONARY EMBOLISM: Status: RESOLVED | Noted: 2017-10-16 | Resolved: 2020-02-06

## 2020-02-06 PROBLEM — Z86.711 HISTORY OF PULMONARY EMBOLISM: Status: ACTIVE | Noted: 2020-02-06

## 2020-02-06 LAB — ERYTHROCYTE [SEDIMENTATION RATE] IN BLOOD BY WESTERGREN METHOD: 22 MM/HR (ref 0–36)

## 2020-02-06 PROCEDURE — 85652 RBC SED RATE AUTOMATED: CPT

## 2020-02-06 PROCEDURE — 99999 PR PBB SHADOW E&M-EST. PATIENT-LVL IV: CPT | Mod: PBBFAC,,, | Performed by: FAMILY MEDICINE

## 2020-02-06 PROCEDURE — 99214 OFFICE O/P EST MOD 30 MIN: CPT | Mod: PBBFAC,PO | Performed by: FAMILY MEDICINE

## 2020-02-06 PROCEDURE — 99214 PR OFFICE/OUTPT VISIT, EST, LEVL IV, 30-39 MIN: ICD-10-PCS | Mod: S$PBB,,, | Performed by: FAMILY MEDICINE

## 2020-02-06 PROCEDURE — 99214 OFFICE O/P EST MOD 30 MIN: CPT | Mod: S$PBB,,, | Performed by: FAMILY MEDICINE

## 2020-02-06 PROCEDURE — 99999 PR PBB SHADOW E&M-EST. PATIENT-LVL IV: ICD-10-PCS | Mod: PBBFAC,,, | Performed by: FAMILY MEDICINE

## 2020-02-06 PROCEDURE — 36415 COLL VENOUS BLD VENIPUNCTURE: CPT | Mod: PO

## 2020-02-06 RX ORDER — HYDROXYZINE HYDROCHLORIDE 25 MG/1
25 TABLET, FILM COATED ORAL 3 TIMES DAILY PRN
COMMUNITY

## 2020-02-06 RX ORDER — SERTRALINE HYDROCHLORIDE 50 MG/1
50 TABLET, FILM COATED ORAL DAILY
Qty: 30 TABLET | Refills: 1 | Status: SHIPPED | OUTPATIENT
Start: 2020-02-06 | End: 2020-07-08 | Stop reason: SDUPTHER

## 2020-02-06 NOTE — PROGRESS NOTES
Patient states she can't have the Flu vaccine   HX of chicken pox , patient notified can get vaccine at pharmacy

## 2020-02-06 NOTE — PROGRESS NOTES
Subjective:       Patient ID: Daiana Mcduffie     Chief Complaint: Headache and Annual Exam      Yovani Mcduffie is a 89 y.o. female.    Review of patient's allergies indicates:   Allergen Reactions    Ibuprofen Shortness Of Breath    Penicillins Other (See Comments)     Goes in and out       Current Outpatient Medications:     amLODIPine (NORVASC) 5 MG tablet, Take 1 tablet (5 mg total) by mouth once daily., Disp: 30 tablet, Rfl: 11    fluticasone propionate (FLONASE) 50 mcg/actuation nasal spray, 2 sprays (100 mcg total) by Each Nostril route once daily., Disp: 1 Bottle, Rfl: 6    hydrOXYzine HCl (ATARAX) 25 MG tablet, Take 25 mg by mouth 3 (three) times daily as needed for Itching., Disp: , Rfl:     lisinopril (PRINIVIL,ZESTRIL) 20 MG tablet, Take 1 tablet (20 mg total) by mouth once daily., Disp: 30 tablet, Rfl: 11    pantoprazole (PROTONIX) 20 MG tablet, TAKE 1 TABLET BY MOUTH ONCE DAILY, Disp: 90 tablet, Rfl: 0    Past Medical History:   Diagnosis Date    GERD (gastroesophageal reflux disease)     Hyperlipidemia     Hypertension      Review of Systems    Objective:      Physical Exam    Assessment:       No diagnosis found.    Plan:       There are no diagnoses linked to this encounter.

## 2020-02-06 NOTE — PROGRESS NOTES
Subjective:       Patient ID: Daiana Mcduffie is a 89 y.o. female.    Chief Complaint: Headache and Annual Exam    Headache    This is a recurrent problem. Episode onset: 2 weeks. The problem occurs intermittently. The pain is located in the temporal (right) region. The pain is at a severity of 5/10. The pain is moderate. Associated symptoms include blurred vision (mild) and hearing loss (chronic). Pertinent negatives include no nausea or vomiting. Associated symptoms comments: Burning sensation involving eyes. She has tried acetaminophen for the symptoms. The treatment provided mild relief.   Unsure of duration of headaches.  Reports disequilibrium x 2 episodes last week. Usually occur in evenings.  Worse HA was when she was seen in the ER 2 days ago, states pain 9-10/10.  EKG in ER showed ST and T wave changes.  Denies chest pain.  Reports a lot of gas.    Review of Systems   HENT: Positive for hearing loss (chronic).    Eyes: Positive for blurred vision (mild).   Gastrointestinal: Negative for nausea and vomiting.   Neurological: Positive for headaches.   Psychiatric/Behavioral: The patient is nervous/anxious.        Objective:      Physical Exam   Constitutional: She appears well-developed and well-nourished.   HENT:   Head: Normocephalic and atraumatic.   Neck: Normal range of motion. Neck supple.   Cardiovascular: Normal rate and regular rhythm.   Pulmonary/Chest: Effort normal and breath sounds normal.   Abdominal: Soft. Bowel sounds are normal. There is no tenderness.   Musculoskeletal: She exhibits no edema or deformity.   Neurological: She is alert.   Unable to perform clock drawing task.  Recalls the president.     Skin: Skin is warm and dry.   Psychiatric: She has a normal mood and affect.       Assessment:       1. Left temporal headache    2. Abnormal EKG    3. Anxiety    4. Current moderate episode of major depressive disorder without prior episode        Plan:       Daiana was seen today for  headache and annual exam.    Diagnoses and all orders for this visit:    Left temporal headache  Suspect benign.  Recent normal CT  -     Sedimentation rate; Future    Abnormal EKG  -     Ambulatory referral/consult to Cardiology; Future    Anxiety  -     sertraline (ZOLOFT) 50 MG tablet; Take 1 tablet (50 mg total) by mouth once daily.    Current moderate episode of major depressive disorder without prior episode  -     sertraline (ZOLOFT) 50 MG tablet; Take 1 tablet (50 mg total) by mouth once daily.

## 2020-02-07 ENCOUNTER — TELEPHONE (OUTPATIENT)
Dept: FAMILY MEDICINE | Facility: CLINIC | Age: 85
End: 2020-02-07

## 2020-02-07 NOTE — TELEPHONE ENCOUNTER
----- Message from Jennifer Stephenson MD sent at 2/7/2020  2:45 PM CST -----  Please inform patient her blood test for headaches was normal.

## 2020-02-13 ENCOUNTER — TELEPHONE (OUTPATIENT)
Dept: FAMILY MEDICINE | Facility: CLINIC | Age: 85
End: 2020-02-13

## 2020-02-14 ENCOUNTER — HOSPITAL ENCOUNTER (EMERGENCY)
Facility: HOSPITAL | Age: 85
Discharge: HOME | End: 2020-02-14
Attending: EMERGENCY MEDICINE
Payer: MEDICARE

## 2020-02-14 VITALS
WEIGHT: 130 LBS | OXYGEN SATURATION: 97 % | SYSTOLIC BLOOD PRESSURE: 160 MMHG | BODY MASS INDEX: 22.2 KG/M2 | TEMPERATURE: 98 F | DIASTOLIC BLOOD PRESSURE: 79 MMHG | HEIGHT: 64 IN | RESPIRATION RATE: 18 BRPM | HEART RATE: 76 BPM

## 2020-02-14 DIAGNOSIS — R06.02 SOB (SHORTNESS OF BREATH): ICD-10-CM

## 2020-02-14 DIAGNOSIS — Z86.711 HISTORY OF PULMONARY EMBOLISM: ICD-10-CM

## 2020-02-14 DIAGNOSIS — Z91.199 MEDICAL NON-COMPLIANCE: ICD-10-CM

## 2020-02-14 DIAGNOSIS — R06.02 SHORTNESS OF BREATH: ICD-10-CM

## 2020-02-14 DIAGNOSIS — Z60.2 ELDERLY PERSON LIVING ALONE: Primary | ICD-10-CM

## 2020-02-14 DIAGNOSIS — I10 ESSENTIAL HYPERTENSION: ICD-10-CM

## 2020-02-14 DIAGNOSIS — R07.9 CHEST PAIN: ICD-10-CM

## 2020-02-14 LAB
ALBUMIN SERPL BCP-MCNC: 3.9 G/DL (ref 3.5–5.2)
ALP SERPL-CCNC: 96 U/L (ref 55–135)
ALT SERPL W/O P-5'-P-CCNC: 14 U/L (ref 10–44)
ANION GAP SERPL CALC-SCNC: 11 MMOL/L (ref 8–16)
AST SERPL-CCNC: 19 U/L (ref 10–40)
BACTERIA #/AREA URNS HPF: NORMAL /HPF
BASOPHILS # BLD AUTO: 0.02 K/UL (ref 0–0.2)
BASOPHILS NFR BLD: 0.4 % (ref 0–1.9)
BILIRUB SERPL-MCNC: 0.4 MG/DL (ref 0.1–1)
BILIRUB UR QL STRIP: NEGATIVE
BNP SERPL-MCNC: 200 PG/ML (ref 0–99)
BUN SERPL-MCNC: 9 MG/DL (ref 8–23)
CALCIUM SERPL-MCNC: 9.8 MG/DL (ref 8.7–10.5)
CHLORIDE SERPL-SCNC: 108 MMOL/L (ref 95–110)
CLARITY UR: CLEAR
CO2 SERPL-SCNC: 26 MMOL/L (ref 23–29)
COLOR UR: COLORLESS
CREAT SERPL-MCNC: 0.9 MG/DL (ref 0.5–1.4)
D DIMER PPP IA.FEU-MCNC: 3.55 MG/L FEU
DIFFERENTIAL METHOD: ABNORMAL
EOSINOPHIL # BLD AUTO: 0 K/UL (ref 0–0.5)
EOSINOPHIL NFR BLD: 0.2 % (ref 0–8)
ERYTHROCYTE [DISTWIDTH] IN BLOOD BY AUTOMATED COUNT: 12.7 % (ref 11.5–14.5)
EST. GFR  (AFRICAN AMERICAN): >60 ML/MIN/1.73 M^2
EST. GFR  (NON AFRICAN AMERICAN): 56 ML/MIN/1.73 M^2
GLUCOSE SERPL-MCNC: 104 MG/DL (ref 70–110)
GLUCOSE UR QL STRIP: NEGATIVE
HCT VFR BLD AUTO: 46.5 % (ref 37–48.5)
HGB BLD-MCNC: 13.9 G/DL (ref 12–16)
HGB UR QL STRIP: ABNORMAL
IMM GRANULOCYTES # BLD AUTO: 0.01 K/UL (ref 0–0.04)
IMM GRANULOCYTES NFR BLD AUTO: 0.2 % (ref 0–0.5)
KETONES UR QL STRIP: NEGATIVE
LEUKOCYTE ESTERASE UR QL STRIP: NEGATIVE
LYMPHOCYTES # BLD AUTO: 2 K/UL (ref 1–4.8)
LYMPHOCYTES NFR BLD: 43.2 % (ref 18–48)
MCH RBC QN AUTO: 28.9 PG (ref 27–31)
MCHC RBC AUTO-ENTMCNC: 29.9 G/DL (ref 32–36)
MCV RBC AUTO: 97 FL (ref 82–98)
MICROSCOPIC COMMENT: NORMAL
MONOCYTES # BLD AUTO: 0.4 K/UL (ref 0.3–1)
MONOCYTES NFR BLD: 8.1 % (ref 4–15)
NEUTROPHILS # BLD AUTO: 2.3 K/UL (ref 1.8–7.7)
NEUTROPHILS NFR BLD: 47.9 % (ref 38–73)
NITRITE UR QL STRIP: NEGATIVE
NRBC BLD-RTO: 0 /100 WBC
PH UR STRIP: 7 [PH] (ref 5–8)
PLATELET # BLD AUTO: 160 K/UL (ref 150–350)
PMV BLD AUTO: 10.4 FL (ref 9.2–12.9)
POCT GLUCOSE: 94 MG/DL (ref 70–110)
POTASSIUM SERPL-SCNC: 3.8 MMOL/L (ref 3.5–5.1)
PROT SERPL-MCNC: 7.6 G/DL (ref 6–8.4)
PROT UR QL STRIP: NEGATIVE
RBC # BLD AUTO: 4.81 M/UL (ref 4–5.4)
RBC #/AREA URNS HPF: 0 /HPF (ref 0–4)
SODIUM SERPL-SCNC: 145 MMOL/L (ref 136–145)
SP GR UR STRIP: 1 (ref 1–1.03)
SQUAMOUS #/AREA URNS HPF: 1 /HPF
T4 FREE SERPL-MCNC: 1.3 NG/DL (ref 0.71–1.51)
TROPONIN I SERPL DL<=0.01 NG/ML-MCNC: 0.01 NG/ML (ref 0–0.03)
TROPONIN I SERPL DL<=0.01 NG/ML-MCNC: <0.006 NG/ML (ref 0–0.03)
TSH SERPL DL<=0.005 MIU/L-ACNC: 0.37 UIU/ML (ref 0.4–4)
URN SPEC COLLECT METH UR: ABNORMAL
UROBILINOGEN UR STRIP-ACNC: NEGATIVE EU/DL
WBC # BLD AUTO: 4.72 K/UL (ref 3.9–12.7)

## 2020-02-14 PROCEDURE — 80053 COMPREHEN METABOLIC PANEL: CPT

## 2020-02-14 PROCEDURE — 81000 URINALYSIS NONAUTO W/SCOPE: CPT

## 2020-02-14 PROCEDURE — 96360 HYDRATION IV INFUSION INIT: CPT

## 2020-02-14 PROCEDURE — 84439 ASSAY OF FREE THYROXINE: CPT

## 2020-02-14 PROCEDURE — 25500020 PHARM REV CODE 255: Performed by: EMERGENCY MEDICINE

## 2020-02-14 PROCEDURE — 93010 ELECTROCARDIOGRAM REPORT: CPT | Mod: ,,, | Performed by: INTERNAL MEDICINE

## 2020-02-14 PROCEDURE — 84484 ASSAY OF TROPONIN QUANT: CPT

## 2020-02-14 PROCEDURE — 63600175 PHARM REV CODE 636 W HCPCS: Performed by: EMERGENCY MEDICINE

## 2020-02-14 PROCEDURE — 84443 ASSAY THYROID STIM HORMONE: CPT

## 2020-02-14 PROCEDURE — 25000003 PHARM REV CODE 250: Performed by: EMERGENCY MEDICINE

## 2020-02-14 PROCEDURE — 85025 COMPLETE CBC W/AUTO DIFF WBC: CPT

## 2020-02-14 PROCEDURE — 93010 EKG 12-LEAD: ICD-10-PCS | Mod: ,,, | Performed by: INTERNAL MEDICINE

## 2020-02-14 PROCEDURE — 93005 ELECTROCARDIOGRAM TRACING: CPT

## 2020-02-14 PROCEDURE — 85379 FIBRIN DEGRADATION QUANT: CPT

## 2020-02-14 PROCEDURE — 99285 EMERGENCY DEPT VISIT HI MDM: CPT | Mod: 25

## 2020-02-14 PROCEDURE — 83880 ASSAY OF NATRIURETIC PEPTIDE: CPT

## 2020-02-14 PROCEDURE — 96361 HYDRATE IV INFUSION ADD-ON: CPT

## 2020-02-14 PROCEDURE — 82962 GLUCOSE BLOOD TEST: CPT

## 2020-02-14 RX ORDER — AMLODIPINE BESYLATE 5 MG/1
10 TABLET ORAL
Status: COMPLETED | OUTPATIENT
Start: 2020-02-14 | End: 2020-02-14

## 2020-02-14 RX ORDER — ASPIRIN 325 MG
325 TABLET ORAL
Status: COMPLETED | OUTPATIENT
Start: 2020-02-14 | End: 2020-02-14

## 2020-02-14 RX ORDER — LISINOPRIL 20 MG/1
20 TABLET ORAL
Status: COMPLETED | OUTPATIENT
Start: 2020-02-14 | End: 2020-02-14

## 2020-02-14 RX ORDER — LISINOPRIL 20 MG/1
20 TABLET ORAL DAILY
Qty: 90 TABLET | Refills: 2 | Status: SHIPPED | OUTPATIENT
Start: 2020-02-14 | End: 2020-07-02 | Stop reason: SDUPTHER

## 2020-02-14 RX ADMIN — IOHEXOL 75 ML: 350 INJECTION, SOLUTION INTRAVENOUS at 02:02

## 2020-02-14 RX ADMIN — LISINOPRIL 20 MG: 20 TABLET ORAL at 01:02

## 2020-02-14 RX ADMIN — SODIUM CHLORIDE, SODIUM LACTATE, POTASSIUM CHLORIDE, AND CALCIUM CHLORIDE 500 ML: .6; .31; .03; .02 INJECTION, SOLUTION INTRAVENOUS at 03:02

## 2020-02-14 RX ADMIN — AMLODIPINE BESYLATE 10 MG: 5 TABLET ORAL at 01:02

## 2020-02-14 RX ADMIN — ASPIRIN 325 MG ORAL TABLET 325 MG: 325 PILL ORAL at 01:02

## 2020-02-14 SDOH — SOCIAL DETERMINANTS OF HEALTH (SDOH): PROBLEMS RELATED TO LIVING ALONE: Z60.2

## 2020-02-14 NOTE — PROGRESS NOTES
Pt daughter in law Ursula Diez [phone] came to take pt home, she stated pt wants to live alone, she is very independent. SW canceled transportation.

## 2020-02-14 NOTE — ED NOTES
Pelvic exam completed. Specimens sent to lab x 2.  Suction with pepito at bedside and on while laying patient flat.  MD present with 2 RN's.  Pt saulo. Well.

## 2020-02-14 NOTE — ED NOTES
Discharged to home per MD.  Daughter in law will take her home.   set up home health for patient coming to see her.  Sameera.

## 2020-02-14 NOTE — ED TRIAGE NOTES
C/O OF [facility] PATIENT.  DR. ALCOCER AT BEDSIDE, ORDERS NOTED.  ALERT AND ORIENTED TO SURROUNDINGS.  POOR HYSTORIEN.  LIVES ALONE.  DOES EVERYTHING FOR SELF.  ALLERGY IBUPROFEN.

## 2020-02-14 NOTE — ED NOTES
Daughter present to take patient home.  spoke with her and will inform MD of family's present prior to leaving

## 2020-02-14 NOTE — CONSULTS
SW attempted to contact Pt son Girma at [phone] multiple times, no answer and Mable Larsen [phone], and Hilda Mahoney [phone], no answer. Pt stated she lives alone and take care of herself and doesn't want to go to a nursing home just yet. Pt stated she has a son that lives in [place] that was coming to see her today and a daughter that lives in [place]. SW sent pt referral to [facility], they accepted her. KIKA scheduled pt a wheelchair van home to [phone] [place] [phone]. Kika referred pt to outpatient case management.       ADT 30 order placed for Van Transportation.  Requested  time:4:45pm   If transportation does not arrive at ETA time nurse will be instructed to follow protocol for transportation below:   How can I get in touch directly with dispatch, if needed?                 Non-emergent dispatch: [phone]      +++NURSING:  If Van does not arrive at requested time please call the above Non Emergent Dispatcher.  If issue not resolved please escalate to your charge nurse for further instructions.

## 2020-02-14 NOTE — ED NOTES
MD at bedside.  Daughter at bedside speaking with family member (daughter in law).   has spoken with the family earlier.  Pt stable, does not want to live with any one or go to a facility.

## 2020-02-14 NOTE — ED PROVIDER NOTES
Encounter Date: 2/14/2020       History     Chief Complaint   Patient presents with    Anxiety     pt feels paniced/anxiety.    Headache     all over headache today.     Patient brought in by EMS for complaints of intermittent shortness of breath. Per the patient she has had intermittent episodes of feeling like it is difficult to breathe although she cannot say when these episodes started our how long it lasts but did have an episode prior to calling 911 today.  The patient is a poor historian at baseline has no history of dementia and that she reports.    Per EMS the patient was reportedly anxious and stating that she had a headache all over.  She does not report these complaints to me.    Review of her past medical history reveals a recent ED visit for hypertension due to noncompliance with blood pressure medications and also headache and recently negative head CT.    Pertinent negatives include she denies any weakness, numbness, slurred speech, sudden onset of worst headache of her life or any other acute neurological symptoms. She also denies any nausea, diaphoresis or chest pain.    The history is provided by the patient, the EMS personnel and medical records.     Review of patient's allergies indicates:   Allergen Reactions    Ibuprofen Shortness Of Breath    Penicillins Other (See Comments)     Goes in and out     Past Medical History:   Diagnosis Date    GERD (gastroesophageal reflux disease)     Hyperlipidemia     Hypertension      History reviewed. No pertinent surgical history.  History reviewed. No pertinent family history.  Social History     Tobacco Use    Smoking status: Never Smoker    Smokeless tobacco: Never Used   Substance Use Topics    Alcohol use: No    Drug use: No     Review of Systems   Constitutional: Negative for chills, diaphoresis, fatigue and fever.   HENT: Negative for facial swelling.    Eyes: Negative for redness.   Respiratory: Negative for wheezing.    Cardiovascular:  Negative for chest pain.   Gastrointestinal: Negative for constipation, nausea and vomiting.   Genitourinary: Negative for dysuria.   Musculoskeletal: Negative for gait problem.   Skin: Negative for pallor.   Neurological: Negative for seizures, facial asymmetry, weakness, light-headedness, numbness and headaches.   Psychiatric/Behavioral: Negative for confusion.   All other systems reviewed and are negative.      Physical Exam     Initial Vitals [02/14/20 1226]   BP Pulse Resp Temp SpO2   (!) 188/90 83 16 97.6 °F (36.4 °C) 99 %      MAP       --         Physical Exam    Nursing note and vitals reviewed.  Constitutional: She appears well-developed and well-nourished. She is not diaphoretic. No distress.   Elderly  female, with significantly elevated blood pressure in no acute distress.   HENT:   Head: Normocephalic and atraumatic.   Eyes: EOM are normal. Pupils are equal, round, and reactive to light.   Neck: Normal range of motion.   Cardiovascular: Normal rate and regular rhythm.   Pulmonary/Chest: Breath sounds normal. No respiratory distress. She has no wheezes.   Abdominal: Soft. She exhibits no distension. There is no tenderness.   Musculoskeletal: Normal range of motion.   Neurological: She is alert and oriented to person, place, and time. She has normal strength. No cranial nerve deficit. Coordination (Normal finger-to-nose bilaterally) normal. GCS eye subscore is 4. GCS verbal subscore is 5. GCS motor subscore is 6.   5/5 strength in both hand , elbow flexion and extension, hip extension bilaterally and dorsiflexion and plantar flexion   Skin: Skin is warm and dry. Capillary refill takes less than 2 seconds.   Psychiatric: She has a normal mood and affect.         ED Course   Procedures  Labs Reviewed   CBC W/ AUTO DIFFERENTIAL - Abnormal; Notable for the following components:       Result Value    Mean Corpuscular Hemoglobin Conc 29.9 (*)     All other components within normal limits    COMPREHENSIVE METABOLIC PANEL - Abnormal; Notable for the following components:    eGFR if non  56 (*)     All other components within normal limits   B-TYPE NATRIURETIC PEPTIDE - Abnormal; Notable for the following components:     (*)     All other components within normal limits   D DIMER, QUANTITATIVE - Abnormal; Notable for the following components:    D-Dimer 3.55 (*)     All other components within normal limits   URINALYSIS - Abnormal; Notable for the following components:    Color, UA Colorless (*)     Specific [place], UA 1.000 (*)     Occult Blood UA 1+ (*)     All other components within normal limits   TSH - Abnormal; Notable for the following components:    TSH 0.369 (*)     All other components within normal limits   TROPONIN I   TROPONIN I   URINALYSIS MICROSCOPIC   T4, FREE   POCT GLUCOSE   POCT GLUCOSE MONITORING CONTINUOUS        ECG Results          EKG 12-lead (Preliminary result)  Result time 02/14/20 13:37:29    ED Interpretation by Richard Prince MD (02/14/20 13:37:29)    No ST elevation.  No ST depression.  Nonspecific T-wave changes, compared to EKG on 02/03/2020 the patient has T-wave inversions in V1 and V3 and biphasic T-wave in V4 and a flattened T-wave in V5.  Otherwise no specific changes.                            Imaging Results          X-Ray Chest PA And Lateral (Final result)  Result time 02/14/20 15:18:48    Final result by Alex Steele Jr., MD (02/14/20 15:18:48)                 Impression:      No acute cardiopulmonary abnormality seen.      Electronically signed by: Alex Steele MD  Date:    02/14/2020  Time:    15:18             Narrative:    EXAMINATION:  XR CHEST PA AND LATERAL    CLINICAL HISTORY:  Shortness of breath    TECHNIQUE:  PA and lateral views of the chest were performed.    COMPARISON:  February 4, 2020.    FINDINGS:  Monitoring leads are in place.  Lungs are well aerated.  Heart upper limits of normal in size.  Pulmonary  vascularity not engorged.  No confluent consolidation.  Minimal blunting of the costophrenic angles posteriorly.  Degenerative change in the spine.                               CTA Chest Non-Coronary (PE Study) (Final result)  Result time 02/14/20 15:09:38    Final result by Alex Steele Jr., MD (02/14/20 15:09:38)                 Impression:      No convincing filling defect within the pulmonary arteries to indicate an embolus.  There is some respiratory motion artifact.    Patchy parenchymal airways disease and likely some volume loss.  No convincing mass lesions.      Electronically signed by: Alex Steele MD  Date:    02/14/2020  Time:    15:09             Narrative:    EXAMINATION:  CTA CHEST NON CORONARY    CLINICAL HISTORY:  Chest pain, acute, PE suspected, intermed prob, positive D-dimer;    TECHNIQUE:  Low dose axial images, sagittal and coronal reformations were obtained from the thoracic inlet to the lung bases following the IV administration of 75 mL of Omnipaque 350.  Contrast timing was optimized to evaluate the pulmonary arteries.  MIP images were performed.    COMPARISON:  CTA chest August 31, 2018.    FINDINGS:  There is good opacification of the pulmonary arteries.  There is some respiratory motion artifact though no convincing filling defects to confirm the presence of an embolus.    Elsewhere aortic arch and great vessels are patent.  Few mm hypodense nodules right lobe of the thyroid.  No follow-up needed considering her age and small size of the nodules.  Prominent azygos vein noted.  No convincing mediastinal or hilar adenopathy.  Likewise no pleural or pericardial fluid.    Images of the upper abdomen demonstrate visualized liver to be unremarkable.  Upper spleen is normal.  Adrenal glands not completely imaged.    Evaluation of the lungs demonstrate some volume loss.  Some patchy irregular opacities noted.  Respiratory motion artifact present.  Some volume loss at the right base  "medially.  No significant lung masses seen.    Evaluation of the bones demonstrate degenerative changes and kyphosis.  No convincing lytic or blastic lesions.                                 Medical Decision Making:   History:   I obtained history from: EMS provider.  Old Medical Records: I decided to obtain old medical records.  Initial Assessment:     Patient presenting with vague complaints, including intermittent shortness of breath per the patient as well as reported anxiety and headache "all over" to EMS.  The patient on initial exam is in no acute distress and not complaining of active shortness of breath or active headache. She is however significantly hypertensive and states that she intermittently takes her blood pressure.  She has no other significant complaints.     She is neurologically intact, however she demonstrates mild disorientation, stating that the year is 2000 and at the month is January.  It is February 2020.  However, the patient is 89 years old and may have some baseline dementia, although this is not clearly corroborated by previous documentation.  The patient states that she lives alone and does not have any family nearby.  She has more than 10 visits in the last year to the ED.  Differential Diagnosis:   Differential diagnosis is broad in this elderly female with nonspecific symptoms.  Including ACS, pneumonia, pulmonary embolism, hypertensive emergency with pulmonary edema, occult UTI, worsening dementia, metabolic derangement, hypothyroidism, hypoglycemia, shortness of breath NOS.    In this patient with significant high blood pressure and evidence of mild dementia, the possibility of hypertensive encephalopathy was considered.  However the patient's disorientation is very mild, she missed the correct month by 15 days and is likely at baseline.  Therefore the risk of emergently lowering her blood pressure and causing an ischemic stroke is not outweigh the possible benefits of treating " what is unlikely hypertensive emergency is cephalopathy.  In addition, review of the patient's most recent blood pressure is revealed significantly elevated blood pressure, nearly to the level she is at today on 3/4 of her last readings.  This further supports my belief that the patient is chronically poorly controlled and does not need emergent lowering of her blood pressure in the ED.                              2/4/2020 2/6/2020 2/14/2020                Blood Pressure            180/95 (A)                       100/68           200/105 (A)                                          Most Recent Value (2/14/2019 - Today)  Blood Pressure            200/105 (A) (2/14/2020)            Clinical Tests:   Lab Tests: Ordered  Radiological Study: Ordered  Medical Tests: Ordered  ED Management:  Will obtain a broad workup with labs, cardiac enzymes, EKG, chest x-ray and urinalysis.  I will also give the patient's home blood pressure medicine which she stated she did not take today.  Disposition pending ED workup as well as assessment of social situation at home since the patient is elderly, appears to have at least mild cognitive functional impairment and lives alone.    Richard Prince MD,   Emergency Medicine  02/14/2020 1:17 PM    (This note was written with voice recognition software.  Please excuse any grammatical errors.)     Update:  I attempted to call the patient's son, Girma, at [phone] without success.  I will consult social Work to concern for the patient's living situation is complete and high likelihood of adverse event given her age and medical comorbidities.  Richard Prince MD,   Emergency Medicine  02/14/2020 1:40 PM    [This note was written with voice recognition software.  Please excuse any grammatical errors.]     Update:  Troponins x2 were negative. D-dimer was elevated and follow up CT PE was negative for pulmonary  embolism.  The patient has remained asymptomatic with no recurrent episodes of shortness of breath. Social work was consulted and is set up home health to come out to the patient's house tomorrow.  The patient's daughter-in-law has arrived and is at bedside and participated in a plan of care discussion.  I expressed a significant concerns about the patient continuing to live independently and strongly recommended that she either move in with her family members or consider a full-time care facility.  The patient does not wish to go to a nursing home, which is understandable.  However she is willing to discuss further options of moving and with her daughter-in-law and son.  In the meantime her daughter-in-law states that she is going to stay with her tonight and home health is coming tomorrow.  Patient also requested a refill of her lisinopril, which she clearly was not taking.  An email was set to the patient's primary care and also help facilitate out of hospital care.  Patient discharged in stable condition.  Richard Prince MD,   Emergency Medicine  02/14/2020 6:00 PM    [This note was written with voice recognition software.  Please excuse any grammatical errors.]                    ED Course as of Feb 14 1801 Fri Feb 14, 2020   1350 POCT Glucose: 94 [CH]   1427 Age adjusted still well over upper limit of normal   D-Dimer(!): 3.55 [CH]   1502 Free T4: 1.30 [CH]   1649 Negative for pulmonary embolus   CTA Chest Non-Coronary (PE Study) [CH]      ED Course User Index  [CH] Richard Prince MD                Clinical Impression:       ICD-10-CM ICD-9-CM   1. Elderly person living alone Z60.2 V60.3   2. Chest pain R07.9 786.50   3. Essential hypertension I10 401.9   4. Shortness of breath R06.02 786.05   5. Medical non-compliance Z91.19 V15.81   6. SOB (shortness of breath) R06.02 786.05   7. History of pulmonary embolism Z86.711 V12.55                             Richard Prince MD  02/14/20 1801

## 2020-02-17 ENCOUNTER — OUTPATIENT CASE MANAGEMENT (OUTPATIENT)
Dept: ADMINISTRATIVE | Facility: OTHER | Age: 85
End: 2020-02-17

## 2020-02-17 ENCOUNTER — TELEPHONE (OUTPATIENT)
Dept: FAMILY MEDICINE | Facility: CLINIC | Age: 85
End: 2020-02-17

## 2020-02-17 NOTE — LETTER
February 20, 2020    Daiana Mcduffie  [address]  [place] [address]             [facility]  [address]  [facility] [address] Dear Ms. Daiana Mcduffie:    Welcome to [facility]s Complex Care Management Program.  It was a pleasure talking with you today.  My name is Jodee Watson RN and I look forward to being your Care Manager.  My goal is to help you function at the healthiest and highest level possible.      As an [facility] patient, some of the services we may be able to provide include:      Development of an individualized care plan with a Registered Nurse    Connection with a    Connection with available resources and services     Coordinate communication among your care team members    Provide coaching and education    Help you understand your doctors treatment plan   Help you obtain information about your insurance coverage.     All services provided by [facility]s Complex Care Managers and other care team members are coordinated with and communicated to your primary care team.    As part of your enrollment, you will be receiving education materials and more information about these services in your [facility] account, by phone or through the mail.  If you do not wish to participate or receive information, please contact our office at [phone].      Sincerely,      Jodee Watson RN  [facility]   Out-patient RN Complex Care Manager   TEL:  [phone]                                                                                                                               Important Contact Numbers Continued on Page 2 ----->                                                         -2-      You can contact me, Jodee Watson RN directly at [phone]       Office Hours Mon-Fri, 8 am-4:30pm     Rashi Lobo, , TEL:  [phone]       Office Hours Mon-Fri, 8 am- 4:30 pm     [facility]- TEL:   [phone]     Office Hours Mon-Fri, 8 am - 4:30 pm     [facility], 24/7 Nurse Help Line (non emergent)- TEL:  [phone]

## 2020-02-17 NOTE — TELEPHONE ENCOUNTER
Spoke with Keith thomason [place] and he reported that  is doing great with her OT , but he stated that he was disappointed she couldn't get any help with cleaning her house.

## 2020-02-17 NOTE — TELEPHONE ENCOUNTER
----- Message from Adriel Pappas sent at 2/17/2020  9:35 AM CST -----  Contact: Melisa TURNER  Type: Patient Call Back    Who called:Irene    What is the request in detail: Patient doing great! OT Eval only  Can the clinic reply by [facility]? No     Would the patient rather a call back or a response via [facility]? Call     Best call back number: [phone]

## 2020-02-19 ENCOUNTER — TELEPHONE (OUTPATIENT)
Dept: FAMILY MEDICINE | Facility: CLINIC | Age: 85
End: 2020-02-19

## 2020-02-20 ENCOUNTER — TELEPHONE (OUTPATIENT)
Dept: FAMILY MEDICINE | Facility: CLINIC | Age: 85
End: 2020-02-20

## 2020-02-20 DIAGNOSIS — F32.1 CURRENT MODERATE EPISODE OF MAJOR DEPRESSIVE DISORDER WITHOUT PRIOR EPISODE: ICD-10-CM

## 2020-02-20 DIAGNOSIS — I51.7 CARDIOMEGALY: ICD-10-CM

## 2020-02-20 DIAGNOSIS — F41.9 ANXIETY: Primary | ICD-10-CM

## 2020-02-20 NOTE — PROGRESS NOTES
"Outpatient Care Management  Initial Patient Assessment    Patient: Daiana Mcduffie  MRN: [id number]  Date of Service: 02/17/2020  Completed by: Jodee Watson RN  Referral Date: 02/20/2020  Program: Case Management (High Risk)    Reason for Visit   Patient presents with    OPCM Chart Review     2/17/2020    OPCM RN First Assessment Attempt     2/18/2020 Pt answers but says HH PT just left and she is tired. Pt agrees to call back tomorrow    Initial Assessment     2/20/2020  Partial Completion    PHQ-9    Plan Of Care    OPCM Enrollment Call       Brief Summary:   2/20/20--Patient referred to OPCM for high risk score 83% at time of latest ED visit 2/14 for elderly female living alone with c/o SOB, anxiety/paniced, headache.  Pt is s/p 8 ED visits in last 6 mos. Spoke with patient telephonically. Explained OPCM program. Patient in agreement to have OPCM assist & manage health issues.  Partial Initial assessment today. Completed PHQ9=1. Pt admits to some depression. Pt is new to Sertraline 50 mg regimen. Unable to do Med Rec today.   [facility] is providing services.  left at [facility] to clarify disciplines involved, to ask questions about pt's status at home:  ?, Primary Caregiver involved?,  Does pt have all of her meds? Food in home? How does pt manage her ADLS/IADLs?  Chronic Care Management ([facility]) documented calls found in pt record. Will notify CCM of OPCM enrollment.   Partial completion of initial assessment today. Pt proves to have difficulty answering assessment questions. Pt is unable to state permission to speak with a family member on her behalf if needed. Pt states she has no family around. When asked if this OPCM RN could contact her daughter, Alicia Diez listed in Demographics, pt states, "Like I am telling you, it depends on whether they want to talk or not". Pt answers that she can not know whether the other 2 listed contacts would speak or be involved. Pt is unable to rate her " "health status- Excellent, Good, Fair or Poor.   Pt checks her BP at home. When pt is asked what actual BP readings she last got, pt asks RN to hold on and the sound of an inflating cuff can be heard,  BP= 120/57, HR 79. Pt reports keeping a book with BP logs.   Pt reports living alone, pays rent, takes elevator to apt. Pt states, "Like I am telling you, I get who I can to bring me to appts and to  rxs". Pt reports missing an Eye Appt apparently due to lack of transportation. In Epic read  blurry vision, [place] noted. Pt reports waking up hungry in AM. Pt will eat eggs, grits leaving out horowitz and sausage that she use to eat. Pt states, "Sometime I will add a little salt".  When pt is asked about having enough food, getting food and preparing food to eat, pt states, "Whatever I can do I do".  Pt does state needing help with house cleaning- having wait and rest after mopping first half of kitchen. Pt gets tired and has SOB. Pt agrees to [facility] LCSW contacting to assist with community resources-- house cleaning, transportation, meals, financial support.   Referred to [facility] LCS[facility].   In basket message to [facility] that pt enrolled in [facility].     2/21/2020--  11:06 am  Contacted pt with plan to complete partial assessment started with yesterday's encounter. Pt answers question to how is she feeling this morning, " I feel weak,  it usually gets better as the day goes on". Pt has audible SOB. Pt confirms she is seating down. Pt denies feeling the need to go to hospital or for EMS call. Pt states knowing how to dial 911. Pt denies having anyone she can call on. Instructed pt to rest while RN checks with [facility] to make home visit. Pt instructed to call 911 if her SOB worsens. This RN to call pt back.--pt states her understanding.   11:15 am Spoke with Catie at [facility] to report today's concerns and if SN can make home visit. PT scheduled- Catie to contact PT to inform of pt status and earlier visit if possible.   11:34 am  Called pt " back. Pt sounds much better, not SOB. Pt says she wanted to talk to this RN about speaking with her daughter. Pt gives permission to speak with her dgt, Alicia Diez. Call to number listed for dgt says number is not able to take calls. Attempted to reach the dgt-in-law listed, Hilda Grady- phone rings busy.     2:30 pm Later spoke with pt to inform her unable to reach her daughter nor her dgt-in-law. Informed pt that PT is scheduled to see pt this afternoon. Made sure pt knows she can call the [facility] on call nurse should she have concerns and pt knows to call EMS in an emergent situation. Pt was able to call 911 with last ED visit.   Informed pt of PCP appt with Jennifer Stephenson at [facility], Mon 2/24 atn 1:20 pm. Pt says she will find someone to bring her to appt.     Message latreceived from Catie at [facility] with PT's pt findings at pt home visit today----pt with no SOB, able to amb and exercise, anxious at times,  BP cuff was not used correctly. PT instructed pt on how to apply BP cuff and pt felt better about that.         Assessment Documentation     OPCM Initial Assessment    Involvement of Care  Do I have permission to speak with other family members about your care?:  No (Comment: My children are away.  Pt is unable to understand question to answer. )  Assessment completed by:  Patient  Patient Reported Insurance  Verified current insurance plan:  Medicare  Current Health Status  Patient Health Rating  Compared to other people your age, how would you rate your health?:  Unable to State, Fair (Comment: Breathing is main area of concern)  Patient Reported Labs & Vitals  Any patient reported labs and/or vitals?:  Yes  Patient reported blood pressure (mmHg):  120/52 (Comment: BP goes up at night. And in the AM it is down. Pt stops to check BP while on the phone. Pt records her BPs)  Patient reported pulse (bpm):  79  Social Determinants  Advanced Care Planning  Do you have any of the following?:   None  If yes, do we have a copy?:  N/A  If no, would the patient like Advance Directive resources?:  No  Advanced Care Planning resources provided?:  No  Is Advanced Care Planning an area of need?:  Yes  Support  Caregiver presence?:  No  Present activity level:  need help from person or special equipment to leave house (Comment: Walker is used. HH PT is going to check pt for another walker)  Who takes you to your medical appointments?:  other (see comment) (Comment: Pt states she gets whoever she can get to take her to her appts. )  Housing  Living arrangements:  alone  Number of people in home:  1  Type of residence:  apartment  Own or rent?:  rent  Permanent residence?:  yes  Does the patient's residence have any of the following?:   (Comment: Pt takes elevator)  Is housing an area of need?:  no  Access to [employer]  Does the patient have access to any of the following devices or technologies?:  none  Clinical Assessment  Medication Adherence  How does the patient obtain their medications?:  other (see comment) (Comment: Whoever pt can get to  meds. )  *Active medication list was reviewed and reconciled with patient and/or caregiver:    Nutritional Status  Diet:  low sodium  Change in appetite?:  yes (Comment: Fluctuates--- wakes up so hungry)  Is nutrition an area of need?:  yes  Labs  Do you have regular lab work to monitor your medications?:  no  Is lab adherence an area of need?:  no  PHQ Depression Screen  Does patient's PHQ Depression Screening indicate a barrier to meeting self-care needs?:  No  Cognitive/Behavioral Health  Alert and oriented?:  no  Difficulty thinking?:  yes (Comment: Pt is unable to clearly answer assessment questions.)  Requires prompting?:  yes  Requires assistance for routine expression?:  yes  Is Cognitive/Behavioral health an area of need?:  yes  Culture/Jainism  Communication  Language preference:  English   needed?:  no  Decreased vision?:    (Comment: Pt states she has missed her eye exam appts due to lack of transportation)  Health Literacy  Is there a Health Literacy need?:  yes  Activities of Daily Living  Ambulation:  assistance required  Cleaning home/chores:  assistance required  Telephone use:  independent  Shopping/attending doctors' appointments:  assistance required  Fall Risk  Patient mobility status:  Ambulatory w/ assistance  Equipment/Current Services  Equipment/supplies used in home:  walker  Community & Government Programs  Community Resource Assessment  Based on the assessment of needs:  Patient is in need of community resources (Comment: Community resources to aide in homemaker services, meals, transportation. )  [facility]  consulted to assist with the following:  transportation, nutrition support  Completion of Initial Assessment  Is the Initial Assessment Complete at this time?:  no         Problem List and History     Patient Active Problem List   Diagnosis    Essential hypertension    Gastroesophageal reflux disease without esophagitis    Mold exposure    Hyperlipidemia    Elevated troponin    Pulmonary nodule, right    Abnormal CT scan, pelvis    Acute cystitis    Shortness of breath    History of pulmonary embolism       Reviewed Active Problem List with patient and/or Caregiver. The following were identified as areas of need:  Safety, Depression    Medical History:  Reviewed medical history with patient and/or caregiver    Social History:  Reviewed social history with patient and/or caregiver    Complex Care Plan    Care plan was discussed and completed today with input from patient and/or caregiver.        Patient Instructions     Instructions were provided via the [email or web address] patient resources and are available for the patient to view on the patient portal, if active.    Next steps:   F/u with pt to complete Initial Assessment, Med Rec  F/u with Guido TURNER pt information.       Follow up in about 10 days (around  2/27/2020) for OPCM Initial Assessment.    [facility] OPCM Self-Management Care Plan was developed with the patients/caregivers input and was based on identified barriers from todays assessment.  Goals were written today with the patient/caregiver and the patient has agreed to work towards these goals to improve his/her overall well-being. Patient verbalized understanding of the care plan, goals, and all of today's instructions. Encouraged patient/caregiver to communicate with his/her physician and health care team about health conditions and the treatment plan.  Provided my contact information today and encouraged patient/caregiver to call me with any questions as needed.      Assessment Documentation     OPCM Initial Assessment    Involvement of Care  Do I have permission to speak with other family members about your care?:  No (Comment: My children are away.  Pt is unable to understand question to answer. )  Assessment completed by:  Patient  Patient Reported Insurance  Verified current insurance plan:  Medicare  Current Health Status  Patient Health Rating  Compared to other people your age, how would you rate your health?:  Unable to State, Fair (Comment: Breathing is main area of concern)  Patient Reported Labs & Vitals  Any patient reported labs and/or vitals?:  Yes  Patient reported blood pressure (mmHg):  120/52 (Comment: BP goes up at night. And in the AM it is down. Pt stops to check BP while on the phone. Pt records her BPs)  Patient reported pulse (bpm):  79  Social Determinants  Advanced Care Planning  Do you have any of the following?:  None  If yes, do we have a copy?:  N/A  If no, would the patient like Advance Directive resources?:  No  Advanced Care Planning resources provided?:  No  Is Advanced Care Planning an area of need?:  Yes  Support  Caregiver presence?:  No  Present activity level:  need help from person or special equipment to leave house (Comment: Walker is used. HH PT is going to check  pt for another walker)  Who takes you to your medical appointments?:  other (see comment) (Comment: Pt states she gets whoever she can get to take her to her appts. )  Housing  Living arrangements:  alone  Number of people in home:  1  Type of residence:  apartment  Own or rent?:  rent  Permanent residence?:  yes  Does the patient's residence have any of the following?:   (Comment: Pt takes elevator)  Is housing an area of need?:  no  Access to [employer]  Does the patient have access to any of the following devices or technologies?:  none  Clinical Assessment  Medication Adherence  How does the patient obtain their medications?:  other (see comment) (Comment: Whoever pt can get to  meds. )  *Active medication list was reviewed and reconciled with patient and/or caregiver:    Nutritional Status  Diet:  low sodium  Change in appetite?:  yes (Comment: Fluctuates--- wakes up so hungry)  Is nutrition an area of need?:  yes  Labs  Do you have regular lab work to monitor your medications?:  no  Is lab adherence an area of need?:  no  PHQ Depression Screen  Does patient's PHQ Depression Screening indicate a barrier to meeting self-care needs?:  No  Cognitive/Behavioral Health  Alert and oriented?:  no  Difficulty thinking?:  yes (Comment: Pt is unable to clearly answer assessment questions.)  Requires prompting?:  yes  Requires assistance for routine expression?:  yes  Is Cognitive/Behavioral health an area of need?:  yes  Culture/Congregation  Communication  Language preference:  English   needed?:  no  Decreased vision?:   (Comment: Pt states she has missed her eye exam appts due to lack of transportation)  Health Literacy  Is there a Health Literacy need?:  yes  Activities of Daily Living  Ambulation:  assistance required  Cleaning home/chores:  assistance required  Telephone use:  independent  Shopping/attending doctors' appointments:  assistance required  Fall Risk  Patient mobility status:   Ambulatory w/ assistance  Equipment/Current Services  Equipment/supplies used in home:  walker  Community & Government Programs  Community Resource Assessment  Based on the assessment of needs:  Patient is in need of community resources (Comment: Community resources to aide in homemaker services, meals, transportation. )  OPCM  consulted to assist with the following:  transportation, nutrition support  Completion of Initial Assessment  Is the Initial Assessment Complete at this time?:  no         Problem List and History     Patient Active Problem List   Diagnosis    Essential hypertension    Gastroesophageal reflux disease without esophagitis    Mold exposure    Hyperlipidemia    Elevated troponin    Pulmonary nodule, right    Abnormal CT scan, pelvis    Acute cystitis    Shortness of breath    History of pulmonary embolism       Reviewed Active Problem List with patient and/or Caregiver. The following were identified as areas of need:   Safety    Medical History:  Reviewed medical history with patient and/or caregiver    Social History:  Reviewed social history with patient and/or caregiver      Patient Instructions     Instructions were provided via the [employer] patient resources and are available for the patient to view on the patient portal, if active.        Next steps:   Complete Initial Assessment questions & Med Rec. -- A few questions remain to be asked and med rec remains needed.    F/u with Guido TURNER to learn more about pt status and care needs. Done 2/21  Refer to Palliative Care as appropriate.     Follow up in about 10 days (around 2/27/2020) for OPCM Initial Assessment.    Todays OPCM Self-Management Care Plan was developed with the patients/caregivers input and was based on identified barriers from todays assessment.  Goals were written today with the patient/caregiver and the patient has agreed to work towards these goals to improve his/her overall well-being. Patient  verbalized understanding of the care plan, goals, and all of today's instructions. Encouraged patient/caregiver to communicate with his/her physician and health care team about health conditions and the treatment plan.  Provided my contact information today and encouraged patient/caregiver to call me with any questions as needed.

## 2020-02-20 NOTE — TELEPHONE ENCOUNTER
----- Message from Richard Prince MD sent at 2/14/2020  4:41 PM CST -----  Hi Dr. Stephenson,    I saw your attached patient in the emergency department.  She has been seen multiple times and I believe is an at-risk elderly patient who needs at the very least Home Health if not full-time care from family member.  If not I fear that she may have an adverse medical event requiring long-term SNF placement.  Our  here was able to set up home health which is start tomorrow and we have attempted to contact her family members, although without success.  Could you please schedule this patient for close outpatient follow-up with you?    Thanks,  Richard Prince MD,   Emergency Medicine  02/14/2020 4:43 PM    [This note was written with voice recognition software.  Please excuse any grammatical errors.]

## 2020-02-21 ENCOUNTER — TELEPHONE (OUTPATIENT)
Dept: FAMILY MEDICINE | Facility: CLINIC | Age: 85
End: 2020-02-21

## 2020-02-21 NOTE — TELEPHONE ENCOUNTER
----- Message from Adriel Pappas sent at 2/21/2020  4:41 PM CST -----  [facility]  Type: Patient Call Back    [facility]  What is the request in detail:  carli needed at the home of the patient. Please place an order or call to verbally place this request.  Can the clinic reply by [facility]? no    Would the patient rather a call back or a response via [facility]?call     Best call back number: [phone]

## 2020-02-24 ENCOUNTER — OFFICE VISIT (OUTPATIENT)
Dept: FAMILY MEDICINE | Facility: CLINIC | Age: 85
End: 2020-02-24
Payer: MEDICARE

## 2020-02-24 ENCOUNTER — TELEPHONE (OUTPATIENT)
Dept: FAMILY MEDICINE | Facility: CLINIC | Age: 85
End: 2020-02-24

## 2020-02-24 VITALS
SYSTOLIC BLOOD PRESSURE: 124 MMHG | TEMPERATURE: 98 F | RESPIRATION RATE: 16 BRPM | DIASTOLIC BLOOD PRESSURE: 80 MMHG | WEIGHT: 130.06 LBS | HEART RATE: 78 BPM | OXYGEN SATURATION: 97 % | HEIGHT: 64 IN | BODY MASS INDEX: 22.2 KG/M2

## 2020-02-24 DIAGNOSIS — F41.9 ANXIETY: Primary | ICD-10-CM

## 2020-02-24 PROCEDURE — 99214 PR OFFICE/OUTPT VISIT, EST, LEVL IV, 30-39 MIN: ICD-10-PCS | Mod: S$PBB,,, | Performed by: FAMILY MEDICINE

## 2020-02-24 PROCEDURE — 99999 PR PBB SHADOW E&M-EST. PATIENT-LVL III: CPT | Mod: PBBFAC,,, | Performed by: FAMILY MEDICINE

## 2020-02-24 PROCEDURE — 99213 OFFICE O/P EST LOW 20 MIN: CPT | Mod: PBBFAC,PO | Performed by: FAMILY MEDICINE

## 2020-02-24 PROCEDURE — 99999 PR PBB SHADOW E&M-EST. PATIENT-LVL III: ICD-10-PCS | Mod: PBBFAC,,, | Performed by: FAMILY MEDICINE

## 2020-02-24 PROCEDURE — 99214 OFFICE O/P EST MOD 30 MIN: CPT | Mod: S$PBB,,, | Performed by: FAMILY MEDICINE

## 2020-02-24 NOTE — TELEPHONE ENCOUNTER
----- Message from Jodee Watson RN sent at 2/24/2020  7:47 AM CST -----  Contact: CHRISTIE Hdez Dr./Staff:    I work in [facility]'s Outpatient Care Management Program and partially completed our initial assessment with Ms. Mcduffie over the phone 2/20.     Concerns are raised for pt's self-management abilities as pt can not identify a primary  or caregiver while living alone. The listed phone number contacts in Demographic are dead ends.  Pt says she doesn't know why her daughter doesn't stay in touch with her.  Pt was unable to confirm what medications she is currently taking.     I have spoken with Guiod TURNER and their assessment does not seem to raise concerns.  Pt is participating in PT home visits. Med Rec and med adherence has not been determined.     Pt was informed of PCP appt  2/24 2:20 pm and she says she will find a ride to appt. Our program SW has been assigned with initial SW assessment pending .    I hope Ms. Mcduffie will arrive to today's appt so that her med list in [facility] can be updated for accuracy. I will reinforce pt adherence.     Thank you  JOSE DAVID Hdez, RN, CCM [facility]  [email or web address]  TEL:  [phone]

## 2020-02-25 NOTE — PROGRESS NOTES
Subjective:       Patient ID: Daiana Mcduffie     Chief Complaint: Hospital Follow Up      Yovani Mcduffie is a 89 y.o. female patient here for follow up repeated ED visit for SOB.  Patient admits to chronic anxiety.  Patient just started receiving home helth.  She has a poor support system.      Review of patient's allergies indicates:   Allergen Reactions    Ibuprofen Shortness Of Breath    Penicillins Other (See Comments)     Goes in and out       Current Outpatient Medications:     lisinopril (PRINIVIL,ZESTRIL) 20 MG tablet, Take 1 tablet (20 mg total) by mouth once daily., Disp: 90 tablet, Rfl: 2    sertraline (ZOLOFT) 50 MG tablet, Take 1 tablet (50 mg total) by mouth once daily., Disp: 30 tablet, Rfl: 1    amLODIPine (NORVASC) 5 MG tablet, Take 1 tablet (5 mg total) by mouth once daily. (Patient not taking: Reported on 2/24/2020), Disp: 30 tablet, Rfl: 11    fluticasone propionate (FLONASE) 50 mcg/actuation nasal spray, 2 sprays (100 mcg total) by Each Nostril route once daily. (Patient not taking: Reported on 2/24/2020), Disp: 1 Bottle, Rfl: 6    hydrOXYzine HCl (ATARAX) 25 MG tablet, Take 25 mg by mouth 3 (three) times daily as needed for Itching., Disp: , Rfl:     pantoprazole (PROTONIX) 20 MG tablet, TAKE 1 TABLET BY MOUTH ONCE DAILY (Patient not taking: Reported on 2/24/2020), Disp: 90 tablet, Rfl: 0    Past Medical History:   Diagnosis Date    GERD (gastroesophageal reflux disease)     Hyperlipidemia     Hypertension      Review of Systems   Neurological: Positive for headaches.   Psychiatric/Behavioral: The patient is nervous/anxious.        Objective:      Physical Exam   Constitutional: She appears well-developed and well-nourished.   HENT:   Head: Normocephalic and atraumatic.   Neck: Normal range of motion. Neck supple.   Cardiovascular: Normal rate and regular rhythm.   Pulmonary/Chest: Effort normal and breath sounds normal.   Abdominal: Soft. Bowel sounds are normal. There is no  tenderness.   Musculoskeletal: She exhibits no edema or deformity.   Neurological: She is alert.   Skin: Skin is warm and dry.   Psychiatric: She has a normal mood and affect.       Assessment:       1. Anxiety        Plan:       Daiana was seen today for hospital follow up.    Diagnoses and all orders for this visit:    Anxiety  Continue Zoloft 50 mg daily.  Will adjust dose in 2 weeks if not improved.  Will try to contact family members.  Continue home health.  Case management involved in case.

## 2020-02-27 ENCOUNTER — TELEPHONE (OUTPATIENT)
Dept: FAMILY MEDICINE | Facility: CLINIC | Age: 85
End: 2020-02-27

## 2020-02-27 ENCOUNTER — OUTPATIENT CASE MANAGEMENT (OUTPATIENT)
Dept: ADMINISTRATIVE | Facility: OTHER | Age: 85
End: 2020-02-27

## 2020-02-27 DIAGNOSIS — F32.1 CURRENT MODERATE EPISODE OF MAJOR DEPRESSIVE DISORDER WITHOUT PRIOR EPISODE: Primary | ICD-10-CM

## 2020-02-27 NOTE — PROGRESS NOTES
"Outpatient Care Management   - High Risk Patient Assessment    Patient: Daiana Mcduffie  MRN:  [id number]  Date of Service:  2/27/2020  Completed by:  Rashi Lobo LCSW  Referral Date: 02/20/2020  Program: Case Management (High Risk)    Reason for Visit   Patient presents with    Social Work Assessment - High Risk    Plan Of Care     Contacted patient to complete social work assessment -- Patient had difficulty answering questions: poor understanding of questions, required repeating/clrifications of questions, and was unable to answer many questions directly.  Patient sounded confused and did not provide her full address or today's date. Patient stated that her "daughter's friend said she would answer any questions for her;" however, patient was unable to provide a name and number.  [facility], Mable Larsen, listed as a contact in her medical record.     Assessment completed with patient's friend, Mable Larsen.  Mable reports she visits patient weekly and provides her assistance with grocery shopping, some housework, and occasional transportation to appointments. She reports patient is independent in ADLs/iADLs but that patient could use assistance when home health terminates.  She denies financial assistance needs. Mable reports that patient has 3 sons in [place] and 1 daughter in [place] and that none are involved in her care.  Discussed patient need of healthcare POA with Mable - Mable does not want to be put in the position of being patient's healthcare agent despite lack of involvement with children, feeling concerned that they would be opposed to her in that role.  Discussed completion of Involvement in Care form to assist [facility] staff in identifying contact persons involved in patients care.   Mable reports she has a observed an increase memory deficits with patient "forgetting where she put things" but denies any major issues. Patient is fully oriented per " Mable.  Mable is able to provide some medical transport for patient but admits she has missed appointments due to lack of transportation. She is unsure if patient is approved for [employer] paratransit and believes patient would be able to use service independently.  Discussed qualifications for Medicaid LTPC with Mable who will assist patient in phone screening. No other concerns expressed by Mable.     Contacted [employer] -- last application was incomplete/had incorrect information and a corrected application was never returned. LCSW mailed new application today.     Patient Summary     OPCM Social Work Assessment (High Risk)    Involvement of Care  Do I have permission to speak with other family members about your care?:  Yes  Assessment completed by:  Friend, Patient (Comment: Mable Larsen)  Cognitive  Which of the following can you state?:  Name, Date of birth, Address (Comment: Patient recited part of address and had difficulty wiith date.  Mable reports patient is fully oriented. )  Cognitive barriers?:  Yes  General  Marital status:  Single  Current employment status:  Disabled  Support  Level of Caregiver support:  Caregiver currently provides assistance  Support system:  Friends  Is the caregiver reporting burnout?:  Yes  Support Barriers?:  Yes  Advanced Care Planning  Do you have any of the following?:  None  If yes, do we have a copy?:  N/A  If no, would you like Advance Directive resources?:  No  Advance Care Planning resources provided?:  No  Is Advance Care Planning an area of need?:  Yes  Financial  Current medical coverage:  Medicare, Medicaid  Have you applied for government assistance programs?:  Yes (Comment: SNAP - $110)  Are you unable to pay any of the following?:  None  Financial Support Barriers?:  No  Safety  Safety barriers?:  No   History  Do you or your spouse currently or formerly serve in the ?:  No  Disaster Plan  Established evacuation plan?:  No  [place] residence:  [place]  Registered  for evacuation?:  No  Ability to evacuate:  Able to ride bus  Mental Health Status  Emotional status:  Telephonic/Unable to assess  Addictive Behaviors  Was the PHQ depression screening completed?:  No  Was the CLARE-7 completed?:  No  Resources  Caregiver stress:  Government support assistance (ex. Medicaid Waivers, VA Aid, and Att...)  Support:  Government support assistance (ex. Medicaid waivers, VA aid)         Complex Care Plan     Care plan was discussed and completed today with input from patient and/or caregiver.    Goals    None         Patient Instructions     Follow up in about 2 weeks (around 3/12/2020).    Todays OPCM Self-Management Care Plan was developed with the patients/caregivers input and was based on identified barriers from todays assessment.  Goals were written today with the patient/caregiver and the patient has agreed to work towards these goals to improve his/her overall well-being. Patient verbalized understanding of the care plan, goals, and all of today's instructions. Encouraged patient/caregiver to communicate with his/her physician and health care team about health conditions and the treatment plan.  Provided my contact information today and encouraged patient/caregiver to call me with any questions as needed.

## 2020-02-27 NOTE — TELEPHONE ENCOUNTER
----- Message from Bing Culp sent at 2/27/2020 11:10 AM CST -----  Type: Patient Call Back    Who called: Michelle TURNER     What is the request in detail: Rep requesting to speak to nurse regarding message she left on 02/21. She would like to know if they can order social work for pt that is alone. She states she can take a verbal.    Can the clinic reply by [facility]? No     Would the patient rather a call back or a response via [facility]? Call back     Best call back number: [phone]    Additional Information:

## 2020-02-27 NOTE — LETTER
February 27, 2020    Daiana Mcduffie  [address]  [address]  [place] [address]             Outpatient Case Management  [address] [facility] [address] Dear Daiana Mcduffie:  c/o: Mable Larsen    Please find the following enclosed:    1. [employer] Paratransit application -- Please contact me if you need assist completing the application.  Please also contact me once the application is completed; we will  the application to mail together with the healthcare verification.    2. Involvement in Care form -- This will allow us to speak to you regarding Ms. Mcduffie with her permission.  This IS NOT a healthcare power of  form and WILL NOT give you permission to make medical decisions for her.    You can contact Medicaid Long-Term Care Services at [phone] to determine if Ms. Mcduffie is eligible for assistance at home.      Please contact me if I can be of additional assistance.      Sincerely,      Rashi Lobo LCSW

## 2020-02-28 ENCOUNTER — TELEPHONE (OUTPATIENT)
Dept: FAMILY MEDICINE | Facility: CLINIC | Age: 85
End: 2020-02-28

## 2020-02-28 ENCOUNTER — OUTPATIENT CASE MANAGEMENT (OUTPATIENT)
Dept: ADMINISTRATIVE | Facility: OTHER | Age: 85
End: 2020-02-28

## 2020-02-28 PROBLEM — N30.00 ACUTE CYSTITIS: Status: RESOLVED | Noted: 2017-10-16 | Resolved: 2020-02-28

## 2020-02-28 NOTE — TELEPHONE ENCOUNTER
----- Message from Jodee Watson RN sent at 2/28/2020  3:36 PM CST -----  Contact: CHRISTIE Hdez Dr./Staff:    This is a follow up concerning Ms. Mcduffie's current medications following her appt with you on Mon 2/24.   According to Ms. Mcduffie, she reports taking two rxs only- lisinopril 20 mg once daily and sertraline 50 mg once daily.    Pt's friend, Mable Larsen listed in Demographics is a contact for pt- although somewhat limited.     Please advise on whether pt's med report is appropriate for pt at this time.     Thank you,  JOSE DAVID Hdez, RN, CCM [facility]  [email or web address]  TEL:  [phone]

## 2020-02-28 NOTE — PROGRESS NOTES
Outpatient Care Management  Plan of Care Follow Up Visit    Patient: Daiana Mcduffie  MRN: [id number]  Date of Service: 02/28/2020  Completed by: Jodee Watson RN  Referral Date: 02/20/2020  Program: Case Management (High Risk)    Reason for Visit   Patient presents with    Update Plan Of Care     2/28/2020       Brief Summary:   Chart was reviewed yest and collaborated with OPCM LCSW after  assessment and SW contact with pt friend, Mable Larsen. Pt successfully attended PCP appt 2/24. Pt reports a friend brought her to appt. When asked today what meds pt is currently taking- pt reports taking only 2 --- lisinopril and sertraline. Pt report feeling weak this AM, denies any falls.   Made contact with Ms Larsen, provided contacts for this OPCM RN and OOC.   Message sent to PCP to verify accuracy in pt taking only the two above stated rxs.         Patient Summary     Involvement of Care:  Do I have permission to speak with other family members about your care?       Patient Reported Labs & Vitals:  1.  Any Patient Reported Labs & Vitals?     2.  Patient Reported Blood Pressure:     3.  Patient Reported Pulse:     4.  Patient Reported Weight (Kg):     5.  Patient Reported Blood Glucose (mg/dl):       Medical History:  Reviewed medical history with patient and/or caregiver    Social History:  Reviewed social history with patient and/or caregiver    Clinical Assessment     Reviewed and provided basic information on available community resources for mental health, transportation, wellness resources, and palliative care programs with patient and/or caregiver.    Complex Care Plan     Care plan was discussed and completed today with input from patient and/or caregiver.    Goals    None         Patient Instructions     Instructions were provided via the [facility] patient resources and are available for the patient to view on the patient portal.    Next Steps:   F/u on PCP response to current med regimen for pt.   F/u with [facility] on pt status.     Follow up in about 6 days (around 3/5/2020) for OPCM RN Follow Up to update care plan.      Todays OPCM Self-Management Care Plan was developed with the patients/caregivers input and was based on identified barriers from todays assessment.  Goals were written today with the patient/caregiver and the patient has agreed to work towards these goals to improve his/her overall well-being. Patient verbalized understanding of the care plan, goals, and all of today's instructions. Encouraged patient/caregiver to communicate with his/her physician and health care team about health conditions and the treatment plan.  Provided my contact information today and encouraged patient/caregiver to call me with any questions as needed.

## 2020-02-29 ENCOUNTER — EXTERNAL CHRONIC CARE MANAGEMENT (OUTPATIENT)
Dept: PRIMARY CARE CLINIC | Facility: CLINIC | Age: 85
End: 2020-02-29
Payer: MEDICARE

## 2020-02-29 PROCEDURE — G2058 PR CHRON CARE MGMT, EA ADDTL 20 MINS: ICD-10-PCS | Mod: S$PBB,,, | Performed by: FAMILY MEDICINE

## 2020-02-29 PROCEDURE — 99490 PR CHRONIC CARE MGMT, 1ST 20 MIN: ICD-10-PCS | Mod: S$PBB,,, | Performed by: FAMILY MEDICINE

## 2020-02-29 PROCEDURE — G2058 CCM ADD 20MIN: HCPCS | Mod: S$PBB,,, | Performed by: FAMILY MEDICINE

## 2020-02-29 PROCEDURE — G2058 CCM ADD 20MIN: HCPCS | Mod: PBBFAC,PN | Performed by: FAMILY MEDICINE

## 2020-02-29 PROCEDURE — 99490 CHRNC CARE MGMT STAFF 1ST 20: CPT | Mod: S$PBB,,, | Performed by: FAMILY MEDICINE

## 2020-02-29 PROCEDURE — 99490 CHRNC CARE MGMT STAFF 1ST 20: CPT | Mod: PBBFAC,PN,25,27 | Performed by: FAMILY MEDICINE

## 2020-03-03 ENCOUNTER — PATIENT OUTREACH (OUTPATIENT)
Dept: ADMINISTRATIVE | Facility: OTHER | Age: 85
End: 2020-03-03

## 2020-03-09 ENCOUNTER — OFFICE VISIT (OUTPATIENT)
Dept: FAMILY MEDICINE | Facility: CLINIC | Age: 85
End: 2020-03-09
Payer: MEDICARE

## 2020-03-09 VITALS
RESPIRATION RATE: 16 BRPM | TEMPERATURE: 97 F | HEIGHT: 64 IN | DIASTOLIC BLOOD PRESSURE: 76 MMHG | WEIGHT: 131.38 LBS | SYSTOLIC BLOOD PRESSURE: 146 MMHG | HEART RATE: 76 BPM | OXYGEN SATURATION: 96 % | BODY MASS INDEX: 22.43 KG/M2

## 2020-03-09 DIAGNOSIS — F41.9 ANXIETY: Primary | ICD-10-CM

## 2020-03-09 PROCEDURE — 99999 PR PBB SHADOW E&M-EST. PATIENT-LVL III: ICD-10-PCS | Mod: PBBFAC,,, | Performed by: FAMILY MEDICINE

## 2020-03-09 PROCEDURE — 99213 PR OFFICE/OUTPT VISIT, EST, LEVL III, 20-29 MIN: ICD-10-PCS | Mod: S$PBB,,, | Performed by: FAMILY MEDICINE

## 2020-03-09 PROCEDURE — 99999 PR PBB SHADOW E&M-EST. PATIENT-LVL III: CPT | Mod: PBBFAC,,, | Performed by: FAMILY MEDICINE

## 2020-03-09 PROCEDURE — 99213 OFFICE O/P EST LOW 20 MIN: CPT | Mod: PBBFAC,PO | Performed by: FAMILY MEDICINE

## 2020-03-09 PROCEDURE — 99213 OFFICE O/P EST LOW 20 MIN: CPT | Mod: S$PBB,,, | Performed by: FAMILY MEDICINE

## 2020-03-09 NOTE — PROGRESS NOTES
Subjective:       Patient ID: Daiana Mcduffie     Chief Complaint: Follow-up      Yovani Mcduffie is a 89 y.o. female. Here for follow up anxiety. Patient states she is taking Zoloft.  Reports feeling lonely.  Patient accompanied to visit by [employer] member.    Review of patient's allergies indicates:   Allergen Reactions    Ibuprofen Shortness Of Breath    Penicillins Other (See Comments)     Goes in and out       Current Outpatient Medications:     hydrOXYzine HCl (ATARAX) 25 MG tablet, Take 25 mg by mouth 3 (three) times daily as needed for Itching., Disp: , Rfl:     lisinopril (PRINIVIL,ZESTRIL) 20 MG tablet, Take 1 tablet (20 mg total) by mouth once daily., Disp: 90 tablet, Rfl: 2    sertraline (ZOLOFT) 50 MG tablet, Take 1 tablet (50 mg total) by mouth once daily., Disp: 30 tablet, Rfl: 1    amLODIPine (NORVASC) 5 MG tablet, Take 1 tablet (5 mg total) by mouth once daily. (Patient not taking: Reported on 2/24/2020), Disp: 30 tablet, Rfl: 11    fluticasone propionate (FLONASE) 50 mcg/actuation nasal spray, 2 sprays (100 mcg total) by Each Nostril route once daily. (Patient not taking: Reported on 2/24/2020), Disp: 1 Bottle, Rfl: 6    pantoprazole (PROTONIX) 20 MG tablet, TAKE 1 TABLET BY MOUTH ONCE DAILY (Patient not taking: Reported on 2/24/2020), Disp: 90 tablet, Rfl: 0    Past Medical History:   Diagnosis Date    GERD (gastroesophageal reflux disease)     Hyperlipidemia     Hypertension      Review of Systems   Respiratory: Negative for shortness of breath.    Psychiatric/Behavioral: Positive for dysphoric mood.        Forgetfulness       Objective:      Physical Exam   Constitutional: She appears well-developed and well-nourished.   Neurological: She is alert.   Psychiatric: Her speech is normal. Her mood appears anxious. Her affect is not inappropriate. She is not actively hallucinating. Cognition and memory are impaired. She exhibits a depressed mood.       Assessment:       1. Anxiety         Plan:       Daiana was seen today for follow-up.    Diagnoses and all orders for this visit:    Anxiety  Continue Zoloft 50 mg daily.  I have not noticed any significant change in affect.  I have asked [employer] member to verify if patient taking medication and to help arrange for better living conditions with more social support.

## 2020-03-10 ENCOUNTER — EXTERNAL HOME HEALTH (OUTPATIENT)
Dept: HOME HEALTH SERVICES | Facility: HOSPITAL | Age: 85
End: 2020-03-10

## 2020-03-13 ENCOUNTER — TELEPHONE (OUTPATIENT)
Dept: FAMILY MEDICINE | Facility: CLINIC | Age: 85
End: 2020-03-13

## 2020-03-13 ENCOUNTER — OUTPATIENT CASE MANAGEMENT (OUTPATIENT)
Dept: ADMINISTRATIVE | Facility: OTHER | Age: 85
End: 2020-03-13

## 2020-03-13 NOTE — TELEPHONE ENCOUNTER
"----- Message from Jodee Watson RN sent at 3/13/2020 12:53 PM CDT -----  Contact: CHRISTIE Hdez Dr./Staff:    I am sending this update because I am not sure what to make of pt's reported experience over night -last night approx 2:30 or 3 am where she said she "could not move at all". Pt claims she was awake at the time when she could not move. Ms. Mcduffie says she prayed she could move and by morning she was able to.     I instructed pt to report any repeat happening, to keep her phone near to bed and suggested pt include bananas in diet which she does.     FYI--  Thank you,  JOSE DAVID Hdez, RN, CCM [facility]  [email or web address]  TEL:  [phone]    "

## 2020-03-13 NOTE — PROGRESS NOTES
Outpatient Care Management  Plan of Care Follow Up Visit    Patient: Daiana Mcduffie  MRN: [id number]  Date of Service: 03/13/2020  Completed by: Jodee Watson RN  Referral Date: 02/20/2020  Program: Case Management (High Risk)    Reason for Visit   Patient presents with    Update Plan Of Care     3/13/2020       Brief Summary:   F/u call to pt. Pt reports she is doing okay. Pt denies falls or injuries. Pt reports eating very well and confirms adherence to sertraline and lisinopril rxs. Pt relays an occurrence overnight that rendered pt physically unable to move. By morning pt was able to move. Pt claims she was awake during the experience and that she was asleep dreaming. Message sent to PCP just out of precaution.   [facility]  services continue. Pt believes  PT may end next week. Pt appears in good spirits.   Messaged OPCM LCSW to start discussion on pt support resources.     Patient Summary     Involvement of Care:  Do I have permission to speak with other family members about your care?       Patient Reported Labs & Vitals:  1.  Any Patient Reported Labs & Vitals?     2.  Patient Reported Blood Pressure:     3.  Patient Reported Pulse:     4.  Patient Reported Weight (Kg):     5.  Patient Reported Blood Glucose (mg/dl):       Medical History:  Reviewed medical history with patient and/or caregiver    Social History:  Reviewed social history with patient and/or caregiver    Clinical Assessment     Reviewed and provided basic information on available community resources for mental health, transportation, wellness resources, and palliative care programs with patient and/or caregiver.        Patient Instructions     Instructions were provided via the [facility] patient resources and are available for the patient to view on the patient portal.    Next Steps:   Collaborate with pt care team-- OPCM LCSW and HH   Provide pt education on s/s depression/management    No follow-ups on file.      Todays OPCM Self-Management  Care Plan was developed with the patients/caregivers input and was based on identified barriers from todays assessment.  Goals were written today with the patient/caregiver and the patient has agreed to work towards these goals to improve his/her overall well-being. Patient verbalized understanding of the care plan, goals, and all of today's instructions. Encouraged patient/caregiver to communicate with his/her physician and health care team about health conditions and the treatment plan.  Provided my contact information today and encouraged patient/caregiver to call me with any questions as needed.

## 2020-03-23 ENCOUNTER — OUTPATIENT CASE MANAGEMENT (OUTPATIENT)
Dept: ADMINISTRATIVE | Facility: OTHER | Age: 85
End: 2020-03-23

## 2020-03-26 ENCOUNTER — OUTPATIENT CASE MANAGEMENT (OUTPATIENT)
Dept: ADMINISTRATIVE | Facility: OTHER | Age: 85
End: 2020-03-26

## 2020-03-26 NOTE — PROGRESS NOTES
"Outpatient Care Management   - Care Plan Follow Up    Patient: Daiana Mcduffie  MRN:  [id number]  Date of Service:  3/26/2020  Completed by:  Rashi Lobo LCSW  Referral Date: 02/20/2020  Program: Case Management (High Risk)    Reason for Visit   Patient presents with    Update Plan Of Care     Follow up completed with patient's friend, Mable Larsen.  Mable reports patient is doing well and has no concerns.  Mable has not visited patient yet this week and states, "I think she said she received it" when [employer] asked about RTA paratransit application. Mable confirmed that she could assist patient with application but did not express urgency.  Sent message to OPCM-RN on proceeding with case - unable to make home visit at this time due to COVID-19 precautions.      Complex Care Plan     Care plan was discussed and completed today with input from patient and/or caregiver.    Goals    None         Patient Instructions     Instructions were provided via the [facility] patient resources and are available for the patient to view on the patient portal.    No follow-ups on file.    Todays OPC Self-Management Care Plan was developed with the patients/caregivers input and was based on identified barriers from todays assessment.  Goals were written today with the patient/caregiver and the patient has agreed to work towards these goals to improve his/her overall well-being. Patient verbalized understanding of the care plan, goals, and all of today's instructions. Encouraged patient/caregiver to communicate with his/her physician and health care team about health conditions and the treatment plan.  Provided my contact information today and encouraged patient/caregiver to call me with any questions as needed.  "

## 2020-03-31 ENCOUNTER — EXTERNAL CHRONIC CARE MANAGEMENT (OUTPATIENT)
Dept: PRIMARY CARE CLINIC | Facility: CLINIC | Age: 85
End: 2020-03-31
Payer: MEDICARE

## 2020-03-31 PROCEDURE — 99490 CHRNC CARE MGMT STAFF 1ST 20: CPT | Mod: PBBFAC,PN | Performed by: FAMILY MEDICINE

## 2020-03-31 PROCEDURE — 99490 CHRNC CARE MGMT STAFF 1ST 20: CPT | Mod: S$PBB,,, | Performed by: FAMILY MEDICINE

## 2020-03-31 PROCEDURE — 99490 PR CHRONIC CARE MGMT, 1ST 20 MIN: ICD-10-PCS | Mod: S$PBB,,, | Performed by: FAMILY MEDICINE

## 2020-04-28 NOTE — PROGRESS NOTES
Outpatient Care Management  Plan of Care Follow Up Visit    Patient: Daiana Mcduffie  MRN: [id number]  Date of Service: 03/23/2020  Completed by: Jodee Watson RN  Referral Date: 02/20/2020  Program: Case Management (High Risk)    Reason for Visit   Patient presents with    OPCM RN First Follow-Up Attempt     3/23/2020    OPCM RN Second Follow-Up Attempt     4/24/2020 Noted Chronic Care Mgmt involvement. Will close out.     OPCM RN Third Assessment Attempt     4/28/2020       Brief Summary:   3rd attempt to reach Ms. Mcduffie (and to complete a few remaining initial OPCM assessment questions). VM left on both listed phone numbers for pt.   Contacted pt's friend, Ms. Mable Larsen in order to check on pt safety/status. Ms. Larsen reports speaking with Ms. Mcduffie yesterday and she is doing well. Ms. Mcduffie has been staying with a [employer] member during the COVID-19 outbreak so as not to be alone. OPCM-LCSW closed case 3/26/2020.  Explained to Ms. Larsen that case was being closed and that Ms. Mcduffie can call in the future if she has concerns or questions. Ms. Larsen confirms having contact number for OPCM-RN.   Ms Larsen agrees with case closure.     Patient Summary     Involvement of Care:  Do I have permission to speak with other family members about your care?       Patient Reported Labs & Vitals:  1.  Any Patient Reported Labs & Vitals?     2.  Patient Reported Blood Pressure:     3.  Patient Reported Pulse:     4.  Patient Reported Weight (Kg):     5.  Patient Reported Blood Glucose (mg/dl):       Medical History:  Reviewed medical history with patient and/or caregiver    Social History:  Reviewed social history with patient and/or caregiver    Clinical Assessment     Reviewed and provided basic information on available community resources for mental health, transportation, wellness resources, and palliative care programs with patient and/or caregiver.    Complex Care Plan     Care plan was discussed and  completed today with input from patient and/or caregiver.    Goals    None         Patient Instructions     Instructions were provided via the [facility] patient resources and are available for the patient to view on the patient portal.    Next Steps:   Case closed.         Todays OPCM Self-Management Care Plan was developed with the patients/caregivers input and was based on identified barriers from todays assessment.  Goals were written today with the patient/caregiver and the patient has agreed to work towards these goals to improve his/her overall well-being. Patient verbalized understanding of the care plan, goals, and all of today's instructions. Encouraged patient/caregiver to communicate with his/her physician and health care team about health conditions and the treatment plan.  Provided my contact information today and encouraged patient/caregiver to call me with any questions as needed.

## 2020-04-30 ENCOUNTER — EXTERNAL CHRONIC CARE MANAGEMENT (OUTPATIENT)
Dept: PRIMARY CARE CLINIC | Facility: CLINIC | Age: 85
End: 2020-04-30
Payer: MEDICARE

## 2020-04-30 PROCEDURE — 99490 CHRNC CARE MGMT STAFF 1ST 20: CPT | Mod: S$PBB,,, | Performed by: FAMILY MEDICINE

## 2020-04-30 PROCEDURE — 99490 CHRNC CARE MGMT STAFF 1ST 20: CPT | Mod: PBBFAC,PO | Performed by: FAMILY MEDICINE

## 2020-04-30 PROCEDURE — 99490 PR CHRONIC CARE MGMT, 1ST 20 MIN: ICD-10-PCS | Mod: S$PBB,,, | Performed by: FAMILY MEDICINE

## 2020-06-11 ENCOUNTER — TELEPHONE (OUTPATIENT)
Dept: FAMILY MEDICINE | Facility: CLINIC | Age: 85
End: 2020-06-11

## 2020-06-11 NOTE — TELEPHONE ENCOUNTER
"----- Message from Dona Hardin sent at 6/11/2020  1:33 PM CDT -----  Contact: Gin Mahoney "holly" [phone]  .Type: Patient Call Back    Who called: Gin Mahoney "holly"    What is the request in detail: Requesting to discuss what's going on with pt's previous o/v     Can the clinic reply by [facility]? Call back     Would the patient rather a call back or a response via [facility]? Call back     Best call back number [phone]        "

## 2020-06-30 ENCOUNTER — TELEPHONE (OUTPATIENT)
Dept: FAMILY MEDICINE | Facility: CLINIC | Age: 85
End: 2020-06-30

## 2020-07-02 DIAGNOSIS — I10 ESSENTIAL HYPERTENSION: ICD-10-CM

## 2020-07-02 RX ORDER — LISINOPRIL 20 MG/1
20 TABLET ORAL DAILY
Qty: 90 TABLET | Refills: 2 | Status: SHIPPED | OUTPATIENT
Start: 2020-07-02 | End: 2020-07-08 | Stop reason: SDUPTHER

## 2020-07-02 NOTE — TELEPHONE ENCOUNTER
----- Message from Marcello Lobo sent at 7/2/2020 11:34 AM CDT -----  Name of Who is Calling: CHRISTINA MARROQUIN [[id number]]    What is the request in detail: CHRISTINA WOODS [[id number]] calling in regards to Rx  lisinopril (PRINIVIL,ZESTRIL) 20 MG tablet   [facility] - [place], [place] - [address] please contact to further discuss and advise      Can the clinic reply by [facility]: no     [facility]  [phone] (home)

## 2020-07-08 ENCOUNTER — OFFICE VISIT (OUTPATIENT)
Dept: FAMILY MEDICINE | Facility: CLINIC | Age: 85
End: 2020-07-08
Payer: MEDICARE

## 2020-07-08 VITALS
TEMPERATURE: 97 F | HEIGHT: 68 IN | SYSTOLIC BLOOD PRESSURE: 128 MMHG | RESPIRATION RATE: 16 BRPM | WEIGHT: 128.75 LBS | HEART RATE: 77 BPM | OXYGEN SATURATION: 99 % | DIASTOLIC BLOOD PRESSURE: 84 MMHG | BODY MASS INDEX: 19.51 KG/M2

## 2020-07-08 DIAGNOSIS — F32.1 CURRENT MODERATE EPISODE OF MAJOR DEPRESSIVE DISORDER WITHOUT PRIOR EPISODE: ICD-10-CM

## 2020-07-08 DIAGNOSIS — F41.9 ANXIETY: ICD-10-CM

## 2020-07-08 DIAGNOSIS — K21.9 GASTROESOPHAGEAL REFLUX DISEASE, ESOPHAGITIS PRESENCE NOT SPECIFIED: ICD-10-CM

## 2020-07-08 DIAGNOSIS — I10 ESSENTIAL HYPERTENSION: ICD-10-CM

## 2020-07-08 PROCEDURE — 99213 OFFICE O/P EST LOW 20 MIN: CPT | Mod: PBBFAC,PO | Performed by: FAMILY MEDICINE

## 2020-07-08 PROCEDURE — 99214 OFFICE O/P EST MOD 30 MIN: CPT | Mod: S$PBB,,, | Performed by: FAMILY MEDICINE

## 2020-07-08 PROCEDURE — 99999 PR PBB SHADOW E&M-EST. PATIENT-LVL III: ICD-10-PCS | Mod: PBBFAC,,, | Performed by: FAMILY MEDICINE

## 2020-07-08 PROCEDURE — 99999 PR PBB SHADOW E&M-EST. PATIENT-LVL III: CPT | Mod: PBBFAC,,, | Performed by: FAMILY MEDICINE

## 2020-07-08 PROCEDURE — 99214 PR OFFICE/OUTPT VISIT, EST, LEVL IV, 30-39 MIN: ICD-10-PCS | Mod: S$PBB,,, | Performed by: FAMILY MEDICINE

## 2020-07-08 RX ORDER — AMLODIPINE BESYLATE 5 MG/1
5 TABLET ORAL DAILY
Qty: 90 TABLET | Refills: 3 | Status: SHIPPED | OUTPATIENT
Start: 2020-07-08 | End: 2020-11-03 | Stop reason: SDUPTHER

## 2020-07-08 RX ORDER — SERTRALINE HYDROCHLORIDE 50 MG/1
50 TABLET, FILM COATED ORAL DAILY
Qty: 90 TABLET | Refills: 3 | Status: SHIPPED | OUTPATIENT
Start: 2020-07-08 | End: 2020-11-03 | Stop reason: SDUPTHER

## 2020-07-08 RX ORDER — LISINOPRIL 20 MG/1
20 TABLET ORAL DAILY
Qty: 90 TABLET | Refills: 3 | Status: SHIPPED | OUTPATIENT
Start: 2020-07-08 | End: 2020-11-03 | Stop reason: SDUPTHER

## 2020-07-08 RX ORDER — PANTOPRAZOLE SODIUM 20 MG/1
20 TABLET, DELAYED RELEASE ORAL DAILY
Qty: 90 TABLET | Refills: 3 | Status: SHIPPED | OUTPATIENT
Start: 2020-07-08 | End: 2020-11-03 | Stop reason: SDUPTHER

## 2020-07-08 NOTE — PROGRESS NOTES
Subjective:       Patient ID: Daiana Mcduffie is a 89 y.o. female.    Chief Complaint: Medication Refill    HPI:  Refill Zoloft for anxiety and depression.  Mood much improved.  Sleeping well.    Stable HTN and GERD  Review of Systems   Constitutional: Negative for appetite change (decreased) and unexpected weight change.   Eyes: Negative for visual disturbance.   Respiratory: Negative for shortness of breath.    Cardiovascular: Negative for chest pain, palpitations and leg swelling.   Neurological: Negative for dizziness and headaches.         Objective:      Physical Exam  Constitutional:       Appearance: She is well-developed.   HENT:      Head: Normocephalic and atraumatic.   Neck:      Musculoskeletal: Normal range of motion and neck supple.   Cardiovascular:      Rate and Rhythm: Normal rate and regular rhythm.   Pulmonary:      Effort: Pulmonary effort is normal.      Breath sounds: Normal breath sounds.   Abdominal:      General: Bowel sounds are normal.      Palpations: Abdomen is soft.      Tenderness: There is no abdominal tenderness.   Musculoskeletal:         General: No deformity.   Skin:     General: Skin is warm and dry.   Neurological:      Mental Status: She is alert.         Assessment:       1. Anxiety    2. Current moderate episode of major depressive disorder without prior episode    3. Essential hypertension    4. Gastroesophageal reflux disease, esophagitis presence not specified        Plan:     Daiana was seen today for medication refill.    Diagnoses and all orders for this visit:    Anxiety  Mood stable  -     sertraline (ZOLOFT) 50 MG tablet; Take 1 tablet (50 mg total) by mouth once daily.    Current moderate episode of major depressive disorder without prior episode  -     sertraline (ZOLOFT) 50 MG tablet; Take 1 tablet (50 mg total) by mouth once daily.    Essential hypertension  -     lisinopriL (PRINIVIL,ZESTRIL) 20 MG tablet; Take 1 tablet (20 mg total) by mouth once daily.  -      amLODIPine (NORVASC) 5 MG tablet; Take 1 tablet (5 mg total) by mouth once daily.    Gastroesophageal reflux disease, esophagitis presence not specified  -     pantoprazole (PROTONIX) 20 MG tablet; Take 1 tablet (20 mg total) by mouth once daily.

## 2020-08-31 ENCOUNTER — EXTERNAL CHRONIC CARE MANAGEMENT (OUTPATIENT)
Dept: PRIMARY CARE CLINIC | Facility: CLINIC | Age: 85
End: 2020-08-31
Payer: MEDICARE

## 2020-08-31 PROCEDURE — 99490 PR CHRONIC CARE MGMT, 1ST 20 MIN: ICD-10-PCS | Mod: S$PBB,,, | Performed by: FAMILY MEDICINE

## 2020-08-31 PROCEDURE — 99490 CHRNC CARE MGMT STAFF 1ST 20: CPT | Mod: PBBFAC,PO | Performed by: FAMILY MEDICINE

## 2020-08-31 PROCEDURE — 99490 CHRNC CARE MGMT STAFF 1ST 20: CPT | Mod: S$PBB,,, | Performed by: FAMILY MEDICINE

## 2020-09-30 ENCOUNTER — EXTERNAL CHRONIC CARE MANAGEMENT (OUTPATIENT)
Dept: PRIMARY CARE CLINIC | Facility: CLINIC | Age: 85
End: 2020-09-30
Payer: MEDICARE

## 2020-09-30 PROCEDURE — 99490 PR CHRONIC CARE MGMT, 1ST 20 MIN: ICD-10-PCS | Mod: S$PBB,,, | Performed by: FAMILY MEDICINE

## 2020-09-30 PROCEDURE — 99490 CHRNC CARE MGMT STAFF 1ST 20: CPT | Mod: S$PBB,,, | Performed by: FAMILY MEDICINE

## 2020-09-30 PROCEDURE — 99490 CHRNC CARE MGMT STAFF 1ST 20: CPT | Mod: PBBFAC,PO | Performed by: FAMILY MEDICINE

## 2020-10-31 ENCOUNTER — EXTERNAL CHRONIC CARE MANAGEMENT (OUTPATIENT)
Dept: PRIMARY CARE CLINIC | Facility: CLINIC | Age: 85
End: 2020-10-31
Payer: MEDICARE

## 2020-10-31 PROCEDURE — 99490 CHRNC CARE MGMT STAFF 1ST 20: CPT | Mod: S$PBB,,, | Performed by: FAMILY MEDICINE

## 2020-10-31 PROCEDURE — 99490 PR CHRONIC CARE MGMT, 1ST 20 MIN: ICD-10-PCS | Mod: S$PBB,,, | Performed by: FAMILY MEDICINE

## 2020-10-31 PROCEDURE — 99490 CHRNC CARE MGMT STAFF 1ST 20: CPT | Mod: PBBFAC,PO | Performed by: FAMILY MEDICINE

## 2020-11-03 DIAGNOSIS — F32.1 CURRENT MODERATE EPISODE OF MAJOR DEPRESSIVE DISORDER WITHOUT PRIOR EPISODE: ICD-10-CM

## 2020-11-03 DIAGNOSIS — F41.9 ANXIETY: ICD-10-CM

## 2020-11-03 DIAGNOSIS — I10 ESSENTIAL HYPERTENSION: ICD-10-CM

## 2020-11-03 DIAGNOSIS — K21.9 GASTROESOPHAGEAL REFLUX DISEASE: ICD-10-CM

## 2020-11-03 RX ORDER — LISINOPRIL 20 MG/1
20 TABLET ORAL DAILY
Qty: 90 TABLET | Refills: 1 | Status: SHIPPED | OUTPATIENT
Start: 2020-11-03 | End: 2020-11-03 | Stop reason: SDUPTHER

## 2020-11-03 RX ORDER — AMLODIPINE BESYLATE 5 MG/1
5 TABLET ORAL DAILY
Qty: 90 TABLET | Refills: 1 | Status: SHIPPED | OUTPATIENT
Start: 2020-11-03 | End: 2022-09-07

## 2020-11-03 NOTE — TELEPHONE ENCOUNTER
----- Message from Silvia Dutta sent at 11/3/2020  1:13 PM CST -----  Type: RX Refill Request    Who Called: Self    Have you contacted your pharmacy:No    Refill or New Rx:Refill    RX Name and Strength: lisinopriL (PRINIVIL,ZESTRIL) 20 MG tablet  pantoprazole (PROTONIX) 20 MG tablet  sertraline (ZOLOFT) 50 MG tablet     Preferred Pharmacy with phone number:   [facility] - [place], [place] - [address] [phone] (Phone)  [phone] (Fax)    Would the patient rather a call back or a response via [facility]? Call    Best Call Back Number:.[phone]

## 2020-11-03 NOTE — TELEPHONE ENCOUNTER
----- Message from Inez Boykin sent at 11/3/2020 10:34 AM CST -----  Regarding: Prescription Refill  Pt needs prescription refill of blood pressure medication and to please send it to [facility] pharmacy on [facility]. Please call patient when completed.      Thank you

## 2020-11-04 RX ORDER — LISINOPRIL 20 MG/1
20 TABLET ORAL DAILY
Qty: 90 TABLET | Refills: 1 | Status: SHIPPED | OUTPATIENT
Start: 2020-11-04 | End: 2022-04-19

## 2020-11-04 RX ORDER — PANTOPRAZOLE SODIUM 20 MG/1
20 TABLET, DELAYED RELEASE ORAL DAILY
Qty: 90 TABLET | Refills: 3 | Status: SHIPPED | OUTPATIENT
Start: 2020-11-04

## 2020-11-04 RX ORDER — SERTRALINE HYDROCHLORIDE 50 MG/1
50 TABLET, FILM COATED ORAL DAILY
Qty: 90 TABLET | Refills: 3 | Status: SHIPPED | OUTPATIENT
Start: 2020-11-04 | End: 2021-02-02

## 2020-11-10 ENCOUNTER — TELEPHONE (OUTPATIENT)
Dept: AUDIOLOGY | Facility: CLINIC | Age: 85
End: 2020-11-10

## 2020-11-10 NOTE — TELEPHONE ENCOUNTER
----- Message from Berenice Okeefe RN sent at 11/10/2020 12:35 PM CST -----  Regarding: FW: call back in regards to hearing aid    ----- Message -----  From: Sanjana Rascon  Sent: 11/10/2020  12:03 PM CST  To: [facility] Audio Clinical Support  Subject: call back in regards to hearing aid              Type: Patient Call Back    Who called:Daiana     What is the request in detail: the patient is requesting a call back from the staff in regards to her hearing aides. She stated that she came there a couple of weeks ago and had her hearing aids worked on. Only was able to be fixed. Patient is requesting a call back from the staff    Can the clinic reply by [facility]?no    Would the patient rather a call back or a response via [facility]? Call back     Best call back number:[phone] or [phone]

## 2020-11-10 NOTE — TELEPHONE ENCOUNTER
Spoke with patient.  She does not have hearing aids through my office.  I explained that I haven't seen her since 9/25/19 for a hearing aid consult and she did not order hearing aids.  I explained that I believe she maybe called the wrong office.

## 2020-11-30 ENCOUNTER — EXTERNAL CHRONIC CARE MANAGEMENT (OUTPATIENT)
Dept: PRIMARY CARE CLINIC | Facility: CLINIC | Age: 85
End: 2020-11-30
Payer: MEDICARE

## 2020-11-30 PROCEDURE — 99490 CHRNC CARE MGMT STAFF 1ST 20: CPT | Mod: S$PBB,,, | Performed by: FAMILY MEDICINE

## 2020-11-30 PROCEDURE — 99490 CHRNC CARE MGMT STAFF 1ST 20: CPT | Mod: PBBFAC,PO | Performed by: FAMILY MEDICINE

## 2020-11-30 PROCEDURE — 99490 PR CHRONIC CARE MGMT, 1ST 20 MIN: ICD-10-PCS | Mod: S$PBB,,, | Performed by: FAMILY MEDICINE

## 2020-12-28 ENCOUNTER — HOSPITAL ENCOUNTER (EMERGENCY)
Facility: HOSPITAL | Age: 85
Discharge: HOME | End: 2020-12-29
Attending: EMERGENCY MEDICINE
Payer: MEDICARE

## 2020-12-28 DIAGNOSIS — R03.0 ELEVATED BLOOD PRESSURE READING: ICD-10-CM

## 2020-12-28 DIAGNOSIS — R51.9 HEADACHE: ICD-10-CM

## 2020-12-28 DIAGNOSIS — I10 HYPERTENSION, UNSPECIFIED TYPE: Primary | ICD-10-CM

## 2020-12-28 LAB
ALBUMIN SERPL BCP-MCNC: 3.7 G/DL (ref 3.5–5.2)
ALP SERPL-CCNC: 78 U/L (ref 55–135)
ALT SERPL W/O P-5'-P-CCNC: 9 U/L (ref 10–44)
ANION GAP SERPL CALC-SCNC: 12 MMOL/L (ref 8–16)
AST SERPL-CCNC: 18 U/L (ref 10–40)
BASOPHILS # BLD AUTO: 0.02 K/UL (ref 0–0.2)
BASOPHILS NFR BLD: 0.4 % (ref 0–1.9)
BILIRUB SERPL-MCNC: 0.3 MG/DL (ref 0.1–1)
BNP SERPL-MCNC: 124 PG/ML (ref 0–99)
BUN SERPL-MCNC: 12 MG/DL (ref 10–30)
CALCIUM SERPL-MCNC: 9.1 MG/DL (ref 8.7–10.5)
CHLORIDE SERPL-SCNC: 106 MMOL/L (ref 95–110)
CO2 SERPL-SCNC: 26 MMOL/L (ref 23–29)
CREAT SERPL-MCNC: 1 MG/DL (ref 0.5–1.4)
DIFFERENTIAL METHOD: ABNORMAL
EOSINOPHIL # BLD AUTO: 0.1 K/UL (ref 0–0.5)
EOSINOPHIL NFR BLD: 0.9 % (ref 0–8)
ERYTHROCYTE [DISTWIDTH] IN BLOOD BY AUTOMATED COUNT: 13.4 % (ref 11.5–14.5)
EST. GFR  (AFRICAN AMERICAN): 57 ML/MIN/1.73 M^2
EST. GFR  (NON AFRICAN AMERICAN): 49 ML/MIN/1.73 M^2
GLUCOSE SERPL-MCNC: 118 MG/DL (ref 70–110)
HCT VFR BLD AUTO: 44.1 % (ref 37–48.5)
HGB BLD-MCNC: 13.5 G/DL (ref 12–16)
IMM GRANULOCYTES # BLD AUTO: 0.02 K/UL (ref 0–0.04)
IMM GRANULOCYTES NFR BLD AUTO: 0.4 % (ref 0–0.5)
LYMPHOCYTES # BLD AUTO: 2 K/UL (ref 1–4.8)
LYMPHOCYTES NFR BLD: 36.6 % (ref 18–48)
MCH RBC QN AUTO: 29.2 PG (ref 27–31)
MCHC RBC AUTO-ENTMCNC: 30.6 G/DL (ref 32–36)
MCV RBC AUTO: 95 FL (ref 82–98)
MONOCYTES # BLD AUTO: 0.4 K/UL (ref 0.3–1)
MONOCYTES NFR BLD: 7.5 % (ref 4–15)
NEUTROPHILS # BLD AUTO: 3 K/UL (ref 1.8–7.7)
NEUTROPHILS NFR BLD: 54.2 % (ref 38–73)
NRBC BLD-RTO: 0 /100 WBC
PLATELET # BLD AUTO: 158 K/UL (ref 150–350)
PLATELET BLD QL SMEAR: ABNORMAL
PMV BLD AUTO: 10 FL (ref 9.2–12.9)
POTASSIUM SERPL-SCNC: 4.2 MMOL/L (ref 3.5–5.1)
PROT SERPL-MCNC: 7.2 G/DL (ref 6–8.4)
RBC # BLD AUTO: 4.63 M/UL (ref 4–5.4)
SODIUM SERPL-SCNC: 144 MMOL/L (ref 136–145)
TROPONIN I SERPL DL<=0.01 NG/ML-MCNC: 0.01 NG/ML (ref 0–0.03)
WBC # BLD AUTO: 5.49 K/UL (ref 3.9–12.7)

## 2020-12-28 PROCEDURE — 93005 ELECTROCARDIOGRAM TRACING: CPT

## 2020-12-28 PROCEDURE — 99285 EMERGENCY DEPT VISIT HI MDM: CPT | Mod: 25

## 2020-12-28 PROCEDURE — 93010 ELECTROCARDIOGRAM REPORT: CPT | Mod: ,,, | Performed by: INTERNAL MEDICINE

## 2020-12-28 PROCEDURE — 25000003 PHARM REV CODE 250: Performed by: EMERGENCY MEDICINE

## 2020-12-28 PROCEDURE — 80053 COMPREHEN METABOLIC PANEL: CPT

## 2020-12-28 PROCEDURE — 83880 ASSAY OF NATRIURETIC PEPTIDE: CPT

## 2020-12-28 PROCEDURE — 84484 ASSAY OF TROPONIN QUANT: CPT

## 2020-12-28 PROCEDURE — 63600175 PHARM REV CODE 636 W HCPCS: Performed by: EMERGENCY MEDICINE

## 2020-12-28 PROCEDURE — 93010 EKG 12-LEAD: ICD-10-PCS | Mod: ,,, | Performed by: INTERNAL MEDICINE

## 2020-12-28 PROCEDURE — 85025 COMPLETE CBC W/AUTO DIFF WBC: CPT

## 2020-12-28 RX ORDER — KETOROLAC TROMETHAMINE 30 MG/ML
15 INJECTION, SOLUTION INTRAMUSCULAR; INTRAVENOUS
Status: COMPLETED | OUTPATIENT
Start: 2020-12-28 | End: 2020-12-28

## 2020-12-28 RX ORDER — ACETAMINOPHEN 500 MG
1000 TABLET ORAL
Status: COMPLETED | OUTPATIENT
Start: 2020-12-28 | End: 2020-12-28

## 2020-12-28 RX ADMIN — ACETAMINOPHEN 1000 MG: 500 TABLET ORAL at 10:12

## 2020-12-28 RX ADMIN — KETOROLAC TROMETHAMINE 15 MG: 30 INJECTION, SOLUTION INTRAMUSCULAR at 10:12

## 2020-12-29 VITALS
OXYGEN SATURATION: 98 % | SYSTOLIC BLOOD PRESSURE: 180 MMHG | HEIGHT: 64 IN | WEIGHT: 130 LBS | DIASTOLIC BLOOD PRESSURE: 88 MMHG | HEART RATE: 69 BPM | TEMPERATURE: 98 F | RESPIRATION RATE: 18 BRPM | BODY MASS INDEX: 22.2 KG/M2

## 2020-12-29 PROCEDURE — 25000003 PHARM REV CODE 250: Performed by: EMERGENCY MEDICINE

## 2020-12-29 RX ORDER — LISINOPRIL 20 MG/1
20 TABLET ORAL
Status: COMPLETED | OUTPATIENT
Start: 2020-12-29 | End: 2020-12-29

## 2020-12-29 RX ORDER — ACETAMINOPHEN 160 MG/5ML
650 SUSPENSION ORAL EVERY 4 HOURS PRN
Qty: 200 ML | Refills: 1 | Status: SHIPPED | OUTPATIENT
Start: 2020-12-29 | End: 2022-07-15

## 2020-12-29 RX ADMIN — LISINOPRIL 20 MG: 20 TABLET ORAL at 12:12

## 2020-12-31 ENCOUNTER — EXTERNAL CHRONIC CARE MANAGEMENT (OUTPATIENT)
Dept: PRIMARY CARE CLINIC | Facility: CLINIC | Age: 85
End: 2020-12-31
Payer: MEDICARE

## 2020-12-31 PROCEDURE — 99490 CHRNC CARE MGMT STAFF 1ST 20: CPT | Mod: PBBFAC,PO | Performed by: FAMILY MEDICINE

## 2020-12-31 PROCEDURE — 99490 PR CHRONIC CARE MGMT, 1ST 20 MIN: ICD-10-PCS | Mod: S$PBB,,, | Performed by: FAMILY MEDICINE

## 2020-12-31 PROCEDURE — 99490 CHRNC CARE MGMT STAFF 1ST 20: CPT | Mod: S$PBB,,, | Performed by: FAMILY MEDICINE

## 2021-01-04 ENCOUNTER — OUTPATIENT CASE MANAGEMENT (OUTPATIENT)
Dept: ADMINISTRATIVE | Facility: OTHER | Age: 86
End: 2021-01-04

## 2021-01-18 NOTE — NURSING
Npo status remains in effect patient denies pain saline locked . Oxygen at 4 liters nasal canula. No change on telemetry . Continue to monitor.    done

## 2021-01-31 ENCOUNTER — EXTERNAL CHRONIC CARE MANAGEMENT (OUTPATIENT)
Dept: PRIMARY CARE CLINIC | Facility: CLINIC | Age: 86
End: 2021-01-31
Payer: MEDICARE

## 2021-01-31 PROCEDURE — 99490 CHRNC CARE MGMT STAFF 1ST 20: CPT | Mod: S$PBB,,, | Performed by: FAMILY MEDICINE

## 2021-01-31 PROCEDURE — 99439 CHRNC CARE MGMT STAF EA ADDL: CPT | Mod: S$PBB,,, | Performed by: FAMILY MEDICINE

## 2021-01-31 PROCEDURE — 99490 PR CHRONIC CARE MGMT, 1ST 20 MIN: ICD-10-PCS | Mod: S$PBB,,, | Performed by: FAMILY MEDICINE

## 2021-01-31 PROCEDURE — 99490 CHRNC CARE MGMT STAFF 1ST 20: CPT | Mod: PBBFAC,PO | Performed by: FAMILY MEDICINE

## 2021-01-31 PROCEDURE — 99439 CHRNC CARE MGMT STAF EA ADDL: CPT | Mod: PBBFAC,PO | Performed by: FAMILY MEDICINE

## 2021-01-31 PROCEDURE — 99439 PR CHRONIC CARE MGMT, EA ADDTL 20 MIN: ICD-10-PCS | Mod: S$PBB,,, | Performed by: FAMILY MEDICINE

## 2021-02-06 ENCOUNTER — HOSPITAL ENCOUNTER (EMERGENCY)
Facility: HOSPITAL | Age: 86
Discharge: HOME | End: 2021-02-06
Attending: EMERGENCY MEDICINE
Payer: MEDICARE

## 2021-02-06 VITALS
HEART RATE: 85 BPM | DIASTOLIC BLOOD PRESSURE: 94 MMHG | TEMPERATURE: 99 F | SYSTOLIC BLOOD PRESSURE: 180 MMHG | RESPIRATION RATE: 16 BRPM | BODY MASS INDEX: 22.31 KG/M2 | WEIGHT: 130 LBS | OXYGEN SATURATION: 98 %

## 2021-02-06 DIAGNOSIS — N95.0 POSTMENOPAUSAL BLEEDING: Primary | ICD-10-CM

## 2021-02-06 LAB
ALBUMIN SERPL BCP-MCNC: 3.5 G/DL (ref 3.5–5.2)
ALP SERPL-CCNC: 73 U/L (ref 55–135)
ALT SERPL W/O P-5'-P-CCNC: 8 U/L (ref 10–44)
ANION GAP SERPL CALC-SCNC: 12 MMOL/L (ref 8–16)
AST SERPL-CCNC: 18 U/L (ref 10–40)
BASOPHILS # BLD AUTO: 0.04 K/UL (ref 0–0.2)
BASOPHILS NFR BLD: 0.7 % (ref 0–1.9)
BILIRUB SERPL-MCNC: 0.2 MG/DL (ref 0.1–1)
BUN SERPL-MCNC: 19 MG/DL (ref 10–30)
CALCIUM SERPL-MCNC: 9 MG/DL (ref 8.7–10.5)
CHLORIDE SERPL-SCNC: 107 MMOL/L (ref 95–110)
CO2 SERPL-SCNC: 24 MMOL/L (ref 23–29)
CREAT SERPL-MCNC: 0.8 MG/DL (ref 0.5–1.4)
DIFFERENTIAL METHOD: ABNORMAL
EOSINOPHIL # BLD AUTO: 0 K/UL (ref 0–0.5)
EOSINOPHIL NFR BLD: 0.5 % (ref 0–8)
ERYTHROCYTE [DISTWIDTH] IN BLOOD BY AUTOMATED COUNT: 13.2 % (ref 11.5–14.5)
EST. GFR  (AFRICAN AMERICAN): >60 ML/MIN/1.73 M^2
EST. GFR  (NON AFRICAN AMERICAN): >60 ML/MIN/1.73 M^2
GLUCOSE SERPL-MCNC: 118 MG/DL (ref 70–110)
HCT VFR BLD AUTO: 40.4 % (ref 37–48.5)
HGB BLD-MCNC: 12.4 G/DL (ref 12–16)
IMM GRANULOCYTES # BLD AUTO: 0.01 K/UL (ref 0–0.04)
IMM GRANULOCYTES NFR BLD AUTO: 0.2 % (ref 0–0.5)
INR PPP: 1 (ref 0.8–1.2)
LYMPHOCYTES # BLD AUTO: 3.1 K/UL (ref 1–4.8)
LYMPHOCYTES NFR BLD: 52.8 % (ref 18–48)
MAGNESIUM SERPL-MCNC: 1.6 MG/DL (ref 1.6–2.6)
MCH RBC QN AUTO: 29.2 PG (ref 27–31)
MCHC RBC AUTO-ENTMCNC: 30.7 G/DL (ref 32–36)
MCV RBC AUTO: 95 FL (ref 82–98)
MONOCYTES # BLD AUTO: 0.6 K/UL (ref 0.3–1)
MONOCYTES NFR BLD: 10.2 % (ref 4–15)
NEUTROPHILS # BLD AUTO: 2.1 K/UL (ref 1.8–7.7)
NEUTROPHILS NFR BLD: 35.6 % (ref 38–73)
NRBC BLD-RTO: 0 /100 WBC
PLATELET # BLD AUTO: 170 K/UL (ref 150–350)
PMV BLD AUTO: 10 FL (ref 9.2–12.9)
POTASSIUM SERPL-SCNC: 4.4 MMOL/L (ref 3.5–5.1)
PROT SERPL-MCNC: 6.7 G/DL (ref 6–8.4)
PROTHROMBIN TIME: 10.7 SEC (ref 9–12.5)
RBC # BLD AUTO: 4.25 M/UL (ref 4–5.4)
SODIUM SERPL-SCNC: 143 MMOL/L (ref 136–145)
WBC # BLD AUTO: 5.87 K/UL (ref 3.9–12.7)

## 2021-02-06 PROCEDURE — 80053 COMPREHEN METABOLIC PANEL: CPT

## 2021-02-06 PROCEDURE — 85025 COMPLETE CBC W/AUTO DIFF WBC: CPT

## 2021-02-06 PROCEDURE — 85610 PROTHROMBIN TIME: CPT

## 2021-02-06 PROCEDURE — 99283 EMERGENCY DEPT VISIT LOW MDM: CPT

## 2021-02-06 PROCEDURE — 83735 ASSAY OF MAGNESIUM: CPT

## 2021-02-28 ENCOUNTER — EXTERNAL CHRONIC CARE MANAGEMENT (OUTPATIENT)
Dept: PRIMARY CARE CLINIC | Facility: CLINIC | Age: 86
End: 2021-02-28
Payer: MEDICARE

## 2021-02-28 PROCEDURE — 99490 CHRNC CARE MGMT STAFF 1ST 20: CPT | Mod: S$PBB,,, | Performed by: FAMILY MEDICINE

## 2021-02-28 PROCEDURE — 99490 PR CHRONIC CARE MGMT, 1ST 20 MIN: ICD-10-PCS | Mod: S$PBB,,, | Performed by: FAMILY MEDICINE

## 2021-02-28 PROCEDURE — 99490 CHRNC CARE MGMT STAFF 1ST 20: CPT | Mod: PBBFAC,PO | Performed by: FAMILY MEDICINE

## 2021-03-02 ENCOUNTER — TELEPHONE (OUTPATIENT)
Dept: OTOLARYNGOLOGY | Facility: CLINIC | Age: 86
End: 2021-03-02

## 2021-03-02 ENCOUNTER — TELEPHONE (OUTPATIENT)
Dept: AUDIOLOGY | Facility: CLINIC | Age: 86
End: 2021-03-02

## 2021-03-26 ENCOUNTER — HOSPITAL ENCOUNTER (EMERGENCY)
Facility: HOSPITAL | Age: 86
Discharge: HOME | End: 2021-03-26
Attending: EMERGENCY MEDICINE
Payer: MEDICARE

## 2021-03-26 VITALS
OXYGEN SATURATION: 97 % | WEIGHT: 130 LBS | BODY MASS INDEX: 22.2 KG/M2 | HEIGHT: 64 IN | TEMPERATURE: 98 F | RESPIRATION RATE: 19 BRPM | HEART RATE: 89 BPM | DIASTOLIC BLOOD PRESSURE: 90 MMHG | SYSTOLIC BLOOD PRESSURE: 172 MMHG

## 2021-03-26 DIAGNOSIS — R06.02 SOB (SHORTNESS OF BREATH): Primary | ICD-10-CM

## 2021-03-26 LAB
ALBUMIN SERPL BCP-MCNC: 3.7 G/DL (ref 3.5–5.2)
ALP SERPL-CCNC: 82 U/L (ref 55–135)
ALT SERPL W/O P-5'-P-CCNC: 13 U/L (ref 10–44)
ANION GAP SERPL CALC-SCNC: 11 MMOL/L (ref 8–16)
AST SERPL-CCNC: 20 U/L (ref 10–40)
BACTERIA #/AREA URNS HPF: NORMAL /HPF
BASOPHILS # BLD AUTO: 0.02 K/UL (ref 0–0.2)
BASOPHILS NFR BLD: 0.3 % (ref 0–1.9)
BILIRUB SERPL-MCNC: 0.4 MG/DL (ref 0.1–1)
BILIRUB UR QL STRIP: NEGATIVE
BUN SERPL-MCNC: 17 MG/DL (ref 10–30)
CALCIUM SERPL-MCNC: 9.7 MG/DL (ref 8.7–10.5)
CHLORIDE SERPL-SCNC: 107 MMOL/L (ref 95–110)
CLARITY UR: CLEAR
CO2 SERPL-SCNC: 25 MMOL/L (ref 23–29)
COLOR UR: YELLOW
CREAT SERPL-MCNC: 1.1 MG/DL (ref 0.5–1.4)
DIFFERENTIAL METHOD: ABNORMAL
EOSINOPHIL # BLD AUTO: 0 K/UL (ref 0–0.5)
EOSINOPHIL NFR BLD: 0 % (ref 0–8)
ERYTHROCYTE [DISTWIDTH] IN BLOOD BY AUTOMATED COUNT: 12.9 % (ref 11.5–14.5)
EST. GFR  (AFRICAN AMERICAN): 51 ML/MIN/1.73 M^2
EST. GFR  (NON AFRICAN AMERICAN): 44 ML/MIN/1.73 M^2
GLUCOSE SERPL-MCNC: 116 MG/DL (ref 70–110)
GLUCOSE UR QL STRIP: NEGATIVE
HCT VFR BLD AUTO: 40.9 % (ref 37–48.5)
HGB BLD-MCNC: 12.8 G/DL (ref 12–16)
HGB UR QL STRIP: NEGATIVE
IMM GRANULOCYTES # BLD AUTO: 0.02 K/UL (ref 0–0.04)
IMM GRANULOCYTES NFR BLD AUTO: 0.3 % (ref 0–0.5)
KETONES UR QL STRIP: NEGATIVE
LEUKOCYTE ESTERASE UR QL STRIP: ABNORMAL
LYMPHOCYTES # BLD AUTO: 1.1 K/UL (ref 1–4.8)
LYMPHOCYTES NFR BLD: 16.1 % (ref 18–48)
MCH RBC QN AUTO: 29.2 PG (ref 27–31)
MCHC RBC AUTO-ENTMCNC: 31.3 G/DL (ref 32–36)
MCV RBC AUTO: 93 FL (ref 82–98)
MICROSCOPIC COMMENT: NORMAL
MONOCYTES # BLD AUTO: 0.4 K/UL (ref 0.3–1)
MONOCYTES NFR BLD: 6.1 % (ref 4–15)
NEUTROPHILS # BLD AUTO: 5.2 K/UL (ref 1.8–7.7)
NEUTROPHILS NFR BLD: 77.2 % (ref 38–73)
NITRITE UR QL STRIP: NEGATIVE
NRBC BLD-RTO: 0 /100 WBC
PH UR STRIP: 6 [PH] (ref 5–8)
PLATELET # BLD AUTO: 169 K/UL (ref 150–350)
PMV BLD AUTO: 9.6 FL (ref 9.2–12.9)
POTASSIUM SERPL-SCNC: 4.2 MMOL/L (ref 3.5–5.1)
PROT SERPL-MCNC: 7.2 G/DL (ref 6–8.4)
PROT UR QL STRIP: ABNORMAL
RBC # BLD AUTO: 4.38 M/UL (ref 4–5.4)
RBC #/AREA URNS HPF: 3 /HPF (ref 0–4)
SODIUM SERPL-SCNC: 143 MMOL/L (ref 136–145)
SP GR UR STRIP: 1.03 (ref 1–1.03)
SQUAMOUS #/AREA URNS HPF: 4 /HPF
URN SPEC COLLECT METH UR: ABNORMAL
UROBILINOGEN UR STRIP-ACNC: NEGATIVE EU/DL
WBC # BLD AUTO: 6.72 K/UL (ref 3.9–12.7)
WBC #/AREA URNS HPF: 4 /HPF (ref 0–5)

## 2021-03-26 PROCEDURE — 36000 PLACE NEEDLE IN VEIN: CPT

## 2021-03-26 PROCEDURE — 99284 EMERGENCY DEPT VISIT MOD MDM: CPT | Mod: 25

## 2021-03-26 PROCEDURE — 93010 EKG 12-LEAD: ICD-10-PCS | Mod: ,,, | Performed by: INTERNAL MEDICINE

## 2021-03-26 PROCEDURE — 93005 ELECTROCARDIOGRAM TRACING: CPT

## 2021-03-26 PROCEDURE — 81000 URINALYSIS NONAUTO W/SCOPE: CPT | Performed by: EMERGENCY MEDICINE

## 2021-03-26 PROCEDURE — 80053 COMPREHEN METABOLIC PANEL: CPT | Performed by: EMERGENCY MEDICINE

## 2021-03-26 PROCEDURE — 93010 ELECTROCARDIOGRAM REPORT: CPT | Mod: ,,, | Performed by: INTERNAL MEDICINE

## 2021-03-26 PROCEDURE — 85025 COMPLETE CBC W/AUTO DIFF WBC: CPT | Performed by: EMERGENCY MEDICINE

## 2021-03-31 ENCOUNTER — EXTERNAL CHRONIC CARE MANAGEMENT (OUTPATIENT)
Dept: PRIMARY CARE CLINIC | Facility: CLINIC | Age: 86
End: 2021-03-31
Payer: MEDICARE

## 2021-03-31 PROCEDURE — 99490 CHRNC CARE MGMT STAFF 1ST 20: CPT | Mod: S$PBB,,, | Performed by: FAMILY MEDICINE

## 2021-03-31 PROCEDURE — 99490 CHRNC CARE MGMT STAFF 1ST 20: CPT | Mod: PBBFAC,PO | Performed by: FAMILY MEDICINE

## 2021-03-31 PROCEDURE — 99490 PR CHRONIC CARE MGMT, 1ST 20 MIN: ICD-10-PCS | Mod: S$PBB,,, | Performed by: FAMILY MEDICINE

## 2021-04-29 ENCOUNTER — TELEPHONE (OUTPATIENT)
Dept: OTOLARYNGOLOGY | Facility: CLINIC | Age: 86
End: 2021-04-29

## 2021-04-30 ENCOUNTER — EXTERNAL CHRONIC CARE MANAGEMENT (OUTPATIENT)
Dept: PRIMARY CARE CLINIC | Facility: CLINIC | Age: 86
End: 2021-04-30
Payer: MEDICARE

## 2021-04-30 PROCEDURE — 99490 CHRNC CARE MGMT STAFF 1ST 20: CPT | Mod: PBBFAC,PO | Performed by: FAMILY MEDICINE

## 2021-04-30 PROCEDURE — 99490 CHRNC CARE MGMT STAFF 1ST 20: CPT | Mod: S$PBB,,, | Performed by: FAMILY MEDICINE

## 2021-04-30 PROCEDURE — 99490 PR CHRONIC CARE MGMT, 1ST 20 MIN: ICD-10-PCS | Mod: S$PBB,,, | Performed by: FAMILY MEDICINE

## 2021-05-31 ENCOUNTER — EXTERNAL CHRONIC CARE MANAGEMENT (OUTPATIENT)
Dept: PRIMARY CARE CLINIC | Facility: CLINIC | Age: 86
End: 2021-05-31
Payer: MEDICARE

## 2021-05-31 PROCEDURE — 99490 CHRNC CARE MGMT STAFF 1ST 20: CPT | Mod: PBBFAC,PO | Performed by: FAMILY MEDICINE

## 2021-05-31 PROCEDURE — 99490 PR CHRONIC CARE MGMT, 1ST 20 MIN: ICD-10-PCS | Mod: S$PBB,,, | Performed by: FAMILY MEDICINE

## 2021-05-31 PROCEDURE — 99490 CHRNC CARE MGMT STAFF 1ST 20: CPT | Mod: S$PBB,,, | Performed by: FAMILY MEDICINE

## 2021-06-30 ENCOUNTER — EXTERNAL CHRONIC CARE MANAGEMENT (OUTPATIENT)
Dept: PRIMARY CARE CLINIC | Facility: CLINIC | Age: 86
End: 2021-06-30
Payer: MEDICARE

## 2021-06-30 PROCEDURE — 99490 CHRNC CARE MGMT STAFF 1ST 20: CPT | Mod: S$PBB,,, | Performed by: FAMILY MEDICINE

## 2021-06-30 PROCEDURE — 99490 PR CHRONIC CARE MGMT, 1ST 20 MIN: ICD-10-PCS | Mod: S$PBB,,, | Performed by: FAMILY MEDICINE

## 2021-06-30 PROCEDURE — 99490 CHRNC CARE MGMT STAFF 1ST 20: CPT | Mod: PBBFAC,PO | Performed by: FAMILY MEDICINE

## 2021-07-31 ENCOUNTER — EXTERNAL CHRONIC CARE MANAGEMENT (OUTPATIENT)
Dept: PRIMARY CARE CLINIC | Facility: CLINIC | Age: 86
End: 2021-07-31
Payer: MEDICARE

## 2021-07-31 PROCEDURE — 99490 CHRNC CARE MGMT STAFF 1ST 20: CPT | Mod: PBBFAC,PO | Performed by: FAMILY MEDICINE

## 2021-07-31 PROCEDURE — 99490 PR CHRONIC CARE MGMT, 1ST 20 MIN: ICD-10-PCS | Mod: S$PBB,,, | Performed by: FAMILY MEDICINE

## 2021-07-31 PROCEDURE — 99490 CHRNC CARE MGMT STAFF 1ST 20: CPT | Mod: S$PBB,,, | Performed by: FAMILY MEDICINE

## 2021-08-09 ENCOUNTER — HOSPITAL ENCOUNTER (INPATIENT)
Facility: HOSPITAL | Age: 86
LOS: 2 days | Discharge: HOME | DRG: 176 | End: 2021-08-11
Attending: EMERGENCY MEDICINE | Admitting: INTERNAL MEDICINE
Payer: MEDICARE

## 2021-08-09 DIAGNOSIS — R07.9 CHEST PAIN: ICD-10-CM
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
## 2021-08-09 DIAGNOSIS — I26.99 PE (PULMONARY THROMBOEMBOLISM): ICD-10-CM

## 2021-08-09 DIAGNOSIS — R09.02 HYPOXIA: ICD-10-CM

## 2021-08-09 DIAGNOSIS — I21.4 NSTEMI (NON-ST ELEVATED MYOCARDIAL INFARCTION): Primary | ICD-10-CM

## 2021-08-09 DIAGNOSIS — I26.99 PULMONARY EMBOLI: ICD-10-CM

## 2021-08-09 PROBLEM — R79.89 ELEVATED TROPONIN I LEVEL: Status: ACTIVE | Noted: 2021-08-09

## 2021-08-09 PROBLEM — R53.83 OTHER FATIGUE: Status: ACTIVE | Noted: 2021-08-09

## 2021-08-09 PROBLEM — I51.89 DIASTOLIC DYSFUNCTION WITHOUT HEART FAILURE: Status: ACTIVE | Noted: 2021-08-09

## 2021-08-09 PROBLEM — I82.502: Status: ACTIVE | Noted: 2021-08-09

## 2021-08-09 PROBLEM — I50.810 RIGHT HEART FAILURE WITH REDUCED RIGHT VENTRICULAR FUNCTION: Status: ACTIVE | Noted: 2021-08-09

## 2021-08-09 PROBLEM — I27.20 PULMONARY HYPERTENSION: Status: ACTIVE | Noted: 2021-08-09

## 2021-08-09 LAB
ALBUMIN SERPL BCP-MCNC: 3.6 G/DL (ref 3.5–5.2)
ALP SERPL-CCNC: 82 U/L (ref 55–135)
ALT SERPL W/O P-5'-P-CCNC: 21 U/L (ref 10–44)
ANION GAP SERPL CALC-SCNC: 10 MMOL/L (ref 8–16)
APTT BLDCRRT: 29.3 SEC (ref 21–32)
ASCENDING AORTA: 2.55 CM
AST SERPL-CCNC: 42 U/L (ref 10–40)
AV INDEX (PROSTH): 1.1
AV MEAN GRADIENT: 2 MMHG
AV PEAK GRADIENT: 3 MMHG
AV VALVE AREA: 3.54 CM2
AV VELOCITY RATIO: 0.83
BASOPHILS # BLD AUTO: 0.04 K/UL (ref 0–0.2)
BASOPHILS NFR BLD: 0.5 % (ref 0–1.9)
BILIRUB SERPL-MCNC: 0.6 MG/DL (ref 0.1–1)
BILIRUB UR QL STRIP: NEGATIVE
BNP SERPL-MCNC: 45 PG/ML (ref 0–99)
BSA FOR ECHO PROCEDURE: 1.63 M2
BUN SERPL-MCNC: 13 MG/DL (ref 10–30)
CALCIUM SERPL-MCNC: 9.5 MG/DL (ref 8.7–10.5)
CHLORIDE SERPL-SCNC: 106 MMOL/L (ref 95–110)
CLARITY UR: CLEAR
CO2 SERPL-SCNC: 27 MMOL/L (ref 23–29)
COLOR UR: YELLOW
CREAT SERPL-MCNC: 0.9 MG/DL (ref 0.5–1.4)
CTP QC/QA: YES
CV ECHO LV RWT: 1.1 CM
DIFFERENTIAL METHOD: ABNORMAL
DOP CALC AO PEAK VEL: 0.86 M/S
DOP CALC AO VTI: 13.22 CM
DOP CALC LVOT AREA: 3.2 CM2
DOP CALC LVOT DIAMETER: 2.02 CM
DOP CALC LVOT PEAK VEL: 0.71 M/S
DOP CALC LVOT STROKE VOLUME: 46.73 CM3
DOP CALCLVOT PEAK VEL VTI: 14.59 CM
E WAVE DECELERATION TIME: 200.02 MSEC
E/A RATIO: 0.49
E/E' RATIO: 9.11 M/S
ECHO LV POSTERIOR WALL: 1.37 CM (ref 0.6–1.1)
EJECTION FRACTION: 65 %
EOSINOPHIL # BLD AUTO: 0.1 K/UL (ref 0–0.5)
EOSINOPHIL NFR BLD: 0.8 % (ref 0–8)
ERYTHROCYTE [DISTWIDTH] IN BLOOD BY AUTOMATED COUNT: 13.3 % (ref 11.5–14.5)
EST. GFR  (AFRICAN AMERICAN): >60 ML/MIN/1.73 M^2
EST. GFR  (NON AFRICAN AMERICAN): 56 ML/MIN/1.73 M^2
FRACTIONAL SHORTENING: 29 % (ref 28–44)
GLUCOSE SERPL-MCNC: 140 MG/DL (ref 70–110)
GLUCOSE UR QL STRIP: NEGATIVE
HCT VFR BLD AUTO: 40 % (ref 37–48.5)
HGB BLD-MCNC: 12.4 G/DL (ref 12–16)
HGB UR QL STRIP: NEGATIVE
IMM GRANULOCYTES # BLD AUTO: 0.01 K/UL (ref 0–0.04)
IMM GRANULOCYTES NFR BLD AUTO: 0.1 % (ref 0–0.5)
INR PPP: 1.1 (ref 0.8–1.2)
INTERVENTRICULAR SEPTUM: 1.17 CM (ref 0.6–1.1)
KETONES UR QL STRIP: NEGATIVE
LA MAJOR: 4.94 CM
LA MINOR: 5.2 CM
LA WIDTH: 3.08 CM
LACTATE SERPL-SCNC: 2.1 MMOL/L (ref 0.5–2.2)
LEFT ATRIUM SIZE: 3.19 CM
LEFT ATRIUM VOLUME INDEX: 26 ML/M2
LEFT ATRIUM VOLUME: 42.31 CM3
LEFT INTERNAL DIMENSION IN SYSTOLE: 1.78 CM (ref 2.1–4)
LEFT VENTRICLE DIASTOLIC VOLUME INDEX: 13.73 ML/M2
LEFT VENTRICLE DIASTOLIC VOLUME: 22.38 ML
LEFT VENTRICLE MASS INDEX: 58 G/M2
LEFT VENTRICLE SYSTOLIC VOLUME INDEX: 5.8 ML/M2
LEFT VENTRICLE SYSTOLIC VOLUME: 9.48 ML
LEFT VENTRICULAR INTERNAL DIMENSION IN DIASTOLE: 2.5 CM (ref 3.5–6)
LEFT VENTRICULAR MASS: 94.12 G
LEUKOCYTE ESTERASE UR QL STRIP: NEGATIVE
LIPASE SERPL-CCNC: 57 U/L (ref 4–60)
LV LATERAL E/E' RATIO: 10.25 M/S
LV SEPTAL E/E' RATIO: 8.2 M/S
LYMPHOCYTES # BLD AUTO: 2.9 K/UL (ref 1–4.8)
LYMPHOCYTES NFR BLD: 39.1 % (ref 18–48)
MAGNESIUM SERPL-MCNC: 1.7 MG/DL (ref 1.6–2.6)
MCH RBC QN AUTO: 28.8 PG (ref 27–31)
MCHC RBC AUTO-ENTMCNC: 31 G/DL (ref 32–36)
MCV RBC AUTO: 93 FL (ref 82–98)
MONOCYTES # BLD AUTO: 0.5 K/UL (ref 0.3–1)
MONOCYTES NFR BLD: 6.6 % (ref 4–15)
MV PEAK A VEL: 0.84 M/S
MV PEAK E VEL: 0.41 M/S
NEUTROPHILS # BLD AUTO: 3.9 K/UL (ref 1.8–7.7)
NEUTROPHILS NFR BLD: 52.9 % (ref 38–73)
NITRITE UR QL STRIP: NEGATIVE
NRBC BLD-RTO: 0 /100 WBC
PH UR STRIP: 6 [PH] (ref 5–8)
PISA TR MAX VEL: 3.36 M/S
PLATELET # BLD AUTO: 99 K/UL (ref 150–450)
PMV BLD AUTO: 11.4 FL (ref 9.2–12.9)
POTASSIUM SERPL-SCNC: 3.3 MMOL/L (ref 3.5–5.1)
PROT SERPL-MCNC: 6.9 G/DL (ref 6–8.4)
PROT UR QL STRIP: NEGATIVE
PROTHROMBIN TIME: 11.7 SEC (ref 9–12.5)
PV PEAK VELOCITY: 0.83 CM/S
RA MAJOR: 5.92 CM
RA PRESSURE: 15 MMHG
RA WIDTH: 4.56 CM
RBC # BLD AUTO: 4.3 M/UL (ref 4–5.4)
RIGHT VENTRICULAR END-DIASTOLIC DIMENSION: 3.95 CM
SARS-COV-2 RDRP RESP QL NAA+PROBE: NEGATIVE
SINUS: 3.02 CM
SODIUM SERPL-SCNC: 143 MMOL/L (ref 136–145)
SP GR UR STRIP: 1.01 (ref 1–1.03)
STJ: 2.54 CM
TDI LATERAL: 0.04 M/S
TDI SEPTAL: 0.05 M/S
TDI: 0.05 M/S
TR MAX PG: 45 MMHG
TROPONIN I SERPL DL<=0.01 NG/ML-MCNC: 0.01 NG/ML (ref 0–0.03)
TROPONIN I SERPL DL<=0.01 NG/ML-MCNC: 0.42 NG/ML (ref 0–0.03)
TSH SERPL DL<=0.005 MIU/L-ACNC: 1.61 UIU/ML (ref 0.4–4)
TV REST PULMONARY ARTERY PRESSURE: 60 MMHG
URN SPEC COLLECT METH UR: NORMAL
UROBILINOGEN UR STRIP-ACNC: NEGATIVE EU/DL
WBC # BLD AUTO: 7.31 K/UL (ref 3.9–12.7)

## 2021-08-09 PROCEDURE — 99291 CRITICAL CARE FIRST HOUR: CPT | Mod: 25

## 2021-08-09 PROCEDURE — 81003 URINALYSIS AUTO W/O SCOPE: CPT | Performed by: EMERGENCY MEDICINE

## 2021-08-09 PROCEDURE — 96372 THER/PROPH/DIAG INJ SC/IM: CPT | Mod: 59

## 2021-08-09 PROCEDURE — 93010 ELECTROCARDIOGRAM REPORT: CPT | Mod: ,,, | Performed by: INTERNAL MEDICINE

## 2021-08-09 PROCEDURE — 85025 COMPLETE CBC W/AUTO DIFF WBC: CPT | Performed by: EMERGENCY MEDICINE

## 2021-08-09 PROCEDURE — 85730 THROMBOPLASTIN TIME PARTIAL: CPT | Performed by: INTERNAL MEDICINE

## 2021-08-09 PROCEDURE — 25500020 PHARM REV CODE 255: Performed by: EMERGENCY MEDICINE

## 2021-08-09 PROCEDURE — 11000001 HC ACUTE MED/SURG PRIVATE ROOM

## 2021-08-09 PROCEDURE — 83605 ASSAY OF LACTIC ACID: CPT | Performed by: EMERGENCY MEDICINE

## 2021-08-09 PROCEDURE — 83735 ASSAY OF MAGNESIUM: CPT | Performed by: EMERGENCY MEDICINE

## 2021-08-09 PROCEDURE — 93010 EKG 12-LEAD: ICD-10-PCS | Mod: ,,, | Performed by: INTERNAL MEDICINE

## 2021-08-09 PROCEDURE — A4216 STERILE WATER/SALINE, 10 ML: HCPCS | Performed by: INTERNAL MEDICINE

## 2021-08-09 PROCEDURE — 84443 ASSAY THYROID STIM HORMONE: CPT | Performed by: EMERGENCY MEDICINE

## 2021-08-09 PROCEDURE — 99223 1ST HOSP IP/OBS HIGH 75: CPT | Mod: ,,, | Performed by: INTERNAL MEDICINE

## 2021-08-09 PROCEDURE — 85610 PROTHROMBIN TIME: CPT | Performed by: EMERGENCY MEDICINE

## 2021-08-09 PROCEDURE — 84484 ASSAY OF TROPONIN QUANT: CPT | Performed by: EMERGENCY MEDICINE

## 2021-08-09 PROCEDURE — 25000003 PHARM REV CODE 250: Performed by: EMERGENCY MEDICINE

## 2021-08-09 PROCEDURE — 93005 ELECTROCARDIOGRAM TRACING: CPT

## 2021-08-09 PROCEDURE — 80053 COMPREHEN METABOLIC PANEL: CPT | Performed by: EMERGENCY MEDICINE

## 2021-08-09 PROCEDURE — 63600175 PHARM REV CODE 636 W HCPCS: Performed by: EMERGENCY MEDICINE

## 2021-08-09 PROCEDURE — 83690 ASSAY OF LIPASE: CPT | Performed by: EMERGENCY MEDICINE

## 2021-08-09 PROCEDURE — 99223 PR INITIAL HOSPITAL CARE,LEVL III: ICD-10-PCS | Mod: ,,, | Performed by: INTERNAL MEDICINE

## 2021-08-09 PROCEDURE — 63600175 PHARM REV CODE 636 W HCPCS: Performed by: INTERNAL MEDICINE

## 2021-08-09 PROCEDURE — 25000003 PHARM REV CODE 250: Performed by: INTERNAL MEDICINE

## 2021-08-09 PROCEDURE — U0002 COVID-19 LAB TEST NON-CDC: HCPCS | Performed by: EMERGENCY MEDICINE

## 2021-08-09 PROCEDURE — 83880 ASSAY OF NATRIURETIC PEPTIDE: CPT | Performed by: EMERGENCY MEDICINE

## 2021-08-09 RX ORDER — HEPARIN SODIUM 10000 [USP'U]/100ML
15 INJECTION, SOLUTION INTRAVENOUS CONTINUOUS
Status: DISCONTINUED | OUTPATIENT
Start: 2021-08-09 | End: 2021-08-09

## 2021-08-09 RX ORDER — CLOPIDOGREL 300 MG/1
300 TABLET, FILM COATED ORAL
Status: COMPLETED | OUTPATIENT
Start: 2021-08-09 | End: 2021-08-09

## 2021-08-09 RX ORDER — ACETAMINOPHEN 500 MG
1000 TABLET ORAL
Status: COMPLETED | OUTPATIENT
Start: 2021-08-09 | End: 2021-08-09

## 2021-08-09 RX ORDER — ASPIRIN 325 MG
325 TABLET ORAL
Status: DISCONTINUED | OUTPATIENT
Start: 2021-08-09 | End: 2021-08-09

## 2021-08-09 RX ORDER — SODIUM CHLORIDE 0.9 % (FLUSH) 0.9 %
10 SYRINGE (ML) INJECTION EVERY 8 HOURS
Status: DISCONTINUED | OUTPATIENT
Start: 2021-08-09 | End: 2021-08-11 | Stop reason: HOSPADM

## 2021-08-09 RX ORDER — ENOXAPARIN SODIUM 100 MG/ML
1 INJECTION SUBCUTANEOUS
Status: COMPLETED | OUTPATIENT
Start: 2021-08-09 | End: 2021-08-09

## 2021-08-09 RX ORDER — ENOXAPARIN SODIUM 100 MG/ML
1 INJECTION SUBCUTANEOUS EVERY 12 HOURS
Status: DISCONTINUED | OUTPATIENT
Start: 2021-08-09 | End: 2021-08-11 | Stop reason: HOSPADM

## 2021-08-09 RX ORDER — POTASSIUM CHLORIDE 20 MEQ/1
60 TABLET, EXTENDED RELEASE ORAL
Status: ACTIVE | OUTPATIENT
Start: 2021-08-09 | End: 2021-08-09

## 2021-08-09 RX ADMIN — ENOXAPARIN SODIUM 60 MG: 60 INJECTION SUBCUTANEOUS at 06:08

## 2021-08-09 RX ADMIN — IOHEXOL 100 ML: 350 INJECTION, SOLUTION INTRAVENOUS at 08:08

## 2021-08-09 RX ADMIN — Medication 10 ML: at 09:08

## 2021-08-09 RX ADMIN — ACETAMINOPHEN 1000 MG: 500 TABLET, FILM COATED ORAL at 05:08

## 2021-08-09 RX ADMIN — CLOPIDOGREL BISULFATE 300 MG: 300 TABLET, FILM COATED ORAL at 06:08

## 2021-08-10 LAB
ALBUMIN SERPL BCP-MCNC: 3.1 G/DL (ref 3.5–5.2)
ALP SERPL-CCNC: 74 U/L (ref 55–135)
ALT SERPL W/O P-5'-P-CCNC: 30 U/L (ref 10–44)
ANION GAP SERPL CALC-SCNC: 6 MMOL/L (ref 8–16)
AST SERPL-CCNC: 41 U/L (ref 10–40)
BASOPHILS # BLD AUTO: 0.03 K/UL (ref 0–0.2)
BASOPHILS NFR BLD: 0.5 % (ref 0–1.9)
BILIRUB SERPL-MCNC: 0.6 MG/DL (ref 0.1–1)
BUN SERPL-MCNC: 14 MG/DL (ref 10–30)
CALCIUM SERPL-MCNC: 9.2 MG/DL (ref 8.7–10.5)
CHLORIDE SERPL-SCNC: 108 MMOL/L (ref 95–110)
CO2 SERPL-SCNC: 27 MMOL/L (ref 23–29)
CREAT SERPL-MCNC: 0.8 MG/DL (ref 0.5–1.4)
DIFFERENTIAL METHOD: ABNORMAL
EOSINOPHIL # BLD AUTO: 0.1 K/UL (ref 0–0.5)
EOSINOPHIL NFR BLD: 0.8 % (ref 0–8)
ERYTHROCYTE [DISTWIDTH] IN BLOOD BY AUTOMATED COUNT: 13.2 % (ref 11.5–14.5)
EST. GFR  (AFRICAN AMERICAN): >60 ML/MIN/1.73 M^2
EST. GFR  (NON AFRICAN AMERICAN): >60 ML/MIN/1.73 M^2
GLUCOSE SERPL-MCNC: 108 MG/DL (ref 70–110)
HCT VFR BLD AUTO: 37.4 % (ref 37–48.5)
HGB BLD-MCNC: 11.6 G/DL (ref 12–16)
IMM GRANULOCYTES # BLD AUTO: 0.01 K/UL (ref 0–0.04)
IMM GRANULOCYTES NFR BLD AUTO: 0.2 % (ref 0–0.5)
LYMPHOCYTES # BLD AUTO: 2.6 K/UL (ref 1–4.8)
LYMPHOCYTES NFR BLD: 40.6 % (ref 18–48)
MAGNESIUM SERPL-MCNC: 1.8 MG/DL (ref 1.6–2.6)
MCH RBC QN AUTO: 28.3 PG (ref 27–31)
MCHC RBC AUTO-ENTMCNC: 31 G/DL (ref 32–36)
MCV RBC AUTO: 91 FL (ref 82–98)
MONOCYTES # BLD AUTO: 0.6 K/UL (ref 0.3–1)
MONOCYTES NFR BLD: 8.9 % (ref 4–15)
NEUTROPHILS # BLD AUTO: 3.2 K/UL (ref 1.8–7.7)
NEUTROPHILS NFR BLD: 49 % (ref 38–73)
NRBC BLD-RTO: 0 /100 WBC
PHOSPHATE SERPL-MCNC: 2.8 MG/DL (ref 2.7–4.5)
PLATELET # BLD AUTO: 114 K/UL (ref 150–450)
PMV BLD AUTO: 10.4 FL (ref 9.2–12.9)
POTASSIUM SERPL-SCNC: 3.8 MMOL/L (ref 3.5–5.1)
PROT SERPL-MCNC: 6 G/DL (ref 6–8.4)
RBC # BLD AUTO: 4.1 M/UL (ref 4–5.4)
SODIUM SERPL-SCNC: 141 MMOL/L (ref 136–145)
TROPONIN I SERPL DL<=0.01 NG/ML-MCNC: 0.33 NG/ML (ref 0–0.03)
WBC # BLD AUTO: 6.51 K/UL (ref 3.9–12.7)

## 2021-08-10 PROCEDURE — 63600175 PHARM REV CODE 636 W HCPCS: Performed by: INTERNAL MEDICINE

## 2021-08-10 PROCEDURE — 25000003 PHARM REV CODE 250: Performed by: INTERNAL MEDICINE

## 2021-08-10 PROCEDURE — 80053 COMPREHEN METABOLIC PANEL: CPT | Performed by: INTERNAL MEDICINE

## 2021-08-10 PROCEDURE — 97535 SELF CARE MNGMENT TRAINING: CPT

## 2021-08-10 PROCEDURE — 84484 ASSAY OF TROPONIN QUANT: CPT | Performed by: STUDENT IN AN ORGANIZED HEALTH CARE EDUCATION/TRAINING PROGRAM

## 2021-08-10 PROCEDURE — 83735 ASSAY OF MAGNESIUM: CPT | Performed by: INTERNAL MEDICINE

## 2021-08-10 PROCEDURE — 85025 COMPLETE CBC W/AUTO DIFF WBC: CPT | Performed by: INTERNAL MEDICINE

## 2021-08-10 PROCEDURE — 84100 ASSAY OF PHOSPHORUS: CPT | Performed by: INTERNAL MEDICINE

## 2021-08-10 PROCEDURE — 36415 COLL VENOUS BLD VENIPUNCTURE: CPT | Performed by: STUDENT IN AN ORGANIZED HEALTH CARE EDUCATION/TRAINING PROGRAM

## 2021-08-10 PROCEDURE — 97165 OT EVAL LOW COMPLEX 30 MIN: CPT

## 2021-08-10 PROCEDURE — 11000001 HC ACUTE MED/SURG PRIVATE ROOM

## 2021-08-10 PROCEDURE — 36415 COLL VENOUS BLD VENIPUNCTURE: CPT | Performed by: INTERNAL MEDICINE

## 2021-08-10 PROCEDURE — A4216 STERILE WATER/SALINE, 10 ML: HCPCS | Performed by: INTERNAL MEDICINE

## 2021-08-10 RX ADMIN — ENOXAPARIN SODIUM 60 MG: 60 INJECTION SUBCUTANEOUS at 09:08

## 2021-08-10 RX ADMIN — ENOXAPARIN SODIUM 60 MG: 60 INJECTION SUBCUTANEOUS at 08:08

## 2021-08-10 RX ADMIN — Medication 10 ML: at 03:08

## 2021-08-10 RX ADMIN — Medication 10 ML: at 09:08

## 2021-08-10 RX ADMIN — Medication 10 ML: at 08:08

## 2021-08-11 VITALS
RESPIRATION RATE: 20 BRPM | BODY MASS INDEX: 22.58 KG/M2 | SYSTOLIC BLOOD PRESSURE: 124 MMHG | HEART RATE: 71 BPM | OXYGEN SATURATION: 94 % | HEIGHT: 64 IN | TEMPERATURE: 97 F | WEIGHT: 132.25 LBS | DIASTOLIC BLOOD PRESSURE: 66 MMHG

## 2021-08-11 LAB
ALBUMIN SERPL BCP-MCNC: 2.9 G/DL (ref 3.5–5.2)
ALP SERPL-CCNC: 74 U/L (ref 55–135)
ALT SERPL W/O P-5'-P-CCNC: 26 U/L (ref 10–44)
ANION GAP SERPL CALC-SCNC: 8 MMOL/L (ref 8–16)
AST SERPL-CCNC: 30 U/L (ref 10–40)
BASOPHILS # BLD AUTO: 0.05 K/UL (ref 0–0.2)
BASOPHILS NFR BLD: 0.8 % (ref 0–1.9)
BILIRUB SERPL-MCNC: 0.4 MG/DL (ref 0.1–1)
BUN SERPL-MCNC: 14 MG/DL (ref 10–30)
CALCIUM SERPL-MCNC: 8.7 MG/DL (ref 8.7–10.5)
CHLORIDE SERPL-SCNC: 108 MMOL/L (ref 95–110)
CO2 SERPL-SCNC: 24 MMOL/L (ref 23–29)
CREAT SERPL-MCNC: 0.8 MG/DL (ref 0.5–1.4)
DIFFERENTIAL METHOD: ABNORMAL
EOSINOPHIL # BLD AUTO: 0.1 K/UL (ref 0–0.5)
EOSINOPHIL NFR BLD: 1.2 % (ref 0–8)
ERYTHROCYTE [DISTWIDTH] IN BLOOD BY AUTOMATED COUNT: 13.1 % (ref 11.5–14.5)
EST. GFR  (AFRICAN AMERICAN): >60 ML/MIN/1.73 M^2
EST. GFR  (NON AFRICAN AMERICAN): >60 ML/MIN/1.73 M^2
GLUCOSE SERPL-MCNC: 96 MG/DL (ref 70–110)
HCT VFR BLD AUTO: 38.9 % (ref 37–48.5)
HGB BLD-MCNC: 12 G/DL (ref 12–16)
IMM GRANULOCYTES # BLD AUTO: 0.01 K/UL (ref 0–0.04)
IMM GRANULOCYTES NFR BLD AUTO: 0.2 % (ref 0–0.5)
LYMPHOCYTES # BLD AUTO: 3 K/UL (ref 1–4.8)
LYMPHOCYTES NFR BLD: 45.6 % (ref 18–48)
MAGNESIUM SERPL-MCNC: 1.6 MG/DL (ref 1.6–2.6)
MCH RBC QN AUTO: 28.8 PG (ref 27–31)
MCHC RBC AUTO-ENTMCNC: 30.8 G/DL (ref 32–36)
MCV RBC AUTO: 93 FL (ref 82–98)
MONOCYTES # BLD AUTO: 0.5 K/UL (ref 0.3–1)
MONOCYTES NFR BLD: 7.8 % (ref 4–15)
NEUTROPHILS # BLD AUTO: 3 K/UL (ref 1.8–7.7)
NEUTROPHILS NFR BLD: 44.4 % (ref 38–73)
NRBC BLD-RTO: 0 /100 WBC
PHOSPHATE SERPL-MCNC: 2.4 MG/DL (ref 2.7–4.5)
PLATELET # BLD AUTO: 132 K/UL (ref 150–450)
PMV BLD AUTO: 10.8 FL (ref 9.2–12.9)
POCT GLUCOSE: 100 MG/DL (ref 70–110)
POTASSIUM SERPL-SCNC: 3.7 MMOL/L (ref 3.5–5.1)
PROT SERPL-MCNC: 5.8 G/DL (ref 6–8.4)
RBC # BLD AUTO: 4.17 M/UL (ref 4–5.4)
SODIUM SERPL-SCNC: 140 MMOL/L (ref 136–145)
WBC # BLD AUTO: 6.66 K/UL (ref 3.9–12.7)

## 2021-08-11 PROCEDURE — 36415 COLL VENOUS BLD VENIPUNCTURE: CPT | Performed by: INTERNAL MEDICINE

## 2021-08-11 PROCEDURE — 94761 N-INVAS EAR/PLS OXIMETRY MLT: CPT

## 2021-08-11 PROCEDURE — 80053 COMPREHEN METABOLIC PANEL: CPT | Performed by: INTERNAL MEDICINE

## 2021-08-11 PROCEDURE — 63600175 PHARM REV CODE 636 W HCPCS: Performed by: INTERNAL MEDICINE

## 2021-08-11 PROCEDURE — 97162 PT EVAL MOD COMPLEX 30 MIN: CPT

## 2021-08-11 PROCEDURE — 85025 COMPLETE CBC W/AUTO DIFF WBC: CPT | Performed by: INTERNAL MEDICINE

## 2021-08-11 PROCEDURE — 84100 ASSAY OF PHOSPHORUS: CPT | Performed by: INTERNAL MEDICINE

## 2021-08-11 PROCEDURE — 83735 ASSAY OF MAGNESIUM: CPT | Performed by: INTERNAL MEDICINE

## 2021-08-11 RX ADMIN — ENOXAPARIN SODIUM 60 MG: 60 INJECTION SUBCUTANEOUS at 09:08

## 2021-08-31 ENCOUNTER — EXTERNAL CHRONIC CARE MANAGEMENT (OUTPATIENT)
Dept: PRIMARY CARE CLINIC | Facility: CLINIC | Age: 86
End: 2021-08-31
Payer: MEDICARE

## 2021-08-31 PROCEDURE — 99490 PR CHRONIC CARE MGMT, 1ST 20 MIN: ICD-10-PCS | Mod: S$PBB,,, | Performed by: FAMILY MEDICINE

## 2021-08-31 PROCEDURE — 99490 CHRNC CARE MGMT STAFF 1ST 20: CPT | Mod: PBBFAC,PO | Performed by: FAMILY MEDICINE

## 2021-08-31 PROCEDURE — 99490 CHRNC CARE MGMT STAFF 1ST 20: CPT | Mod: S$PBB,,, | Performed by: FAMILY MEDICINE

## 2021-09-09 ENCOUNTER — PES CALL (OUTPATIENT)
Dept: ADMINISTRATIVE | Facility: CLINIC | Age: 86
End: 2021-09-09

## 2021-09-14 ENCOUNTER — HOSPITAL ENCOUNTER (EMERGENCY)
Facility: HOSPITAL | Age: 86
Discharge: HOME | End: 2021-09-15
Attending: EMERGENCY MEDICINE
Payer: MEDICARE
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
## 2021-09-14 DIAGNOSIS — R55 SYNCOPE: ICD-10-CM

## 2021-09-14 DIAGNOSIS — N39.0 URINARY TRACT INFECTION WITH HEMATURIA, SITE UNSPECIFIED: ICD-10-CM
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
## 2021-09-14 DIAGNOSIS — I10 HYPERTENSION, UNSPECIFIED TYPE: ICD-10-CM

## 2021-09-14 DIAGNOSIS — R31.9 URINARY TRACT INFECTION WITH HEMATURIA, SITE UNSPECIFIED: ICD-10-CM

## 2021-09-14 LAB
ALBUMIN SERPL BCP-MCNC: 3.3 G/DL (ref 3.5–5.2)
ALP SERPL-CCNC: 71 U/L (ref 55–135)
ALT SERPL W/O P-5'-P-CCNC: 12 U/L (ref 10–44)
ANION GAP SERPL CALC-SCNC: 14 MMOL/L (ref 8–16)
AST SERPL-CCNC: 24 U/L (ref 10–40)
BASOPHILS # BLD AUTO: 0.02 K/UL (ref 0–0.2)
BASOPHILS NFR BLD: 0.4 % (ref 0–1.9)
BILIRUB SERPL-MCNC: 0.6 MG/DL (ref 0.1–1)
BNP SERPL-MCNC: 126 PG/ML (ref 0–99)
BUN SERPL-MCNC: 12 MG/DL (ref 10–30)
BUN SERPL-MCNC: 14 MG/DL (ref 6–30)
CALCIUM SERPL-MCNC: 9.5 MG/DL (ref 8.7–10.5)
CHLORIDE SERPL-SCNC: 105 MMOL/L (ref 95–110)
CHLORIDE SERPL-SCNC: 107 MMOL/L (ref 95–110)
CO2 SERPL-SCNC: 23 MMOL/L (ref 23–29)
CREAT SERPL-MCNC: 0.7 MG/DL (ref 0.5–1.4)
CREAT SERPL-MCNC: 0.8 MG/DL (ref 0.5–1.4)
CTP QC/QA: YES
DIFFERENTIAL METHOD: ABNORMAL
EOSINOPHIL # BLD AUTO: 0 K/UL (ref 0–0.5)
EOSINOPHIL NFR BLD: 0.2 % (ref 0–8)
ERYTHROCYTE [DISTWIDTH] IN BLOOD BY AUTOMATED COUNT: 13.4 % (ref 11.5–14.5)
EST. GFR  (AFRICAN AMERICAN): >60 ML/MIN/1.73 M^2
EST. GFR  (NON AFRICAN AMERICAN): >60 ML/MIN/1.73 M^2
ETHANOL SERPL-MCNC: <10 MG/DL
GLUCOSE SERPL-MCNC: 110 MG/DL (ref 70–110)
GLUCOSE SERPL-MCNC: 117 MG/DL (ref 70–110)
HCT VFR BLD AUTO: 44.5 % (ref 37–48.5)
HCT VFR BLD CALC: 44 %PCV (ref 36–54)
HGB BLD-MCNC: 14 G/DL (ref 12–16)
IMM GRANULOCYTES # BLD AUTO: 0.01 K/UL (ref 0–0.04)
IMM GRANULOCYTES NFR BLD AUTO: 0.2 % (ref 0–0.5)
INR PPP: 1 (ref 0.8–1.2)
LYMPHOCYTES # BLD AUTO: 1 K/UL (ref 1–4.8)
LYMPHOCYTES NFR BLD: 19.9 % (ref 18–48)
MCH RBC QN AUTO: 29 PG (ref 27–31)
MCHC RBC AUTO-ENTMCNC: 31.5 G/DL (ref 32–36)
MCV RBC AUTO: 92 FL (ref 82–98)
MONOCYTES # BLD AUTO: 0.4 K/UL (ref 0.3–1)
MONOCYTES NFR BLD: 8 % (ref 4–15)
NEUTROPHILS # BLD AUTO: 3.6 K/UL (ref 1.8–7.7)
NEUTROPHILS NFR BLD: 71.3 % (ref 38–73)
NRBC BLD-RTO: 0 /100 WBC
PLATELET # BLD AUTO: 159 K/UL (ref 150–450)
PMV BLD AUTO: 10.6 FL (ref 9.2–12.9)
POC IONIZED CALCIUM: 1.09 MMOL/L (ref 1.06–1.42)
POC TCO2 (MEASURED): 26 MMOL/L (ref 23–29)
POTASSIUM BLD-SCNC: 3.6 MMOL/L (ref 3.5–5.1)
POTASSIUM SERPL-SCNC: 3.7 MMOL/L (ref 3.5–5.1)
PROT SERPL-MCNC: 6.8 G/DL (ref 6–8.4)
PROTHROMBIN TIME: 11.4 SEC (ref 9–12.5)
RBC # BLD AUTO: 4.82 M/UL (ref 4–5.4)
SAMPLE: ABNORMAL
SARS-COV-2 RDRP RESP QL NAA+PROBE: NEGATIVE
SODIUM BLD-SCNC: 143 MMOL/L (ref 136–145)
SODIUM SERPL-SCNC: 144 MMOL/L (ref 136–145)
TROPONIN I SERPL DL<=0.01 NG/ML-MCNC: 0.01 NG/ML (ref 0–0.03)
WBC # BLD AUTO: 4.98 K/UL (ref 3.9–12.7)

## 2021-09-14 PROCEDURE — 87088 URINE BACTERIA CULTURE: CPT | Performed by: STUDENT IN AN ORGANIZED HEALTH CARE EDUCATION/TRAINING PROGRAM

## 2021-09-14 PROCEDURE — 80053 COMPREHEN METABOLIC PANEL: CPT | Performed by: STUDENT IN AN ORGANIZED HEALTH CARE EDUCATION/TRAINING PROGRAM

## 2021-09-14 PROCEDURE — 93005 ELECTROCARDIOGRAM TRACING: CPT | Mod: 59

## 2021-09-14 PROCEDURE — 93010 ELECTROCARDIOGRAM REPORT: CPT | Mod: ,,, | Performed by: INTERNAL MEDICINE

## 2021-09-14 PROCEDURE — 81001 URINALYSIS AUTO W/SCOPE: CPT | Performed by: STUDENT IN AN ORGANIZED HEALTH CARE EDUCATION/TRAINING PROGRAM

## 2021-09-14 PROCEDURE — 82077 ASSAY SPEC XCP UR&BREATH IA: CPT | Performed by: STUDENT IN AN ORGANIZED HEALTH CARE EDUCATION/TRAINING PROGRAM

## 2021-09-14 PROCEDURE — 12011 RPR F/E/E/N/L/M 2.5 CM/<: CPT

## 2021-09-14 PROCEDURE — 99285 PR EMERGENCY DEPT VISIT,LEVEL V: ICD-10-PCS | Mod: CS,,, | Performed by: EMERGENCY MEDICINE

## 2021-09-14 PROCEDURE — 25000003 PHARM REV CODE 250: Performed by: EMERGENCY MEDICINE

## 2021-09-14 PROCEDURE — 99285 EMERGENCY DEPT VISIT HI MDM: CPT | Mod: 25

## 2021-09-14 PROCEDURE — 93010 EKG 12-LEAD: ICD-10-PCS | Mod: ,,, | Performed by: INTERNAL MEDICINE

## 2021-09-14 PROCEDURE — 85610 PROTHROMBIN TIME: CPT | Performed by: STUDENT IN AN ORGANIZED HEALTH CARE EDUCATION/TRAINING PROGRAM

## 2021-09-14 PROCEDURE — 99285 EMERGENCY DEPT VISIT HI MDM: CPT | Mod: CS,,, | Performed by: EMERGENCY MEDICINE

## 2021-09-14 PROCEDURE — U0002 COVID-19 LAB TEST NON-CDC: HCPCS | Performed by: EMERGENCY MEDICINE

## 2021-09-14 PROCEDURE — 87086 URINE CULTURE/COLONY COUNT: CPT | Performed by: STUDENT IN AN ORGANIZED HEALTH CARE EDUCATION/TRAINING PROGRAM

## 2021-09-14 PROCEDURE — 63600175 PHARM REV CODE 636 W HCPCS: Performed by: EMERGENCY MEDICINE

## 2021-09-14 PROCEDURE — 83880 ASSAY OF NATRIURETIC PEPTIDE: CPT | Performed by: STUDENT IN AN ORGANIZED HEALTH CARE EDUCATION/TRAINING PROGRAM

## 2021-09-14 PROCEDURE — 80047 BASIC METABLC PNL IONIZED CA: CPT | Mod: 59

## 2021-09-14 PROCEDURE — 25000003 PHARM REV CODE 250

## 2021-09-14 PROCEDURE — 96374 THER/PROPH/DIAG INJ IV PUSH: CPT

## 2021-09-14 PROCEDURE — 90715 TDAP VACCINE 7 YRS/> IM: CPT | Performed by: EMERGENCY MEDICINE

## 2021-09-14 PROCEDURE — 25000003 PHARM REV CODE 250: Performed by: STUDENT IN AN ORGANIZED HEALTH CARE EDUCATION/TRAINING PROGRAM

## 2021-09-14 PROCEDURE — 84484 ASSAY OF TROPONIN QUANT: CPT | Performed by: STUDENT IN AN ORGANIZED HEALTH CARE EDUCATION/TRAINING PROGRAM

## 2021-09-14 PROCEDURE — 80307 DRUG TEST PRSMV CHEM ANLYZR: CPT | Performed by: STUDENT IN AN ORGANIZED HEALTH CARE EDUCATION/TRAINING PROGRAM

## 2021-09-14 PROCEDURE — 85025 COMPLETE CBC W/AUTO DIFF WBC: CPT | Performed by: STUDENT IN AN ORGANIZED HEALTH CARE EDUCATION/TRAINING PROGRAM

## 2021-09-14 PROCEDURE — 90471 IMMUNIZATION ADMIN: CPT | Performed by: EMERGENCY MEDICINE

## 2021-09-14 RX ORDER — ACETAMINOPHEN 325 MG/1
650 TABLET ORAL
Status: COMPLETED | OUTPATIENT
Start: 2021-09-14 | End: 2021-09-14

## 2021-09-14 RX ORDER — AMLODIPINE BESYLATE 5 MG/1
5 TABLET ORAL
Status: COMPLETED | OUTPATIENT
Start: 2021-09-14 | End: 2021-09-14

## 2021-09-14 RX ORDER — LIDOCAINE HYDROCHLORIDE 10 MG/ML
10 INJECTION, SOLUTION EPIDURAL; INFILTRATION; INTRACAUDAL; PERINEURAL
Status: COMPLETED | OUTPATIENT
Start: 2021-09-14 | End: 2021-09-14

## 2021-09-14 RX ORDER — HYDRALAZINE HYDROCHLORIDE 25 MG/1
25 TABLET, FILM COATED ORAL
Status: COMPLETED | OUTPATIENT
Start: 2021-09-14 | End: 2021-09-14

## 2021-09-14 RX ORDER — LABETALOL HCL 20 MG/4 ML
10 SYRINGE (ML) INTRAVENOUS
Status: COMPLETED | OUTPATIENT
Start: 2021-09-14 | End: 2021-09-14

## 2021-09-14 RX ADMIN — TETANUS TOXOID, REDUCED DIPHTHERIA TOXOID AND ACELLULAR PERTUSSIS VACCINE, ADSORBED 0.5 ML: 5; 2.5; 8; 8; 2.5 SUSPENSION INTRAMUSCULAR at 05:09

## 2021-09-14 RX ADMIN — ACETAMINOPHEN 650 MG: 325 TABLET ORAL at 11:09

## 2021-09-14 RX ADMIN — LIDOCAINE HYDROCHLORIDE 100 MG: 10 INJECTION, SOLUTION EPIDURAL; INFILTRATION; INTRACAUDAL at 04:09

## 2021-09-14 RX ADMIN — AMLODIPINE BESYLATE 5 MG: 5 TABLET ORAL at 11:09

## 2021-09-14 RX ADMIN — HYDRALAZINE HYDROCHLORIDE 25 MG: 25 TABLET, FILM COATED ORAL at 06:09

## 2021-09-14 RX ADMIN — LABETALOL HYDROCHLORIDE 10 MG: 5 INJECTION, SOLUTION INTRAVENOUS at 05:09

## 2021-09-15 VITALS
BODY MASS INDEX: 23.9 KG/M2 | SYSTOLIC BLOOD PRESSURE: 189 MMHG | HEIGHT: 64 IN | DIASTOLIC BLOOD PRESSURE: 94 MMHG | HEART RATE: 70 BPM | OXYGEN SATURATION: 98 % | TEMPERATURE: 98 F | WEIGHT: 140 LBS | RESPIRATION RATE: 16 BRPM

## 2021-09-15 LAB
AMPHET+METHAMPHET UR QL: NEGATIVE
BARBITURATES UR QL SCN>200 NG/ML: NEGATIVE
BENZODIAZ UR QL SCN>200 NG/ML: NEGATIVE
BILIRUB UR QL STRIP: NEGATIVE
BZE UR QL SCN: NEGATIVE
CANNABINOIDS UR QL SCN: NEGATIVE
CLARITY UR REFRACT.AUTO: CLEAR
COLOR UR AUTO: YELLOW
CREAT UR-MCNC: 39 MG/DL (ref 15–325)
GLUCOSE UR QL STRIP: NEGATIVE
HGB UR QL STRIP: NEGATIVE
KETONES UR QL STRIP: ABNORMAL
LEUKOCYTE ESTERASE UR QL STRIP: ABNORMAL
METHADONE UR QL SCN>300 NG/ML: NEGATIVE
MICROSCOPIC COMMENT: ABNORMAL
NITRITE UR QL STRIP: NEGATIVE
OPIATES UR QL SCN: NEGATIVE
PCP UR QL SCN>25 NG/ML: NEGATIVE
PH UR STRIP: 7 [PH] (ref 5–8)
PROT UR QL STRIP: NEGATIVE
RBC #/AREA URNS AUTO: 1 /HPF (ref 0–4)
SP GR UR STRIP: 1.01 (ref 1–1.03)
SQUAMOUS #/AREA URNS AUTO: 0 /HPF
TOXICOLOGY INFORMATION: NORMAL
URN SPEC COLLECT METH UR: ABNORMAL
WBC #/AREA URNS AUTO: 14 /HPF (ref 0–5)

## 2021-09-15 PROCEDURE — 25000003 PHARM REV CODE 250

## 2021-09-15 RX ORDER — CEPHALEXIN 500 MG/1
500 CAPSULE ORAL
Status: COMPLETED | OUTPATIENT
Start: 2021-09-15 | End: 2021-09-15

## 2021-09-15 RX ORDER — CEPHALEXIN 500 MG/1
500 CAPSULE ORAL 4 TIMES DAILY
Qty: 20 CAPSULE | Refills: 0 | Status: SHIPPED | OUTPATIENT
Start: 2021-09-15 | End: 2021-09-20

## 2021-09-15 RX ADMIN — CEPHALEXIN 500 MG: 500 CAPSULE ORAL at 02:09

## 2021-09-17 ENCOUNTER — TELEPHONE (OUTPATIENT)
Dept: FAMILY MEDICINE | Facility: CLINIC | Age: 86
End: 2021-09-17

## 2021-09-19 LAB — BACTERIA UR CULT: ABNORMAL

## 2021-09-21 ENCOUNTER — HOSPITAL ENCOUNTER (EMERGENCY)
Facility: HOSPITAL | Age: 86
Discharge: HOME | End: 2021-09-21
Attending: EMERGENCY MEDICINE
Payer: MEDICARE

## 2021-09-21 VITALS
HEIGHT: 64 IN | OXYGEN SATURATION: 96 % | HEART RATE: 84 BPM | DIASTOLIC BLOOD PRESSURE: 64 MMHG | SYSTOLIC BLOOD PRESSURE: 139 MMHG | TEMPERATURE: 97 F | RESPIRATION RATE: 16 BRPM | WEIGHT: 124.13 LBS | BODY MASS INDEX: 21.19 KG/M2

## 2021-09-21 DIAGNOSIS — R41.82 AMS (ALTERED MENTAL STATUS): ICD-10-CM

## 2021-09-21 LAB
ALBUMIN SERPL BCP-MCNC: 3.1 G/DL (ref 3.5–5.2)
ALP SERPL-CCNC: 63 U/L (ref 55–135)
ALT SERPL W/O P-5'-P-CCNC: 12 U/L (ref 10–44)
ANION GAP SERPL CALC-SCNC: 7 MMOL/L (ref 8–16)
AST SERPL-CCNC: 20 U/L (ref 10–40)
BASOPHILS # BLD AUTO: 0.02 K/UL (ref 0–0.2)
BASOPHILS NFR BLD: 0.4 % (ref 0–1.9)
BILIRUB SERPL-MCNC: 0.5 MG/DL (ref 0.1–1)
BILIRUB UR QL STRIP: NEGATIVE
BNP SERPL-MCNC: 44 PG/ML (ref 0–99)
BUN SERPL-MCNC: 13 MG/DL (ref 10–30)
CALCIUM SERPL-MCNC: 9.8 MG/DL (ref 8.7–10.5)
CHLORIDE SERPL-SCNC: 108 MMOL/L (ref 95–110)
CLARITY UR: CLEAR
CO2 SERPL-SCNC: 27 MMOL/L (ref 23–29)
COLOR UR: YELLOW
CREAT SERPL-MCNC: 0.9 MG/DL (ref 0.5–1.4)
CTP QC/QA: YES
DIFFERENTIAL METHOD: ABNORMAL
EOSINOPHIL # BLD AUTO: 0 K/UL (ref 0–0.5)
EOSINOPHIL NFR BLD: 0.4 % (ref 0–8)
ERYTHROCYTE [DISTWIDTH] IN BLOOD BY AUTOMATED COUNT: 13.3 % (ref 11.5–14.5)
EST. GFR  (AFRICAN AMERICAN): >60 ML/MIN/1.73 M^2
EST. GFR  (NON AFRICAN AMERICAN): 56 ML/MIN/1.73 M^2
GLUCOSE SERPL-MCNC: 110 MG/DL (ref 70–110)
GLUCOSE UR QL STRIP: NEGATIVE
HCT VFR BLD AUTO: 39.8 % (ref 37–48.5)
HGB BLD-MCNC: 12.3 G/DL (ref 12–16)
HGB UR QL STRIP: NEGATIVE
IMM GRANULOCYTES # BLD AUTO: 0.02 K/UL (ref 0–0.04)
IMM GRANULOCYTES NFR BLD AUTO: 0.4 % (ref 0–0.5)
KETONES UR QL STRIP: ABNORMAL
LACTATE SERPL-SCNC: 1.2 MMOL/L (ref 0.5–2.2)
LEUKOCYTE ESTERASE UR QL STRIP: ABNORMAL
LYMPHOCYTES # BLD AUTO: 1.9 K/UL (ref 1–4.8)
LYMPHOCYTES NFR BLD: 33.8 % (ref 18–48)
MAGNESIUM SERPL-MCNC: 1.7 MG/DL (ref 1.6–2.6)
MCH RBC QN AUTO: 28.9 PG (ref 27–31)
MCHC RBC AUTO-ENTMCNC: 30.9 G/DL (ref 32–36)
MCV RBC AUTO: 93 FL (ref 82–98)
MICROSCOPIC COMMENT: NORMAL
MONOCYTES # BLD AUTO: 0.5 K/UL (ref 0.3–1)
MONOCYTES NFR BLD: 9.2 % (ref 4–15)
NEUTROPHILS # BLD AUTO: 3.1 K/UL (ref 1.8–7.7)
NEUTROPHILS NFR BLD: 55.8 % (ref 38–73)
NITRITE UR QL STRIP: NEGATIVE
NRBC BLD-RTO: 0 /100 WBC
PH UR STRIP: 7 [PH] (ref 5–8)
PLATELET # BLD AUTO: 160 K/UL (ref 150–450)
PMV BLD AUTO: 9.8 FL (ref 9.2–12.9)
POCT GLUCOSE: 123 MG/DL (ref 70–110)
POCT GLUCOSE: 93 MG/DL (ref 70–110)
POTASSIUM SERPL-SCNC: 4.4 MMOL/L (ref 3.5–5.1)
PROT SERPL-MCNC: 6.5 G/DL (ref 6–8.4)
PROT UR QL STRIP: ABNORMAL
RBC # BLD AUTO: 4.26 M/UL (ref 4–5.4)
RBC #/AREA URNS HPF: 3 /HPF (ref 0–4)
SARS-COV-2 RDRP RESP QL NAA+PROBE: NEGATIVE
SODIUM SERPL-SCNC: 142 MMOL/L (ref 136–145)
SP GR UR STRIP: 1.02 (ref 1–1.03)
SQUAMOUS #/AREA URNS HPF: 2 /HPF
T4 FREE SERPL-MCNC: 1.15 NG/DL (ref 0.71–1.51)
TROPONIN I SERPL DL<=0.01 NG/ML-MCNC: <0.006 NG/ML (ref 0–0.03)
TSH SERPL DL<=0.005 MIU/L-ACNC: 0.16 UIU/ML (ref 0.4–4)
URN SPEC COLLECT METH UR: ABNORMAL
UROBILINOGEN UR STRIP-ACNC: ABNORMAL EU/DL
WBC # BLD AUTO: 5.54 K/UL (ref 3.9–12.7)
WBC #/AREA URNS HPF: 4 /HPF (ref 0–5)

## 2021-09-21 PROCEDURE — 96360 HYDRATION IV INFUSION INIT: CPT

## 2021-09-21 PROCEDURE — 93010 ELECTROCARDIOGRAM REPORT: CPT | Mod: ,,, | Performed by: INTERNAL MEDICINE

## 2021-09-21 PROCEDURE — 84439 ASSAY OF FREE THYROXINE: CPT | Performed by: EMERGENCY MEDICINE

## 2021-09-21 PROCEDURE — 80053 COMPREHEN METABOLIC PANEL: CPT | Performed by: EMERGENCY MEDICINE

## 2021-09-21 PROCEDURE — 81000 URINALYSIS NONAUTO W/SCOPE: CPT | Performed by: EMERGENCY MEDICINE

## 2021-09-21 PROCEDURE — 87040 BLOOD CULTURE FOR BACTERIA: CPT | Performed by: EMERGENCY MEDICINE

## 2021-09-21 PROCEDURE — 83735 ASSAY OF MAGNESIUM: CPT | Performed by: EMERGENCY MEDICINE

## 2021-09-21 PROCEDURE — 84443 ASSAY THYROID STIM HORMONE: CPT | Performed by: EMERGENCY MEDICINE

## 2021-09-21 PROCEDURE — 25000003 PHARM REV CODE 250: Performed by: EMERGENCY MEDICINE

## 2021-09-21 PROCEDURE — 96361 HYDRATE IV INFUSION ADD-ON: CPT

## 2021-09-21 PROCEDURE — 85025 COMPLETE CBC W/AUTO DIFF WBC: CPT | Performed by: EMERGENCY MEDICINE

## 2021-09-21 PROCEDURE — 93005 ELECTROCARDIOGRAM TRACING: CPT

## 2021-09-21 PROCEDURE — 99285 EMERGENCY DEPT VISIT HI MDM: CPT | Mod: 25

## 2021-09-21 PROCEDURE — 84484 ASSAY OF TROPONIN QUANT: CPT | Performed by: EMERGENCY MEDICINE

## 2021-09-21 PROCEDURE — 83605 ASSAY OF LACTIC ACID: CPT | Performed by: EMERGENCY MEDICINE

## 2021-09-21 PROCEDURE — 93010 EKG 12-LEAD: ICD-10-PCS | Mod: ,,, | Performed by: INTERNAL MEDICINE

## 2021-09-21 PROCEDURE — 82962 GLUCOSE BLOOD TEST: CPT

## 2021-09-21 PROCEDURE — U0002 COVID-19 LAB TEST NON-CDC: HCPCS | Performed by: EMERGENCY MEDICINE

## 2021-09-21 PROCEDURE — 83880 ASSAY OF NATRIURETIC PEPTIDE: CPT | Performed by: EMERGENCY MEDICINE

## 2021-09-21 RX ORDER — SERTRALINE HYDROCHLORIDE 25 MG/1
25 TABLET, FILM COATED ORAL DAILY
COMMUNITY
End: 2022-05-30 | Stop reason: SDUPTHER

## 2021-09-21 RX ORDER — SODIUM CHLORIDE 9 MG/ML
125 INJECTION, SOLUTION INTRAVENOUS ONCE
Status: COMPLETED | OUTPATIENT
Start: 2021-09-21 | End: 2021-09-21

## 2021-09-21 RX ADMIN — SODIUM CHLORIDE 125 ML/HR: 0.9 INJECTION, SOLUTION INTRAVENOUS at 03:09

## 2021-09-25 LAB
BACTERIA BLD CULT: NORMAL
BACTERIA BLD CULT: NORMAL

## 2021-09-30 ENCOUNTER — EXTERNAL CHRONIC CARE MANAGEMENT (OUTPATIENT)
Dept: PRIMARY CARE CLINIC | Facility: CLINIC | Age: 86
End: 2021-09-30
Payer: MEDICARE

## 2021-09-30 ENCOUNTER — EXTERNAL HOME HEALTH (OUTPATIENT)
Dept: HOME HEALTH SERVICES | Facility: HOSPITAL | Age: 86
End: 2021-09-30
Payer: MEDICARE

## 2021-09-30 PROCEDURE — 99490 CHRNC CARE MGMT STAFF 1ST 20: CPT | Mod: PBBFAC,PO | Performed by: FAMILY MEDICINE

## 2021-09-30 PROCEDURE — 99490 CHRNC CARE MGMT STAFF 1ST 20: CPT | Mod: S$PBB,,, | Performed by: FAMILY MEDICINE

## 2021-09-30 PROCEDURE — 99490 PR CHRONIC CARE MGMT, 1ST 20 MIN: ICD-10-PCS | Mod: S$PBB,,, | Performed by: FAMILY MEDICINE

## 2021-10-04 ENCOUNTER — DOCUMENT SCAN (OUTPATIENT)
Dept: HOME HEALTH SERVICES | Facility: HOSPITAL | Age: 86
End: 2021-10-04
Payer: MEDICARE

## 2021-10-04 ENCOUNTER — TELEPHONE (OUTPATIENT)
Dept: FAMILY MEDICINE | Facility: CLINIC | Age: 86
End: 2021-10-04

## 2021-10-15 ENCOUNTER — TELEPHONE (OUTPATIENT)
Dept: FAMILY MEDICINE | Facility: CLINIC | Age: 86
End: 2021-10-15

## 2021-10-15 DIAGNOSIS — R53.1 WEAKNESS: Primary | ICD-10-CM

## 2021-10-18 ENCOUNTER — TELEPHONE (OUTPATIENT)
Dept: FAMILY MEDICINE | Facility: CLINIC | Age: 86
End: 2021-10-18

## 2021-10-18 DIAGNOSIS — R53.1 WEAKNESS: Primary | ICD-10-CM

## 2021-10-22 PROCEDURE — G0179 MD RECERTIFICATION HHA PT: HCPCS | Mod: ,,, | Performed by: FAMILY MEDICINE

## 2021-10-22 PROCEDURE — G0179 PR HOME HEALTH MD RECERTIFICATION: ICD-10-PCS | Mod: ,,, | Performed by: FAMILY MEDICINE

## 2021-10-31 ENCOUNTER — EXTERNAL CHRONIC CARE MANAGEMENT (OUTPATIENT)
Dept: PRIMARY CARE CLINIC | Facility: CLINIC | Age: 86
End: 2021-10-31
Payer: MEDICARE

## 2021-10-31 PROCEDURE — 99490 PR CHRONIC CARE MGMT, 1ST 20 MIN: ICD-10-PCS | Mod: S$PBB,,, | Performed by: FAMILY MEDICINE

## 2021-10-31 PROCEDURE — 99490 CHRNC CARE MGMT STAFF 1ST 20: CPT | Mod: PBBFAC,PO | Performed by: FAMILY MEDICINE

## 2021-10-31 PROCEDURE — 99490 CHRNC CARE MGMT STAFF 1ST 20: CPT | Mod: S$PBB,,, | Performed by: FAMILY MEDICINE

## 2021-11-04 ENCOUNTER — EXTERNAL HOME HEALTH (OUTPATIENT)
Dept: HOME HEALTH SERVICES | Facility: HOSPITAL | Age: 86
End: 2021-11-04
Payer: MEDICARE

## 2021-11-04 ENCOUNTER — PATIENT OUTREACH (OUTPATIENT)
Dept: HOME HEALTH SERVICES | Facility: HOSPITAL | Age: 86
End: 2021-11-04
Payer: MEDICARE

## 2021-12-21 PROCEDURE — G0179 PR HOME HEALTH MD RECERTIFICATION: ICD-10-PCS | Mod: ,,, | Performed by: FAMILY MEDICINE

## 2021-12-21 PROCEDURE — G0179 MD RECERTIFICATION HHA PT: HCPCS | Mod: ,,, | Performed by: FAMILY MEDICINE

## 2021-12-30 ENCOUNTER — PATIENT OUTREACH (OUTPATIENT)
Dept: HOME HEALTH SERVICES | Facility: HOSPITAL | Age: 86
End: 2021-12-30
Payer: MEDICARE

## 2021-12-30 ENCOUNTER — EXTERNAL HOME HEALTH (OUTPATIENT)
Dept: HOME HEALTH SERVICES | Facility: HOSPITAL | Age: 86
End: 2021-12-30
Payer: MEDICARE

## 2022-01-06 NOTE — TELEPHONE ENCOUNTER
----- Message from Mable Cee LPN sent at 2/6/2018 10:47 AM CST -----  Contact: [facility]       ----- Message -----  From: Stacy Lobo  Sent: 2/6/2018  10:23 AM  To: [facility] Staff    [facility] says the patient would like the liquid form of her medication. Please call   [facility] .      ranitidine (ZANTAC) 150 MG capsule      [facility] [phone]  
abnormal lab result

## 2022-02-19 PROCEDURE — G0179 PR HOME HEALTH MD RECERTIFICATION: ICD-10-PCS | Mod: ,,, | Performed by: FAMILY MEDICINE

## 2022-02-19 PROCEDURE — G0179 MD RECERTIFICATION HHA PT: HCPCS | Mod: ,,, | Performed by: FAMILY MEDICINE

## 2022-02-28 ENCOUNTER — EXTERNAL HOME HEALTH (OUTPATIENT)
Dept: HOME HEALTH SERVICES | Facility: HOSPITAL | Age: 87
End: 2022-02-28
Payer: MEDICARE

## 2022-02-28 ENCOUNTER — PATIENT OUTREACH (OUTPATIENT)
Dept: HOME HEALTH SERVICES | Facility: HOSPITAL | Age: 87
End: 2022-02-28
Payer: MEDICARE

## 2022-03-17 ENCOUNTER — TELEPHONE (OUTPATIENT)
Dept: FAMILY MEDICINE | Facility: CLINIC | Age: 87
End: 2022-03-17
Payer: MEDICARE

## 2022-03-17 NOTE — TELEPHONE ENCOUNTER
----- Message from Lu Rodriguez MA sent at 3/17/2022  9:51 AM CDT -----  Type: Patient Call Back    Who called:[facility]    What is the request in detail:she lives with her grandson and he has noticed some bleeding .. he states it may be coming from her vagina.. and the nurse thinks it ma be a UTI    Can the clinic reply by [facility]?no    Would the patient rather a call back or a response via [facility]? yes    Best call back number:[phone]

## 2022-03-17 NOTE — TELEPHONE ENCOUNTER
Advised  nurse that the patient has not been since by a provider since 07/08/2020. Dr. Stephenson is no longer in a PCP role and Niru will be out of the office for the remainder of the week. Patient need to be evaluated in the ED.

## 2022-03-29 ENCOUNTER — HOSPITAL ENCOUNTER (EMERGENCY)
Facility: HOSPITAL | Age: 87
Discharge: HOME | End: 2022-03-29
Attending: EMERGENCY MEDICINE
Payer: MEDICARE

## 2022-03-29 VITALS
HEART RATE: 83 BPM | BODY MASS INDEX: 20.6 KG/M2 | DIASTOLIC BLOOD PRESSURE: 101 MMHG | RESPIRATION RATE: 18 BRPM | WEIGHT: 120 LBS | SYSTOLIC BLOOD PRESSURE: 141 MMHG | TEMPERATURE: 98 F | OXYGEN SATURATION: 100 %

## 2022-03-29 DIAGNOSIS — R53.1 WEAKNESS: ICD-10-CM

## 2022-03-29 DIAGNOSIS — I10 HYPERTENSION, UNCONTROLLED: Primary | ICD-10-CM

## 2022-03-29 LAB
ALBUMIN SERPL BCP-MCNC: 3.4 G/DL (ref 3.5–5.2)
ALP SERPL-CCNC: 68 U/L (ref 55–135)
ALT SERPL W/O P-5'-P-CCNC: 8 U/L (ref 10–44)
ANION GAP SERPL CALC-SCNC: 6 MMOL/L (ref 8–16)
AST SERPL-CCNC: 14 U/L (ref 10–40)
BASOPHILS # BLD AUTO: 0.03 K/UL (ref 0–0.2)
BASOPHILS NFR BLD: 0.6 % (ref 0–1.9)
BILIRUB SERPL-MCNC: 0.4 MG/DL (ref 0.1–1)
BILIRUB UR QL STRIP: NEGATIVE
BNP SERPL-MCNC: 93 PG/ML (ref 0–99)
BUN SERPL-MCNC: 11 MG/DL (ref 10–30)
CALCIUM SERPL-MCNC: 9.3 MG/DL (ref 8.7–10.5)
CHLORIDE SERPL-SCNC: 107 MMOL/L (ref 95–110)
CLARITY UR: CLEAR
CO2 SERPL-SCNC: 28 MMOL/L (ref 23–29)
COLOR UR: COLORLESS
CREAT SERPL-MCNC: 0.8 MG/DL (ref 0.5–1.4)
CTP QC/QA: YES
DIFFERENTIAL METHOD: ABNORMAL
EOSINOPHIL # BLD AUTO: 0 K/UL (ref 0–0.5)
EOSINOPHIL NFR BLD: 0.8 % (ref 0–8)
ERYTHROCYTE [DISTWIDTH] IN BLOOD BY AUTOMATED COUNT: 13.6 % (ref 11.5–14.5)
EST. GFR  (AFRICAN AMERICAN): >60 ML/MIN/1.73 M^2
EST. GFR  (NON AFRICAN AMERICAN): >60 ML/MIN/1.73 M^2
GLUCOSE SERPL-MCNC: 78 MG/DL (ref 70–110)
GLUCOSE UR QL STRIP: NEGATIVE
HCT VFR BLD AUTO: 41.1 % (ref 37–48.5)
HGB BLD-MCNC: 12.5 G/DL (ref 12–16)
HGB UR QL STRIP: NEGATIVE
IMM GRANULOCYTES # BLD AUTO: 0.01 K/UL (ref 0–0.04)
IMM GRANULOCYTES NFR BLD AUTO: 0.2 % (ref 0–0.5)
KETONES UR QL STRIP: NEGATIVE
LACTATE SERPL-SCNC: 1.1 MMOL/L (ref 0.5–2.2)
LEUKOCYTE ESTERASE UR QL STRIP: ABNORMAL
LYMPHOCYTES # BLD AUTO: 2.5 K/UL (ref 1–4.8)
LYMPHOCYTES NFR BLD: 50.7 % (ref 18–48)
MAGNESIUM SERPL-MCNC: 1.9 MG/DL (ref 1.6–2.6)
MCH RBC QN AUTO: 29.3 PG (ref 27–31)
MCHC RBC AUTO-ENTMCNC: 30.4 G/DL (ref 32–36)
MCV RBC AUTO: 97 FL (ref 82–98)
MICROSCOPIC COMMENT: NORMAL
MONOCYTES # BLD AUTO: 0.5 K/UL (ref 0.3–1)
MONOCYTES NFR BLD: 9.4 % (ref 4–15)
NEUTROPHILS # BLD AUTO: 1.9 K/UL (ref 1.8–7.7)
NEUTROPHILS NFR BLD: 38.3 % (ref 38–73)
NITRITE UR QL STRIP: NEGATIVE
NRBC BLD-RTO: 0 /100 WBC
PH UR STRIP: 8 [PH] (ref 5–8)
PLATELET # BLD AUTO: 141 K/UL (ref 150–450)
PMV BLD AUTO: 10.9 FL (ref 9.2–12.9)
POTASSIUM SERPL-SCNC: 4.1 MMOL/L (ref 3.5–5.1)
PROT SERPL-MCNC: 6.8 G/DL (ref 6–8.4)
PROT UR QL STRIP: NEGATIVE
RBC # BLD AUTO: 4.26 M/UL (ref 4–5.4)
RBC #/AREA URNS HPF: 1 /HPF (ref 0–4)
SARS-COV-2 RDRP RESP QL NAA+PROBE: NEGATIVE
SODIUM SERPL-SCNC: 141 MMOL/L (ref 136–145)
SP GR UR STRIP: <1.005 (ref 1–1.03)
TROPONIN I SERPL DL<=0.01 NG/ML-MCNC: <0.006 NG/ML (ref 0–0.03)
URN SPEC COLLECT METH UR: ABNORMAL
UROBILINOGEN UR STRIP-ACNC: NEGATIVE EU/DL
WBC # BLD AUTO: 5.01 K/UL (ref 3.9–12.7)
WBC #/AREA URNS HPF: 1 /HPF (ref 0–5)

## 2022-03-29 PROCEDURE — U0002 COVID-19 LAB TEST NON-CDC: HCPCS | Performed by: EMERGENCY MEDICINE

## 2022-03-29 PROCEDURE — 81000 URINALYSIS NONAUTO W/SCOPE: CPT | Performed by: EMERGENCY MEDICINE

## 2022-03-29 PROCEDURE — 85025 COMPLETE CBC W/AUTO DIFF WBC: CPT | Performed by: EMERGENCY MEDICINE

## 2022-03-29 PROCEDURE — 96374 THER/PROPH/DIAG INJ IV PUSH: CPT

## 2022-03-29 PROCEDURE — 93010 EKG 12-LEAD: ICD-10-PCS | Mod: ,,, | Performed by: INTERNAL MEDICINE

## 2022-03-29 PROCEDURE — 87040 BLOOD CULTURE FOR BACTERIA: CPT | Mod: 59 | Performed by: EMERGENCY MEDICINE

## 2022-03-29 PROCEDURE — 96361 HYDRATE IV INFUSION ADD-ON: CPT

## 2022-03-29 PROCEDURE — 83735 ASSAY OF MAGNESIUM: CPT | Performed by: EMERGENCY MEDICINE

## 2022-03-29 PROCEDURE — 96375 TX/PRO/DX INJ NEW DRUG ADDON: CPT

## 2022-03-29 PROCEDURE — 99285 EMERGENCY DEPT VISIT HI MDM: CPT | Mod: 25

## 2022-03-29 PROCEDURE — 93005 ELECTROCARDIOGRAM TRACING: CPT

## 2022-03-29 PROCEDURE — 93010 ELECTROCARDIOGRAM REPORT: CPT | Mod: ,,, | Performed by: INTERNAL MEDICINE

## 2022-03-29 PROCEDURE — 63600175 PHARM REV CODE 636 W HCPCS: Performed by: EMERGENCY MEDICINE

## 2022-03-29 PROCEDURE — 83605 ASSAY OF LACTIC ACID: CPT | Performed by: EMERGENCY MEDICINE

## 2022-03-29 PROCEDURE — 83880 ASSAY OF NATRIURETIC PEPTIDE: CPT | Performed by: EMERGENCY MEDICINE

## 2022-03-29 PROCEDURE — 84484 ASSAY OF TROPONIN QUANT: CPT | Performed by: EMERGENCY MEDICINE

## 2022-03-29 PROCEDURE — 25000003 PHARM REV CODE 250: Performed by: EMERGENCY MEDICINE

## 2022-03-29 PROCEDURE — 80053 COMPREHEN METABOLIC PANEL: CPT | Performed by: EMERGENCY MEDICINE

## 2022-03-29 RX ORDER — SODIUM CHLORIDE 9 MG/ML
125 INJECTION, SOLUTION INTRAVENOUS ONCE
Status: COMPLETED | OUTPATIENT
Start: 2022-03-29 | End: 2022-03-29

## 2022-03-29 RX ORDER — LABETALOL HYDROCHLORIDE 5 MG/ML
10 INJECTION, SOLUTION INTRAVENOUS
Status: COMPLETED | OUTPATIENT
Start: 2022-03-29 | End: 2022-03-29

## 2022-03-29 RX ORDER — HYDRALAZINE HYDROCHLORIDE 20 MG/ML
5 INJECTION INTRAMUSCULAR; INTRAVENOUS
Status: COMPLETED | OUTPATIENT
Start: 2022-03-29 | End: 2022-03-29

## 2022-03-29 RX ORDER — ACETAMINOPHEN 325 MG/1
650 TABLET ORAL
Status: COMPLETED | OUTPATIENT
Start: 2022-03-29 | End: 2022-03-29

## 2022-03-29 RX ADMIN — SODIUM CHLORIDE 125 ML/HR: 0.9 INJECTION, SOLUTION INTRAVENOUS at 07:03

## 2022-03-29 RX ADMIN — ACETAMINOPHEN 650 MG: 325 TABLET ORAL at 11:03

## 2022-03-29 RX ADMIN — LABETALOL HYDROCHLORIDE 10 MG: 5 INJECTION, SOLUTION INTRAVENOUS at 11:03

## 2022-03-29 RX ADMIN — HYDRALAZINE HYDROCHLORIDE 5 MG: 20 INJECTION, SOLUTION INTRAMUSCULAR; INTRAVENOUS at 09:03

## 2022-03-29 NOTE — ED TRIAGE NOTES
Patients presents to ED via EMS after being called out by neighbors because they stated that they noticed patients caregiver has not been present x's 3 days. States that the caregiver went to [place] and had a stroke so has not been back to care for patient. Reports patient has dementia at baseline. All information was given from patients neighbor. Patient unreliable resource unable to do intake.

## 2022-03-30 NOTE — ED PROVIDER NOTES
"Encounter Date: 3/29/2022    SCRIBE #1 NOTE: I, Gama Rodríguez, am scribing for, and in the presence of,  Moris Bentley MD. I have scribed the following portions of the note - Other sections scribed: HPI, ROS, PE.       History     Chief Complaint   Patient presents with    General Illness     Pt arrives via EMS c/o gen weakness and fatigue.  Pt has a dementia hx and lives at home alone with the assistance of a caregiver.  Neighbors called 911 because caregiver is currently not checking on patient due to caregiver having a stroke per EMS.  Pt is very [place], pt denies any current illness.  Pt is alert, skin w/d, resp easy.       Daiana cMduffie is a 89 y. o female with a past medical history of chronic pulmonary embolism, multiple DVTs, GERD, hyperlipidemia, and hypertension, that comes to the Emergency department via emergency medical services complaining of general illness. EMS was called by patient neighbors after they stated the patient's caregiver had a stroke episode, and has not been present for 3 days. Upon EMS arrival, the patient told them she felt weak. Patient is hard of hearing and has a history of dementia per EMS. When asked if she has any symptoms, she states she has "pain sometimes". No medications taken prior to arrival. No alleviating or exacerbating factors noted. Denies any current symptoms. Allergic to ibuprofen and penicillins.    The history is provided by the EMS personnel. No  was used.     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  No family history on file.  Social History     Tobacco Use    Smoking status: Never Smoker    Smokeless " tobacco: Never Used   Substance Use Topics    Alcohol use: No    Drug use: No     Review of Systems   Constitutional: Negative for chills and fever.   HENT: Negative for sore throat.    Eyes: Negative for visual disturbance.   Respiratory: Negative for cough and shortness of breath.    Cardiovascular: Negative for chest pain.   Gastrointestinal: Negative for abdominal pain, diarrhea, nausea and vomiting.   Genitourinary: Negative for dysuria.   Musculoskeletal: Negative for back pain.   Skin: Negative for rash.   Neurological: Positive for weakness.       Physical Exam     Initial Vitals [03/29/22 1842]   BP Pulse Resp Temp SpO2   (!) 169/83 76 16 97.7 °F (36.5 °C) 98 %      MAP       --         Physical Exam    Nursing note and vitals reviewed.  Constitutional: She appears well-developed and well-nourished.   HENT:   Mildly dry oropharynx.   Eyes: EOM are normal. Pupils are equal, round, and reactive to light.   Neck: Neck supple. No thyromegaly present. No JVD present.   Normal range of motion.  Cardiovascular: Normal rate, regular rhythm, normal heart sounds and intact distal pulses. Exam reveals no gallop and no friction rub.    No murmur heard.  Pulmonary/Chest: Breath sounds normal. No respiratory distress.   Abdominal: Abdomen is soft. Bowel sounds are normal.   Musculoskeletal:         General: No tenderness or edema. Normal range of motion.      Cervical back: Normal range of motion and neck supple.     Neurological: She is alert and oriented to person, place, and time. She has normal strength.   GCS 14   Skin: Skin is warm and dry.         ED Course   Procedures  Labs Reviewed   CBC W/ AUTO DIFFERENTIAL - Abnormal; Notable for the following components:       Result Value    MCHC 30.4 (*)     Platelets 141 (*)     Lymph % 50.7 (*)     All other components within normal limits   COMPREHENSIVE METABOLIC PANEL - Abnormal; Notable for the following components:    Albumin 3.4 (*)     ALT 8 (*)     Anion Gap 6  (*)     All other components within normal limits   URINALYSIS, REFLEX TO URINE CULTURE - Abnormal; Notable for the following components:    Color, UA Colorless (*)     Specific Gravity, UA <1.005 (*)     Leukocytes, UA Trace (*)     All other components within normal limits    Narrative:     Specimen Source->Urine   CULTURE, BLOOD   CULTURE, BLOOD   LACTIC ACID, PLASMA   TROPONIN I   B-TYPE NATRIURETIC PEPTIDE   MAGNESIUM   URINALYSIS MICROSCOPIC    Narrative:     Specimen Source->Urine   SARS-COV-2 RDRP GENE          Imaging Results          CT Head Without Contrast (Final result)  Result time 03/29/22 22:10:21    Final result by Sherin Argueta MD (03/29/22 22:10:21)                 Impression:      No acute territorial infarct detected at this time.  Age-indeterminate lacunar infarct in the anterior limb of the left internal capsule.  If persistent neurological findings, recommend MRI with brain with diffusion-weighted sequencing.      Electronically signed by: Sherin Argueta  Date:    03/29/2022  Time:    22:10             Narrative:    EXAMINATION:  CT OF THE HEAD WITHOUT    CLINICAL HISTORY:  Generalized weakness and fatigue.  History of dementia.    TECHNIQUE:  5 mm unenhanced axial images were obtained from the skull base to the vertex.    COMPARISON:  09/21/2021    FINDINGS:  There is stable cerebral atrophy and chronic small vessel ischemic changes.  There is an age indeterminate lacunar infarct in the anterior limb of the internal capsule, which was not present on the CT head from 09/21/2021.  There is no acute intracranial hemorrhage, mass effect, or midline shift.  Senescent calcifications are seen in bilateral basal ganglia.  In the visualized paranasal sinuses, there are bilateral maxillary sinus mucous retention cysts or polyps.  There is trace mucoperiosteal thickening seen in the hypoplastic left inferior frontal sinus.                                 Medications   0.9%  NaCl infusion (0  mL/hr Intravenous Stopped 3/29/22 2256)   hydrALAZINE injection 5 mg (5 mg Intravenous Given 3/29/22 2130)   labetaloL injection 10 mg (10 mg Intravenous Given 3/29/22 2319)   acetaminophen tablet 650 mg (650 mg Oral Given 3/29/22 2327)     Medical Decision Making:   History:   Old Medical Records: I decided to obtain old medical records.  Clinical Tests:   Lab Tests: Ordered and Reviewed  Medical Tests: Ordered and Reviewed  Spoke with God daughter Hilda at [phone]. Spoke with son Girma at [phone].  Attempted to call care giver and power of  Harry at [phone].  I also discussed the case with  Lexi Ruiz. It appears Harry may have suffered a stroke and is currently in [place]. Hilda is willing to  the patient. Patient appears well kept and is not dehydrated. Per family patient is at her usual sate of health. Stable for discharge. BP was elevated and age indeterminate infarct noted form prior heat ct 9/2021. However patient has history of multiple infarcts. With no change in patient's baseline no indication for admission at this time.           Scribe Attestation:   Scribe #1: I performed the above scribed service and the documentation accurately describes the services I performed. I attest to the accuracy of the note.                 Clinical Impression:   Final diagnoses:  [R53.1] Weakness  [I10] Hypertension, uncontrolled (Primary)       I, Moris Lockhart, personally performed the services described in this documentation. All medical record entries made by the scribe were at my direction and in my presence. I have reviewed the chart and agree that the record reflects my personal performance and is accurate and complete.     ED Disposition Condition    Discharge Stable        ED Prescriptions     None        Follow-up Information     Follow up With Specialties Details Why Contact Info    Jennifer Stephenson MD Family Medicine Call in 1 day for recheck this week and blood  pressure recheck. [facility] [facility] [address]  [phone]      Carter Denson MD Neurology Schedule an appointment as soon as possible for a visit  for follow up of recurrent prior mild strokes. [address] [facility] [address]  [address]  [facility] [address]  [phone]             Moris Bentley MD  03/30/22 0251

## 2022-04-02 LAB
BACTERIA BLD CULT: NORMAL
BACTERIA BLD CULT: NORMAL

## 2022-04-07 ENCOUNTER — OUTPATIENT CASE MANAGEMENT (OUTPATIENT)
Dept: ADMINISTRATIVE | Facility: OTHER | Age: 87
End: 2022-04-07
Payer: MEDICARE

## 2022-04-07 NOTE — LETTER
April 7, 2022    Daiana Mcduffie  [address]  [place] [address]             [facility]  [address]  [facility] [address] Dear Ms. Daiana Mcduffie,      I have been assigned as your  with [facility] Outpatient Complex Case Management Department. We received a referral to call you to discuss your medical history. I have attempted to reach you by telephone, but I was unsuccessful. Please call our department so that we can go over some questions with you regarding your health.     The Outpatient Case Management department can be reached at [phone] from 8:00am to 4:30pm on Monday thru Friday. [facility] also has a program where a nurse is available 24/7 to answer questions or provider medical advice. Their number is [phone].     Thanks,      Ayaka Schrader, RN  Outpatient Case Management  [phone]

## 2022-04-07 NOTE — PLAN OF CARE
UM reached out to  director regarding DC plan for patient.      Utilization management () nurse, received call from friend of patient stating pt is demented and living alone and needs more assistance since her caretaker recently had a stroke.      Upon review of previous records pt was with [facility] and [facility] in the past.      Spoke with Sky Jorge at [facility], pt was discharged from [facility] in 2020 and would need orders from PCP in order to resume.  Pt does not have updated PCP information in Epic.      Spoke with Odalys Bolden at [facility].  Pt is current [facility].  She will reach out to home health nurse and have pt seen as soon as possible and request addition of SW.

## 2022-04-11 NOTE — PROGRESS NOTES
Outpatient Care Management  Initial Patient Assessment    Patient: Daiana Mcduffie  MRN: [id number]  Date of Service: 04/07/2022  Completed by: Ayaka Schrader RN  Referral Date: 02/20/2020  Program: Case Management (High Risk)    Reason for Visit   Patient presents with    [facility] RN First Assessment Attempt    [facility] Enrollment Call    [facility] Chart Review    Initial Assessment    [facility] RN Second Assessment Attempt       Brief Summary:  Daiana Mcduffie was referred by inpt case management due to a call that was received and request per  for elderly pt who is demented and is living alone. Patient qualifies for program based on risk score of 87.7%.   Active problem list, medical, surgical and social history reviewed. Active comorbidities include Anxiety, MDD, and cardiomegaly. Areas of need identified by patient include safety.   Complex care plan created with patient/caregiver input. By next encounter, patient agrees to get pt's meds from her apartment.   Spoke with Julius Nealim, friend, who reports that she is currently taking care of the pt in her home. Reports that she needs assistance with the pt's care. Voiced the she is currently the caretaker for her  and is unable to take care of both for an extended time. Reports that the pt does have a daughter and son. Pt gives permission to speak with Hilda Mahoney and her children. Mrs. Mahoney provided the phone numbers for the pt's children. Alicia [phone] and Girma [phone]. Mrs. Mahoney reports that the pt had a man, Harry, living with her and assisting with her care, but he recently had a stroke. Reports that Harry is the son of Ms. Mcduffie's friend Mable. Mrs. Mahoney reports that the pt currently does not have any of her medications. Voiced that she plans to go to the pt's apartment today and retrieve her medications. Reports that Girma had planned to bring Ms. cMduffie home with him this weekend, but he never arrived to get the pt. Reports that the pt is  confused at times, but remembers when her bills are due and she writes out her checks.         Assessment Documentation     [facility] Initial Assessment    Involvement of Care  Do I have permission to speak with other family members about your care?: Yes (Comment: Pt gives permission to speak with Mrs. Mahoney and her children.)  Assessment completed by: Friend, Patient  Identified Areas of Need  Advanced Care Planning: No  Housing: yes (Comment: Pt is currently requiring assistance with her care. Reports that the previous caregiver who lived with her had a stroke.)  Medication Adherence: Yes (Comment: Reports that the pt does not have any of her medications with her at this time.)  *Active medication list was reviewed and reconciled with patient and/or caregiver:   Nutrition: no  Lab Adherence: no  Depression: No  Cognitive/Behavioral Health: no  Communication: no  Health Literacy: no  Fall risk?: No  Equipment/Supplies/Services: yes          Problem List and History         Reviewed medical and social history with patient and/or caregiver. A complex care plan was discussed and completed today, with input from patient and/or caregiver.    Patient Instructions     Instructions were provided via the [employer] patient resources and are available for the patient to view on the patient portal, if active.    Next steps: Refer case to LCSW with O[facility]. Help with getting a PCP.  Follow up in about 13 days (around 4/20/2022).    [facility] Self-Management Care Plan was developed with the patients/caregivers input and was based on identified barriers from [facility] assessment.  Goals were written today with the patient/caregiver and the patient has agreed to work towards these goals to improve his/her overall well-being. Patient verbalized understanding of the care plan, goals, and all of today's instructions. Encouraged patient/caregiver to communicate with his/her physician and health care team about health conditions and the  treatment plan.  Provided my contact information today and encouraged patient/caregiver to call me with any questions as needed.

## 2022-04-12 ENCOUNTER — OUTPATIENT CASE MANAGEMENT (OUTPATIENT)
Dept: ADMINISTRATIVE | Facility: OTHER | Age: 87
End: 2022-04-12
Payer: MEDICARE

## 2022-04-12 NOTE — PROGRESS NOTES
4/12/2022    1st Attempt to complete Social Work Assessment for Outpatient Care Management; left message requesting a return call.  LCSW will reattempt at a later date.

## 2022-04-13 ENCOUNTER — OUTPATIENT CASE MANAGEMENT (OUTPATIENT)
Dept: ADMINISTRATIVE | Facility: OTHER | Age: 87
End: 2022-04-13
Payer: MEDICARE

## 2022-04-13 NOTE — PROGRESS NOTES
04/13/2022    2nd attempt to complete Social Work Assessment for O[facility]; left message requesting a return call. LCSW will mail a letter with contact information requesting a return call.  OP[facility] RN notified.

## 2022-04-18 ENCOUNTER — OUTPATIENT CASE MANAGEMENT (OUTPATIENT)
Dept: ADMINISTRATIVE | Facility: OTHER | Age: 87
End: 2022-04-18
Payer: MEDICARE
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
## 2022-04-19 ENCOUNTER — OFFICE VISIT (OUTPATIENT)
Dept: FAMILY MEDICINE | Facility: CLINIC | Age: 87
End: 2022-04-19
Payer: MEDICARE

## 2022-04-19 ENCOUNTER — TELEPHONE (OUTPATIENT)
Dept: FAMILY MEDICINE | Facility: CLINIC | Age: 87
End: 2022-04-19
Payer: MEDICARE

## 2022-04-19 VITALS
TEMPERATURE: 98 F | DIASTOLIC BLOOD PRESSURE: 80 MMHG | HEIGHT: 64 IN | RESPIRATION RATE: 16 BRPM | WEIGHT: 119.94 LBS | HEART RATE: 69 BPM | BODY MASS INDEX: 20.47 KG/M2 | OXYGEN SATURATION: 99 % | SYSTOLIC BLOOD PRESSURE: 136 MMHG

## 2022-04-19 DIAGNOSIS — J30.2 SEASONAL ALLERGIC RHINITIS, UNSPECIFIED TRIGGER: ICD-10-CM

## 2022-04-19 DIAGNOSIS — I10 ESSENTIAL HYPERTENSION: Primary | ICD-10-CM

## 2022-04-19 DIAGNOSIS — I50.810 RIGHT HEART FAILURE WITH REDUCED RIGHT VENTRICULAR FUNCTION: ICD-10-CM

## 2022-04-19 PROBLEM — R79.89 ELEVATED TROPONIN I LEVEL: Status: RESOLVED | Noted: 2021-08-09 | Resolved: 2022-04-19

## 2022-04-19 PROBLEM — R79.89 ELEVATED TROPONIN: Status: RESOLVED | Noted: 2017-09-10 | Resolved: 2022-04-19

## 2022-04-19 PROCEDURE — 99214 OFFICE O/P EST MOD 30 MIN: CPT | Mod: S$PBB,,, | Performed by: FAMILY MEDICINE

## 2022-04-19 PROCEDURE — 99999 PR PBB SHADOW E&M-EST. PATIENT-LVL III: ICD-10-PCS | Mod: PBBFAC,,, | Performed by: FAMILY MEDICINE

## 2022-04-19 PROCEDURE — 99213 OFFICE O/P EST LOW 20 MIN: CPT | Mod: PBBFAC,PO | Performed by: FAMILY MEDICINE

## 2022-04-19 PROCEDURE — 99214 PR OFFICE/OUTPT VISIT, EST, LEVL IV, 30-39 MIN: ICD-10-PCS | Mod: S$PBB,,, | Performed by: FAMILY MEDICINE

## 2022-04-19 PROCEDURE — 99999 PR PBB SHADOW E&M-EST. PATIENT-LVL III: CPT | Mod: PBBFAC,,, | Performed by: FAMILY MEDICINE

## 2022-04-19 RX ORDER — FLUTICASONE PROPIONATE 50 MCG
2 SPRAY, SUSPENSION (ML) NASAL DAILY
Qty: 16 G | Refills: 2 | Status: SHIPPED | OUTPATIENT
Start: 2022-04-19

## 2022-04-19 NOTE — PROGRESS NOTES
Health Maintenance Due   Topic     COVID-19 Vaccine (1) Consult with pcp     Shingles Vaccine (1 of 2) Consult with pcp     Pneumococcal Vaccines (Age 65+) (1 - PCV) Consult with pcp     Lipid Panel  Consult with Dr Lombard     Influenza Vaccine (1) Consult with pcp

## 2022-04-19 NOTE — PROGRESS NOTES
"Chief Complaint   Patient presents with    [facility]        Daiana Mcduffie is a 89 y.o. female who presents per chief complain.  Chronic medical issues, if present, have been documented.  Acute medical issues, if present have been documented in the Chief Complaint.     HPI    ROS  Review of Systems   Constitutional: Negative.  Negative for activity change, appetite change, chills, diaphoresis, fatigue, fever and unexpected weight change.   HENT: Positive for hearing loss (bilateral). Negative for congestion, ear pain, nosebleeds, postnasal drip, rhinorrhea, sinus pressure, sneezing, sore throat and trouble swallowing.    Eyes: Negative for pain and visual disturbance.   Respiratory: Negative for cough, choking and shortness of breath.    Cardiovascular: Negative for chest pain and leg swelling.   Gastrointestinal: Negative for abdominal pain, constipation, diarrhea, nausea and vomiting.   Genitourinary: Negative for difficulty urinating, dysuria, frequency and urgency.   Musculoskeletal: Positive for arthralgias and gait problem. Negative for back pain, joint swelling, myalgias, neck pain and neck stiffness.   Skin: Negative.    Allergic/Immunologic: Negative for environmental allergies and food allergies.   Neurological: Positive for speech difficulty and weakness. Negative for dizziness, seizures, syncope, light-headedness, numbness and headaches.   Psychiatric/Behavioral: Negative.  Negative for confusion, decreased concentration, dysphoric mood and sleep disturbance. The patient is not nervous/anxious.      Physical Exam  Vitals:    04/19/22 1137   BP: 136/80   Pulse: 69   Resp: 16   Temp: 97.6 °F (36.4 °C)    Body mass index is 20.59 kg/m².  Weight: 54.4 kg (119 lb 14.9 oz)   Height: 5' 4" (162.6 cm)     Physical Exam  Constitutional:       General: She is not in acute distress.     Appearance: Normal appearance. She is well-developed. She is not ill-appearing, toxic-appearing or diaphoretic. "   HENT:      Head: Normocephalic and atraumatic.      Right Ear: Hearing and external ear normal. No decreased hearing noted.      Left Ear: Hearing and external ear normal. No decreased hearing noted.      Nose: Nose normal. No nasal deformity or rhinorrhea.      Mouth/Throat:      Dentition: Normal dentition. Does not have dentures.      Pharynx: Uvula midline.   Eyes:      General: Lids are normal. No scleral icterus.        Right eye: No foreign body or discharge.         Left eye: No foreign body or discharge.      Conjunctiva/sclera: Conjunctivae normal.      Right eye: No chemosis or exudate.     Left eye: No chemosis or exudate.     Pupils: Pupils are equal, round, and reactive to light.   Cardiovascular:      Rate and Rhythm: Normal rate and regular rhythm.      Heart sounds: Normal heart sounds, S1 normal and S2 normal. No murmur heard.    No friction rub. No gallop.   Pulmonary:      Effort: Pulmonary effort is normal. No accessory muscle usage or respiratory distress.      Breath sounds: Normal breath sounds. No decreased breath sounds, wheezing, rhonchi or rales.   Abdominal:      General: There is no distension.      Palpations: Abdomen is soft. Abdomen is not rigid.      Tenderness: There is no abdominal tenderness. There is no guarding or rebound.   Musculoskeletal:         General: Normal range of motion.      Cervical back: Full passive range of motion without pain, normal range of motion and neck supple.   Skin:     General: Skin is warm and dry.      Findings: No rash.   Neurological:      Mental Status: She is alert and oriented to person, place, and time.      Cranial Nerves: No cranial nerve deficit.      Sensory: No sensory deficit.      Motor: No abnormal muscle tone or seizure activity.      Coordination: Coordination normal.      Gait: Gait normal.   Psychiatric:         Attention and Perception: She is attentive.         Mood and Affect: Mood and affect normal.         Speech: Speech is  delayed.         Behavior: Behavior normal. Behavior is cooperative.         Thought Content: Thought content normal.         Cognition and Memory: Cognition and memory normal.         Judgment: Judgment normal.       Assessment & Plan    Discussion of plan of care including treatment options regarding health and wellness were reviewed and discussed with patient.  Any changes to medication or treatment plan, as well as any screening blood test, imaging, or referrals to specialist, are documented.  Follow up as indicated.     1. Essential hypertension  Patient was counseled and encouraged to maintain a low sodium diet, as well as increasing physical activity.  Recommend random BP checks at home on a regular basis.  Repeat BP at end of visit was not necessary. Will continue medication at this time, and follow up in 3-6 months, or sooner if blood pressure begins to increase.       2. Right heart failure with reduced right ventricular function  Chronic condition; will continue to document and monitor.  Stable; no therapeutic changes at this time.     3. Seasonal allergic rhinitis, unspecified trigger  Medication prescribed for use only as symptoms require.   - fluticasone propionate (FLONASE) 50 mcg/actuation nasal spray; 2 sprays (100 mcg total) by Each Nostril route once daily.  Dispense: 16 g; Refill: 2       Follow up if symptoms worsen or fail to improve.      ACTIVE MEDICAL ISSUES:  Documented in Problem List    PAST MEDICAL HISTORY  Documented    PAST SURGICAL HISTORY:  Documented    SOCIAL HISTORY:  Documented    FAMILY HISTORY:  Documented    ALLERGIES AND MEDICATIONS: updated and reviewed.  Documented    Health Maintenance       Date Due Completion Date    COVID-19 Vaccine (1) Never done ---    Shingles Vaccine (1 of 2) Never done ---    Pneumococcal Vaccines (Age 65+) (1 - PCV) Never done ---    Lipid Panel 05/17/2021 5/17/2016    Influenza Vaccine (1) Never done ---    TETANUS VACCINE 09/14/2031 9/14/2021

## 2022-04-19 NOTE — TELEPHONE ENCOUNTER
----- Message from Ayaka Schrader RN sent at 4/19/2022 11:10 AM CDT -----  Mara this is Ayaka with [facility]. The pt is on her way. I gave her the wrong clinic.

## 2022-04-20 ENCOUNTER — OUTPATIENT CASE MANAGEMENT (OUTPATIENT)
Dept: ADMINISTRATIVE | Facility: OTHER | Age: 87
End: 2022-04-20
Payer: MEDICARE

## 2022-04-20 NOTE — PROGRESS NOTES
04/20/2022    3rd Attempt to complete SW Assessment for Outpatient Care Management;  left message requesting a return call.  SW will continue to reach out.

## 2022-04-20 NOTE — PROGRESS NOTES
Call placed to pt's daughter, Alicia Diez, with a message left containing this CMs contact information. Call placed to the phone number that was received for the pt's son Girma ([phone]) A message was left containing this CMs contact information. Call placed to Mrs. Mahoney with a message left containing this CMs contact information.

## 2022-04-20 NOTE — PROGRESS NOTES
Patient and identified barriers were discussed during case conference with Medical Director and/or physician consultant and OPCM team.    Recommendations and Plan:(1)  Schedule a family conference and (2) informed per team member that a correspondence was received from a team MD recommending  to contact [facility].

## 2022-04-21 ENCOUNTER — OUTPATIENT CASE MANAGEMENT (OUTPATIENT)
Dept: ADMINISTRATIVE | Facility: OTHER | Age: 87
End: 2022-04-21
Payer: MEDICARE

## 2022-04-21 NOTE — PROGRESS NOTES
Attempt to complete Social Work Assessment for Outpatient Care Management; spoke with pt's daughter and she requested a return call.  LCSW will reattempt at a later date.

## 2022-04-22 ENCOUNTER — OUTPATIENT CASE MANAGEMENT (OUTPATIENT)
Dept: ADMINISTRATIVE | Facility: OTHER | Age: 87
End: 2022-04-22
Payer: MEDICARE
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
## 2022-04-25 ENCOUNTER — OUTPATIENT CASE MANAGEMENT (OUTPATIENT)
Dept: ADMINISTRATIVE | Facility: OTHER | Age: 87
End: 2022-04-25
Payer: MEDICARE

## 2022-04-25 NOTE — PROGRESS NOTES
Outpatient Care Management  Plan of Care Follow Up Visit    Patient: Daiana Mcduffie  MRN: [id number]  Date of Service: 04/25/2022  Completed by: Ayaka Schrader RN  Referral Date: 02/20/2020  Program: Case Management (High Risk)    Reason for Visit   Patient presents with    Update Plan Of Care    Conference call to coordinate care        Brief Summary: Conference call initiated per ADILIA, Tracie Elam with OPCM. Present on the call Tracie Elam, Ms. Daiana Mcduffie, Mrs. Angelica Mahoney, and this CM. Informed per Mrs. Mahoney that Mr. Mcdonald who previously lived with the pt will be returning to the pt's apartment on Friday 4/29/2022. Reports that Mr. Mcdonald is using a walker due to his recent stroke. Pt voiced that she would like to return to her home when Mr. Mcdonald returns. Pt voiced that she will like to return to her previous activities at her home. Mrs. Mahoney reports that the pt's apartment needs some sheet rock work, but is not in any poor condition. Reports that they were informed at the most recent MD appt that he would fill out the paperwork for nursing home placement if she decides to go to the nursing home.  Reports that she was able to get the pt's medication from the apartment.      Patient Summary     Involvement of Care:  Do I have permission to speak with other family members about your care?   yes    Patient Reported Labs & Vitals:  1.  Any Patient Reported Labs & Vitals?     2.  Patient Reported Blood Pressure:     3.  Patient Reported Pulse:     4.  Patient Reported Weight (Kg):     5.  Patient Reported Blood Glucose (mg/dl):       Medical and social history was reviewed with patient and/or caregiver.     Clinical Assessment     Reviewed and provided basic information on available community resources for mental health, transportation, wellness resources, and palliative care programs with patient and/or caregiver.     Complex Care Plan     Care plan was discussed and completed today with input from patient  and/or caregiver.    Patient Instructions     Instructions were provided via the [facility] patient resources and are available for the patient to view on the patient portal.    Next Steps: Discuss with pt safety measures.    Follow up in about 1 week (around 5/2/2022) for RN Follow.    Todays OPCM Self-Management Care Plan was developed with the patients/caregivers input and was based on identified barriers from todays assessment.  Goals were written today with the patient/caregiver and the patient has agreed to work towards these goals to improve his/her overall well-being. Patient verbalized understanding of the care plan, goals, and all of today's instructions. Encouraged patient/caregiver to communicate with his/her physician and health care team about health conditions and the treatment plan.  Provided my contact information today and encouraged patient/caregiver to call me with any questions as needed.

## 2022-04-25 NOTE — PROGRESS NOTES
Outpatient Care Management   - Care Plan Follow Up    Patient: Daiana Mcduffie  MRN:  [id number]  Date of Service:  4/26/2022  Completed by:  Tracie Elam LCSW  Referral Date: 02/20/2020  Program: Case Management (High Risk)    Reason for Visit   Patient presents with    Update Plan Of Care    OPCM Chart Review    Conference Call with Pt and Friend       04/25/2022    SW telephoned the pt and her friend along with OPCM RN, Ayaka Schrader to discuss the pt's plans. The pt stated that she did not want to enter a NH, but wanted to return to her home to be cared for by the son of a friend, Harry. SW also attempted to telephoned the pt's daughter, Alicia and she informed the SW that she could not participate in the call- even though the daughter had requested this time to carry out the conference. SW will follow up in two weeks.        Complex Care Plan     Care plan was discussed and completed today with input from patient and/or caregiver.    Patient Instructions         Follow up in about 2 weeks (around 5/9/2022).    Todays OPC Self-Management Care Plan was developed with the patients/caregivers input and was based on identified barriers from todays assessment.  Goals were written today with the patient/caregiver and the patient has agreed to work towards these goals to improve his/her overall well-being. Patient verbalized understanding of the care plan, goals, and all of today's instructions. Encouraged patient/caregiver to communicate with his/her physician and health care team about health conditions and the treatment plan.  Provided my contact information today and encouraged patient/caregiver to call me with any questions as needed.

## 2022-04-25 NOTE — PROGRESS NOTES
4/25/2022    Attempt to contact [facility]. Left message requesting a return call.  LCSW will reattempt at a later date.

## 2022-04-26 ENCOUNTER — OUTPATIENT CASE MANAGEMENT (OUTPATIENT)
Dept: ADMINISTRATIVE | Facility: OTHER | Age: 87
End: 2022-04-26
Payer: MEDICARE

## 2022-04-26 NOTE — PROGRESS NOTES
Outpatient Care Management   - Care Plan Follow Up    Patient: Daiana Mcduffie  MRN:  [id number]  Date of Service:  4/25/2022  Completed by:  Tracie Elam LCSW  Referral Date: 02/20/2020  Program: Case Management (High Risk)    Reason for Visit   Patient presents with    Update Plan Of Care    OPCM Chart Review    Conference Call with Pt and Friend       04/25/2022    SW telephoned the pt and her friend along with OPCM RN, Ayaka Schrader to discuss the pt's plans. The pt stated that she did not want to enter a NH, but wanted to return to her home to be cared for by the son of a friend, Harry. SW also attempted to telephoned the pt's daughter, Alicia and she informed the SW that she could not participate in the call- even though the daughter had requested this time to carry out the conference. SW will follow up in two weeks.        Complex Care Plan     Care plan was discussed and completed today with input from patient and/or caregiver.    Patient Instructions       Follow up in about 2 weeks (around 5/9/2022).    Todays OPC Self-Management Care Plan was developed with the patients/caregivers input and was based on identified barriers from todays assessment.  Goals were written today with the patient/caregiver and the patient has agreed to work towards these goals to improve his/her overall well-being. Patient verbalized understanding of the care plan, goals, and all of today's instructions. Encouraged patient/caregiver to communicate with his/her physician and health care team about health conditions and the treatment plan.  Provided my contact information today and encouraged patient/caregiver to call me with any questions as needed.

## 2022-04-26 NOTE — PROGRESS NOTES
04/26/2022    KIKA telephoned [facility] [employer] [facility]) due to safety concerns of pt when she returns to her apartment. When KIKA informed [facility] of the concerns, SW was informed that [facility] does not perform preventive measures. KIKA was guided to call back and report when the pt. Has returned to her apartment.

## 2022-04-26 NOTE — PROGRESS NOTES
Outpatient Care Management   - Care Plan Follow Up    Patient: Daiana Mcduffie  MRN:  [id number]  Date of Service:  4/26/2022  Completed by:  Tracie Elam LCSW  Referral Date: 02/20/2020  Program: Case Management (High Risk)    Reason for Visit   Patient presents with    Update Plan Of Care    OPCM Chart Review    Conference Call with Pt and Friend       04/25/2022    SW telephoned the pt and her friend along with OPCM RN, Ayaka Schrader to discuss the pt's plans. The pt stated that she did not want to enter a NH, but wanted to return to her home to be cared for by the son of a friend, Harry. SW also attempted to telephoned the pt's daughter, Alicia and she informed the SW that she could not participate in the call- even though the daughter had requested this time to carry out the conference. SW will follow up in two weeks.        Complex Care Plan     Care plan was discussed and completed today with input from patient and/or caregiver.    Patient Instructions         Follow up in about 2 weeks (around 5/9/2022).    Todays OPC Self-Management Care Plan was developed with the patients/caregivers input and was based on identified barriers from todays assessment.  Goals were written today with the patient/caregiver and the patient has agreed to work towards these goals to improve his/her overall well-being. Patient verbalized understanding of the care plan, goals, and all of today's instructions. Encouraged patient/caregiver to communicate with his/her physician and health care team about health conditions and the treatment plan.  Provided my contact information today and encouraged patient/caregiver to call me with any questions as needed.

## 2022-05-02 ENCOUNTER — OUTPATIENT CASE MANAGEMENT (OUTPATIENT)
Dept: ADMINISTRATIVE | Facility: OTHER | Age: 87
End: 2022-05-02
Payer: MEDICARE

## 2022-05-02 NOTE — PROGRESS NOTES
Outpatient Care Management  Plan of Care Follow Up Visit    Patient: Daiana Mcduffie  MRN: [id number]  Date of Service: 05/02/2022  Completed by: Ayaka Schrader RN  Referral Date: 02/20/2020  Program: Case Management (High Risk)    Reason for Visit   Patient presents with    OPCM RN First Follow-Up Attempt       Brief Summary: Call placed to Ms. Mcduffie's phone number with no answer. Call placed to Mrs. Mahoney and was informed that the pt returned to her apartment two days ago. Reports that Harry is there with her and he is able to ambulate with a walker and drive. Reports that she visited the pt on yesterday to administer her meds. Reports that the pt has her meds in the home. Reports that the pt has Harry's mother who is also informed in the pt's care. Mrs. Mahoney reports that the pt does not cook ,but Harry cooks for the pt.    Patient Summary     Involvement of Care:  Do I have permission to speak with other family members about your care?   yes    Patient Reported Labs & Vitals:  1.  Any Patient Reported Labs & Vitals?     2.  Patient Reported Blood Pressure:     3.  Patient Reported Pulse:     4.  Patient Reported Weight (Kg):     5.  Patient Reported Blood Glucose (mg/dl):       Medical and social history was reviewed with patient and/or caregiver.     Clinical Assessment     Reviewed and provided basic information on available community resources for mental health, transportation, wellness resources, and palliative care programs with patient and/or caregiver.     Complex Care Plan     Care plan was discussed and completed today with input from patient and/or caregiver.    Patient Instructions     Instructions were provided via the [employer] patient resources and are available for the patient to view on the patient portal.    Next Steps: F/U that the pt has a caregiver in the home.    Follow up in about 1 week (around 5/9/2022) for RN Follow.    Todays OPCM Self-Management Care Plan was developed with the  patients/caregivers input and was based on identified barriers from todays assessment.  Goals were written today with the patient/caregiver and the patient has agreed to work towards these goals to improve his/her overall well-being. Patient verbalized understanding of the care plan, goals, and all of today's instructions. Encouraged patient/caregiver to communicate with his/her physician and health care team about health conditions and the treatment plan.  Provided my contact information today and encouraged patient/caregiver to call me with any questions as needed.

## 2022-05-05 ENCOUNTER — OUTPATIENT CASE MANAGEMENT (OUTPATIENT)
Dept: ADMINISTRATIVE | Facility: OTHER | Age: 87
End: 2022-05-05
Payer: MEDICARE

## 2022-05-05 NOTE — PROGRESS NOTES
Outpatient Care Management   - Care Plan Follow Up    Patient: Daiana Mcduffie  MRN:  [id number]  Date of Service:  5/5/2022  Completed by:  Tracie Elam LCSW  Referral Date: 02/20/2020  Program: Case Management (High Risk)    Reason for Visit   Patient presents with    OPCM Chart Review    OPCM SW FOLLOW-UP ATTEMPT    Update Plan Of Care     5/5/2022    The SW telephoned pt and pt's friend to check on her status. The friend reported that the pt had returned to her home. The pt's friend reported that she offered pt to continue staying with her, but the pt wanted to return to her apartment. The SW also spoke with the pt, who reported that she was okay in her apartment and that her caregiver, Harry is with her. SW encouraged the pt to telephone with any questions and concerns.     Complex Care Plan     Care plan was discussed and completed today with input from patient and/or caregiver.    Patient Instructions         Follow up in about 2 weeks (around 5/19/2022).    [facility] OPCM Self-Management Care Plan was developed with the patients/caregivers input and was based on identified barriers from todays assessment.  Goals were written today with the patient/caregiver and the patient has agreed to work towards these goals to improve his/her overall well-being. Patient verbalized understanding of the care plan, goals, and all of today's instructions. Encouraged patient/caregiver to communicate with his/her physician and health care team about health conditions and the treatment plan.  Provided my contact information today and encouraged patient/caregiver to call me with any questions as needed.

## 2022-05-12 ENCOUNTER — OUTPATIENT CASE MANAGEMENT (OUTPATIENT)
Dept: ADMINISTRATIVE | Facility: OTHER | Age: 87
End: 2022-05-12
Payer: MEDICARE

## 2022-05-12 NOTE — PROGRESS NOTES
5/12/2022    The SW telephoned [employer] to report the pt living in conditions that are not safe. The provider informed the SW that this is not to be reported to EPS, but to the  at [phone]. The SW was told that it is not an issue for EPS because the landlord needs to complete the repairs in the apartment.

## 2022-05-12 NOTE — PROGRESS NOTES
1st attempt to contact the pt without success. Will mail an attempt to contact letter to the pt.

## 2022-05-12 NOTE — LETTER
May 12, 2022    Daiana Mcduffie  [address]  [facility] [address]             [facility]  [address] [facility] [address] Dear Ms. Daiana Mcduffie:,    This letter is from Ayaka morse RN Case Manager with [facility] Outpatient Case Management Department. I have been unsuccessful with contacting you for a  follow-up on your health status. If you require any future assistance or if any new concerns or problems arise, please do not hesitate to call me at [phone].     The Outpatient Case Management Department can be reached at [phone] from 8:00 am to 4:30 pm on Monday thru Friday. [facility] also has a program where a nurse is available 24/7 to answer questions or provide medical advice. Their phone number is [phone].     Sincerely,        Ayaka Schrader, CHRISTIE  [facility] Case Manager

## 2022-05-20 ENCOUNTER — OUTPATIENT CASE MANAGEMENT (OUTPATIENT)
Dept: ADMINISTRATIVE | Facility: OTHER | Age: 87
End: 2022-05-20

## 2022-05-20 NOTE — PROGRESS NOTES
Outpatient Care Management   - Care Plan Follow Up    Patient: Daiana Mcduffie  MRN:  [id number]  Date of Service:  5/20/2022  Completed by:  Tracie Elam LCSW  Referral Date: 02/20/2020  Program: Case Management (High Risk)    Reason for Visit   Patient presents with    OPCM Chart Review    Case Closure    OPCM SW FOLLOW-UP       5/20/2022        Attempt to complete Social Work follow-up for Outpatient Care Management; left message.  SW then telephoned pt's emergency contact and   informed her that she may telephone the [place]  , [phone] on Ms. Mcduffie's behalf regarding the needed repairs to the apartment. SW also discussed case closure and contact agreed.     Complex Care Plan     Care plan was discussed and completed today with input from patient and/or caregiver.    Patient Instructions       No follow-ups on file.    Todays OPCM Self-Management Care Plan was developed with the patients/caregivers input and was based on identified barriers from todays assessment.  Goals were written today with the patient/caregiver and the patient has agreed to work towards these goals to improve his/her overall well-being. Patient verbalized understanding of the care plan, goals, and all of today's instructions. Encouraged patient/caregiver to communicate with his/her physician and health care team about health conditions and the treatment plan.  Provided my contact information today and encouraged patient/caregiver to call me with any questions as needed.

## 2022-05-30 ENCOUNTER — OUTPATIENT CASE MANAGEMENT (OUTPATIENT)
Dept: ADMINISTRATIVE | Facility: OTHER | Age: 87
End: 2022-05-30
Payer: MEDICARE

## 2022-05-30 NOTE — PROGRESS NOTES
Call placed to pt with a message left x's 2 with this CMs contact information. Call placed to Michael Denzel with a message left containing this CMs contact information.

## 2022-05-30 NOTE — PROGRESS NOTES
Outpatient Care Management  Plan of Care Follow Up Visit    Patient: Daiana Mcduffie  MRN: [id number]  Date of Service: 05/30/2022  Completed by: Ayaka Schrader RN  Referral Date: 02/20/2020  Program: Case Management (High Risk)    Reason for Visit   Patient presents with    Update Plan Of Care       Brief Summary: Received a call from Harry who lives in the apartment with the pt. Reports that he has a difficult time with getting the pt to her medical appts. States that he makes an appt for the pt and then she will say that she is unable to go to the appt. Reports that the pt is in her room most of the day. States that the pt performs her hygiene independently. States that the pt is able to prepare a light meal to eat. Harry states that he works 12 hour shift from 6-6 with alternating night and day shift. Harry reports that he is with the pt whenever he is not at work. Voiced that the pt is out of the sertraline tablets. Informed that this CM will contact Dr. Lombard's staff.Message sent to Dr. Lombard's staff to notify that the pt needs a refill on the sertraline. Voiced that the pt's children are not active in her care at this time.          Patient Summary     Involvement of Care:  Do I have permission to speak with other family members about your care?   yes    Patient Reported Labs & Vitals:  1.  Any Patient Reported Labs & Vitals?     2.  Patient Reported Blood Pressure:     3.  Patient Reported Pulse:     4.  Patient Reported Weight (Kg):     5.  Patient Reported Blood Glucose (mg/dl):       Medical and social history was reviewed with patient and/or caregiver.     Clinical Assessment     Reviewed and provided basic information on available community resources for mental health, transportation, wellness resources, and palliative care programs with patient and/or caregiver.     Complex Care Plan     Care plan was discussed and completed today with input from patient and/or caregiver.    Patient Instructions      Instructions were provided via the [facility] patient resources and are available for the patient to view on the patient portal.    Next Steps: Will request Care at home for the pt.  Follow up in about 2 weeks (around 6/13/2022) for RN Follow.    Todays OPCM Self-Management Care Plan was developed with the patients/caregivers input and was based on identified barriers from todays assessment.  Goals were written today with the patient/caregiver and the patient has agreed to work towards these goals to improve his/her overall well-being. Patient verbalized understanding of the care plan, goals, and all of today's instructions. Encouraged patient/caregiver to communicate with his/her physician and health care team about health conditions and the treatment plan.  Provided my contact information today and encouraged patient/caregiver to call me with any questions as needed.

## 2022-05-30 NOTE — PROGRESS NOTES
Outpatient Care Management  Plan of Care Follow Up Visit    Patient: Daiana Mcduffie  MRN: [id number]  Date of Service: 05/30/2022  Completed by: Ayaka Schrader RN  Referral Date: 02/20/2020  Program: Case Management (High Risk)    Reason for Visit   Patient presents with    Update Plan Of Care       Brief Summary: Received a call from Pt's friend/caregiver Mrs. Mahoney. Reports that the pt maintains her hygiene.Reports that the pt is a very clean person. Reports that she has spoken with Harry about the pt's current status. Reports that the pt does not wander. Reports that she has spoken with the pt on the telephone but has not seen her for about one week. Reports that she plans to see the pt soon.     Patient Summary     Involvement of Care:  Do I have permission to speak with other family members about your care?   yes    Patient Reported Labs & Vitals:  1.  Any Patient Reported Labs & Vitals?     2.  Patient Reported Blood Pressure:     3.  Patient Reported Pulse:     4.  Patient Reported Weight (Kg):     5.  Patient Reported Blood Glucose (mg/dl):       Medical and social history was reviewed with patient and/or caregiver.     Clinical Assessment     Reviewed and provided basic information on available community resources for mental health, transportation, wellness resources, and palliative care programs with patient and/or caregiver.     Complex Care Plan     Care plan was discussed and completed today with input from patient and/or caregiver.    Patient Instructions     Instructions were provided via the [employer] patient resources and are available for the patient to view on the patient portal.    Next Steps: Will request Care at home for the pt.  Follow up in about 2 weeks (around 6/13/2022) for RN Follow.     Todays OPCM Self-Management Care Plan was developed with the patients/caregivers input and was based on identified barriers from todays assessment.  Goals were written today with the  patient/caregiver and the patient has agreed to work towards these goals to improve his/her overall well-being. Patient verbalized understanding of the care plan, goals, and all of today's instructions. Encouraged patient/caregiver to communicate with his/her physician and health care team about health conditions and the treatment plan.  Provided my contact information today and encouraged patient/caregiver to call me with any questions as needed.

## 2022-05-30 NOTE — TELEPHONE ENCOUNTER
----- Message from Ayaka Schrader RN sent at 5/30/2022  3:38 PM CDT -----  Mara this is Ayaka with [facility] Case [facility]. Pt;s caregiver reports that she needs a refill on the Sertraline.    Please advise  Thank you

## 2022-06-03 ENCOUNTER — DOCUMENT SCAN (OUTPATIENT)
Dept: HOME HEALTH SERVICES | Facility: HOSPITAL | Age: 87
End: 2022-06-03
Payer: MEDICARE

## 2022-06-03 RX ORDER — SERTRALINE HYDROCHLORIDE 25 MG/1
25 TABLET, FILM COATED ORAL DAILY
Qty: 90 TABLET | Refills: 3 | Status: SHIPPED | OUTPATIENT
Start: 2022-06-03

## 2022-06-09 ENCOUNTER — OUTPATIENT CASE MANAGEMENT (OUTPATIENT)
Dept: ADMINISTRATIVE | Facility: OTHER | Age: 87
End: 2022-06-09
Payer: MEDICARE

## 2022-06-17 ENCOUNTER — OUTPATIENT CASE MANAGEMENT (OUTPATIENT)
Dept: ADMINISTRATIVE | Facility: OTHER | Age: 87
End: 2022-06-17
Payer: MEDICARE

## 2022-06-17 NOTE — PROGRESS NOTES
OPCM chart review for any changes in pt's POC. Call placed to pt's numbers with messages left containing this CMs contact information.

## 2022-06-30 ENCOUNTER — PES CALL (OUTPATIENT)
Dept: ADMINISTRATIVE | Facility: CLINIC | Age: 87
End: 2022-06-30
Payer: MEDICARE

## 2022-07-01 ENCOUNTER — OUTPATIENT CASE MANAGEMENT (OUTPATIENT)
Dept: ADMINISTRATIVE | Facility: OTHER | Age: 87
End: 2022-07-01
Payer: MEDICARE

## 2022-07-01 NOTE — PROGRESS NOTES
Outpatient Care Management  Plan of Care Follow Up Visit    Patient: Daiana Mcduffie  MRN: [id number]  Date of Service: 07/01/2022  Completed by: Gin Mayo RN  Referral Date: 02/20/2020  Program: Case Management (High Risk)    Reason for Visit   Patient presents with    OPCM RN Third Follow-Up Attempt     7/1/22       Brief Summary: Attempted to contact patient for f/u x3 without success. Message left requesting return call.  Case closed.     Patient Summary     Involvement of Care:  Do I have permission to speak with other family members about your care?       Patient Reported Labs & Vitals:  1.  Any Patient Reported Labs & Vitals?     2.  Patient Reported Blood Pressure:     3.  Patient Reported Pulse:     4.  Patient Reported Weight (Kg):     5.  Patient Reported Blood Glucose (mg/dl):       Medical and social history was reviewed with patient and/or caregiver.     Clinical Assessment     Reviewed and provided basic information on available community resources for mental health, transportation, wellness resources, and palliative care programs with patient and/or caregiver.     Complex Care Plan     Care plan was discussed and completed today with input from patient and/or caregiver.    Patient Instructions     Instructions were provided via the [facility] patient resources and are available for the patient to view on the patient portal.    Next Steps: Case closed after 3 unsuccessful attempts.  No follow-ups on file.    Todays OPCM Self-Management Care Plan was developed with the patients/caregivers input and was based on identified barriers from todays assessment.  Goals were written today with the patient/caregiver and the patient has agreed to work towards these goals to improve his/her overall well-being. Patient verbalized understanding of the care plan, goals, and all of today's instructions. Encouraged patient/caregiver to communicate with his/her physician and health care team about health conditions  and the treatment plan.  Provided my contact information today and encouraged patient/caregiver to call me with any questions as needed.

## 2022-07-03 NOTE — TELEPHONE ENCOUNTER
Pt called back stating that SOB is better and she is not going to call 911; she was requesting OV today; I did inform her nothing available; she states if it gets worse she will call 911  
Pt called stating she was having SOB and that she was going to call 911 to go to ER; she wanted to inform Dr Stephenson  
no

## 2022-07-15 ENCOUNTER — HOSPITAL ENCOUNTER (EMERGENCY)
Facility: HOSPITAL | Age: 87
Discharge: HOME | End: 2022-07-15
Attending: EMERGENCY MEDICINE
Payer: MEDICARE

## 2022-07-15 VITALS
OXYGEN SATURATION: 100 % | RESPIRATION RATE: 24 BRPM | HEART RATE: 90 BPM | WEIGHT: 120 LBS | DIASTOLIC BLOOD PRESSURE: 90 MMHG | HEIGHT: 64 IN | SYSTOLIC BLOOD PRESSURE: 172 MMHG | BODY MASS INDEX: 20.49 KG/M2 | TEMPERATURE: 99 F

## 2022-07-15 DIAGNOSIS — S80.11XA CONTUSION OF MULTIPLE SITES OF RIGHT LOWER EXTREMITY, INITIAL ENCOUNTER: ICD-10-CM
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
## 2022-07-15 DIAGNOSIS — N39.0 URINARY TRACT INFECTION WITHOUT HEMATURIA, SITE UNSPECIFIED: ICD-10-CM

## 2022-07-15 LAB
ALBUMIN SERPL BCP-MCNC: 3.2 G/DL (ref 3.5–5.2)
ALP SERPL-CCNC: 70 U/L (ref 55–135)
ALT SERPL W/O P-5'-P-CCNC: 9 U/L (ref 10–44)
ANION GAP SERPL CALC-SCNC: 11 MMOL/L (ref 8–16)
AST SERPL-CCNC: 17 U/L (ref 10–40)
BACTERIA #/AREA URNS HPF: ABNORMAL /HPF
BASOPHILS # BLD AUTO: 0.03 K/UL (ref 0–0.2)
BASOPHILS NFR BLD: 0.6 % (ref 0–1.9)
BILIRUB SERPL-MCNC: 0.4 MG/DL (ref 0.1–1)
BILIRUB UR QL STRIP: NEGATIVE
BNP SERPL-MCNC: 426 PG/ML (ref 0–99)
BUN SERPL-MCNC: 15 MG/DL (ref 10–30)
CALCIUM SERPL-MCNC: 9.1 MG/DL (ref 8.7–10.5)
CHLORIDE SERPL-SCNC: 108 MMOL/L (ref 95–110)
CLARITY UR: CLEAR
CO2 SERPL-SCNC: 21 MMOL/L (ref 23–29)
COLOR UR: COLORLESS
CREAT SERPL-MCNC: 0.8 MG/DL (ref 0.5–1.4)
CTP QC/QA: YES
DIFFERENTIAL METHOD: ABNORMAL
EOSINOPHIL # BLD AUTO: 0 K/UL (ref 0–0.5)
EOSINOPHIL NFR BLD: 0.6 % (ref 0–8)
ERYTHROCYTE [DISTWIDTH] IN BLOOD BY AUTOMATED COUNT: 13.1 % (ref 11.5–14.5)
EST. GFR  (AFRICAN AMERICAN): >60 ML/MIN/1.73 M^2
EST. GFR  (NON AFRICAN AMERICAN): >60 ML/MIN/1.73 M^2
GLUCOSE SERPL-MCNC: 88 MG/DL (ref 70–110)
GLUCOSE UR QL STRIP: NEGATIVE
HCT VFR BLD AUTO: 41.2 % (ref 37–48.5)
HGB BLD-MCNC: 12.6 G/DL (ref 12–16)
HGB UR QL STRIP: NEGATIVE
IMM GRANULOCYTES # BLD AUTO: 0.01 K/UL (ref 0–0.04)
IMM GRANULOCYTES NFR BLD AUTO: 0.2 % (ref 0–0.5)
KETONES UR QL STRIP: NEGATIVE
LEUKOCYTE ESTERASE UR QL STRIP: ABNORMAL
LYMPHOCYTES # BLD AUTO: 2.1 K/UL (ref 1–4.8)
LYMPHOCYTES NFR BLD: 39.9 % (ref 18–48)
MCH RBC QN AUTO: 29.4 PG (ref 27–31)
MCHC RBC AUTO-ENTMCNC: 30.6 G/DL (ref 32–36)
MCV RBC AUTO: 96 FL (ref 82–98)
MICROSCOPIC COMMENT: ABNORMAL
MONOCYTES # BLD AUTO: 0.4 K/UL (ref 0.3–1)
MONOCYTES NFR BLD: 6.6 % (ref 4–15)
NEUTROPHILS # BLD AUTO: 2.8 K/UL (ref 1.8–7.7)
NEUTROPHILS NFR BLD: 52.1 % (ref 38–73)
NITRITE UR QL STRIP: NEGATIVE
NRBC BLD-RTO: 0 /100 WBC
PH UR STRIP: 8 [PH] (ref 5–8)
PLATELET # BLD AUTO: 154 K/UL (ref 150–450)
PMV BLD AUTO: 10.2 FL (ref 9.2–12.9)
POCT GLUCOSE: 94 MG/DL (ref 70–110)
POTASSIUM SERPL-SCNC: 4.2 MMOL/L (ref 3.5–5.1)
PROT SERPL-MCNC: 6.4 G/DL (ref 6–8.4)
PROT UR QL STRIP: NEGATIVE
RBC # BLD AUTO: 4.28 M/UL (ref 4–5.4)
RBC #/AREA URNS HPF: 2 /HPF (ref 0–4)
SARS-COV-2 RDRP RESP QL NAA+PROBE: NEGATIVE
SODIUM SERPL-SCNC: 140 MMOL/L (ref 136–145)
SP GR UR STRIP: 1.01 (ref 1–1.03)
SQUAMOUS #/AREA URNS HPF: 1 /HPF
T4 FREE SERPL-MCNC: 1.06 NG/DL (ref 0.71–1.51)
TROPONIN I SERPL DL<=0.01 NG/ML-MCNC: 0.01 NG/ML (ref 0–0.03)
TSH SERPL DL<=0.005 MIU/L-ACNC: 0.12 UIU/ML (ref 0.4–4)
URN SPEC COLLECT METH UR: ABNORMAL
UROBILINOGEN UR STRIP-ACNC: NEGATIVE EU/DL
WBC # BLD AUTO: 5.29 K/UL (ref 3.9–12.7)
WBC #/AREA URNS HPF: 12 /HPF (ref 0–5)

## 2022-07-15 PROCEDURE — 84484 ASSAY OF TROPONIN QUANT: CPT | Performed by: EMERGENCY MEDICINE

## 2022-07-15 PROCEDURE — 93010 EKG 12-LEAD: ICD-10-PCS | Mod: ,,, | Performed by: INTERNAL MEDICINE

## 2022-07-15 PROCEDURE — U0002 COVID-19 LAB TEST NON-CDC: HCPCS | Performed by: EMERGENCY MEDICINE

## 2022-07-15 PROCEDURE — 93005 ELECTROCARDIOGRAM TRACING: CPT

## 2022-07-15 PROCEDURE — 84439 ASSAY OF FREE THYROXINE: CPT | Performed by: EMERGENCY MEDICINE

## 2022-07-15 PROCEDURE — 81000 URINALYSIS NONAUTO W/SCOPE: CPT | Performed by: EMERGENCY MEDICINE

## 2022-07-15 PROCEDURE — 84443 ASSAY THYROID STIM HORMONE: CPT | Performed by: EMERGENCY MEDICINE

## 2022-07-15 PROCEDURE — 83880 ASSAY OF NATRIURETIC PEPTIDE: CPT | Performed by: EMERGENCY MEDICINE

## 2022-07-15 PROCEDURE — 99285 EMERGENCY DEPT VISIT HI MDM: CPT | Mod: 25

## 2022-07-15 PROCEDURE — 82962 GLUCOSE BLOOD TEST: CPT

## 2022-07-15 PROCEDURE — 80053 COMPREHEN METABOLIC PANEL: CPT | Performed by: EMERGENCY MEDICINE

## 2022-07-15 PROCEDURE — 93010 ELECTROCARDIOGRAM REPORT: CPT | Mod: ,,, | Performed by: INTERNAL MEDICINE

## 2022-07-15 PROCEDURE — 85025 COMPLETE CBC W/AUTO DIFF WBC: CPT | Performed by: EMERGENCY MEDICINE

## 2022-07-15 PROCEDURE — 25000003 PHARM REV CODE 250: Performed by: EMERGENCY MEDICINE

## 2022-07-15 PROCEDURE — 96360 HYDRATION IV INFUSION INIT: CPT

## 2022-07-15 PROCEDURE — 87086 URINE CULTURE/COLONY COUNT: CPT | Performed by: EMERGENCY MEDICINE

## 2022-07-15 PROCEDURE — 96361 HYDRATE IV INFUSION ADD-ON: CPT

## 2022-07-15 RX ORDER — ACETAMINOPHEN 500 MG
500 TABLET ORAL EVERY 6 HOURS PRN
Qty: 13 TABLET | Refills: 0 | Status: SHIPPED | OUTPATIENT
Start: 2022-07-15 | End: 2022-07-15 | Stop reason: SDUPTHER

## 2022-07-15 RX ORDER — CLONIDINE HYDROCHLORIDE 0.1 MG/1
0.1 TABLET ORAL
Status: COMPLETED | OUTPATIENT
Start: 2022-07-15 | End: 2022-07-15

## 2022-07-15 RX ORDER — CIPROFLOXACIN 500 MG/1
500 TABLET ORAL 2 TIMES DAILY
Qty: 20 TABLET | Refills: 0 | Status: SHIPPED | OUTPATIENT
Start: 2022-07-15 | End: 2022-07-15 | Stop reason: SDUPTHER

## 2022-07-15 RX ORDER — ACETAMINOPHEN 500 MG
500 TABLET ORAL EVERY 6 HOURS PRN
Qty: 13 TABLET | Refills: 0 | Status: SHIPPED | OUTPATIENT
Start: 2022-07-15

## 2022-07-15 RX ORDER — CIPROFLOXACIN 500 MG/1
500 TABLET ORAL 2 TIMES DAILY
Qty: 6 TABLET | Refills: 0 | Status: SHIPPED | OUTPATIENT
Start: 2022-07-15 | End: 2022-07-18

## 2022-07-15 RX ORDER — CIPROFLOXACIN 500 MG/1
500 TABLET ORAL
Status: COMPLETED | OUTPATIENT
Start: 2022-07-15 | End: 2022-07-15

## 2022-07-15 RX ADMIN — SODIUM CHLORIDE 1000 ML: 0.9 INJECTION, SOLUTION INTRAVENOUS at 09:07

## 2022-07-15 RX ADMIN — CIPROFLOXACIN 500 MG: 500 TABLET, FILM COATED ORAL at 10:07

## 2022-07-15 RX ADMIN — CLONIDINE HYDROCHLORIDE 0.1 MG: 0.1 TABLET ORAL at 10:07

## 2022-07-15 NOTE — ED PROVIDER NOTES
Encounter Date: 7/15/2022    SCRIBE #1 NOTE: I, Eulogio Hale, am scribing for, and in the presence of,  Negro Mccullough MD. I have scribed the following portions of the note - Other sections scribed: HPI, ROS, PE.       History     Chief Complaint   Patient presents with    Loss of Consciousness     EMS called to 89yo female that had a possible syncope. She was found on the floor, unknown if she hit her head, takes eliquis. C/o right leg pain. Stated she got weak and told EMS that she has not eaten in 4 days. Also was home alone with only a 6yo child.      Daiana Mcduffie is a 89 y.o. female, with a PMHx of HLDM, HTN, DVT, and Chronic PE, who presents to the ED s/p possible syncopal episode PTA. EMS personnel report patient was found on the floor upon arrival and it is uncertain whether she hit her head. EMS additionally express patient was at home alone with only a 5 y.o. child. Report patient told them she felt weak because she has not eaten in 4 days.  have been contacted. Patient only complaining of right leg pain in ED, but also endorses Hx of Arthritis. Compliant with Eliquis. Per chart review, patient has been seen in the ED several times over the last year secondary to falls and AMS. No other exacerbating or alleviating factors. No other associated symptoms. Patient is allergic to Ibuprofen and Penicillins.    The history is provided by the patient and the EMS personnel. No  was used.     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  No family history on file.  Social History     Tobacco Use    Smoking status: Never Smoker    Smokeless  tobacco: Never Used   Substance Use Topics    Alcohol use: No    Drug use: No     Review of Systems   Unable to perform ROS: Age       Physical Exam     Initial Vitals [07/15/22 1825]   BP Pulse Resp Temp SpO2   (!) 187/93 80 (!) 24 98.6 °F (37 °C) 98 %      MAP       --         Physical Exam    Nursing note and vitals reviewed.  HENT:   Head: Normocephalic and atraumatic.   Mouth/Throat: Oropharynx is clear and moist and mucous membranes are normal.   Neck: Neck supple.   Normal range of motion.   Full passive range of motion without pain.     Cardiovascular: Normal rate, regular rhythm, S1 normal, S2 normal and normal heart sounds. Exam reveals no gallop and no friction rub.    No murmur heard.  Pulmonary/Chest: Effort normal and breath sounds normal. No respiratory distress.   Abdominal: Abdomen is soft. She exhibits no distension. There is no abdominal tenderness.   Musculoskeletal:         General: No edema. Normal range of motion.      Cervical back: Full passive range of motion without pain, normal range of motion and neck supple.      Right lower leg: Tenderness present.      Comments: Minimal tenderness to right shin. No lower extremity edema or cyanosis.      Neurological:   Poor historian at 1st however once I get patient to a room was able to kill me a detailed history of what happened.  Moving all extremities.  Ambulatory.  No facial droop.  No sensory deficit.  Extraocular moves intact.   Skin: Skin is warm. No ecchymosis and no rash noted.   Psychiatric: She has a normal mood and affect. Thought content normal.         ED Course   Procedures  Labs Reviewed   URINALYSIS, REFLEX TO URINE CULTURE - Abnormal; Notable for the following components:       Result Value    Color, UA Colorless (*)     Leukocytes, UA 2+ (*)     All other components within normal limits    Narrative:     Specimen Source->Urine   COMPREHENSIVE METABOLIC PANEL - Abnormal; Notable for the following components:    CO2 21 (*)      Albumin 3.2 (*)     ALT 9 (*)     All other components within normal limits   CBC W/ AUTO DIFFERENTIAL - Abnormal; Notable for the following components:    MCHC 30.6 (*)     All other components within normal limits   TSH - Abnormal; Notable for the following components:    TSH 0.125 (*)     All other components within normal limits   B-TYPE NATRIURETIC PEPTIDE - Abnormal; Notable for the following components:     (*)     All other components within normal limits   URINALYSIS MICROSCOPIC - Abnormal; Notable for the following components:    WBC, UA 12 (*)     Bacteria Few (*)     All other components within normal limits    Narrative:     Specimen Source->Urine   CULTURE, URINE   TROPONIN I   T4, FREE   SARS-COV-2 RDRP GENE   POCT GLUCOSE   POCT GLUCOSE MONITORING CONTINUOUS     EKG Readings: (Independently Interpreted)   EKG done at 8:10 p.m. showed normal sinus rhythm short GA with rate 85. Flat T-waves diffusely.  No ST elevation.  Normal axis QRS.  Nonspecific EKG.       Imaging Results          CT Head Without Contrast (Final result)  Result time 07/15/22 20:30:42    Final result by Cherie Arita MD (07/15/22 20:30:42)                 Impression:      No acute intracranial abnormalities identified.      Electronically signed by: Cherie Arita MD  Date:    07/15/2022  Time:    20:30             Narrative:    EXAMINATION:  CT HEAD WITHOUT CONTRAST    CLINICAL HISTORY:  Head trauma, minor (Age >= 65y);    TECHNIQUE:  Low dose axial images were obtained through the head.  Coronal and sagittal reformations were also performed. Contrast was not administered.    COMPARISON:  CT head from 03/29/2022.    FINDINGS:  There is generalized cerebral volume loss and chronic microvascular ischemic disease.  Small remote lacunar infarct is seen within the left basal ganglia.  No evidence of acute/recent major vascular distribution cerebral infarction, intraparenchymal hemorrhage, or intra-axial space occupying  lesion. The ventricular system is normal in size and configuration with no evidence of hydrocephalus. No effacement of the skull-base cisterns. No abnormal extra-axial fluid collections or blood products.  There is bilateral maxillary sinus disease with suspected large left maxillary mucous retention cyst and smaller right maxillary mucous retention cysts.  Remaining visualized paranasal sinuses and mastoid air cells are essentially clear.  The calvarium shows no significant abnormality.                               CT Cervical Spine Without Contrast (Final result)  Result time 07/15/22 20:34:45    Final result by Cherie Arita MD (07/15/22 20:34:45)                 Impression:      No evidence of acute cervical spine fracture or dislocation.      Electronically signed by: Cherie Arita MD  Date:    07/15/2022  Time:    20:34             Narrative:    EXAMINATION:  CT CERVICAL SPINE WITHOUT CONTRAST    CLINICAL HISTORY:  Neck trauma (Age >= 65y);    TECHNIQUE:  Low dose axial images, sagittal and coronal reformations were performed though the cervical spine.  Contrast was not administered.    COMPARISON:  CT cervical spine from September 2021.    FINDINGS:  No evidence of acute cervical spine fracture or dislocation.  Odontoid process is intact.  Craniocervical junction is unremarkable.  Cervical spine alignment is within normal limits.  Grossly multilevel stable degenerative changes are seen.    Surrounding soft tissues show no significant abnormalities.  Airway is patent.  Partially visualized lung apices are clear.                               X-Ray Chest AP Portable (Final result)  Result time 07/15/22 19:31:59    Final result by Cherie Arita MD (07/15/22 19:31:59)                 Impression:      No acute cardiopulmonary process identified.      Electronically signed by: Cherie Arita MD  Date:    07/15/2022  Time:    19:31             Narrative:    EXAMINATION:  XR CHEST AP PORTABLE    CLINICAL  HISTORY:  fall;    TECHNIQUE:  Single frontal view of the chest was performed.    COMPARISON:  September 2021.    FINDINGS:  Cardiac silhouette is stable in size.  Lungs are symmetrically expanded.  No evidence of focal consolidative process, pneumothorax, or significant pleural effusion.  No acute osseous abnormality identified.                               X-Ray Hip 2 or 3 views Right (with Pelvis when performed) (Final result)  Result time 07/15/22 19:33:57    Final result by Cherie Arita MD (07/15/22 19:33:57)                 Impression:      No acute displaced fracture seen.      Electronically signed by: Cherie Arita MD  Date:    07/15/2022  Time:    19:33             Narrative:    EXAMINATION:  XR HIP WITH PELVIS WHEN PERFORMED, 2 OR 3  VIEWS RIGHT; XR FEMUR 2 VIEW RIGHT; XR TIBIA FIBULA 2 VIEW RIGHT    CLINICAL HISTORY:  Unspecified fall, initial encounter    TECHNIQUE:  AP view of the pelvis and frog leg lateral view of the right hip were performed.  Right femur AP and lateral.  Right tibia fibula AP and lateral.    COMPARISON:  None    FINDINGS:  No evidence of acute displaced fracture, dislocation, or osseous destructive process.  Mild degenerative changes are seen involving the bilateral hips.  Joint spaces of the right knee appear fairly well preserved.  No abnormal widening of the ankle mortise.                               X-Ray Femur 2 AP/LAT Right (Final result)  Result time 07/15/22 19:33:57    Final result by Cherie Arita MD (07/15/22 19:33:57)                 Impression:      No acute displaced fracture seen.      Electronically signed by: Cherie Arita MD  Date:    07/15/2022  Time:    19:33             Narrative:    EXAMINATION:  XR HIP WITH PELVIS WHEN PERFORMED, 2 OR 3  VIEWS RIGHT; XR FEMUR 2 VIEW RIGHT; XR TIBIA FIBULA 2 VIEW RIGHT    CLINICAL HISTORY:  Unspecified fall, initial encounter    TECHNIQUE:  AP view of the pelvis and frog leg lateral view of the right hip were  performed.  Right femur AP and lateral.  Right tibia fibula AP and lateral.    COMPARISON:  None    FINDINGS:  No evidence of acute displaced fracture, dislocation, or osseous destructive process.  Mild degenerative changes are seen involving the bilateral hips.  Joint spaces of the right knee appear fairly well preserved.  No abnormal widening of the ankle mortise.                               X-Ray Tibia Fibula 2 View Right (Final result)  Result time 07/15/22 19:33:57    Final result by Cherie Arita MD (07/15/22 19:33:57)                 Impression:      No acute displaced fracture seen.      Electronically signed by: Cherie Arita MD  Date:    07/15/2022  Time:    19:33             Narrative:    EXAMINATION:  XR HIP WITH PELVIS WHEN PERFORMED, 2 OR 3  VIEWS RIGHT; XR FEMUR 2 VIEW RIGHT; XR TIBIA FIBULA 2 VIEW RIGHT    CLINICAL HISTORY:  Unspecified fall, initial encounter    TECHNIQUE:  AP view of the pelvis and frog leg lateral view of the right hip were performed.  Right femur AP and lateral.  Right tibia fibula AP and lateral.    COMPARISON:  None    FINDINGS:  No evidence of acute displaced fracture, dislocation, or osseous destructive process.  Mild degenerative changes are seen involving the bilateral hips.  Joint spaces of the right knee appear fairly well preserved.  No abnormal widening of the ankle mortise.                                 Medications   sodium chloride 0.9% bolus 1,000 mL (1,000 mLs Intravenous New Bag 7/15/22 2106)   ciprofloxacin HCl tablet 500 mg (500 mg Oral Given 7/15/22 2206)   cloNIDine tablet 0.1 mg (0.1 mg Oral Given 7/15/22 2206)     Medical Decision Making:   History:   Old Medical Records: I decided to obtain old medical records.  Initial Assessment:   89-year-old female presenting secondary story above.  Will get patient into a room she gave a completely different story.  She states that she was able to focus easier than initial conversation in the hallway.  She  states that she was walking with her phone and she misstepped and landed on her right look leg and right thigh.  She states that she was having trouble getting up and that is when EMS was called.  She did not hit her head and not having loss conscious.  She has only having minimal pain.  Not anything for pain.  Able to ambulate to the bathroom.  Denied any chest pain or shortness of breath or any other complaints.  Labs reassuring.  Clonidine for hypertension.  Patient did not take her blood pressure medications today per her.  I did have a conversation with her granddaughter.  Granddaughter states that they a stock the Fridge today.  They do not allow her to Cook.  They have someone living in the house with her on a regular basis that prepares food for her and that they go by the house will at least once a day to check on her if not more.  Recently they have not been going is much secondary the fact that there is a recent COVID outbreak and they were worried about exposing her.  Discharging patient with Cipro. I discussed with the patient/family the diagnosis, treatment plan, indications for return to the emergency department, and for expected follow-up. The patient/family verbalized an understanding. The patient/family is asked if there are any questions or concerns. We discuss the case, until all issues are addressed to the patient/familys satisfaction. Patient/family understands and is agreeable to the plan.   Negro Mccullough      Independently Interpreted Test(s):   I have ordered and independently interpreted EKG Reading(s) - see prior notes  Clinical Tests:   Lab Tests: Ordered and Reviewed  Radiological Study: Ordered and Reviewed  Medical Tests: Ordered and Reviewed          Scribe Attestation:   Scribe #1: I performed the above scribed service and the documentation accurately describes the services I performed. I attest to the accuracy of the note.                 Clinical Impression:   Final  diagnoses:  [W19.XXXA] Fall (Primary)  [S80.11XA] Contusion of multiple sites of right lower extremity, initial encounter  [N39.0] Urinary tract infection without hematuria, site unspecified       Inegro, personally performed the services described in this documentation. All medical record entries made by the scribe were at my direction and in my presence. I have reviewed the chart and agree that the record reflects my personal performance and is accurate and complete.   ED Disposition Condition    Discharge Stable        ED Prescriptions     Medication Sig Dispense Start Date End Date Auth. Provider    ciprofloxacin HCl (CIPRO) 500 MG tablet  (Status: Discontinued) Take 1 tablet (500 mg total) by mouth 2 (two) times daily. for 10 days 20 tablet 7/15/2022 7/15/2022 Negro Mccullough MD    acetaminophen (TYLENOL) 500 MG tablet  (Status: Discontinued) Take 1 tablet (500 mg total) by mouth every 6 (six) hours as needed for Pain. 13 tablet 7/15/2022 7/15/2022 Negro Mccullough MD    ciprofloxacin HCl (CIPRO) 500 MG tablet Take 1 tablet (500 mg total) by mouth 2 (two) times daily. for 3 days 6 tablet 7/15/2022 7/18/2022 Negro Mccullough MD    acetaminophen (TYLENOL) 500 MG tablet Take 1 tablet (500 mg total) by mouth every 6 (six) hours as needed for Pain. 13 tablet 7/15/2022  Negro Mccullough MD        Follow-up Information     Follow up With Specialties Details Why Contact Info    [facility]  Schedule an appointment as soon as possible for a visit in 2 days  [address]  [place] [address]  [phone]             Negro Mccullough MD  07/15/22 5066

## 2022-07-16 NOTE — DISCHARGE INSTRUCTIONS
Thank you for coming to our Emergency Department today. It is important to remember that some problems are difficult to diagnose and may not be found during your first visit. Be sure to follow up with your primary care doctor and review any labs/imaging that was performed with them. If you do not have a primary care doctor, you may contact the one listed on your discharge paperwork or you may also call the [facility] Appointment Desk at [phone] to schedule an appointment with one.     All medications may potentially have side effects and it is impossible to predict which medications may give you side effects. If you feel that you are having a negative effect of any medication you should immediately stop taking them and seek medical attention.    Return to the ER with any questions/concerns, new/concerning symptoms, worsening or failure to improve. Do not drive or make any important decisions for 24 hours if you have received any pain medications, sedatives or mood altering drugs during your ER visit.

## 2022-07-16 NOTE — ED NOTES
Pt said she was hungry and was provided a sandwich and juice. She was able to go to restroom and get urine sample with assistance walking.

## 2022-07-16 NOTE — ED NOTES
Pt was assisted to restroom again. She became weak on the walk back but was able to get in the bed.

## 2022-07-17 LAB — BACTERIA UR CULT: NORMAL

## 2022-09-07 ENCOUNTER — OFFICE VISIT (OUTPATIENT)
Dept: FAMILY MEDICINE | Facility: CLINIC | Age: 87
End: 2022-09-07
Payer: MEDICARE

## 2022-09-07 ENCOUNTER — PATIENT OUTREACH (OUTPATIENT)
Dept: ADMINISTRATIVE | Facility: OTHER | Age: 87
End: 2022-09-07
Payer: MEDICARE

## 2022-09-07 ENCOUNTER — LAB VISIT (OUTPATIENT)
Dept: LAB | Facility: HOSPITAL | Age: 87
End: 2022-09-07
Attending: FAMILY MEDICINE
Payer: MEDICARE

## 2022-09-07 VITALS
TEMPERATURE: 98 F | HEART RATE: 78 BPM | OXYGEN SATURATION: 98 % | BODY MASS INDEX: 20.36 KG/M2 | RESPIRATION RATE: 18 BRPM | SYSTOLIC BLOOD PRESSURE: 138 MMHG | DIASTOLIC BLOOD PRESSURE: 80 MMHG | WEIGHT: 119.25 LBS | HEIGHT: 64 IN

## 2022-09-07 DIAGNOSIS — I26.99 PE (PULMONARY THROMBOEMBOLISM): ICD-10-CM

## 2022-09-07 DIAGNOSIS — H91.93 BILATERAL HEARING LOSS, UNSPECIFIED HEARING LOSS TYPE: ICD-10-CM

## 2022-09-07 DIAGNOSIS — E78.5 HYPERLIPIDEMIA, UNSPECIFIED HYPERLIPIDEMIA TYPE: ICD-10-CM

## 2022-09-07 DIAGNOSIS — I26.99 PE (PULMONARY THROMBOEMBOLISM): Primary | ICD-10-CM

## 2022-09-07 DIAGNOSIS — R79.9 ABNORMAL FINDING OF BLOOD CHEMISTRY, UNSPECIFIED: ICD-10-CM

## 2022-09-07 DIAGNOSIS — I27.20 PULMONARY HYPERTENSION: ICD-10-CM

## 2022-09-07 DIAGNOSIS — R41.3 MEMORY LOSS: ICD-10-CM

## 2022-09-07 DIAGNOSIS — I10 ESSENTIAL HYPERTENSION: ICD-10-CM

## 2022-09-07 DIAGNOSIS — Z91.81 HISTORY OF FALL: ICD-10-CM

## 2022-09-07 LAB
ALBUMIN SERPL BCP-MCNC: 3.6 G/DL (ref 3.5–5.2)
ALP SERPL-CCNC: 73 U/L (ref 55–135)
ALT SERPL W/O P-5'-P-CCNC: 9 U/L (ref 10–44)
ANION GAP SERPL CALC-SCNC: 12 MMOL/L (ref 8–16)
AST SERPL-CCNC: 18 U/L (ref 10–40)
BASOPHILS # BLD AUTO: 0.04 K/UL (ref 0–0.2)
BASOPHILS NFR BLD: 0.9 % (ref 0–1.9)
BILIRUB SERPL-MCNC: 0.4 MG/DL (ref 0.1–1)
BUN SERPL-MCNC: 17 MG/DL (ref 10–30)
CALCIUM SERPL-MCNC: 9.7 MG/DL (ref 8.7–10.5)
CHLORIDE SERPL-SCNC: 107 MMOL/L (ref 95–110)
CHOLEST SERPL-MCNC: 296 MG/DL (ref 120–199)
CHOLEST/HDLC SERPL: 3.5 {RATIO} (ref 2–5)
CO2 SERPL-SCNC: 24 MMOL/L (ref 23–29)
CREAT SERPL-MCNC: 0.8 MG/DL (ref 0.5–1.4)
DIFFERENTIAL METHOD: ABNORMAL
EOSINOPHIL # BLD AUTO: 0 K/UL (ref 0–0.5)
EOSINOPHIL NFR BLD: 0.5 % (ref 0–8)
ERYTHROCYTE [DISTWIDTH] IN BLOOD BY AUTOMATED COUNT: 13.3 % (ref 11.5–14.5)
EST. GFR  (NO RACE VARIABLE): >60 ML/MIN/1.73 M^2
ESTIMATED AVG GLUCOSE: 114 MG/DL (ref 68–131)
GLUCOSE SERPL-MCNC: 95 MG/DL (ref 70–110)
HBA1C MFR BLD: 5.6 % (ref 4–5.6)
HCT VFR BLD AUTO: 42 % (ref 37–48.5)
HDLC SERPL-MCNC: 85 MG/DL (ref 40–75)
HDLC SERPL: 28.7 % (ref 20–50)
HGB BLD-MCNC: 12.7 G/DL (ref 12–16)
IMM GRANULOCYTES # BLD AUTO: 0.01 K/UL (ref 0–0.04)
IMM GRANULOCYTES NFR BLD AUTO: 0.2 % (ref 0–0.5)
LDLC SERPL CALC-MCNC: 188 MG/DL (ref 63–159)
LYMPHOCYTES # BLD AUTO: 1.8 K/UL (ref 1–4.8)
LYMPHOCYTES NFR BLD: 40.5 % (ref 18–48)
MCH RBC QN AUTO: 29.5 PG (ref 27–31)
MCHC RBC AUTO-ENTMCNC: 30.2 G/DL (ref 32–36)
MCV RBC AUTO: 98 FL (ref 82–98)
MONOCYTES # BLD AUTO: 0.4 K/UL (ref 0.3–1)
MONOCYTES NFR BLD: 8.8 % (ref 4–15)
NEUTROPHILS # BLD AUTO: 2.2 K/UL (ref 1.8–7.7)
NEUTROPHILS NFR BLD: 49.1 % (ref 38–73)
NONHDLC SERPL-MCNC: 211 MG/DL
NRBC BLD-RTO: 0 /100 WBC
PLATELET # BLD AUTO: 183 K/UL (ref 150–450)
PMV BLD AUTO: 10.8 FL (ref 9.2–12.9)
POTASSIUM SERPL-SCNC: 4.4 MMOL/L (ref 3.5–5.1)
PROT SERPL-MCNC: 7.1 G/DL (ref 6–8.4)
RBC # BLD AUTO: 4.3 M/UL (ref 4–5.4)
SODIUM SERPL-SCNC: 143 MMOL/L (ref 136–145)
TRIGL SERPL-MCNC: 115 MG/DL (ref 30–150)
TSH SERPL DL<=0.005 MIU/L-ACNC: 1.03 UIU/ML (ref 0.4–4)
WBC # BLD AUTO: 4.42 K/UL (ref 3.9–12.7)

## 2022-09-07 PROCEDURE — 83036 HEMOGLOBIN GLYCOSYLATED A1C: CPT | Performed by: FAMILY MEDICINE

## 2022-09-07 PROCEDURE — 99397 PR PREVENTIVE VISIT,EST,65 & OVER: ICD-10-PCS | Mod: S$PBB,GZ,, | Performed by: FAMILY MEDICINE

## 2022-09-07 PROCEDURE — 36415 COLL VENOUS BLD VENIPUNCTURE: CPT | Mod: PO | Performed by: FAMILY MEDICINE

## 2022-09-07 PROCEDURE — 99999 PR PBB SHADOW E&M-EST. PATIENT-LVL III: ICD-10-PCS | Mod: PBBFAC,,, | Performed by: FAMILY MEDICINE

## 2022-09-07 PROCEDURE — 85025 COMPLETE CBC W/AUTO DIFF WBC: CPT | Performed by: FAMILY MEDICINE

## 2022-09-07 PROCEDURE — 80061 LIPID PANEL: CPT | Performed by: FAMILY MEDICINE

## 2022-09-07 PROCEDURE — 80053 COMPREHEN METABOLIC PANEL: CPT | Performed by: FAMILY MEDICINE

## 2022-09-07 PROCEDURE — 99213 OFFICE O/P EST LOW 20 MIN: CPT | Mod: PBBFAC,PO | Performed by: FAMILY MEDICINE

## 2022-09-07 PROCEDURE — 99397 PER PM REEVAL EST PAT 65+ YR: CPT | Mod: S$PBB,GZ,, | Performed by: FAMILY MEDICINE

## 2022-09-07 PROCEDURE — 84443 ASSAY THYROID STIM HORMONE: CPT | Performed by: FAMILY MEDICINE

## 2022-09-07 PROCEDURE — 99999 PR PBB SHADOW E&M-EST. PATIENT-LVL III: CPT | Mod: PBBFAC,,, | Performed by: FAMILY MEDICINE

## 2022-09-07 NOTE — PROGRESS NOTES
Subjective:       Patient ID: Daiana Mcduffie is a 89 y.o. female.    Chief Complaint: [facility]      HPI  89-year-old female presents to [facility].  Is here with a friend.  Friend has been living with her for last 2 years.  States she has been having more difficulty and requires more care.  Patient states she is doing well overall.  Denies any issues at this time.  Day reordered hearing aids for her.  Has occasional memory loss which has been worsening.  Patient is ambulatory but had 2 falls recently.  Has not been taking any medications.    Review of Systems   Constitutional: Negative.    HENT: Negative.     Respiratory: Negative.     Cardiovascular: Negative.    Gastrointestinal: Negative.    Endocrine: Negative.    Genitourinary: Negative.    Musculoskeletal: Negative.    Neurological: Negative.    Psychiatric/Behavioral: Negative.          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 No pertinent surgical history.  History reviewed. No pertinent family history.  Social History     Socioeconomic History    Marital status:    Tobacco Use    Smoking status: Never    Smokeless tobacco: Never   Substance and Sexual Activity    Alcohol use: No    Drug use: No    Sexual activity: Not Currently     Social Determinants of Health     Financial Resource Strain: Low Risk     Difficulty of Paying Living Expenses: Not very hard   Food Insecurity: No Food Insecurity    Worried About Running Out of Food in the Last Year: Never true    Ran Out of Food in the Last Year: Never true   Transportation Needs: No Transportation Needs    Lack of Transportation (Medical): No    Lack of Transportation (Non-Medical): No   Physical Activity: Inactive    Days of Exercise per Week: 0 days    Minutes of Exercise per Session: 0 min   Stress: No Stress Concern Present     "Feeling of Stress : Not at all   Social Connections: Moderately Integrated    Frequency of Communication with Friends and Family: Twice a week    Frequency of Social Gatherings with Friends and Family: Once a week    Attends Taoist Services: More than 4 times per year    Active Member of Clubs or Organizations: Yes    Attends Club or Organization Meetings: More than 4 times per year    Marital Status: Never    Housing Stability: Low Risk     Unable to Pay for Housing in the Last Year: No    Number of Places Lived in the Last Year: 2    Unstable Housing in the Last Year: No       Current Outpatient Medications:     acetaminophen (TYLENOL) 500 MG tablet, Take 1 tablet (500 mg total) by mouth every 6 (six) hours as needed for Pain., Disp: 13 tablet, Rfl: 0    apixaban (ELIQUIS) 2.5 mg Tab, Take 2 tablets (5 mg) twice daily for 10 days, then take 1 tablet (2.5 mg) twice daily thereafter., Disp: 90 tablet, Rfl: 3    fluticasone propionate (FLONASE) 50 mcg/actuation nasal spray, 2 sprays (100 mcg total) by Each Nostril route once daily., Disp: 16 g, Rfl: 2    hydrOXYzine HCl (ATARAX) 25 MG tablet, Take 25 mg by mouth 3 (three) times daily as needed for Itching., Disp: , Rfl:     lisinopriL (PRINIVIL,ZESTRIL) 20 MG tablet, Take 1 tablet (20 mg total) by mouth once daily., Disp: 90 tablet, Rfl: 1    pantoprazole (PROTONIX) 20 MG tablet, Take 1 tablet (20 mg total) by mouth once daily., Disp: 90 tablet, Rfl: 3    sertraline (ZOLOFT) 25 MG tablet, Take 1 tablet (25 mg total) by mouth once daily., Disp: 90 tablet, Rfl: 3   Objective:      Vitals:    09/07/22 0918   BP: (!) 140/80   BP Location: Left arm   Patient Position: Sitting   BP Method: Small (Manual)   Pulse: 78   Resp: 18   Temp: 97.7 °F (36.5 °C)   TempSrc: Oral   SpO2: 98%   Weight: 54.1 kg (119 lb 4.3 oz)   Height: 5' 4" (1.626 m)       Physical Exam  Constitutional:       General: She is not in acute distress.  HENT:      Head: Normocephalic and " atraumatic.   Eyes:      Conjunctiva/sclera: Conjunctivae normal.   Cardiovascular:      Rate and Rhythm: Normal rate and regular rhythm.      Heart sounds: Normal heart sounds. No murmur heard.    No friction rub. No gallop.   Pulmonary:      Effort: Pulmonary effort is normal.      Breath sounds: Normal breath sounds. No wheezing or rales.   Musculoskeletal:      Cervical back: Neck supple.   Skin:     General: Skin is warm and dry.   Neurological:      Mental Status: She is alert and oriented to person, place, and time.   Psychiatric:         Behavior: Behavior normal.         Thought Content: Thought content normal.         Judgment: Judgment normal.          Assessment:       1. PE (pulmonary thromboembolism)    2. Pulmonary hypertension    3. Bilateral hearing loss, unspecified hearing loss type    4. History of fall    5. Memory loss    6. Essential hypertension    7. Hyperlipidemia, unspecified hyperlipidemia type    8. Abnormal finding of blood chemistry, unspecified          Plan:       PE (pulmonary thromboembolism)  -     CBC Auto Differential; Future; Expected date: 09/07/2022  -     Comprehensive Metabolic Panel; Future; Expected date: 09/07/2022  -     Hemoglobin A1C; Future; Expected date: 09/07/2022  -     TSH; Future; Expected date: 09/07/2022  -     Lipid Panel; Future; Expected date: 09/07/2022    Pulmonary hypertension  -     CBC Auto Differential; Future; Expected date: 09/07/2022  -     Comprehensive Metabolic Panel; Future; Expected date: 09/07/2022  -     Hemoglobin A1C; Future; Expected date: 09/07/2022  -     TSH; Future; Expected date: 09/07/2022  -     Lipid Panel; Future; Expected date: 09/07/2022    Bilateral hearing loss, unspecified hearing loss type    History of fall    Memory loss  -     Ambulatory referral/consult to Neurology; Future; Expected date: 09/14/2022    Essential hypertension  -     CBC Auto Differential; Future; Expected date: 09/07/2022  -     Comprehensive Metabolic  Panel; Future; Expected date: 09/07/2022  -     Hemoglobin A1C; Future; Expected date: 09/07/2022  -     TSH; Future; Expected date: 09/07/2022  -     Lipid Panel; Future; Expected date: 09/07/2022    Hyperlipidemia, unspecified hyperlipidemia type  -     CBC Auto Differential; Future; Expected date: 09/07/2022  -     Comprehensive Metabolic Panel; Future; Expected date: 09/07/2022  -     Hemoglobin A1C; Future; Expected date: 09/07/2022  -     TSH; Future; Expected date: 09/07/2022  -     Lipid Panel; Future; Expected date: 09/07/2022    Abnormal finding of blood chemistry, unspecified  -     Hemoglobin A1C; Future; Expected date: 09/07/2022      Hold off meds at this time. F/u labs. Had pt discuss w/ case mgmt. F/u in 3 months. Referred to neuro.        Future Appointments   Date Time Provider Department Center   12/7/2022  9:[facility]       Patient note was created using MMApp[employer].  Any errors in syntax or even information may not have been identified and edited on initial review prior to signing this note.

## 2022-09-07 NOTE — PROGRESS NOTES
Health Maintenance Due   Topic     COVID-19 Vaccine (1)     Shingles Vaccine (1 of 2)  hx chicken pox. Notified pt can get vaccine at pharmacy      Pneumococcal Vaccines (Age 65+) (1 - PCV)       Lipid Panel  Consult pcp      Influenza Vaccine (1)

## 2022-09-08 ENCOUNTER — TELEPHONE (OUTPATIENT)
Dept: FAMILY MEDICINE | Facility: CLINIC | Age: 87
End: 2022-09-08
Payer: MEDICARE

## 2022-09-08 NOTE — PROGRESS NOTES
"CHW - Initial Contact    This Community Health Worker completed OR updated the Social Determinant of Health questionnaire with patient during clinic visit today.    Pt identified barriers of most importance are: physical assistance   Referrals to community agencies completed with patient/caregiver consent outside of [facility] include: n/a  Referrals were put through [facility] - no  Support and Services: n/a  Other information discussed the patient needs / wants help with:   Follow up required: yes  9/22/22    Pt's "nephew" has been helping voluntarily  Seeking in home assistance while he's working (schedule fluctuates)      "

## 2022-09-17 ENCOUNTER — HOSPITAL ENCOUNTER (EMERGENCY)
Facility: HOSPITAL | Age: 87
Discharge: HOME | End: 2022-09-18
Attending: STUDENT IN AN ORGANIZED HEALTH CARE EDUCATION/TRAINING PROGRAM
Payer: MEDICARE

## 2022-09-17 DIAGNOSIS — R42 DIZZINESS: ICD-10-CM

## 2022-09-17 DIAGNOSIS — I10 HYPERTENSION, UNSPECIFIED TYPE: ICD-10-CM

## 2022-09-17 DIAGNOSIS — G44.211 INTRACTABLE EPISODIC TENSION-TYPE HEADACHE: Primary | ICD-10-CM

## 2022-09-17 PROCEDURE — 99285 EMERGENCY DEPT VISIT HI MDM: CPT | Mod: 25

## 2022-09-17 RX ORDER — ACETAMINOPHEN 500 MG
1000 TABLET ORAL
Status: COMPLETED | OUTPATIENT
Start: 2022-09-17 | End: 2022-09-18

## 2022-09-18 VITALS
TEMPERATURE: 98 F | HEIGHT: 64 IN | SYSTOLIC BLOOD PRESSURE: 147 MMHG | OXYGEN SATURATION: 99 % | WEIGHT: 119 LBS | BODY MASS INDEX: 20.32 KG/M2 | HEART RATE: 89 BPM | RESPIRATION RATE: 20 BRPM | DIASTOLIC BLOOD PRESSURE: 85 MMHG

## 2022-09-18 LAB
ALBUMIN SERPL BCP-MCNC: 3.7 G/DL (ref 3.5–5.2)
ALP SERPL-CCNC: 84 U/L (ref 55–135)
ALT SERPL W/O P-5'-P-CCNC: 13 U/L (ref 10–44)
ANION GAP SERPL CALC-SCNC: 12 MMOL/L (ref 8–16)
AST SERPL-CCNC: 20 U/L (ref 10–40)
BASOPHILS # BLD AUTO: 0.02 K/UL (ref 0–0.2)
BASOPHILS NFR BLD: 0.4 % (ref 0–1.9)
BILIRUB SERPL-MCNC: 0.4 MG/DL (ref 0.1–1)
BILIRUB UR QL STRIP: NEGATIVE
BUN SERPL-MCNC: 21 MG/DL (ref 10–30)
CALCIUM SERPL-MCNC: 9.6 MG/DL (ref 8.7–10.5)
CHLORIDE SERPL-SCNC: 107 MMOL/L (ref 95–110)
CLARITY UR: CLEAR
CO2 SERPL-SCNC: 23 MMOL/L (ref 23–29)
COLOR UR: YELLOW
CREAT SERPL-MCNC: 0.8 MG/DL (ref 0.5–1.4)
DIFFERENTIAL METHOD: ABNORMAL
EOSINOPHIL # BLD AUTO: 0 K/UL (ref 0–0.5)
EOSINOPHIL NFR BLD: 0.6 % (ref 0–8)
ERYTHROCYTE [DISTWIDTH] IN BLOOD BY AUTOMATED COUNT: 13.2 % (ref 11.5–14.5)
EST. GFR  (NO RACE VARIABLE): >60 ML/MIN/1.73 M^2
GLUCOSE SERPL-MCNC: 102 MG/DL (ref 70–110)
GLUCOSE UR QL STRIP: NEGATIVE
HCT VFR BLD AUTO: 43.2 % (ref 37–48.5)
HGB BLD-MCNC: 13.3 G/DL (ref 12–16)
HGB UR QL STRIP: NEGATIVE
IMM GRANULOCYTES # BLD AUTO: 0.01 K/UL (ref 0–0.04)
IMM GRANULOCYTES NFR BLD AUTO: 0.2 % (ref 0–0.5)
INR PPP: 1 (ref 0.8–1.2)
KETONES UR QL STRIP: NEGATIVE
LEUKOCYTE ESTERASE UR QL STRIP: NEGATIVE
LYMPHOCYTES # BLD AUTO: 2.5 K/UL (ref 1–4.8)
LYMPHOCYTES NFR BLD: 49.5 % (ref 18–48)
MCH RBC QN AUTO: 28.8 PG (ref 27–31)
MCHC RBC AUTO-ENTMCNC: 30.8 G/DL (ref 32–36)
MCV RBC AUTO: 94 FL (ref 82–98)
MONOCYTES # BLD AUTO: 0.4 K/UL (ref 0.3–1)
MONOCYTES NFR BLD: 8.3 % (ref 4–15)
NEUTROPHILS # BLD AUTO: 2.1 K/UL (ref 1.8–7.7)
NEUTROPHILS NFR BLD: 41 % (ref 38–73)
NITRITE UR QL STRIP: NEGATIVE
NRBC BLD-RTO: 0 /100 WBC
PH UR STRIP: 8 [PH] (ref 5–8)
PLATELET # BLD AUTO: 162 K/UL (ref 150–450)
PMV BLD AUTO: 10.8 FL (ref 9.2–12.9)
POTASSIUM SERPL-SCNC: 4.3 MMOL/L (ref 3.5–5.1)
PROT SERPL-MCNC: 7.4 G/DL (ref 6–8.4)
PROT UR QL STRIP: NEGATIVE
PROTHROMBIN TIME: 10.5 SEC (ref 9–12.5)
RBC # BLD AUTO: 4.62 M/UL (ref 4–5.4)
SODIUM SERPL-SCNC: 142 MMOL/L (ref 136–145)
SP GR UR STRIP: 1.01 (ref 1–1.03)
TROPONIN I SERPL DL<=0.01 NG/ML-MCNC: <0.006 NG/ML (ref 0–0.03)
URN SPEC COLLECT METH UR: NORMAL
UROBILINOGEN UR STRIP-ACNC: NEGATIVE EU/DL
WBC # BLD AUTO: 5.05 K/UL (ref 3.9–12.7)

## 2022-09-18 PROCEDURE — 93010 ELECTROCARDIOGRAM REPORT: CPT | Mod: ,,, | Performed by: INTERNAL MEDICINE

## 2022-09-18 PROCEDURE — 25000003 PHARM REV CODE 250: Performed by: STUDENT IN AN ORGANIZED HEALTH CARE EDUCATION/TRAINING PROGRAM

## 2022-09-18 PROCEDURE — 85025 COMPLETE CBC W/AUTO DIFF WBC: CPT | Performed by: STUDENT IN AN ORGANIZED HEALTH CARE EDUCATION/TRAINING PROGRAM

## 2022-09-18 PROCEDURE — 84484 ASSAY OF TROPONIN QUANT: CPT | Performed by: STUDENT IN AN ORGANIZED HEALTH CARE EDUCATION/TRAINING PROGRAM

## 2022-09-18 PROCEDURE — 93010 EKG 12-LEAD: ICD-10-PCS | Mod: ,,, | Performed by: INTERNAL MEDICINE

## 2022-09-18 PROCEDURE — 85610 PROTHROMBIN TIME: CPT | Performed by: STUDENT IN AN ORGANIZED HEALTH CARE EDUCATION/TRAINING PROGRAM

## 2022-09-18 PROCEDURE — 80053 COMPREHEN METABOLIC PANEL: CPT | Performed by: STUDENT IN AN ORGANIZED HEALTH CARE EDUCATION/TRAINING PROGRAM

## 2022-09-18 PROCEDURE — 93005 ELECTROCARDIOGRAM TRACING: CPT

## 2022-09-18 PROCEDURE — 81003 URINALYSIS AUTO W/O SCOPE: CPT | Performed by: STUDENT IN AN ORGANIZED HEALTH CARE EDUCATION/TRAINING PROGRAM

## 2022-09-18 RX ORDER — AMLODIPINE BESYLATE 5 MG/1
5 TABLET ORAL
Status: COMPLETED | OUTPATIENT
Start: 2022-09-18 | End: 2022-09-18

## 2022-09-18 RX ORDER — CARVEDILOL 6.25 MG/1
6.25 TABLET ORAL 2 TIMES DAILY WITH MEALS
Qty: 60 TABLET | Refills: 11 | Status: SHIPPED | OUTPATIENT
Start: 2022-09-18 | End: 2023-09-18

## 2022-09-18 RX ADMIN — AMLODIPINE BESYLATE 5 MG: 5 TABLET ORAL at 01:09

## 2022-09-18 RX ADMIN — ACETAMINOPHEN 1000 MG: 500 TABLET ORAL at 12:09

## 2022-09-18 NOTE — ED TRIAGE NOTES
Daiana Mcduffie is a 89 y.o female to ED by EMS c/o HA and dizziness for a few months.  Patient is non-compliant with medication.  Lives alone but has an occasional caretaker.  Currently caretaker is out of town.

## 2022-09-18 NOTE — ED PROVIDER NOTES
"Encounter Date: 9/17/2022    SCRIBE #1 NOTE: I, Radha Cande, am scribing for, and in the presence of,  Jin Byrd MD.     History     Chief Complaint   Patient presents with    Dizziness    Headache     Pt c/o intermittent headache and dizziness for "months" Per EMS, "pt lives alone and is non-complaint with meds. Pt has an occasional caretaker but is currently out of town."      89 yo F with PMHx of PE, GERD, HTN, HLD, presents to the ED via EMS for chronic intermittent, generalized headaches. She states her headaches have been ongoing for a while, endorses speech difficulty when her headaches intensify, states "I can't talk, I just don't feel like it". Also endorses occasional intermittent shortness of breath. Patient had a home health nurse but states she "does not know when they last came". She also lives alone and states none of her family members check on her. She denies any chest pain, acute visual disturbance, fever, chills, abdominal pain, rectal pain, other somatic pain, or other associated symptoms. Hx limited secondary to patient being [place] and with possible dementia.     The history is provided by the patient.   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 No pertinent surgical history.  History reviewed. No pertinent family history.  Social History     Tobacco Use    Smoking status: Never    Smokeless tobacco: Never   Substance Use Topics    Alcohol use: No    Drug use: No     Review of Systems   Constitutional:  Negative for chills and fever.   HENT:  Negative for congestion and rhinorrhea.    Eyes:  Negative for pain and visual disturbance.   Respiratory:  Positive for shortness of breath. " Negative for cough.    Cardiovascular:  Negative for chest pain and leg swelling.   Gastrointestinal:  Negative for abdominal pain, diarrhea, nausea, rectal pain and vomiting.   Endocrine: Negative for polyuria.   Genitourinary:  Negative for dysuria and hematuria.   Musculoskeletal:  Negative for gait problem and neck pain.   Skin:  Negative for rash.   Allergic/Immunologic: Negative for immunocompromised state.   Neurological:  Positive for speech difficulty (w/ headaches) and headaches. Negative for weakness.     Physical Exam     Initial Vitals [09/17/22 2204]   BP Pulse Resp Temp SpO2   (!) 188/98 82 16 97.7 °F (36.5 °C) 97 %      MAP       --         Physical Exam    Nursing note and vitals reviewed.  Constitutional: She is not diaphoretic. No distress.   Cachetic   HENT:   Head: Normocephalic and atraumatic.   Eyes: Conjunctivae and EOM are normal. Pupils are equal, round, and reactive to light.   Neck: No JVD present.   Normal range of motion.  Cardiovascular:  Normal rate and regular rhythm.           Pulmonary/Chest: Breath sounds normal. No respiratory distress.   Abdominal: Abdomen is soft. Bowel sounds are normal. She exhibits no distension. There is no abdominal tenderness. There is no rebound and no guarding.   Musculoskeletal:         General: No tenderness or edema. Normal range of motion.      Cervical back: Normal range of motion.     Lymphadenopathy:     She has no cervical adenopathy.   Neurological: She is alert and oriented to person, place, and time.   Moves all extremities and carries on conversation. CN- II: PERRL; III/IV/VI: EOMI w/out evidence of nystagmus; V: no deficits appreciated to light touch bilateral face; VII: no facial weakness, no facial asymmetry. Eyebrow raise symmetric. Smile symmetric; IX/X: palate midline, and raises symmetrically; XI: shoulder shrug 5/5 bilaterally; XII: tongue is midline w/out asymmetry. Strength 5/5 to bilateral upper and 4/5 in lower extremities,  sensation intact to light touch.      Skin: Skin is warm. Capillary refill takes less than 2 seconds.   Psychiatric: She has a normal mood and affect.       ED Course   Procedures  Labs Reviewed   CBC W/ AUTO DIFFERENTIAL - Abnormal; Notable for the following components:       Result Value    MCHC 30.8 (*)     Lymph % 49.5 (*)     All other components within normal limits   COMPREHENSIVE METABOLIC PANEL   URINALYSIS, REFLEX TO URINE CULTURE    Narrative:     Specimen Source->Urine   PROTIME-INR   TROPONIN I     EKG Readings: (Independently Interpreted)   EKG obtained at 12:36 p.m. a.m. with sinus tachycardia with a ventricular rate of 101.  Intervals within normal limits.  No ST elevation or depression to suggest ischemia.  Nonspecific T-wave abnormalities.     Imaging Results              X-Ray Chest AP Portable (Final result)  Result time 09/17/22 23:51:40      Final result by Seth Painter MD (09/17/22 23:51:40)                   Impression:      Stable examination.  No acute process.      Electronically signed by: Seth Painter MD  Date:    09/17/2022  Time:    23:51               Narrative:    EXAMINATION:  XR CHEST AP PORTABLE    CLINICAL HISTORY:  Shortness of breath.    TECHNIQUE:  Single frontal view of the chest was performed.    COMPARISON:  07/15/2022.    FINDINGS:  The trachea is unremarkable.  There are calcifications of the aortic knob.  The cardiomediastinal silhouette is stable.  There is no evidence of free air beneath the hemidiaphragms.  There are no pleural effusions.  There is no evidence of a pneumothorax.  There is no evidence of pneumomediastinum.  No airspace opacity is present.  The aeration of the lung fields are unchanged.  There are degenerative changes in the osseous structures.                                       CT Head Without Contrast (Final result)  Result time 09/17/22 23:10:19      Final result by Kelechi Preciado MD (09/17/22 23:10:19)                   Impression:      No  acute abnormality.      Electronically signed by: Kelechi Preciado  Date:    09/17/2022  Time:    23:10               Narrative:    EXAMINATION:  CT HEAD WITHOUT CONTRAST    CLINICAL HISTORY:  Headache, new or worsening (Age >= 50y);    TECHNIQUE:  Low dose axial CT images obtained throughout the head without intravenous contrast. Sagittal and coronal reconstructions were performed.    COMPARISON:  07/15/2022    FINDINGS:  Intracranial compartment:    Ventricles and sulci are stable in size for age without evidence of hydrocephalus. No extra-axial blood or fluid collections.    The brain parenchyma appears stable.  Low density involving the anterior limb of the internal capsule near the head of the caudate nucleus on the left suggests remote lacunar infarct symmetrical bilateral basal ganglia calcifications are present.  No parenchymal mass, hemorrhage, edema or major vascular distribution infarct.    Skull/extracranial contents (limited evaluation): No fracture. Bilateral maxillary sinus opacities are noted left greater than right.  Bilateral ocular lens replacements are noted.                                       Medications   acetaminophen tablet 1,000 mg (1,000 mg Oral Given 9/18/22 0055)   amLODIPine tablet 5 mg (5 mg Oral Given 9/18/22 0121)     Medical Decision Making:   History:   Old Medical Records: I decided to obtain old medical records.  Initial Assessment:   89 yo F with PMHx of PE, GERD, HTN, HLD, presents to the ED via EMS for chronic intermittent, generalized headaches.  Patient no acute distress.  Exam with patient that is cachectic but moves all of her extremities, history limited secondary to patient being hard of hearing.  Will contact patient's got daughter for collateral information.  Differential diagnosis includes migraine versus tension type headache. No headache red flags. Neurologic exam without evidence of meningismus, focal neurologic findings. Presentation not consistent with acute  intracranial bleed to include SAH (lack of risk factors, headache history). Presentation not consistent with acute CNS infection to include meningitis or brain abscess, Temporal arteritis unlikely, as is acute angle closure glaucoma given history and physical findings. Presentation not consistent with other acute, emergent causes of headache at this time. Plan to treat symptomatically with pain medication.     Plan: pain medication, CT brain, serial reassessment        Independently Interpreted Test(s):   I have ordered and independently interpreted EKG Reading(s) - see prior notes  Clinical Tests:   Lab Tests: Ordered and Reviewed  Radiological Study: Ordered and Reviewed  Medical Tests: Ordered and Reviewed        Scribe Attestation:   Scribe #1: I performed the above scribed service and the documentation accurately describes the services I performed. I attest to the accuracy of the note.      ED Course as of 09/18/22 0241   Sat Sep 17, 2022   2314 Spoke with patient's god daughter who patient intermittently lives with..  Patient has been checked on multiple times throughout the day today by her neighbor.  Furthermore she has family member that stays at home named Harry who left for the day this afternoon .  Per the patient's God daughter it seems whenever the pt is left alone she calls EMS to be brought to the emergency department.  She has family members who give her her medication at home. [AS]   2338 CT head without any acute abnormalities. [AS]   Sun Sep 18, 2022   0155 Chem 14 negative for hypo-or hyper natremia, kalemia, chloridemia, or other electrolyte abnormalities; BUN and creatinine were within normal limits indicating normal kidney function, ALT and AST were within normal limits indicating normal liver function.  CBC without significant leukocytosis, anemia, or platelet abnormalities. Trop negative. UA without signs of infection. [AS]   0238 Patient currently headache free.  Blood pressure improved  after amlodipine and Coreg.  Low suspicion for hypertensive emergency or urgency. Pt is currently stable for discharge. I discussed with the patient/family the diagnosis, treatment plan, indications for return to the emergency department, and for expected follow-up. The patient/family verbalized an understanding. The patient/family is asked if there are any questions or concerns. We discuss the case, until all issues are addressed to the patient/family's satisfaction. Patient/family understands and is agreeable to the plan.   [AS]      ED Course User Index  [AS] Jin Byrd MD                   Clinical Impression:   Final diagnoses:  [R42] Dizziness  [G44.211] Intractable episodic tension-type headache (Primary)  [I10] Hypertension, unspecified type        ED Disposition Condition    Discharge Stable        I, Jin Byrd MD, personally performed the services described in this documentation. All medical record entries made by the scribe were at my direction and in my presence. I have reviewed the chart and agree that the record reflects my personal performance and is accurate and complete.    ED Prescriptions       Medication Sig Dispense Start Date End Date Auth. Provider    carvediloL (COREG) 6.25 MG tablet Take 1 tablet (6.25 mg total) by mouth 2 (two) times daily with meals. 60 tablet 9/18/2022 9/18/2023 Jin Byrd MD          Follow-up Information       Follow up With Specialties Details Why Contact Info    Primary care doctor  Schedule an appointment as soon as possible for a visit  For reassessment of blood pressure medication         This dictation has been generated using M-Modal Fluency Direct dictation; some phonetic errors may occur.        Jin Byrd MD  09/18/22 [id number]

## 2022-09-18 NOTE — DISCHARGE INSTRUCTIONS
Thank you for coming to our Emergency Department today. It is important to remember that some problems are difficult to diagnose and may not be found during your first visit. Be sure to follow up with your primary care doctor and review any labs/imaging that was performed with them. If you do not have a primary care doctor, you may contact the one listed on your discharge paperwork or you may also call the [facility] Appointment Desk at [phone] to schedule an appointment with one.     All medications may potentially have side effects and it is impossible to predict which medications may give you side effects. If you feel that you are having a negative effect of any medication you should immediately stop taking them and seek medical attention.    Return to the ER with any questions/concerns, new/concerning symptoms, worsening or failure to improve. Do not drive or make any important decisions for 24 hours if you have received any pain medications, sedatives or mood altering drugs during your ER visit.

## 2022-09-18 NOTE — ED NOTES
Spoke with God daughter Hilda this morning and informed that pt is discharged and in need of transport home as she has no key to get into her home. States the grandson Harry will be coming into town this morning to pick patient up from hospital.

## 2022-09-21 ENCOUNTER — PATIENT OUTREACH (OUTPATIENT)
Dept: ADMINISTRATIVE | Facility: OTHER | Age: 87
End: 2022-09-21
Payer: MEDICARE

## 2022-09-21 NOTE — PROGRESS NOTES
CHW - Follow Up    This Community Health Worker completed a follow up visit with caregiver via telephone today.  Pt/Caregiver reported: specifics of care needed, what is affordable, pt's strengths & what qualities pt's family would like in a care taker  Community Health Worker provided: list of agencies along with costs & standards (3)  Follow up required: yes  Follow-up Outreach - Due: 9/28/2022  Pt's caretaker will get back once a decision is made about agency along with pt's family

## 2022-09-27 ENCOUNTER — PATIENT OUTREACH (OUTPATIENT)
Dept: ADMINISTRATIVE | Facility: OTHER | Age: 87
End: 2022-09-27
Payer: MEDICARE

## 2022-09-27 NOTE — PROGRESS NOTES
CHW - Follow Up    This Community Health Worker completed a follow up visit with caregiver via telephone today.  Pt/Caregiver reported: wanted to know if agencies should be contacted by caregiver  Community Health Worker provided: spoke with another agency on pt's behalf for more cost effective services; org will give call back at later time  Follow up required: yes  10/7/22

## 2022-10-04 ENCOUNTER — PATIENT OUTREACH (OUTPATIENT)
Dept: ADMINISTRATIVE | Facility: OTHER | Age: 87
End: 2022-10-04
Payer: MEDICARE

## 2022-10-04 NOTE — PROGRESS NOTES
CHW - Follow Up    This Community Health Worker completed a follow up visit with caregiver via telephone today.  Pt/Caregiver reported:   Community Health Worker provided: no response from agencies outreached for answers; will continue search  Follow up required: yes  Follow-up Outreach - Due: 10/10/2022

## 2022-10-05 ENCOUNTER — PATIENT OUTREACH (OUTPATIENT)
Dept: ADMINISTRATIVE | Facility: OTHER | Age: 87
End: 2022-10-05
Payer: MEDICARE

## 2022-10-05 DIAGNOSIS — R53.81 DEBILITY: Primary | ICD-10-CM

## 2022-10-05 DIAGNOSIS — Z91.81 HISTORY OF FALL: ICD-10-CM

## 2022-10-05 NOTE — PROGRESS NOTES
CHW - Follow Up    This Community Health Worker completed a follow up visit with patient via telephone today.  Pt/Caregiver reported:   Community Health Worker provided: redirected pt to provider for care giving referral  Follow up required: yes  Follow-up Outreach - Due: 10/25/2022

## 2022-10-06 ENCOUNTER — PATIENT OUTREACH (OUTPATIENT)
Dept: ADMINISTRATIVE | Facility: OTHER | Age: 87
End: 2022-10-06
Payer: MEDICARE

## 2022-10-06 NOTE — PROGRESS NOTES
CHW - Case Closure    This Community Health Worker spoke to caregiver via telephone today.   Pt/Caregiver reported: confirmation that provider input referral for pt and will call if he needs anything   Pt/Caregiver denied any additional needs at this time and agrees with episode closure at this time.  Provided patient with Community Health Worker's contact information and encouraged him/her to contact this Community Health Worker if additional needs arise.

## 2022-10-07 PROCEDURE — G0180 PR HOME HEALTH MD CERTIFICATION: ICD-10-PCS | Mod: ,,, | Performed by: FAMILY MEDICINE

## 2022-10-07 PROCEDURE — G0180 MD CERTIFICATION HHA PATIENT: HCPCS | Mod: ,,, | Performed by: FAMILY MEDICINE

## 2022-10-24 ENCOUNTER — EXTERNAL HOME HEALTH (OUTPATIENT)
Dept: HOME HEALTH SERVICES | Facility: HOSPITAL | Age: 87
End: 2022-10-24
Payer: MEDICARE

## 2022-10-25 ENCOUNTER — DOCUMENT SCAN (OUTPATIENT)
Dept: HOME HEALTH SERVICES | Facility: HOSPITAL | Age: 87
End: 2022-10-25
Payer: MEDICARE

## 2023-04-13 ENCOUNTER — PES CALL (OUTPATIENT)
Dept: ADMINISTRATIVE | Facility: CLINIC | Age: 88
End: 2023-04-13
Payer: MEDICARE

## 2023-04-27 ENCOUNTER — PES CALL (OUTPATIENT)
Dept: ADMINISTRATIVE | Facility: CLINIC | Age: 88
End: 2023-04-27
Payer: MEDICARE

## 2023-07-03 ENCOUNTER — PES CALL (OUTPATIENT)
Dept: ADMINISTRATIVE | Facility: CLINIC | Age: 88
End: 2023-07-03
Payer: MEDICARE

## 2023-10-10 ENCOUNTER — PATIENT OUTREACH (OUTPATIENT)
Dept: ADMINISTRATIVE | Facility: HOSPITAL | Age: 88
End: 2023-10-10
Payer: MEDICARE

## 2023-10-10 NOTE — PROGRESS NOTES
Population Health Chart Review & Patient Outreach Details:     Reason for Outreach Encounter:     [x]  Non-Compliant Report   []  Payor Report ([employer], PHN, BCBS, MSSP, MCIP, UHC, etc.)   []  Pre-Visit Chart Review     Updates Requested / Reviewed:     [x]  Care Everywhere    []     []  External Sources ([facility], [facility], DIS, etc.)   [x]  Care Team Updated    Patient Outreach Method:    [x]  Telephone Outreach Completed   [] Successful   [x] Left Voicemail   [] Unable to Contact (wrong number, no voicemail)  []  [employer] [facility] Outreach Sent  []  Letter Outreach Mailed  []  Fax Sent for External Records  []  External Records Upload    Health Maintenance Topics Addressed and Outreach Outcomes / Actions Taken:        []      Breast Cancer Screening []  Mammo Scheduled      []  External Records Requested     []  Added Reminder to Complete to Upcoming Primary Care Appt Notes     []  Patient Declined     []  Patient Will Call Back to Schedule     []  Patient Will Schedule with External Provider / Order Routed if Applicable             []       Cervical Cancer Screening []  Pap Scheduled      []  External Records Requested     []  Added Reminder to Complete to Upcoming Primary Care Appt Notes     []  Patient Declined     []  Patient Will Call Back to Schedule     []  Patient Will Schedule with External Provider               []          Colorectal Cancer Screening []  Colonoscopy Case Request or Referral Placed     []  External Records Requested     []  Added Reminder to Complete to Upcoming Primary Care Appt Notes     []  Patient Declined     []  Patient Will Call Back to Schedule     []  Patient Will Schedule with External Provider     []  Fit Kit Mailed (add the SmartPhrase under additional notes)     []  Reminded Patient to Complete Home Test             []      Diabetic Eye Exam []  Eye Camera Scheduled or Optometry Referral Placed     []  External Records Requested     []  Added Reminder to Complete to  Upcoming Primary Care Appt Notes     []  Patient Declined     []  Patient Will Call Back to Schedule     []  Patient Will Schedule with External Provider             []      Blood Pressure Control []  Primary Care Follow Up Visit Scheduled     []  Remote Blood Pressure Reading Captured     []  Added Reminder to Complete to Upcoming Primary Care Appt Notes     []  Patient Declined     []  Patient Will Call Back / Patient Will Send Portal Message with Reading     []  Patient Will Call Back to Schedule Provider Visit             []       HbA1c & Other Labs []  Lab Appt Scheduled for Due Labs     []  Primary Care Follow Up Visit Scheduled      []  Reminded Patient to Complete Home Test     []  Added Reminder to Complete to Upcoming Primary Care Appt Notes     []  Patient Declined     []  Patient Will Call Back to Schedule     []  Patient Will Schedule with External Provider / Order Routed if Applicable           [x]    Schedule Primary Care Appt []  Primary Care Appt Scheduled     []  Patient Declined     []  Patient Will Call Back to Schedule     []  Pt Established with External Provider & Updated Care Team             []      Medication Adherence []  Primary Care Appointment Scheduled     []  Added Reminder to Upcoming Primary Care Appt Notes     []  Patient Reminded to  Prescription     []  Patient Declined, Provider Notified if Needed     []  Sent Provider Message to Review and/or Add Exclusion to Problem List             []      Osteoporosis Screening []  DXA Appointment Scheduled     []  External Records Requested     []  Added Reminder to Complete to Upcoming Primary Care Appt Notes     []  Patient Declined     []  Patient Will Call Back to Schedule     []  Patient Will Schedule with External Provider / Order Routed if Applicable     Additional Care Coordinator Notes:         Further Action Needed If Patient Returns Outreach:     Need to schedule visit with pcp.

## 2024-01-01 NOTE — ED TRIAGE NOTES
"Pt states she has had epigastric pain for the last 3 days. She states she has also had chest pain on inspiration for the past 3 days. Pt has a history of GERD and states she has had "gas pains" for several days not relieved by OTC medications. She denies SOB. She describes the pain as sharp and cramping.  " SCM

## 2024-01-17 ENCOUNTER — PATIENT OUTREACH (OUTPATIENT)
Dept: ADMINISTRATIVE | Facility: HOSPITAL | Age: 89
End: 2024-01-17
Payer: MEDICARE

## 2024-01-17 NOTE — PROGRESS NOTES
Population Health Chart Review & Patient Outreach Details    HCC report due for physical with PCP.  Unable to LM  Further Action Needed If Patient Returns Outreach:            Updates Requested / Reviewed:     [x]  Care Everywhere    []     []  External Sources ([facility], [facility], [facility], etc.)    [] [facility]   [] [facility]   [] Other:    []  Care Team Updated   []  Removed  or Duplicate Orders   []  Immunization Reconciliation Completed / Queried    [] [place]   [] [place]   [] [place]   [] [place]      Health Maintenance Topics Addressed and Outreach Outcomes / Actions Taken:             Breast Cancer Screening []  Mammogram Order Placed    []  Mammogram Screening Scheduled    []  External Records Requested & Care Team Updated if Applicable    []  External Records Uploaded & Care Team Updated if Applicable    []  Pt Declined Scheduling Mammogram    []  Pt Will Schedule with External Provider / Order Routed & Care Team Updated if Applicable              Cervical Cancer Screening []  Pap Smear Scheduled in Primary Care or OBGYN    []  External Records Requested & Care Team Updated if Applicable       []  External Records Uploaded, Care Team Updated, & History Updated if Applicable    []  Patient Declined Scheduling Pap Smear    []  Patient Will Schedule with External Provider & Care Team Updated if Applicable                  Colorectal Cancer Screening []  Colonoscopy Case Request / Referral / Home Test Order Placed    []  External Records Requested & Care Team Updated if Applicable    []  External Records Uploaded, Care Team Updated, & History Updated if Applicable    []  Patient Declined Completing Colon Cancer Screening    []  Patient Will Schedule with External Provider & Care Team Updated if Applicable    []  Fit Kit Mailed (add the SmartPhrase under additional notes)    []  Reminded Patient to Complete Home Test                Diabetic Eye Exam []  Eye Exam Screening Order Placed    []  Eye  Camera Scheduled or Optometry/Ophthalmology Referral Placed    []  External Records Requested & Care Team Updated if Applicable    []  External Records Uploaded, Care Team Updated, & History Updated if Applicable    []  Patient Declined Scheduling Eye Exam    []  Patient Will Schedule with External Provider & Care Team Updated if Applicable             Blood Pressure Control []  Primary Care Follow Up Visit Scheduled     []  Remote Blood Pressure Reading Captured    []  Patient Declined Remote Reading or Scheduling Appt - Escalated to PCP    []  Patient Will Call Back or Send Portal Message with Reading                 HbA1c & Other Labs []  Overdue Lab(s) Ordered    []  Overdue Lab(s) Scheduled    []  External Records Uploaded & Care Team Updated if Applicable    []  Primary Care Follow Up Visit Scheduled     []  Reminded Patient to Complete A1c Home Test    []  Patient Declined Scheduling Labs or Will Call Back to Schedule    []  Patient Will Schedule with External Provider / Order Routed, & Care Team Updated if Applicable           Primary Care Appointment []  Primary Care Appt Scheduled    []  Patient Declined Scheduling or Will Call Back to Schedule    []  Pt Established with External Provider, Updated Care Team, & Informed Pt to Notify Payor if Applicable           Medication Adherence /    Statin Use []  Primary Care Appointment Scheduled    []  Patient Reminded to  Prescription    []  Patient Declined, Provider Notified if Needed    []  Sent Provider Message to Review to Evaluate Pt for Statin, Add Exclusion Dx Codes, Document   Exclusion in Problem List, Change Statin Intensity Level to Moderate or High Intensity if Applicable                Osteoporosis Screening []  Dexa Order Placed    []  Dexa Appointment Scheduled    []  External Records Requested & Care Team Updated    []  External Records Uploaded, Care Team Updated, & History Updated if Applicable    []  Patient Declined Scheduling Dexa or  Will Call Back to Schedule    []  Patient Will Schedule with External Provider / Order Routed & Care Team Updated if Applicable       Additional Notes:

## 2024-02-02 ENCOUNTER — PATIENT OUTREACH (OUTPATIENT)
Dept: ADMINISTRATIVE | Facility: HOSPITAL | Age: 89
End: 2024-02-02
Payer: MEDICARE

## 2024-02-02 NOTE — PROGRESS NOTES
Population Health Chart Review & Patient Outreach Details      Further Action Needed If Patient Returns Outreach:      Need to schedule visit with pcp.       Updates Requested / Reviewed:     [x]  Care Everywhere    []     []  External Sources ([facility], [facility], [facility], etc.)    [] [facility]   [] [facility]   [] Other:    []  Care Team Updated   []  Removed  or Duplicate Orders   []  Immunization Reconciliation Completed / Queried    [] [place]   [] [place]   [] [place]   [] [place]      Health Maintenance Topics Addressed and Outreach Outcomes / Actions Taken:             Breast Cancer Screening []  Mammogram Order Placed    []  Mammogram Screening Scheduled    []  External Records Requested & Care Team Updated if Applicable    []  External Records Uploaded & Care Team Updated if Applicable    []  Pt Declined Scheduling Mammogram    []  Pt Will Schedule with External Provider / Order Routed & Care Team Updated if Applicable              Cervical Cancer Screening []  Pap Smear Scheduled in Primary Care or OBGYN    []  External Records Requested & Care Team Updated if Applicable       []  External Records Uploaded, Care Team Updated, & History Updated if Applicable    []  Patient Declined Scheduling Pap Smear    []  Patient Will Schedule with External Provider & Care Team Updated if Applicable                  Colorectal Cancer Screening []  Colonoscopy Case Request / Referral / Home Test Order Placed    []  External Records Requested & Care Team Updated if Applicable    []  External Records Uploaded, Care Team Updated, & History Updated if Applicable    []  Patient Declined Completing Colon Cancer Screening    []  Patient Will Schedule with External Provider & Care Team Updated if Applicable    []  Fit Kit Mailed (add the SmartPhrase under additional notes)    []  Reminded Patient to Complete Home Test                Diabetic Eye Exam []  Eye Exam Screening Order Placed    []  Eye Camera Scheduled or  Optometry/Ophthalmology Referral Placed    []  External Records Requested & Care Team Updated if Applicable    []  External Records Uploaded, Care Team Updated, & History Updated if Applicable    []  Patient Declined Scheduling Eye Exam    []  Patient Will Schedule with External Provider & Care Team Updated if Applicable             Blood Pressure Control []  Primary Care Follow Up Visit Scheduled     []  Remote Blood Pressure Reading Captured    []  Patient Declined Remote Reading or Scheduling Appt - Escalated to PCP    []  Patient Will Call Back or Send Portal Message with Reading                 HbA1c & Other Labs []  Overdue Lab(s) Ordered    []  Overdue Lab(s) Scheduled    []  External Records Uploaded & Care Team Updated if Applicable    []  Primary Care Follow Up Visit Scheduled     []  Reminded Patient to Complete A1c Home Test    []  Patient Declined Scheduling Labs or Will Call Back to Schedule    []  Patient Will Schedule with External Provider / Order Routed, & Care Team Updated if Applicable           Primary Care Appointment []  Primary Care Appt Scheduled    []  Patient Declined Scheduling or Will Call Back to Schedule    []  Pt Established with External Provider, Updated Care Team, & Informed Pt to Notify Payor if Applicable           Medication Adherence /    Statin Use []  Primary Care Appointment Scheduled    []  Patient Reminded to  Prescription    []  Patient Declined, Provider Notified if Needed    []  Sent Provider Message to Review to Evaluate Pt for Statin, Add Exclusion Dx Codes, Document   Exclusion in Problem List, Change Statin Intensity Level to Moderate or High Intensity if Applicable                Osteoporosis Screening []  Dexa Order Placed    []  Dexa Appointment Scheduled    []  External Records Requested & Care Team Updated    []  External Records Uploaded, Care Team Updated, & History Updated if Applicable    []  Patient Declined Scheduling Dexa or Will Call Back to  Schedule    []  Patient Will Schedule with External Provider / Order Routed & Care Team Updated if Applicable       Additional Notes:     HCC Report need to see PCP this year. -- letter mail out.

## 2024-02-02 NOTE — LETTER
February 2, 2024            Daiana Mcduffie  [address]  [facility] [address]       Dear Ms. Mcduffie,      Our records indicate that you are overdue for an annual checkup. Currently, Sebastian Kumar MD is listed as your primary care provider. If this is incorrect, please notify your primary care clinic so we can update your health record.    Your [facility] care team is committed to supporting your overall health. We look forward to seeing you soon and appreciate the opportunity to serve you.    If you would like to schedule your appointment, please contact your primary care clinic.    Sincerely,     Sebastian Kumar MD and your [facility] Primary Care Team